# Patient Record
Sex: FEMALE | Race: BLACK OR AFRICAN AMERICAN | NOT HISPANIC OR LATINO | Employment: FULL TIME | URBAN - METROPOLITAN AREA
[De-identification: names, ages, dates, MRNs, and addresses within clinical notes are randomized per-mention and may not be internally consistent; named-entity substitution may affect disease eponyms.]

---

## 2017-02-04 ENCOUNTER — APPOINTMENT (OUTPATIENT)
Dept: GENERAL RADIOLOGY | Facility: CLINIC | Age: 27
End: 2017-02-04
Attending: EMERGENCY MEDICINE

## 2017-02-04 ENCOUNTER — HOSPITAL ENCOUNTER (EMERGENCY)
Facility: CLINIC | Age: 27
Discharge: HOME | End: 2017-02-04
Attending: EMERGENCY MEDICINE | Admitting: EMERGENCY MEDICINE

## 2017-02-04 VITALS
TEMPERATURE: 97.7 F | RESPIRATION RATE: 18 BRPM | BODY MASS INDEX: 29.77 KG/M2 | OXYGEN SATURATION: 100 % | DIASTOLIC BLOOD PRESSURE: 81 MMHG | WEIGHT: 168 LBS | SYSTOLIC BLOOD PRESSURE: 116 MMHG

## 2017-02-04 DIAGNOSIS — T59.91XA TOXIC EFFECT OF GAS EXPOSURE, ACCIDENTAL OR UNINTENTIONAL, INITIAL ENCOUNTER: ICD-10-CM

## 2017-02-04 DIAGNOSIS — R11.2 NON-INTRACTABLE VOMITING WITH NAUSEA, UNSPECIFIED VOMITING TYPE: ICD-10-CM

## 2017-02-04 LAB
ALBUMIN SERPL-MCNC: 3.6 G/DL (ref 3.4–5)
ALP SERPL-CCNC: 44 U/L (ref 40–150)
ALT SERPL W P-5'-P-CCNC: 23 U/L (ref 0–50)
ANION GAP SERPL CALCULATED.3IONS-SCNC: 9 MMOL/L (ref 3–14)
AST SERPL W P-5'-P-CCNC: 24 U/L (ref 0–45)
BASOPHILS # BLD AUTO: 0 10E9/L (ref 0–0.2)
BASOPHILS NFR BLD AUTO: 0.2 %
BILIRUB SERPL-MCNC: 0.5 MG/DL (ref 0.2–1.3)
BUN SERPL-MCNC: 11 MG/DL (ref 7–30)
CALCIUM SERPL-MCNC: 8.1 MG/DL (ref 8.5–10.1)
CHLORIDE SERPL-SCNC: 106 MMOL/L (ref 94–109)
CO2 SERPL-SCNC: 26 MMOL/L (ref 20–32)
COHGB MFR BLD: 3.7 % (ref 0–2)
CREAT SERPL-MCNC: 0.84 MG/DL (ref 0.52–1.04)
DIFFERENTIAL METHOD BLD: ABNORMAL
EOSINOPHIL # BLD AUTO: 0.2 10E9/L (ref 0–0.7)
EOSINOPHIL NFR BLD AUTO: 1.6 %
ERYTHROCYTE [DISTWIDTH] IN BLOOD BY AUTOMATED COUNT: 13.2 % (ref 10–15)
GFR SERPL CREATININE-BSD FRML MDRD: 82 ML/MIN/1.7M2
GLUCOSE SERPL-MCNC: 91 MG/DL (ref 70–99)
HCT VFR BLD AUTO: 41.8 % (ref 35–47)
HGB BLD-MCNC: 13.3 G/DL (ref 11.7–15.7)
IMM GRANULOCYTES # BLD: 0.1 10E9/L (ref 0–0.4)
IMM GRANULOCYTES NFR BLD: 0.4 %
LYMPHOCYTES # BLD AUTO: 3.1 10E9/L (ref 0.8–5.3)
LYMPHOCYTES NFR BLD AUTO: 23 %
MCH RBC QN AUTO: 30.5 PG (ref 26.5–33)
MCHC RBC AUTO-ENTMCNC: 31.8 G/DL (ref 31.5–36.5)
MCV RBC AUTO: 96 FL (ref 78–100)
MONOCYTES # BLD AUTO: 0.9 10E9/L (ref 0–1.3)
MONOCYTES NFR BLD AUTO: 6.6 %
NEUTROPHILS # BLD AUTO: 9.3 10E9/L (ref 1.6–8.3)
NEUTROPHILS NFR BLD AUTO: 68.2 %
NRBC # BLD AUTO: 0 10*3/UL
NRBC BLD AUTO-RTO: 0 /100
PLATELET # BLD AUTO: 210 10E9/L (ref 150–450)
POTASSIUM SERPL-SCNC: 4.2 MMOL/L (ref 3.4–5.3)
PROT SERPL-MCNC: 7.3 G/DL (ref 6.8–8.8)
RBC # BLD AUTO: 4.36 10E12/L (ref 3.8–5.2)
SODIUM SERPL-SCNC: 141 MMOL/L (ref 133–144)
WBC # BLD AUTO: 13.7 10E9/L (ref 4–11)

## 2017-02-04 PROCEDURE — 96361 HYDRATE IV INFUSION ADD-ON: CPT | Performed by: EMERGENCY MEDICINE

## 2017-02-04 PROCEDURE — 99284 EMERGENCY DEPT VISIT MOD MDM: CPT | Mod: Z6 | Performed by: EMERGENCY MEDICINE

## 2017-02-04 PROCEDURE — 99284 EMERGENCY DEPT VISIT MOD MDM: CPT | Mod: 25 | Performed by: EMERGENCY MEDICINE

## 2017-02-04 PROCEDURE — 85025 COMPLETE CBC W/AUTO DIFF WBC: CPT | Performed by: EMERGENCY MEDICINE

## 2017-02-04 PROCEDURE — 96374 THER/PROPH/DIAG INJ IV PUSH: CPT | Performed by: EMERGENCY MEDICINE

## 2017-02-04 PROCEDURE — 80053 COMPREHEN METABOLIC PANEL: CPT | Performed by: EMERGENCY MEDICINE

## 2017-02-04 PROCEDURE — 82375 ASSAY CARBOXYHB QUANT: CPT | Performed by: EMERGENCY MEDICINE

## 2017-02-04 PROCEDURE — 71020 XR CHEST 2 VW: CPT

## 2017-02-04 PROCEDURE — 25000128 H RX IP 250 OP 636: Performed by: EMERGENCY MEDICINE

## 2017-02-04 RX ORDER — LIDOCAINE 40 MG/G
CREAM TOPICAL
Status: DISCONTINUED | OUTPATIENT
Start: 2017-02-04 | End: 2017-02-04 | Stop reason: HOSPADM

## 2017-02-04 RX ORDER — SODIUM CHLORIDE 9 MG/ML
1000 INJECTION, SOLUTION INTRAVENOUS CONTINUOUS
Status: DISCONTINUED | OUTPATIENT
Start: 2017-02-04 | End: 2017-02-04 | Stop reason: HOSPADM

## 2017-02-04 RX ORDER — ONDANSETRON 2 MG/ML
4 INJECTION INTRAMUSCULAR; INTRAVENOUS EVERY 30 MIN PRN
Status: DISCONTINUED | OUTPATIENT
Start: 2017-02-04 | End: 2017-02-04 | Stop reason: HOSPADM

## 2017-02-04 RX ADMIN — SODIUM CHLORIDE 1000 ML: 9 INJECTION, SOLUTION INTRAVENOUS at 01:13

## 2017-02-04 RX ADMIN — ONDANSETRON 4 MG: 2 INJECTION INTRAMUSCULAR; INTRAVENOUS at 01:15

## 2017-02-04 ASSESSMENT — ENCOUNTER SYMPTOMS
ABDOMINAL PAIN: 0
DIZZINESS: 1
LIGHT-HEADEDNESS: 1
SHORTNESS OF BREATH: 1
VOMITING: 1
DIARRHEA: 0
FATIGUE: 1
NAUSEA: 1
FEVER: 0
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
## 2017-02-04 NOTE — ED AVS SNAPSHOT
[facility], [place], Emergency Department    [address]    [facility] [address]    Phone:  [phone]    Fax:  [phone]                                       Chhaya Mock   MRN: [id number]    Department:  [facility], Emergency Department   Date of Visit:  2/4/2017           After Visit Summary Signature Page     I have received my discharge instructions, and my questions have been answered. I have discussed any challenges I see with this plan with the nurse or doctor.    ..........................................................................................................................................  Patient/Patient Representative Signature      ..........................................................................................................................................  Patient Representative Print Name and Relationship to Patient    ..................................................               ................................................  Date                                            Time    ..........................................................................................................................................  Reviewed by Signature/Title    ...................................................              ..............................................  Date                                                            Time
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
## 2017-02-04 NOTE — DISCHARGE INSTRUCTIONS
"   * VOMITING [6yr-Adult]  Vomiting is a common symptom that may be due to different causes. These include gastroenteritis (\"stomach-flu\"), food poisoning and gastritis. There are other more serious causes of vomiting which may be hard to diagnose early in the illness. Therefore, it is important to watch for the warning signs listed below.  The main danger from repeated vomiting is \"dehydration\". This is due to excess loss of water and minerals from the body. When this occurs, body fluids must be replaced.`  HOME CARE:    If symptoms are severe, rest at home for the next 24 hours.    You may use acetaminophen (Tylenol) 650-1000 mg every 6 hours to control fever, unless another medicine was prescribed. [ NOTE : If you have chronic liver disease, talk with your doctor before using acetaminophen.] (Aspirin should never be used in anyone under 18 years of age who is ill with a fever. It may cause severe liver damage.)    Avoid tobacco and alcohol use, which may worsen your symptoms.    If medicines for vomiting were prescribed, take as directed.  DURING THE FIRST 12-24 HOURS follow the diet below. Try to take frequent small sips even if you vomit occasionally:    FRUIT JUICES: Apple, grape juice, clear fruit drinks, electrolyte replacement and sports drinks.    BEVERAGES: Sport drinks such as Gatorade, soft drinks without caffeine; mineral water (plain or flavored), decaffeinated tea and coffee.    SOUPS: Clear broth, consommé and bouillon    DESSERTS: Plain gelatin (Jell-O), popsicles and fruit juice bars.  DURING THE NEXT 24 HOURS you may add the following to the above:    Hot cereal, plain toast, bread, rolls, crackers    Plain noodles, rice, mashed potatoes, chicken noodle or rice soup    Unsweetened canned fruit (avoid pineapple), bananas    Avoid dairy products    Limit caffeine and chocolate. No spices or seasonings except salt.  DURING THE NEXT 24 HOURS  Slowly go back to a normal diet, as you feel better and " your symptoms lessen.  FOLLOW UP with your doctor as advised if you are not improving over the next 2-3 days.  GET PROMPT MEDICAL ATTENTION if any of the following occur:    Constant abdominal pain that stays in the same spot or gets worse    Continued vomiting (unable to keep liquids down) for 24 hours    Frequent diarrhea (more than 5 times a day); blood (red or black color) in diarrhea    No urine output for 12 hours or extreme thirst    Weakness, dizziness or fainting    Unusually drowsy or confused    Fever over 101.0  F (38.3  C) for more than 3 days    Yellow color of the eyes or skin    0979-4163 [facility], [address] [place] [address]. All rights reserved. This information is not intended as a substitute for professional medical care. Always follow your healthcare professional's instructions.

## 2017-02-24 ENCOUNTER — HOSPITAL ENCOUNTER (EMERGENCY)
Facility: CLINIC | Age: 27
Discharge: HOME | End: 2017-02-25
Attending: EMERGENCY MEDICINE | Admitting: EMERGENCY MEDICINE

## 2017-02-24 DIAGNOSIS — L02.412 ABSCESS OF AXILLA, LEFT: ICD-10-CM

## 2017-02-24 PROCEDURE — 25000132 ZZH RX MED GY IP 250 OP 250 PS 637: Performed by: EMERGENCY MEDICINE

## 2017-02-24 PROCEDURE — 99283 EMERGENCY DEPT VISIT LOW MDM: CPT | Mod: 25 | Performed by: EMERGENCY MEDICINE

## 2017-02-24 PROCEDURE — 10061 I&D ABSCESS COMP/MULTIPLE: CPT | Performed by: EMERGENCY MEDICINE

## 2017-02-24 PROCEDURE — 99283 EMERGENCY DEPT VISIT LOW MDM: CPT | Mod: Z6 | Performed by: EMERGENCY MEDICINE

## 2017-02-24 PROCEDURE — 10061 I&D ABSCESS COMP/MULTIPLE: CPT | Mod: Z6 | Performed by: EMERGENCY MEDICINE

## 2017-02-24 RX ORDER — OXYCODONE AND ACETAMINOPHEN 5; 325 MG/1; MG/1
2 TABLET ORAL ONCE
Status: COMPLETED | OUTPATIENT
Start: 2017-02-24 | End: 2017-02-24

## 2017-02-24 RX ADMIN — OXYCODONE HYDROCHLORIDE AND ACETAMINOPHEN 2 TABLET: 5; 325 TABLET ORAL at 23:33

## 2017-02-24 NOTE — ED AVS SNAPSHOT
[facility], Emergency Department    [address]    [place] [address]    Phone:  [phone]    Fax:  [phone]                                       Chhaya Mock   MRN: [id number]    Department:  [facility], Emergency Department   Date of Visit:  2/24/2017           Patient Information     Date Of Birth          1990        Your diagnoses for this visit were:     Abscess of axilla, left        You were seen by Esperanza Luque MD.        Discharge Instructions       Take percocet for pain if needed.  Do not take with tylenol.  You can take it with ibuprofen.      Take clindamycin for 10 days to help treat the infection.     Wound check in 2 days.          Abscess (Incision & Drainage)  An abscess (sometimes called a  boil ) occurs when bacteria get trapped under the skin and begin to grow. Pus forms inside the abscess as the body responds to the bacteria. An abscess can occur with an insect bite, ingrown hair, blocked oil gland, pimple, cyst, or puncture wound.  Treatment of your abscess has required an incision to drain the pus. If the abscess pocket was large, a gauze packing may have been inserted. This will need to be removed and possibly replaced on your next visit. Antibiotics are not required in the treatment of a simple abscess, unless the infection is spreading into the skin around the wound (known as  cellulitis ).  Healing of the wound will take about one to two weeks depending on the size of the abscess. Healthy tissue will grow from the bottom and sides of the opening until it seals over.  Home care  The following home care guidelines can help your wound heal:    The wound may drain for the first two days. Cover the wound with a clean dry dressing. If the dressing becomes soaked with blood or pus, change it.    If a gauze packing was placed inside the abscess cavity, you may be advised to remove it yourself. You may do this in the shower. Once the packing is removed,  you should wash the area in the shower or bath 3 to 4 times a day, until the skin opening has closed. Make sure you wash your hands after changing the packing or cleaning the wound.    If you were prescribed antibiotics, take them as directed until they are all gone.    You may use acetaminophen (Tylenol) or ibuprofen (Motrin, Advil) to control pain, unless another pain medicine was prescribed. If you have liver disease or ever had a stomach ulcer, talk with your doctor before using these medicines.  Follow-up care  Follow up with your doctor as advised by our staff. If a gauze packing was inserted in your wound, it should be removed in 1 to 2 days. Check your wound every day for the signs of worsening infection listed below.  When to seek medical care  Get prompt medical attention if any of the following occur:    Increasing redness or swelling    Red streaks in the skin leading away from the wound    Increasing local pain or swelling    Continued pus draining from the wound two days after treatment    Fever of 100.4 F (38 C) or higher, or as directed by your health care provider    Boil returns when you are at home.    4423-9046 [employer]. [address] [place] [address]. All rights reserved. This information is not intended as a substitute for professional medical care. Always follow your healthcare professional's instructions.          24 Hour Appointment Hotline       To make an appointment at any [facility], call [phone] ([phone]). If you don't have a family doctor or clinic, we will help you find one. [place] clinics are conveniently located to serve the needs of you and your family.             Review of your medicines      START taking        Dose / Directions Last dose taken    clindamycin 300 MG capsule   Commonly known as:  CLEOCIN   Dose:  300 mg   Quantity:  40 capsule        Take 1 capsule (300 mg) by mouth 4 times daily for 10 days   Refills:  0         "oxyCODONE-acetaminophen 5-325 MG per tablet   Commonly known as:  PERCOCET   Dose:  1-2 tablet   Quantity:  20 tablet        Take 1-2 tablets by mouth every 4 hours as needed for moderate to severe pain or pain   Refills:  0          Our records show that you are taking the medicines listed below. If these are incorrect, please call your family doctor or clinic.        Dose / Directions Last dose taken    TYLENOL PO   Dose:  200 mg        Take 200 mg by mouth   Refills:  0                Prescriptions were sent or printed at these locations (2 Prescriptions)                   Other Prescriptions                Printed at Department/Unit printer (2 of 2)         clindamycin (CLEOCIN) 300 MG capsule               oxyCODONE-acetaminophen (PERCOCET) 5-325 MG per tablet                Procedures and tests performed during your visit     Abscess Culture Aerobic Bacterial    Incision and drainage      Orders Needing Specimen Collection     None      Pending Results     Date and Time Order Name Status Description    2/25/2017 0059 Abscess Culture Aerobic Bacterial In process             Pending Culture Results     Date and Time Order Name Status Description    2/25/2017 0059 Abscess Culture Aerobic Bacterial In process             Thank you for choosing [facility]       Thank you for choosing [facility] for your care. Our goal is always to provide you with excellent care. Hearing back from our patients is one way we can continue to improve our services. Please take a few minutes to complete the written survey that you may receive in the mail after you visit with us. Thank you!        [employer] Information     [employer] lets you send messages to your doctor, view your test results, renew your prescriptions, schedule appointments and more. To sign up, go to [email or web address] . Click on \"Log in\" on the left side of the screen, which will take you to the Welcome page. Then click on \"Sign up Now\" on the right side of the page. "     You will be asked to enter the access code listed below, as well as some personal information. Please follow the directions to create your username and password.     Your access code is: [email or web address]  Expires: 2017  2:29 AM     Your access code will  in 90 days. If you need help or a new code, please call your [facility] clinic or [phone].        Care EveryWhere ID     This is your Care EveryWhere ID. This could be used by other organizations to access your [facility] medical records  [id number]        After Visit Summary       This is your record. Keep this with you and show to your community pharmacist(s) and doctor(s) at your next visit.

## 2017-02-24 NOTE — ED AVS SNAPSHOT
[facility], [place], Emergency Department    [address]    [facility] [address]    Phone:  [phone]    Fax:  [phone]                                       Chhaya Mock   MRN: [id number]    Department:  [facility], Emergency Department   Date of Visit:  2/24/2017           After Visit Summary Signature Page     I have received my discharge instructions, and my questions have been answered. I have discussed any challenges I see with this plan with the nurse or doctor.    ..........................................................................................................................................  Patient/Patient Representative Signature      ..........................................................................................................................................  Patient Representative Print Name and Relationship to Patient    ..................................................               ................................................  Date                                            Time    ..........................................................................................................................................  Reviewed by Signature/Title    ...................................................              ..............................................  Date                                                            Time

## 2017-02-25 VITALS
RESPIRATION RATE: 18 BRPM | SYSTOLIC BLOOD PRESSURE: 115 MMHG | DIASTOLIC BLOOD PRESSURE: 86 MMHG | HEART RATE: 83 BPM | TEMPERATURE: 96.2 F | OXYGEN SATURATION: 99 %

## 2017-02-25 PROCEDURE — 87186 SC STD MICRODIL/AGAR DIL: CPT | Performed by: EMERGENCY MEDICINE

## 2017-02-25 PROCEDURE — 87076 CULTURE ANAEROBE IDENT EACH: CPT | Performed by: EMERGENCY MEDICINE

## 2017-02-25 PROCEDURE — 87070 CULTURE OTHR SPECIMN AEROBIC: CPT | Performed by: EMERGENCY MEDICINE

## 2017-02-25 PROCEDURE — 25000132 ZZH RX MED GY IP 250 OP 250 PS 637: Performed by: EMERGENCY MEDICINE

## 2017-02-25 PROCEDURE — 87077 CULTURE AEROBIC IDENTIFY: CPT | Performed by: EMERGENCY MEDICINE

## 2017-02-25 RX ORDER — CLINDAMYCIN HCL 300 MG
300 CAPSULE ORAL 4 TIMES DAILY
Qty: 40 CAPSULE | Refills: 0 | Status: SHIPPED | OUTPATIENT
Start: 2017-02-25 | End: 2017-02-26

## 2017-02-25 RX ORDER — OXYCODONE AND ACETAMINOPHEN 5; 325 MG/1; MG/1
1-2 TABLET ORAL EVERY 4 HOURS PRN
Qty: 20 TABLET | Refills: 0 | Status: SHIPPED | OUTPATIENT
Start: 2017-02-25 | End: 2017-02-27

## 2017-02-25 RX ORDER — IBUPROFEN 600 MG/1
600 TABLET, FILM COATED ORAL ONCE
Status: COMPLETED | OUTPATIENT
Start: 2017-02-25 | End: 2017-02-25

## 2017-02-25 RX ORDER — BUPIVACAINE HYDROCHLORIDE AND EPINEPHRINE 2.5; 5 MG/ML; UG/ML
10 INJECTION, SOLUTION EPIDURAL; INFILTRATION; INTRACAUDAL; PERINEURAL ONCE
Status: DISCONTINUED | OUTPATIENT
Start: 2017-02-25 | End: 2017-02-25 | Stop reason: HOSPADM

## 2017-02-25 RX ADMIN — IBUPROFEN 600 MG: 600 TABLET ORAL at 01:02

## 2017-02-25 ASSESSMENT — ENCOUNTER SYMPTOMS
CHILLS: 0
FEVER: 0
WOUND: 1

## 2017-02-25 NOTE — DISCHARGE INSTRUCTIONS
Take percocet for pain if needed.  Do not take with tylenol.  You can take it with ibuprofen.      Take clindamycin for 10 days to help treat the infection.     Wound check in 2 days.          Abscess (Incision & Drainage)  An abscess (sometimes called a  boil ) occurs when bacteria get trapped under the skin and begin to grow. Pus forms inside the abscess as the body responds to the bacteria. An abscess can occur with an insect bite, ingrown hair, blocked oil gland, pimple, cyst, or puncture wound.  Treatment of your abscess has required an incision to drain the pus. If the abscess pocket was large, a gauze packing may have been inserted. This will need to be removed and possibly replaced on your next visit. Antibiotics are not required in the treatment of a simple abscess, unless the infection is spreading into the skin around the wound (known as  cellulitis ).  Healing of the wound will take about one to two weeks depending on the size of the abscess. Healthy tissue will grow from the bottom and sides of the opening until it seals over.  Home care  The following home care guidelines can help your wound heal:    The wound may drain for the first two days. Cover the wound with a clean dry dressing. If the dressing becomes soaked with blood or pus, change it.    If a gauze packing was placed inside the abscess cavity, you may be advised to remove it yourself. You may do this in the shower. Once the packing is removed, you should wash the area in the shower or bath 3 to 4 times a day, until the skin opening has closed. Make sure you wash your hands after changing the packing or cleaning the wound.    If you were prescribed antibiotics, take them as directed until they are all gone.    You may use acetaminophen (Tylenol) or ibuprofen (Motrin, Advil) to control pain, unless another pain medicine was prescribed. If you have liver disease or ever had a stomach ulcer, talk with your doctor before using these  medicines.  Follow-up care  Follow up with your doctor as advised by our staff. If a gauze packing was inserted in your wound, it should be removed in 1 to 2 days. Check your wound every day for the signs of worsening infection listed below.  When to seek medical care  Get prompt medical attention if any of the following occur:    Increasing redness or swelling    Red streaks in the skin leading away from the wound    Increasing local pain or swelling    Continued pus draining from the wound two days after treatment    Fever of 100.4 F (38 C) or higher, or as directed by your health care provider    Boil returns when you are at home.    9149-5517 [employer]. [address] [place] [address], [place] [address]. All rights reserved. This information is not intended as a substitute for professional medical care. Always follow your healthcare professional's instructions.

## 2017-02-25 NOTE — ED PROVIDER NOTES
History     Chief Complaint   Patient presents with     Abscess     Boil under L armpit Pt noticed 2 days ago.      HPI  Chhaya Mock is a 26 year old female who developed an abscess of her left armpit 2 days ago.  She has had them since the age of 12 so knew what it was.  No hx of diabetes.  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  No f/c.      I have reviewed the Medications, Allergies, Past Medical and Surgical History, and Social History in the Epic system.    Review of Systems   Constitutional: Negative for chills and fever.   Skin: Positive for wound.   All other systems reviewed and are negative.      Physical Exam   BP: 115/86  Pulse: 83  Temp: 96.2  F (35.7  C)  Resp: 18  SpO2: 99 %  Physical Exam   Constitutional: She is oriented to person, place, and time. She appears well-developed and well-nourished. No distress.   HENT:   Head: Normocephalic and atraumatic.   Eyes: EOM are normal.   Neck: Normal range of motion.   Cardiovascular: Normal rate, regular rhythm and normal heart sounds.    Pulmonary/Chest: Effort normal and breath sounds normal.   Musculoskeletal:   Swelling of left axilla.  She has old scars making evaluation more difficult.  Ultrasound probe confirms abscess.    Neurological: She is alert and oriented to person, place, and time.   Skin: Skin is warm and dry. She is not diaphoretic.   Psychiatric: She has a normal mood and affect.   Nursing note and vitals reviewed.      ED Course     ED Course     Incision + drainage  Date/Time: 2/25/2017 1:04 AM  Performed by: TYLOR HOGAN  Authorized by: TYLOR HOGAN   Consent: Verbal consent obtained.  Risks and benefits: risks, benefits and alternatives were discussed  Consent given by: patient  Patient understanding: patient states understanding of the procedure being performed  Patient consent: the patient's understanding of the procedure matches consent given  Patient identity confirmed: verbally with patient  Time  "out: Immediately prior to procedure a \"time out\" was called to verify the correct patient, procedure, equipment, support staff and site/side marked as required.  Type: abscess  Location: left axilla.  Anesthesia: local infiltration    Anesthesia:  Anesthesia: local infiltration  Local Anesthetic: bupivacaine 0.5% with epinephrine   Anesthetic total: 4 mL  Sedation:  Patient sedated: no    Scalpel size: 11  Needle gauge: 18  Incision type: single straight  Incision depth: subcutaneous  Complexity: simple  Drainage: purulent  Drainage amount: moderate  Packing material: wick placed  Patient tolerance: Patient tolerated the procedure well with no immediate complications                 Labs Ordered and Resulted from Time of ED Arrival Up to the Time of Departure from the ED - No data to display    Assessments & Plan (with Medical Decision Making)   Patient has hx of recurrent abscesses and had another left axillary region starting 2 days ago.  She was given percocet for pain. Wound was opened and wick placed.  Used ultrasound to verify location given old scars making it more difficult.  Used needle to guide correct placement.  Tolerated well. No sob after.     I have reviewed the nursing notes.    I have reviewed the findings, diagnosis, plan and need for follow up with the patient.    New Prescriptions    CLINDAMYCIN (CLEOCIN) 300 MG CAPSULE    Take 1 capsule (300 mg) by mouth 4 times daily for 10 days    OXYCODONE-ACETAMINOPHEN (PERCOCET) 5-325 MG PER TABLET    Take 1-2 tablets by mouth every 4 hours as needed for moderate to severe pain or pain       Final diagnoses:   Abscess of axilla, left       2/24/2017   [facility], [place], EMERGENCY DEPARTMENT     Esperanza Luque MD  02/25/17 0107    "

## 2017-02-26 RX ORDER — SULFAMETHOXAZOLE/TRIMETHOPRIM 800-160 MG
1 TABLET ORAL 2 TIMES DAILY
Qty: 20 TABLET | Refills: 0
Start: 2017-02-26 | End: 2017-04-08

## 2017-02-26 NOTE — PROGRESS NOTES
Pharmacy called and stated the clindamycin originally prescribed was not covered by insurance.  Discussed with pharmacist.  Patient not pregnant.  Prescription changed to Septra DS by mouth twice a day for 10 days.    Lance Bennett MD

## 2017-03-04 ENCOUNTER — TELEPHONE (OUTPATIENT)
Dept: EMERGENCY MEDICINE | Facility: CLINIC | Age: 27
End: 2017-03-04

## 2017-03-04 LAB
BACTERIA SPEC CULT: ABNORMAL
Lab: ABNORMAL
MICRO REPORT STATUS: ABNORMAL
MICROORGANISM SPEC CULT: ABNORMAL
SPECIMEN SOURCE: ABNORMAL

## 2017-03-04 NOTE — TELEPHONE ENCOUNTER
[facility] Emergency Department Lab result notification:    [facility] ED lab result protocol used  General culture protocol -  Abscess    Reason for call  Notify of lab results, assess symptoms,  review ED providers recommendations/discharge instructions (if necessary) and advise per ED lab result f/u protocol    Lab Result  Final Abscess culture report on 03/04/2017  [facility] ED discharge antibiotic: Clindamycin (Cleocin) 300 mg PO capsule, Take 1 capsule (300 mg) by mouth 4 times daily for 10 days Rx changed to Septra DS by mouth twice a day for 10 days.  #1. Bacteria, Moderate growth Staphylococcus lugdunensis, which is SUSCEPTIBLE to ED discharge antibiotic.  Incision and Drainage performed in the [facility] ED: Yes  Patient to be notified of result, symptoms's assessed and advised per [facility] ED lab result protocol.  Information table from ED Provider visit on 02/24/2017  Symptoms reported at ED visit (Chief complaint, HPI) a 26 year old female who developed an abscess of her left armpit 2 days ago. She has had them since the age of 12 so knew what it was. No hx of diabetes. 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 No f/c.    ED providers Impression and Plan (applicable information) Patient has hx of recurrent abscesses and had another left axillary region starting 2 days ago. She was given percocet for pain. Wound was opened and wick placed. Used ultrasound to verify location given old scars making it more difficult. Used needle to guide correct placement. Tolerated well. No sob after.    Miscellaneous information Take percocet for pain if needed. Do not take with tylenol. You can take it with ibuprofen.   Take clindamycin for 10 days to help treat the infection.   Wound check in 2 days.      RN Assessment (Patient s current Symptoms), include time called.  [Insert Left message here if message left]  At 1545 pt hung up on RN   At 1546 Left voicemail message requesting a call back  to [phone] between 10 a.m. and 6:30 p.m. for patient's ED/UC lab results.      Tabitha Valdivia, RN  [facility] [employer] RN  Lung Nodule and ED Lab Result F/u RN  Epic pool (ED late result f/u RN): [id number]  FV INCIDENTAL RADIOLOGY F/U NURSES: P [id number]  Ph# [phone]      Copy of Lab result   Exam Information   Exam Date Exam Time Accession # Results    2/25/17  1:03 AM [id number]    Component Results   Component Collected Lab   Specimen Description 02/25/2017  1:03 AM 75   Abscess Left Axilla   Special Requests 02/25/2017  1:03 AM 75   Specimen collected in eSwab transport (blue cap)   Culture Micro (Abnormal) 02/25/2017  1:03    Moderate growth Coagulase negative Staphylococcus Susceptibility testing not    routinely done   Moderate growth Staphylococcus lugdunensis   Moderate growth Gram positive bacilli resembling diphtheroids No further    identification Susceptibility testing not routinely done   On day 3, isolated in broth only: Anaerobic gram negative rods Unable to    completely identify Susceptibility testing not routinely done      Micro Report Status 02/25/2017  1:03    FINAL 03/04/2017   Organism: 02/25/2017  1:03    Moderate growth Staphylococcus lugdunensis   Culture & Susceptibility   MODERATE GROWTH STAPHYLOCOCCUS LUGDUNENSIS (IKE)   Antibiotic Sensitivity Unit Status   CIPROFLOXACIN <=0.5 Susceptible ug/mL Final   CLINDAMYCIN <=0.25 Susceptible ug/mL Final   ERYTHROMYCIN <=0.25 Susceptible ug/mL Final   GENTAMICIN <=0.5 Susceptible ug/mL Final   LEVOFLOXACIN <=0.12 Susceptible ug/mL Final   OXACILLIN 2 Susceptible ug/mL Final   PENICILLIN >1.0 Resistant ug/mL Final   TETRACYCLINE <=1 Susceptible ug/mL Final   Trimethoprim/Sulfa <=.5/9.5 Susceptible ug/mL Final   VANCOMYCIN <=0.5 Susceptible ug/mL Final

## 2017-04-08 ENCOUNTER — HOSPITAL ENCOUNTER (EMERGENCY)
Facility: CLINIC | Age: 27
Discharge: HOME | End: 2017-04-08
Attending: INTERNAL MEDICINE | Admitting: INTERNAL MEDICINE

## 2017-04-08 VITALS
DIASTOLIC BLOOD PRESSURE: 89 MMHG | HEART RATE: 65 BPM | SYSTOLIC BLOOD PRESSURE: 118 MMHG | HEIGHT: 63 IN | TEMPERATURE: 98.1 F | RESPIRATION RATE: 18 BRPM | OXYGEN SATURATION: 100 % | WEIGHT: 161.13 LBS | BODY MASS INDEX: 28.55 KG/M2

## 2017-04-08 DIAGNOSIS — L02.411 ABSCESS OF RIGHT AXILLA: ICD-10-CM

## 2017-04-08 DIAGNOSIS — L02.219 CELLULITIS AND ABSCESS OF TRUNK: ICD-10-CM

## 2017-04-08 DIAGNOSIS — L03.113 CELLULITIS OF RIGHT HAND EXCLUDING FINGERS AND THUMB: ICD-10-CM

## 2017-04-08 DIAGNOSIS — L03.319 CELLULITIS AND ABSCESS OF TRUNK: ICD-10-CM

## 2017-04-08 PROCEDURE — 10060 I&D ABSCESS SIMPLE/SINGLE: CPT | Mod: Z6 | Performed by: INTERNAL MEDICINE

## 2017-04-08 PROCEDURE — 25000132 ZZH RX MED GY IP 250 OP 250 PS 637: Performed by: INTERNAL MEDICINE

## 2017-04-08 PROCEDURE — 99284 EMERGENCY DEPT VISIT MOD MDM: CPT | Mod: Z6 | Performed by: INTERNAL MEDICINE

## 2017-04-08 PROCEDURE — 10060 I&D ABSCESS SIMPLE/SINGLE: CPT | Performed by: INTERNAL MEDICINE

## 2017-04-08 PROCEDURE — 76882 US LMTD JT/FCL EVL NVASC XTR: CPT | Performed by: INTERNAL MEDICINE

## 2017-04-08 PROCEDURE — 25000125 ZZHC RX 250: Performed by: INTERNAL MEDICINE

## 2017-04-08 PROCEDURE — 99284 EMERGENCY DEPT VISIT MOD MDM: CPT | Mod: 25 | Performed by: INTERNAL MEDICINE

## 2017-04-08 RX ORDER — OXYCODONE AND ACETAMINOPHEN 5; 325 MG/1; MG/1
1 TABLET ORAL EVERY 6 HOURS PRN
Qty: 10 TABLET | Refills: 0 | Status: SHIPPED | OUTPATIENT
Start: 2017-04-08 | End: 2017-04-28

## 2017-04-08 RX ORDER — OXYCODONE AND ACETAMINOPHEN 5; 325 MG/1; MG/1
1 TABLET ORAL ONCE
Status: COMPLETED | OUTPATIENT
Start: 2017-04-08 | End: 2017-04-08

## 2017-04-08 RX ORDER — CEPHALEXIN 500 MG/1
500 CAPSULE ORAL 4 TIMES DAILY
Qty: 40 CAPSULE | Refills: 0 | Status: SHIPPED | OUTPATIENT
Start: 2017-04-08 | End: 2017-04-18

## 2017-04-08 RX ORDER — LIDOCAINE HYDROCHLORIDE AND EPINEPHRINE 10; 10 MG/ML; UG/ML
30 INJECTION, SOLUTION INFILTRATION; PERINEURAL ONCE
Status: COMPLETED | OUTPATIENT
Start: 2017-04-08 | End: 2017-04-08

## 2017-04-08 RX ADMIN — LIDOCAINE HYDROCHLORIDE AND EPINEPHRINE 30 ML: 10; 10 INJECTION, SOLUTION INFILTRATION; PERINEURAL at 14:22

## 2017-04-08 RX ADMIN — OXYCODONE HYDROCHLORIDE AND ACETAMINOPHEN 1 TABLET: 5; 325 TABLET ORAL at 15:42

## 2017-04-08 RX ADMIN — OXYCODONE HYDROCHLORIDE AND ACETAMINOPHEN 1 TABLET: 5; 325 TABLET ORAL at 14:24

## 2017-04-08 ASSESSMENT — ENCOUNTER SYMPTOMS
FEVER: 0
CHILLS: 0

## 2017-04-08 NOTE — DISCHARGE INSTRUCTIONS
Abscess (Incision & Drainage)  An abscess (sometimes called a  boil ) occurs when bacteria get trapped under the skin and begin to grow. Pus forms inside the abscess as the body responds to the bacteria. An abscess can occur with an insect bite, ingrown hair, blocked oil gland, pimple, cyst, or puncture wound.  Treatment of your abscess has required an incision to drain the pus. If the abscess pocket was large, a gauze packing may have been inserted. This will need to be removed and possibly replaced on your next visit. Antibiotics are not required in the treatment of a simple abscess, unless the infection is spreading into the skin around the wound (known as  cellulitis ).  Healing of the wound will take about one to two weeks depending on the size of the abscess. Healthy tissue will grow from the bottom and sides of the opening until it seals over.  Home care  The following home care guidelines can help your wound heal:    The wound may drain for the first two days. Cover the wound with a clean dry dressing. If the dressing becomes soaked with blood or pus, change it.    If a gauze packing was placed inside the abscess cavity, you may be advised to remove it yourself. You may do this in the shower. Once the packing is removed, you should wash the area in the shower or bath 3 to 4 times a day, until the skin opening has closed. Make sure you wash your hands after changing the packing or cleaning the wound.    If you were prescribed antibiotics, take them as directed until they are all gone.    You may use acetaminophen (Tylenol) or ibuprofen (Motrin, Advil) to control pain, unless another pain medicine was prescribed. If you have liver disease or ever had a stomach ulcer, talk with your doctor before using these medicines.  Follow-up care  Follow up with your doctor as advised by our staff. If a gauze packing was inserted in your wound, it should be removed in 1 to 2 days. Check your wound every day for the signs  of worsening infection listed below.  When to seek medical care  Get prompt medical attention if any of the following occur:    Increasing redness or swelling    Red streaks in the skin leading away from the wound    Increasing local pain or swelling    Continued pus draining from the wound two days after treatment    Fever of 100.4 F (38 C) or higher, or as directed by your health care provider    Boil returns when you are at home.    3747-2861 [employer]. [address] [place] [address], [place] [address]. All rights reserved. This information is not intended as a substitute for professional medical care. Always follow your healthcare professional's instructions.      Please make an appointment to follow up with Your Primary Care Provider in 3-7 days even if entirely better.

## 2017-04-08 NOTE — ED AVS SNAPSHOT
[facility], Emergency Department    [address]    [place] [address]    Phone:  [phone]    Fax:  [phone]                                       Chhaya Mock   MRN: [id number]    Department:  [facility], Emergency Department   Date of Visit:  4/8/2017           Patient Information     Date Of Birth          1990        Your diagnoses for this visit were:     Cellulitis and abscess of trunk right axilla       You were seen by Rayray Flores MD.        Discharge Instructions         Abscess (Incision & Drainage)  An abscess (sometimes called a  boil ) occurs when bacteria get trapped under the skin and begin to grow. Pus forms inside the abscess as the body responds to the bacteria. An abscess can occur with an insect bite, ingrown hair, blocked oil gland, pimple, cyst, or puncture wound.  Treatment of your abscess has required an incision to drain the pus. If the abscess pocket was large, a gauze packing may have been inserted. This will need to be removed and possibly replaced on your next visit. Antibiotics are not required in the treatment of a simple abscess, unless the infection is spreading into the skin around the wound (known as  cellulitis ).  Healing of the wound will take about one to two weeks depending on the size of the abscess. Healthy tissue will grow from the bottom and sides of the opening until it seals over.  Home care  The following home care guidelines can help your wound heal:    The wound may drain for the first two days. Cover the wound with a clean dry dressing. If the dressing becomes soaked with blood or pus, change it.    If a gauze packing was placed inside the abscess cavity, you may be advised to remove it yourself. You may do this in the shower. Once the packing is removed, you should wash the area in the shower or bath 3 to 4 times a day, until the skin opening has closed. Make sure you wash your hands after changing the packing or  cleaning the wound.    If you were prescribed antibiotics, take them as directed until they are all gone.    You may use acetaminophen (Tylenol) or ibuprofen (Motrin, Advil) to control pain, unless another pain medicine was prescribed. If you have liver disease or ever had a stomach ulcer, talk with your doctor before using these medicines.  Follow-up care  Follow up with your doctor as advised by our staff. If a gauze packing was inserted in your wound, it should be removed in 1 to 2 days. Check your wound every day for the signs of worsening infection listed below.  When to seek medical care  Get prompt medical attention if any of the following occur:    Increasing redness or swelling    Red streaks in the skin leading away from the wound    Increasing local pain or swelling    Continued pus draining from the wound two days after treatment    Fever of 100.4 F (38 C) or higher, or as directed by your health care provider    Boil returns when you are at home.    9803-6066 [employer]. [address] [place] [address]. All rights reserved. This information is not intended as a substitute for professional medical care. Always follow your healthcare professional's instructions.      Please make an appointment to follow up with Your Primary Care Provider in 3-7 days even if entirely better.     24 Hour Appointment Hotline       To make an appointment at any [facility], call [phone] ([phone]). If you don't have a family doctor or clinic, we will help you find one. [facility] clinics are conveniently located to serve the needs of you and your family.             Review of your medicines      START taking        Dose / Directions Last dose taken    cephALEXin 500 MG capsule   Commonly known as:  KEFLEX   Dose:  500 mg   Quantity:  40 capsule        Take 1 capsule (500 mg) by mouth 4 times daily for 10 days   Refills:  0        oxyCODONE-acetaminophen 5-325 MG per tablet   Commonly  "known as:  PERCOCET   Dose:  1 tablet   Quantity:  10 tablet        Take 1 tablet by mouth every 6 hours as needed for moderate to severe pain   Refills:  0                Prescriptions were sent or printed at these locations (2 Prescriptions)                   Other Prescriptions                Printed at Department/Unit printer (2 of 2)         cephALEXin (KEFLEX) 500 MG capsule               oxyCODONE-acetaminophen (PERCOCET) 5-325 MG per tablet                Procedures and tests performed during your visit     POC US SOFT TISSUE      Orders Needing Specimen Collection     None      Pending Results     No orders found from 2017 to 2017.            Pending Culture Results     No orders found from 2017 to 2017.            Thank you for choosing [facility]       Thank you for choosing [facility] for your care. Our goal is always to provide you with excellent care. Hearing back from our patients is one way we can continue to improve our services. Please take a few minutes to complete the written survey that you may receive in the mail after you visit with us. Thank you!        [employer] Information     [employer] lets you send messages to your doctor, view your test results, renew your prescriptions, schedule appointments and more. To sign up, go to [email or web address] . Click on \"Log in\" on the left side of the screen, which will take you to the Welcome page. Then click on \"Sign up Now\" on the right side of the page.     You will be asked to enter the access code listed below, as well as some personal information. Please follow the directions to create your username and password.     Your access code is: [email or web address]  Expires: 2017  3:29 AM     Your access code will  in 90 days. If you need help or a new code, please call your [facility] clinic or [phone].        Care EveryWhere ID     This is your Care EveryWhere ID. This could be used by other organizations to access your [facility] medical " records  [id number]        After Visit Summary       This is your record. Keep this with you and show to your community pharmacist(s) and doctor(s) at your next visit.

## 2017-04-08 NOTE — ED AVS SNAPSHOT
[facility], [place], Emergency Department    [address]    [facility] [address]    Phone:  [phone]    Fax:  [phone]                                       Chhaya Mock   MRN: [id number]    Department:  [facility], Emergency Department   Date of Visit:  4/8/2017           After Visit Summary Signature Page     I have received my discharge instructions, and my questions have been answered. I have discussed any challenges I see with this plan with the nurse or doctor.    ..........................................................................................................................................  Patient/Patient Representative Signature      ..........................................................................................................................................  Patient Representative Print Name and Relationship to Patient    ..................................................               ................................................  Date                                            Time    ..........................................................................................................................................  Reviewed by Signature/Title    ...................................................              ..............................................  Date                                                            Time

## 2017-04-08 NOTE — ED PROVIDER NOTES
History     Chief Complaint   Patient presents with     Wound Infection     Boil under right armpit.     MARLON Mock is an otherwise healthy 26 year old female who presents to the Emergency Department for evaluation of an abscess at right axilla. Patient states that she developed abscesses under bilateral axilla about three days ago. She was able to drain the abscess at left axilla but was unable to do so at right axilla. She states the abscess at her right axilla is very painful, making it difficult to lift her right arm. She denies fever, chills, nausea, or vomiting.     PAST MEDICAL HISTORY  Past Medical History:   Diagnosis Date     Carbuncle and furuncle      Tobacco use disorder     Smokes 1/2 ppd. Started smoking at 14 years     PAST SURGICAL HISTORY  Past Surgical History:   Procedure Laterality Date     C ORAL SURGERY PROCEDURE  10/23/2008    [place] Teeth Extraction     FAMILY HISTORY  Family History   Problem Relation Age of Onset     Hypertension Maternal Grandmother      DIABETES Maternal Uncle      Hypertension Maternal Uncle      Alcohol/Drug Maternal Aunt      Alcohol/Drug Maternal Uncle      Anesthesia Reaction Other      SOCIAL HISTORY  Social History   Substance Use Topics     Smoking status: Current Every Day Smoker     Packs/day: 0.50     Years: 2.00     Types: Cigarettes     Smokeless tobacco: Never Used     Alcohol use Yes      Comment: occassionally     MEDICATIONS  No current facility-administered medications for this encounter.      Current Outpatient Prescriptions   Medication     cephALEXin (KEFLEX) 500 MG capsule     oxyCODONE-acetaminophen (PERCOCET) 5-325 MG per tablet     ALLERGIES  No Known Allergies      I have reviewed the Medications, Allergies, Past Medical and Surgical History, and Social History in the Epic system.    Review of Systems   Constitutional: Negative for chills and fever.   Musculoskeletal:        Positive for pain at Right axilla   All other  "systems reviewed and are negative.      Physical Exam   BP: 105/69  Pulse: 91  Temp: 97.9  F (36.6  C)  Resp: 16  Height: 160 cm (5' 3\")  Weight: 73.1 kg (161 lb 2 oz)  SpO2: 100 %  Physical Exam   Constitutional: No distress.   HENT:   Head: Atraumatic.   Mouth/Throat: Oropharynx is clear and moist. No oropharyngeal exudate.   Eyes: Pupils are equal, round, and reactive to light. No scleral icterus.   Cardiovascular: Normal heart sounds and intact distal pulses.    Pulmonary/Chest: Breath sounds normal. No respiratory distress.   Abdominal: Soft. Bowel sounds are normal. There is no tenderness.   Musculoskeletal: She exhibits no edema or tenderness.   Skin: Skin is warm. Lesion noted. No rash noted. She is not diaphoretic.            ED Course     ED Course     1:58 PM  The patient was seen and examined by Dr. Shields in Room 6.     Incision + drainage  Date/Time: 4/8/2017 4:29 PM  Performed by: CHRIS SHIELDS  Authorized by: CHRIS SHIELDS   Consent: Verbal consent obtained. Written consent not obtained.  Risks and benefits: risks, benefits and alternatives were discussed  Consent given by: patient  Patient understanding: patient states understanding of the procedure being performed  Patient identity confirmed: verbally with patient  Time out: Immediately prior to procedure a \"time out\" was called to verify the correct patient, procedure, equipment, support staff and site/side marked as required.  Type: abscess  Body area: upper extremity  Location details: right arm  Anesthesia: local infiltration    Anesthesia:  Anesthesia: local infiltration  Local Anesthetic: lidocaine 1% with epinephrine   Anesthetic total: 5 mL  Sedation:  Patient sedated: no    Scalpel size: 11  Incision type: single straight  Incision depth: dermal  Complexity: simple  Drainage: purulent  Drainage amount: copious  Wound treatment: wound left open  Packing material: none  Patient tolerance: Patient tolerated the procedure well with no " immediate complications        Results for orders placed during the hospital encounter of 04/08/17   POC US SOFT TISSUE    Impression Limited Soft Tissue Ultrasound, performed and interpreted by me.    Indication:  Skin redness warmth pain. Evaluate for cellulitis vs abscess.     Body location: right upper extremity    Findings:  There is no cobblestoning suggestive of cellulitis in the evaluated area. There is a fluid collection measuring to suggest abscess. No foreign body identified    IMPRESSION: Abscess            Results for orders placed or performed during the hospital encounter of 04/08/17 (from the past 24 hour(s))   POC US SOFT TISSUE     Status: None    Collection Time: 04/08/17  2:02 PM    Impression    Limited Soft Tissue Ultrasound, performed and interpreted by me.    Indication:  Skin redness warmth pain. Evaluate for cellulitis vs abscess.     Body location: right upper extremity    Findings:  There is no cobblestoning suggestive of cellulitis in the evaluated area. There is a fluid collection measuring to suggest abscess. No foreign body identified    IMPRESSION: Abscess               Labs Ordered and Resulted from Time of ED Arrival Up to the Time of Departure from the ED - No data to display    Assessments & Plan (with Medical Decision Making)  Left axillary abscess s/p I and D with relief, large pus out, will DC with keflex and percocet #10, follow up with her PMD for wound check.       I have reviewed the nursing notes.    I have reviewed the findings, diagnosis, plan and need for follow up with the patient.    Discharge Medication List as of 4/8/2017  3:54 PM      START taking these medications    Details   cephALEXin (KEFLEX) 500 MG capsule Take 1 capsule (500 mg) by mouth 4 times daily for 10 days, Disp-40 capsule, R-0, Local Print      oxyCODONE-acetaminophen (PERCOCET) 5-325 MG per tablet Take 1 tablet by mouth every 6 hours as needed for moderate to severe pain, Disp-10 tablet, R-0, Local  Print             Final diagnoses:   Cellulitis and abscess of trunk - right axilla     Namrata PALACIOS, am serving as a trained medical scribe to document services personally performed by Rayray Flores MD, based on the provider's statements to me.   Rayray PALACIOS MD, was physically present and have reviewed and verified the accuracy of this note documented by Namrata Shannon.      4/8/2017   [facility], [place], EMERGENCY DEPARTMENT     Rayray Flores MD  04/08/17 0545

## 2017-04-08 NOTE — LETTER
[facility], [place], EMERGENCY DEPARTMENT  [address]  [facility] [address]  [phone]    2017    Chhaya Mock  [address]  [place] [address]  [phone] (home)     : 1990      To Whom it may concern:    Chhaya Mock was seen in our Emergency Department today, 2017.  I expect her condition to improve over the next 2 days.  She may return to work/school when improved.    Sincerely,        Rayray Flores MD

## 2017-04-28 ENCOUNTER — HOSPITAL ENCOUNTER (EMERGENCY)
Facility: CLINIC | Age: 27
Discharge: HOME | End: 2017-04-28
Attending: EMERGENCY MEDICINE | Admitting: EMERGENCY MEDICINE

## 2017-04-28 VITALS
OXYGEN SATURATION: 99 % | RESPIRATION RATE: 20 BRPM | BODY MASS INDEX: 27.99 KG/M2 | DIASTOLIC BLOOD PRESSURE: 77 MMHG | WEIGHT: 158 LBS | TEMPERATURE: 98.3 F | SYSTOLIC BLOOD PRESSURE: 107 MMHG

## 2017-04-28 DIAGNOSIS — B37.31 YEAST VAGINITIS: ICD-10-CM

## 2017-04-28 DIAGNOSIS — N76.0 ACUTE VAGINITIS: ICD-10-CM

## 2017-04-28 DIAGNOSIS — N89.8 VAGINAL DISCHARGE: ICD-10-CM

## 2017-04-28 LAB
ALBUMIN UR-MCNC: NEGATIVE MG/DL
APPEARANCE UR: ABNORMAL
BILIRUB UR QL STRIP: NEGATIVE
COLOR UR AUTO: YELLOW
GLUCOSE UR STRIP-MCNC: NEGATIVE MG/DL
HCG UR QL: NEGATIVE
HGB UR QL STRIP: ABNORMAL
INTERNAL QC OK POCT: YES
KETONES UR STRIP-MCNC: NEGATIVE MG/DL
LEUKOCYTE ESTERASE UR QL STRIP: NEGATIVE
MICRO REPORT STATUS: NORMAL
MUCOUS THREADS #/AREA URNS LPF: PRESENT /LPF
NITRATE UR QL: NEGATIVE
PH UR STRIP: 7 PH (ref 5–7)
RBC #/AREA URNS AUTO: 2 /HPF (ref 0–2)
SP GR UR STRIP: 1.01 (ref 1–1.03)
SPECIMEN SOURCE: NORMAL
SQUAMOUS #/AREA URNS AUTO: 10 /HPF (ref 0–1)
URN SPEC COLLECT METH UR: ABNORMAL
UROBILINOGEN UR STRIP-MCNC: NORMAL MG/DL (ref 0–2)
WBC #/AREA URNS AUTO: 1 /HPF (ref 0–2)
WET PREP SPEC: NORMAL

## 2017-04-28 PROCEDURE — 87491 CHLMYD TRACH DNA AMP PROBE: CPT | Performed by: EMERGENCY MEDICINE

## 2017-04-28 PROCEDURE — 87591 N.GONORRHOEAE DNA AMP PROB: CPT | Performed by: EMERGENCY MEDICINE

## 2017-04-28 PROCEDURE — 99284 EMERGENCY DEPT VISIT MOD MDM: CPT | Mod: Z6 | Performed by: EMERGENCY MEDICINE

## 2017-04-28 PROCEDURE — 81025 URINE PREGNANCY TEST: CPT | Performed by: EMERGENCY MEDICINE

## 2017-04-28 PROCEDURE — 81001 URINALYSIS AUTO W/SCOPE: CPT | Performed by: EMERGENCY MEDICINE

## 2017-04-28 PROCEDURE — 87210 SMEAR WET MOUNT SALINE/INK: CPT | Performed by: EMERGENCY MEDICINE

## 2017-04-28 PROCEDURE — 99284 EMERGENCY DEPT VISIT MOD MDM: CPT | Performed by: EMERGENCY MEDICINE

## 2017-04-28 RX ORDER — FLUCONAZOLE 150 MG/1
TABLET ORAL
Qty: 2 TABLET | Refills: 0 | Status: SHIPPED | OUTPATIENT
Start: 2017-04-28 | End: 2017-05-01

## 2017-04-28 ASSESSMENT — ENCOUNTER SYMPTOMS
SHORTNESS OF BREATH: 0
FEVER: 0
DIFFICULTY URINATING: 0
HEADACHES: 0
CONFUSION: 0
EYE REDNESS: 0
ARTHRALGIAS: 0
ABDOMINAL PAIN: 0
COLOR CHANGE: 0
NECK STIFFNESS: 0

## 2017-04-28 NOTE — ED AVS SNAPSHOT
[facility], [place], Emergency Department    [address]    [facility] [address]    Phone:  [phone]    Fax:  [phone]                                       Chhaya Mock   MRN: [id number]    Department:  [facility], Emergency Department   Date of Visit:  4/28/2017           After Visit Summary Signature Page     I have received my discharge instructions, and my questions have been answered. I have discussed any challenges I see with this plan with the nurse or doctor.    ..........................................................................................................................................  Patient/Patient Representative Signature      ..........................................................................................................................................  Patient Representative Print Name and Relationship to Patient    ..................................................               ................................................  Date                                            Time    ..........................................................................................................................................  Reviewed by Signature/Title    ...................................................              ..............................................  Date                                                            Time

## 2017-04-28 NOTE — ED PROVIDER NOTES
History     Chief Complaint   Patient presents with     Abdominal Pain     Pain across lower abdomen for 2 days. Having yellow vaginal discharge. LMP 4/12.     [facility]  Chhaya Mock is a 26 year old female who presents to emergency department with a 2 day history of suprapubic abdominal pain and vaginal discharge.  Patient reports she's been having yellow vaginal discharge.  She denies any nausea or vomiting.  She denies any dysuria, urgency, or frequency.  She denies any constipation or diarrhea.  Patient's last menstrual period was approximately 2 weeks ago.  She denies fever.  No previous abdominal surgeries.  The patient is concerned that the vaginal discharge is consistent with previous episodes of bacterial vaginosis or yeast vaginitis.    I have reviewed the Medications, Allergies, Past Medical and Surgical History, and Social History in the Epic system.    Review of Systems   Constitutional: Negative for fever.   HENT: Negative for congestion.    Eyes: Negative for redness.   Respiratory: Negative for shortness of breath.    Cardiovascular: Negative for chest pain.   Gastrointestinal: Negative for abdominal pain.   Genitourinary: Positive for pelvic pain and vaginal discharge. Negative for difficulty urinating.   Musculoskeletal: Negative for arthralgias and neck stiffness.   Skin: Negative for color change.   Neurological: Negative for headaches.   Psychiatric/Behavioral: Negative for confusion.   All other systems reviewed and are negative.      Physical Exam   BP: 105/72  Heart Rate: 87  Temp: 98.9  F (37.2  C)  Resp: 16  Weight: 71.7 kg (158 lb)  SpO2: 98 %  Physical Exam   Constitutional: She appears well-developed and well-nourished. No distress.   HENT:   Head: Normocephalic and atraumatic.   Eyes: Pupils are equal, round, and reactive to light. No scleral icterus.   Neck: Normal range of motion.   Cardiovascular: Normal rate, regular rhythm, normal heart sounds and intact distal pulses.     Pulmonary/Chest: Effort normal and breath sounds normal. No respiratory distress.   Abdominal: Soft. Bowel sounds are normal. There is no tenderness.   Genitourinary: Uterus normal. Cervix exhibits no motion tenderness. Right adnexum displays no mass and no tenderness. Left adnexum displays no mass and no tenderness. Vaginal discharge (white) found.   Musculoskeletal: Normal range of motion. She exhibits no edema or tenderness.   Neurological: She is alert. She has normal strength. Coordination and gait normal.   Skin: Skin is warm. No rash noted. She is not diaphoretic.   Nursing note and vitals reviewed.      ED Course     ED Course     Procedures            Critical Care time:    Results for orders placed or performed during the hospital encounter of 04/28/17   UA with Microscopic reflex to Culture   Result Value Ref Range    Color Urine Yellow     Appearance Urine Slightly Cloudy     Glucose Urine Negative NEG mg/dL    Bilirubin Urine Negative NEG    Ketones Urine Negative NEG mg/dL    Specific Gravity Urine 1.015 1.003 - 1.035    Blood Urine Small (A) NEG    pH Urine 7.0 5.0 - 7.0 pH    Protein Albumin Urine Negative NEG mg/dL    Urobilinogen mg/dL Normal 0.0 - 2.0 mg/dL    Nitrite Urine Negative NEG    Leukocyte Esterase Urine Negative NEG    Source Midstream Urine     WBC Urine 1 0 - 2 /HPF    RBC Urine 2 0 - 2 /HPF    Squamous Epithelial /HPF Urine 10 (H) 0 - 1 /HPF    Mucous Urine Present (A) NEG /LPF   hCG qual urine POCT   Result Value Ref Range    HCG Qual Urine Negative neg    Internal QC OK Yes    Wet prep   Result Value Ref Range    Specimen Description Cervix     Wet Prep       No clue cells seen  No yeast seen  No Trichomonas seen  No PMNs seen      Micro Report Status FINAL 04/28/2017            Assessments & Plan (with Medical Decision Making)   26 year old female to the emergency department with one-day history of vaginal discharge.  The patient does not have abdominal pain nor tenderness.  She  does not have cervical motion tenderness or signs or symptoms concerning for PID on her physical exam.  She does have white vaginal discharge seems most consistent with these vaginitis.  Her wet prep is negative.  GC and chlamydia are currently pending.  The patient will be treated clinically for yeast vaginitis with Diflucan.  Primary care follow-up as needed.  Yeast vaginitis discharge instructions given.    I have reviewed the nursing notes.    I have reviewed the findings, diagnosis, plan and need for follow up with the patient.    New Prescriptions    FLUCONAZOLE (DIFLUCAN) 150 MG TABLET    Take one tablet now, and one tablet in three days       Final diagnoses:   Vaginal discharge   Yeast vaginitis       4/28/2017   [facility], [place], EMERGENCY DEPARTMENT     Prince Dorado MD  04/28/17 3389

## 2017-04-28 NOTE — DISCHARGE INSTRUCTIONS
Vaginal Infection: Yeast (Candidiasis)  Yeast infection occurs when yeast in the vagina increase and start attacking the vaginal tissues. Yeast is a type of fungus. These infections are often caused by a type of yeast called Candida albicans. Other species of yeast can also cause infections. Factors that may make infection more likely include recent antibiotic use, douching, or increased sex. Yeast infections are more common in women who have diabetes, or are obese or pregnant, or have a weak immune system.  Symptoms of yeast infection    Clumpy or thin, white discharge, which may look like cottage cheese    No odor or minimal odor    Severe vaginal itching or burning    Burning with urination    Swelling, redness of vulva    Pain during sex  Treating yeast infection  Yeast infection is treated with a vaginal antifungal cream. In some cases, antifungal pills are prescribed instead. During treatment:    Finish all of your medicine, even if your symptoms go away.    Apply the cream before going to bed. Lie flat after applying so that it doesn't drip out.    Do not douche or use tampons.    Don't rely on a diaphragm or condoms, since the cream may weaken them.    Avoid intercourse if advised by your healthcare provider.     Should I treat a yeast infection myself?  Discuss with your healthcare provider whether you should use over-the-counter medicines to treat a yeast infection. Self-treatment may depend on whether:    You've had a yeast infection in the past.    You're at risk for STDs.  Call your healthcare provider if symptoms do not go away or come back after treatment.     9654-8903 [employer]. [address] [place] [address], [place] [address]. All rights reserved. This information is not intended as a substitute for professional medical care. Always follow your healthcare professional's instructions.      Take diflucan as directed.

## 2017-04-28 NOTE — ED AVS SNAPSHOT
[facility], Emergency Department    [address]    [place] [address]    Phone:  [phone]    Fax:  [phone]                                       Chhaya Mock   MRN: [id number]    Department:  [facility], Emergency Department   Date of Visit:  4/28/2017           Patient Information     Date Of Birth          1990        Your diagnoses for this visit were:     Vaginal discharge     Yeast vaginitis        You were seen by Prince Dorado MD.        Discharge Instructions         Vaginal Infection: Yeast (Candidiasis)  Yeast infection occurs when yeast in the vagina increase and start attacking the vaginal tissues. Yeast is a type of fungus. These infections are often caused by a type of yeast called Candida albicans. Other species of yeast can also cause infections. Factors that may make infection more likely include recent antibiotic use, douching, or increased sex. Yeast infections are more common in women who have diabetes, or are obese or pregnant, or have a weak immune system.  Symptoms of yeast infection    Clumpy or thin, white discharge, which may look like cottage cheese    No odor or minimal odor    Severe vaginal itching or burning    Burning with urination    Swelling, redness of vulva    Pain during sex  Treating yeast infection  Yeast infection is treated with a vaginal antifungal cream. In some cases, antifungal pills are prescribed instead. During treatment:    Finish all of your medicine, even if your symptoms go away.    Apply the cream before going to bed. Lie flat after applying so that it doesn't drip out.    Do not douche or use tampons.    Don't rely on a diaphragm or condoms, since the cream may weaken them.    Avoid intercourse if advised by your healthcare provider.     Should I treat a yeast infection myself?  Discuss with your healthcare provider whether you should use over-the-counter medicines to treat a yeast infection. Self-treatment may depend  on whether:    You've had a yeast infection in the past.    You're at risk for STDs.  Call your healthcare provider if symptoms do not go away or come back after treatment.     0610-0144 [employer]. [address] [place] [address], [place] [address]. All rights reserved. This information is not intended as a substitute for professional medical care. Always follow your healthcare professional's instructions.      Take diflucan as directed.    24 Hour Appointment Hotline       To make an appointment at any [facility] clinic, call [phone] ([phone]). If you don't have a family doctor or clinic, we will help you find one. [facility] clinics are conveniently located to serve the needs of you and your family.             Review of your medicines      START taking        Dose / Directions Last dose taken    fluconazole 150 MG tablet   Commonly known as:  DIFLUCAN   Quantity:  2 tablet        Take one tablet now, and one tablet in three days   Refills:  0                Prescriptions were sent or printed at these locations (1 Prescription)                   Other Prescriptions                Printed at Department/Unit printer (1 of 1)         fluconazole (DIFLUCAN) 150 MG tablet                Procedures and tests performed during your visit     Chlamydia trachomatis PCR    Neisseria gonorrhoea PCR    Prep for procedure - pelvic exam    UA with Microscopic reflex to Culture    Wet prep    hCG qual urine POCT      Orders Needing Specimen Collection     None      Pending Results     Date and Time Order Name Status Description    4/28/2017 1350 Neisseria gonorrhoea PCR In process     4/28/2017 1350 Chlamydia trachomatis PCR In process             Pending Culture Results     Date and Time Order Name Status Description    4/28/2017 1350 Neisseria gonorrhoea PCR In process     4/28/2017 1350 Chlamydia trachomatis PCR In process             Thank you for choosing [facility]       Thank you for choosing [facility] for  "your care. Our goal is always to provide you with excellent care. Hearing back from our patients is one way we can continue to improve our services. Please take a few minutes to complete the written survey that you may receive in the mail after you visit with us. Thank you!        [employer] Information     [employer] lets you send messages to your doctor, view your test results, renew your prescriptions, schedule appointments and more. To sign up, go to [email or web address] . Click on \"Log in\" on the left side of the screen, which will take you to the Welcome page. Then click on \"Sign up Now\" on the right side of the page.     You will be asked to enter the access code listed below, as well as some personal information. Please follow the directions to create your username and password.     Your access code is: [email or web address]  Expires: 2017  3:29 AM     Your access code will  in 90 days. If you need help or a new code, please call your [facility] clinic or [phone].        Care EveryWhere ID     This is your Care EveryWhere ID. This could be used by other organizations to access your [facility] medical records  [id number]        After Visit Summary       This is your record. Keep this with you and show to your community pharmacist(s) and doctor(s) at your next visit.                  "

## 2017-04-30 LAB
C TRACH DNA SPEC QL NAA+PROBE: NORMAL
N GONORRHOEA DNA SPEC QL NAA+PROBE: NORMAL
SPECIMEN SOURCE: NORMAL
SPECIMEN SOURCE: NORMAL

## 2017-05-01 ENCOUNTER — HOSPITAL ENCOUNTER (EMERGENCY)
Facility: CLINIC | Age: 27
Discharge: HOME | End: 2017-05-01
Attending: EMERGENCY MEDICINE | Admitting: EMERGENCY MEDICINE

## 2017-05-01 VITALS
RESPIRATION RATE: 12 BRPM | HEART RATE: 58 BPM | WEIGHT: 163 LBS | OXYGEN SATURATION: 100 % | TEMPERATURE: 98.3 F | SYSTOLIC BLOOD PRESSURE: 111 MMHG | BODY MASS INDEX: 28.87 KG/M2 | DIASTOLIC BLOOD PRESSURE: 79 MMHG

## 2017-05-01 DIAGNOSIS — L02.411 ABSCESS OF AXILLA, RIGHT: ICD-10-CM

## 2017-05-01 DIAGNOSIS — L73.2 HIDRADENITIS SUPPURATIVA: ICD-10-CM

## 2017-05-01 LAB
GRAM STN SPEC: ABNORMAL
MICRO REPORT STATUS: ABNORMAL
SPECIMEN SOURCE: ABNORMAL

## 2017-05-01 PROCEDURE — 76882 US LMTD JT/FCL EVL NVASC XTR: CPT | Performed by: EMERGENCY MEDICINE

## 2017-05-01 PROCEDURE — 87077 CULTURE AEROBIC IDENTIFY: CPT | Performed by: EMERGENCY MEDICINE

## 2017-05-01 PROCEDURE — 99285 EMERGENCY DEPT VISIT HI MDM: CPT | Mod: 25 | Performed by: EMERGENCY MEDICINE

## 2017-05-01 PROCEDURE — 10060 I&D ABSCESS SIMPLE/SINGLE: CPT | Mod: Z6 | Performed by: EMERGENCY MEDICINE

## 2017-05-01 PROCEDURE — 87186 SC STD MICRODIL/AGAR DIL: CPT | Performed by: EMERGENCY MEDICINE

## 2017-05-01 PROCEDURE — 87205 SMEAR GRAM STAIN: CPT | Performed by: EMERGENCY MEDICINE

## 2017-05-01 PROCEDURE — 99156 MOD SED OTH PHYS/QHP 5/>YRS: CPT | Mod: 59 | Performed by: EMERGENCY MEDICINE

## 2017-05-01 PROCEDURE — 96361 HYDRATE IV INFUSION ADD-ON: CPT | Performed by: EMERGENCY MEDICINE

## 2017-05-01 PROCEDURE — 25000128 H RX IP 250 OP 636: Performed by: EMERGENCY MEDICINE

## 2017-05-01 PROCEDURE — 25000125 ZZHC RX 250

## 2017-05-01 PROCEDURE — 87070 CULTURE OTHR SPECIMN AEROBIC: CPT | Performed by: EMERGENCY MEDICINE

## 2017-05-01 PROCEDURE — 76882 US LMTD JT/FCL EVL NVASC XTR: CPT | Mod: 26 | Performed by: EMERGENCY MEDICINE

## 2017-05-01 PROCEDURE — 96375 TX/PRO/DX INJ NEW DRUG ADDON: CPT | Performed by: EMERGENCY MEDICINE

## 2017-05-01 PROCEDURE — 96374 THER/PROPH/DIAG INJ IV PUSH: CPT | Performed by: EMERGENCY MEDICINE

## 2017-05-01 PROCEDURE — 25000128 H RX IP 250 OP 636

## 2017-05-01 PROCEDURE — 10060 I&D ABSCESS SIMPLE/SINGLE: CPT | Performed by: EMERGENCY MEDICINE

## 2017-05-01 RX ORDER — DEXAMETHASONE SODIUM PHOSPHATE 10 MG/ML
INJECTION, SOLUTION INTRAMUSCULAR; INTRAVENOUS
Status: DISPENSED
Start: 2017-05-01 | End: 2017-05-02

## 2017-05-01 RX ORDER — IPRATROPIUM BROMIDE AND ALBUTEROL SULFATE 2.5; .5 MG/3ML; MG/3ML
SOLUTION RESPIRATORY (INHALATION)
Status: DISPENSED
Start: 2017-05-01 | End: 2017-05-02

## 2017-05-01 RX ORDER — OXYCODONE AND ACETAMINOPHEN 5; 325 MG/1; MG/1
1-2 TABLET ORAL EVERY 4 HOURS PRN
Qty: 12 TABLET | Refills: 0 | Status: SHIPPED | OUTPATIENT
Start: 2017-05-01 | End: 2017-09-11

## 2017-05-01 RX ORDER — ONDANSETRON 2 MG/ML
4 INJECTION INTRAMUSCULAR; INTRAVENOUS ONCE
Status: COMPLETED | OUTPATIENT
Start: 2017-05-01 | End: 2017-05-01

## 2017-05-01 RX ORDER — SODIUM CHLORIDE 9 MG/ML
INJECTION, SOLUTION INTRAVENOUS
Status: COMPLETED
Start: 2017-05-01 | End: 2017-05-01

## 2017-05-01 RX ORDER — BUPIVACAINE HYDROCHLORIDE AND EPINEPHRINE 2.5; 5 MG/ML; UG/ML
INJECTION, SOLUTION EPIDURAL; INFILTRATION; INTRACAUDAL; PERINEURAL
Status: COMPLETED
Start: 2017-05-01 | End: 2017-05-01

## 2017-05-01 RX ORDER — KETAMINE HYDROCHLORIDE 10 MG/ML
INJECTION, SOLUTION INTRAMUSCULAR; INTRAVENOUS
Status: COMPLETED
Start: 2017-05-01 | End: 2017-05-01

## 2017-05-01 RX ORDER — FENTANYL CITRATE 50 UG/ML
INJECTION, SOLUTION INTRAMUSCULAR; INTRAVENOUS
Status: COMPLETED
Start: 2017-05-01 | End: 2017-05-01

## 2017-05-01 RX ORDER — SULFAMETHOXAZOLE/TRIMETHOPRIM 800-160 MG
1 TABLET ORAL 2 TIMES DAILY
Qty: 10 TABLET | Refills: 0 | Status: SHIPPED | OUTPATIENT
Start: 2017-05-01 | End: 2017-05-06

## 2017-05-01 RX ORDER — PROPOFOL 10 MG/ML
INJECTION, EMULSION INTRAVENOUS
Status: COMPLETED
Start: 2017-05-01 | End: 2017-05-01

## 2017-05-01 RX ORDER — HYDROMORPHONE HYDROCHLORIDE 1 MG/ML
0.5 INJECTION, SOLUTION INTRAMUSCULAR; INTRAVENOUS; SUBCUTANEOUS ONCE
Status: COMPLETED | OUTPATIENT
Start: 2017-05-01 | End: 2017-05-01

## 2017-05-01 RX ADMIN — KETAMINE HYDROCHLORIDE 80 MG: 10 INJECTION, SOLUTION INTRAMUSCULAR; INTRAVENOUS at 12:12

## 2017-05-01 RX ADMIN — FENTANYL CITRATE 25 MCG: 50 INJECTION INTRAMUSCULAR; INTRAVENOUS at 12:02

## 2017-05-01 RX ADMIN — ONDANSETRON 4 MG: 2 INJECTION INTRAMUSCULAR; INTRAVENOUS at 12:59

## 2017-05-01 RX ADMIN — HYDROMORPHONE HYDROCHLORIDE 0.5 MG: 1 INJECTION, SOLUTION INTRAMUSCULAR; INTRAVENOUS; SUBCUTANEOUS at 13:28

## 2017-05-01 RX ADMIN — PROPOFOL 100 MG/HR: 10 INJECTION, EMULSION INTRAVENOUS at 12:03

## 2017-05-01 RX ADMIN — BUPIVACAINE HYDROCHLORIDE AND EPINEPHRINE BITARTRATE 5 ML: 2.5; .005 INJECTION, SOLUTION EPIDURAL; INFILTRATION; INTRACAUDAL; PERINEURAL at 12:02

## 2017-05-01 RX ADMIN — SODIUM CHLORIDE 1000 ML: 9 INJECTION, SOLUTION INTRAVENOUS at 12:30

## 2017-05-01 ASSESSMENT — ENCOUNTER SYMPTOMS
NAUSEA: 0
FEVER: 0
SHORTNESS OF BREATH: 0
VOMITING: 0

## 2017-05-01 NOTE — ED AVS SNAPSHOT
[facility], [place], Emergency Department    [address]    [facility] [address]    Phone:  [phone]    Fax:  [phone]                                       Chhaya Mock   MRN: [id number]    Department:  [facility], Emergency Department   Date of Visit:  5/1/2017           After Visit Summary Signature Page     I have received my discharge instructions, and my questions have been answered. I have discussed any challenges I see with this plan with the nurse or doctor.    ..........................................................................................................................................  Patient/Patient Representative Signature      ..........................................................................................................................................  Patient Representative Print Name and Relationship to Patient    ..................................................               ................................................  Date                                            Time    ..........................................................................................................................................  Reviewed by Signature/Title    ...................................................              ..............................................  Date                                                            Time

## 2017-05-01 NOTE — ED PROVIDER NOTES
"  History     Chief Complaint   Patient presents with     Wound Infection     R axillary abscess     HPI  Chhaya Mock is a 26 year old  female who presents for evaluation of a right axillary cyst. Patient complains of a cyst in her right axilla that began forming yesterday with associated severe pain when attempting to move her right arm. She states she frequently gets boils and has gotten them on numerous areas of her body in the past including her chest, between her legs, and on her backside. She denies fever. She states she was seen and evaluated here in the emergency department a couple of weeks ago (per chart review on 04/08/17) for the same issue at which time the cyst was drained however she feels \"it wasn't done right and just came back\". She describes severe pain in her armpit and states it hurts to even lift up her arm.     Per chart review, when patient was seen and evaluated on 04/08/17 the abscess was I&D'd with a large output of pus. She was discharged on a course of Keflex as well as given ten tablets of Percocet for her pain. She was instructed to follow up with her primary care provider for wound check.   Social: smokes 1/2 ppd, denies illicit drug use    I have reviewed the Medications, Allergies, Past Medical and Surgical History, and Social History in the [facility] system.  Past Medical History:   Diagnosis Date     Carbuncle and furuncle      Tobacco use disorder     Smokes 1/2 ppd. Started smoking at 14 years       Past Surgical History:   Procedure Laterality Date     C ORAL SURGERY PROCEDURE  10/23/2008    [place] Teeth Extraction       Family History   Problem Relation Age of Onset     Hypertension Maternal Grandmother      DIABETES Maternal Uncle      Hypertension Maternal Uncle      Alcohol/Drug Maternal Aunt      Alcohol/Drug Maternal Uncle      Anesthesia Reaction Other        Social History   Substance Use Topics     Smoking status: Current Every Day Smoker     " Packs/day: 0.50     Years: 2.00     Types: Cigarettes     Smokeless tobacco: Never Used     Alcohol use Yes      Comment: occassionally     Current Facility-Administered Medications   Medication     NaCl 0.9 % infusion     Current Outpatient Prescriptions   Medication     sulfamethoxazole-trimethoprim (BACTRIM DS) 800-160 MG per tablet     oxyCODONE-acetaminophen (PERCOCET) 5-325 MG per tablet     fluconazole (DIFLUCAN) 150 MG tablet        Allergies   Allergen Reactions     No Known Allergies        Review of Systems   Constitutional: Negative for fever.   Respiratory: Negative for shortness of breath.    Cardiovascular: Negative for chest pain.   Gastrointestinal: Negative for nausea and vomiting.   Skin:        Positive for right axillary abscess   All other systems reviewed and are negative.      Physical Exam   BP: 110/71  Heart Rate: 85  Temp: 98.4  F (36.9  C)  Resp: 18  Weight: 73.9 kg (163 lb)  SpO2: 99 %  Physical Exam  Physical Exam   Constitutional:   well nourished, well developed, appears uncomfortable  HENT:   Head: Normocephalic and atraumatic.   Neck:   no adenopathy, no bony tenderness  Axillary: large and fluctuant abscess with loculations beneath the right axilla, no erythema  Cardiovascular: regular rate and rhythm without murmurs or gallops  Pulmonary/Chest: Clear to auscultation bilaterally, with no wheezes or retractions. No respiratory distress.  GI: Soft with good bowel sounds.  Non-tender, non-distended, with no guarding, no rebound, no peritoneal signs.   Musculoskeletal:  no edema or clubbing   Skin: Skin is warm and dry. No rash noted.   Neurological: alert and oriented to person, place, and time. Nonfocal exam  Psychiatric:  normal mood and affect.   ED Course     ED Course     Procedures  Results for orders placed during the hospital encounter of 05/01/17   POC US SOFT TISSUE    Impression Limited Soft Tissue Ultrasound, performed and interpreted by me.    Indication:  Pain and  swelling. Evaluate for cellulitis vs abscess.     Body location: right axilla    Findings: There is a fluid collection measuring 7 cm by 9.5cm with loculations to suggest abscess.      IMPRESSION: Abscess             10:58 AM  The patient was seen and examined by Dr. Blackwlel in Room 7.     Procedure: Incision and Drainage   LOCATIONS:  Right axilla     ANESTHESIA:  Local field block using Marcaine 0.25% with epinephrine, total of 5 mLs     PREPARATION:  Cleansed with Shur Clens and alcohol wipes     PROCEDURE:  Area was incised with # 11 Blade ([place]) with a Single Straight incision.  Wound treatment included Purulent Drainage and Expression of Material and well as using a Jackie clamp to remove loculations.  Packing consisted of Plain Gauze.  Appropriate dressing was applied to cover the area.    Patient Status:        Patient tolerated the procedure moderately well. There were no complications.  Incision and drainage was done under procedural sedation using propofol, fentanyl, and ketamine, please see Dr. Fink's documentation on this procedure.               Critical Care time:  none               Labs Ordered and Resulted from Time of ED Arrival Up to the Time of Departure from the ED   WOUND CULTURE AEROBIC BACTERIAL   GRAM STAIN            Assessments & Plan (with Medical Decision Making)         I have reviewed the nursing notes.  Emergency Department course:  The patient was seen and examined at 1058 am.  The patient has a large, fluctuant, loculated abscess beneath her right axilla.  She was unable to tolerate even the pressure of the ultrasound.  Risks and benefits of doing an incision and drainage under procedural sedation were discussed with the patient and she would prefer this.    Procedure note: Incision and drainage done under procedural sedation.  Procedural sedation included propofol, fentanyl, and ketamine. Dr. Fink performed the sedation.  Please see his note regarding this.  Incision and  draining is as noted above with large amounts of purulent drainage and pus.  The wound was then packed.  Wound culture was obtained.  I suspect the patient has hidradenitis supperativa, and this wound abscess may recur..   The patient was discharged on on Bactrim double strength.  I also prescribed 10 Percocet for pain.  She should follow-up with surgery clinic and call tomorrow for an appointment.  She should not drive for the remainder of today.  She will be under the effects of sedation for several hours.  She should not drive while taking Percocet.  She can try ibuprofen, which is much more effective for pain.    The patient was observed in the ED for several hours after the procedure.  She received Dilaudid IV for pain.  I believe her abscesses are not simple abscesses but rather hidradenitis supperativa abscesses.  She should follow-up with both surgery and dermatology.  She should not drive for today.  I counseled the patient in my usual and standard fashion for abscess and reasons to return to the Emergency Department.        I have reviewed the findings, diagnosis, plan and need for follow up with the patient.    New Prescriptions    OXYCODONE-ACETAMINOPHEN (PERCOCET) 5-325 MG PER TABLET    Take 1-2 tablets by mouth every 4 hours as needed for pain    SULFAMETHOXAZOLE-TRIMETHOPRIM (BACTRIM DS) 800-160 MG PER TABLET    Take 1 tablet by mouth 2 times daily for 5 days       Final diagnoses:   Abscess of axilla, right   Hidradenitis suppurativa   IMl, am serving as a trained medical scribe to document services personally performed by Deyanira Blackwell MD, based on the provider's statements to me.   IDeyanira MD, was physically present and have reviewed and verified the accuracy of this note documented by Ml Chang.  This note was created in part by the use of Dragon voice recognition dictation system. Inadvertent grammatical errors and typographical errors may still exist.  Deyanira  MD Rosalva      5/1/2017   [facility], [place], EMERGENCY DEPARTMENT     Deyanira Blackwell MD  05/01/17 9721

## 2017-05-01 NOTE — DISCHARGE INSTRUCTIONS
Please make an appointment to follow up with Surgery (General) (phone: [phone]) in 7 days.  You most likely have hidradenitis supperativa in these wounds and abscesses will recur.  This was a very large abscess and you will probably need definitive treatment by surgery to take care of it.  You have a packing in place.  This should be removed in one to 2 days.  Return for signs of infection.   Do not drive when taking Percocet.  Do not drive for the remainder of today and tonight.  Ibuprofen 600 mg every 6 hours as needed is far more effective pain relief than Percocet.  Please make an appointment to follow up with Dermatology Clinic (phone: [phone]) in 7-14 days.

## 2017-05-01 NOTE — ED NOTES
12:23 PM  Sedation:   Performed by: Chely Fink  Authorized by: Chely Fink    Pre-Procedure Assessment done at 1130.    Expected Level:  Moderate Sedation    Indication:  Sedation is required to allow for Incision and drainage of abcess    Consent obtained from patient after discussing the risks, benefits and alternatives.    PO Intake:  Appropriately NPO for procedure    ASA Class:  Class 1 - HEALTHY PATIENT    Mallampati:  Grade 1:  Soft palate, uvula, tonsillar pillars, and posterior pharyngeal wall visible    Lungs: Lungs Clear with good breath sounds bilaterally.     Heart: Normal heart sounds and rate    History and physical reviewed and no updates needed. I have reviewed the lab findings, diagnostic data, medications, and the plan for sedation. I have determined this patient to be an appropriate candidate for the planned sedation and procedure and have reassessed the patient IMMEDIATELY PRIOR to sedation and procedure.      Sedation Post Procedure Summary:    Prior to the start of the procedure and with procedural staff participation, I verbally confirmed the patient s identity using two indicators, relevant allergies, that the procedure was appropriate and matched the consent or emergent situation, and that the correct equipment/implants were available. Immediately prior to starting the procedure I conducted the Time Out with the procedural staff and re-confirmed the patient s name, procedure, and site/side. (The Joint Commission universal protocol was followed.)  Yes      Sedatives: Fentanyl, Propofol and Ketamine    Vital signs, airway, End Tidal CO2 and pulse oximetry were monitored and remained stable throughout the procedure and sedation was maintained until the procedure was complete.  The patient was monitored by staff until sedation discharge criteria were met.    Patient tolerance: Patient tolerated the procedure well with no immediate complications.    Time of sedation in minutes:  20 minutes  from beginning to end of physician one to one monitoring.           Chely Fink MD  05/01/17 4922

## 2017-05-01 NOTE — ED AVS SNAPSHOT
[facility], Emergency Department    [address]    [place] [address]    Phone:  [phone]    Fax:  [phone]                                       Chhaya Mock   MRN: [id number]    Department:  [facility], Emergency Department   Date of Visit:  5/1/2017           Patient Information     Date Of Birth          1990        Your diagnoses for this visit were:     Abscess of axilla, right     Hidradenitis suppurativa        You were seen by Deyanira Blackwell MD.        Discharge Instructions       Please make an appointment to follow up with Surgery (General) (phone: [phone]) in 7 days.  You most likely have hidradenitis supperativa in these wounds and abscesses will recur.  This was a very large abscess and you will probably need definitive treatment by surgery to take care of it.  You have a packing in place.  This should be removed in one to 2 days.  Return for signs of infection.   Do not drive when taking Percocet.  Do not drive for the remainder of today and tonight.  Ibuprofen 600 mg every 6 hours as needed is far more effective pain relief than Percocet.  Please make an appointment to follow up with Dermatology Clinic (phone: [phone]) in 7-14 days.     Discharge References/Attachments     ABSCESS, INCISION AND DRAINAGE (ENGLISH)    HIDRADENITIS SUPPURATIVA, INCISION AND DRAINAGE (ENGLISH)      24 Hour Appointment Hotline       To make an appointment at any [facility], call [phone] ([phone]). If you don't have a family doctor or clinic, we will help you find one. [facility] clinics are conveniently located to serve the needs of you and your family.             Review of your medicines      START taking        Dose / Directions Last dose taken    oxyCODONE-acetaminophen 5-325 MG per tablet   Commonly known as:  PERCOCET   Dose:  1-2 tablet   Quantity:  12 tablet        Take 1-2 tablets by mouth every 4 hours as needed for pain   Refills:  0         sulfamethoxazole-trimethoprim 800-160 MG per tablet   Commonly known as:  BACTRIM DS   Dose:  1 tablet   Quantity:  10 tablet        Take 1 tablet by mouth 2 times daily for 5 days   Refills:  0          Our records show that you are taking the medicines listed below. If these are incorrect, please call your family doctor or clinic.        Dose / Directions Last dose taken    fluconazole 150 MG tablet   Commonly known as:  DIFLUCAN   Quantity:  2 tablet        Take one tablet now, and one tablet in three days   Refills:  0                Prescriptions were sent or printed at these locations (2 Prescriptions)                   Other Prescriptions                Printed at Department/Unit printer (2 of 2)         sulfamethoxazole-trimethoprim (BACTRIM DS) 800-160 MG per tablet               oxyCODONE-acetaminophen (PERCOCET) 5-325 MG per tablet                Procedures and tests performed during your visit     Gram stain    POC US SOFT TISSUE    Wound Culture Aerobic Bacterial      Orders Needing Specimen Collection     None      Pending Results     Date and Time Order Name Status Description    5/1/2017 1123 Gram stain In process     5/1/2017 1123 Wound Culture Aerobic Bacterial In process             Pending Culture Results     Date and Time Order Name Status Description    5/1/2017 1123 Gram stain In process     5/1/2017 1123 Wound Culture Aerobic Bacterial In process             Thank you for choosing [facility]       Thank you for choosing [facility] for your care. Our goal is always to provide you with excellent care. Hearing back from our patients is one way we can continue to improve our services. Please take a few minutes to complete the written survey that you may receive in the mail after you visit with us. Thank you!        [employer] Information     [employer] lets you send messages to your doctor, view your test results, renew your prescriptions, schedule appointments and more. To sign up, go to  [email or web address] . Click on \"Log in\" on the left side of the screen, which will take you to the Welcome page. Then click on \"Sign up Now\" on the right side of the page.     You will be asked to enter the access code listed below, as well as some personal information. Please follow the directions to create your username and password.     Your access code is: [email or web address]  Expires: 2017  3:29 AM     Your access code will  in 90 days. If you need help or a new code, please call your [facility] clinic or [phone].        Care EveryWhere ID     This is your Care EveryWhere ID. This could be used by other organizations to access your [facility] medical records  [id number]        After Visit Summary       This is your record. Keep this with you and show to your community pharmacist(s) and doctor(s) at your next visit.                  "

## 2017-05-01 NOTE — LETTER
[facility], [place], EMERGENCY DEPARTMENT  [address]  [facility] [address]  [phone]    May 1, 2017    Chhaya Mock  [address]  [place] [address]  [phone] (home)     : 1990      To Whom it may concern:    Chhaya Mock was seen in our Emergency Department today, May 1, 2017.  Please excuse her from work until May 3.    Sincerely,        Deyanira Blackwell MD

## 2017-05-04 ENCOUNTER — TELEPHONE (OUTPATIENT)
Dept: EMERGENCY MEDICINE | Facility: CLINIC | Age: 27
End: 2017-05-04

## 2017-05-04 LAB
BACTERIA SPEC CULT: ABNORMAL
MICRO REPORT STATUS: ABNORMAL
MICROORGANISM SPEC CULT: ABNORMAL
SPECIMEN SOURCE: ABNORMAL

## 2017-05-04 NOTE — TELEPHONE ENCOUNTER
"[facility] Emergency Department Lab result notification:    [facility] ED lab result protocol used  Wound culture    Reason for call  Notify of lab results, assess symptoms,  review ED providers recommendations/discharge instructions (if necessary) and advise per ED lab result f/u protocol    Lab Result  Final Wound culture report on 5/4/17  [facility] ED discharge antibiotic: Sulfamethoxazole-Trimethoprim (Bactrim DS, Septra DS) 800-160 mg PO tablet, 1 tablet by mouth 2 times daily for 5 days  #1. Bacteria, Light growth Staphylococcus lugdunensis, which is [SUSCEPTIBLE] to ED discharge antibiotic - Bactrim  Incision and Drainage performed in [facility] ED [Yes / No]: Yes  Patient to be notified of result, symptoms's assessed and advised per [facility] ED lab result protocol.  Information table from ED Provider visit on 5/2/17  Symptoms reported at ED visit (Chief complaint, HPI)   Wound Infection       R axillary abscess      HPI  Marielosle Katelin Mock is a 26 year old  female who presents for evaluation of a right axillary cyst. Patient complains of a cyst in her right axilla that began forming yesterday with associated severe pain when attempting to move her right arm. She states she frequently gets boils and has gotten them on numerous areas of her body in the past including her chest, between her legs, and on her backside. She denies fever. She states she was seen and evaluated here in the emergency department a couple of weeks ago (per chart review on 04/08/17) for the same issue at which time the cyst was drained however she feels \"it wasn't done right and just came back\". She describes severe pain in her armpit and states it hurts to even lift up her arm.      Per chart review, when patient was seen and evaluated on 04/08/17 the abscess was I&D'd with a large output of pus. She was discharged on a course of Keflex as well as given ten tablets of Percocet for her pain. She was instructed to " "follow up with her primary care provider for wound check.   Social: smokes 1/2 ppd, denies illicit drug use     ED providers Impression and Plan (applicable information) Incision and draining is as noted above with large amounts of purulent drainage and pus. The wound was then packed.  Wound culture was obtained.  I suspect the patient has hidradenitis supperativa, and this wound abscess may recur..   The patient was discharged on on Bactrim double strength. I also prescribed 10 Percocet for pain. She should follow-up with surgery clinic and call tomorrow for an appointment.  She should not drive for the remainder of today. She will be under the effects of sedation for several hours. She should not drive while taking Percocet. She can try ibuprofen, which is much more effective for pain.     The patient was observed in the ED for several hours after the procedure. She received Dilaudid IV for pain. I believe her abscesses are not simple abscesses but rather hidradenitis supperativa abscesses. She should follow-up with both surgery and dermatology. She should not drive for today.  I counseled the patient in my usual and standard fashion for abscess and reasons to return to the Emergency Department.        Miscellaneous information      RN Assessment (Patient s current Symptoms), include time called.  [Insert Left message here if message left]  4:10pm Patient said, \"My packing came out last night in the shower and that area is much better.\"    RN Recommendations/Instructions per [facility] ED lab result protocol  Advised to finish full course of antibiotics as prescribed and drink plenty of fluids.  And follow up with your PCP as the ED provider recommended. Patient voices understanding and is in agreement with this plan.     Please Contact your PCP clinic or return to the Emergency department if your:    Symptoms return.    Symptoms do not improve after 3 days on antibiotic.    Symptoms do not resolve after completing " antibiotic.    Symptoms worsen or other concerning symptom's.    PCP follow-up Questions asked: YES       [RN Name]  Brittney Mcdonnell RN  [employer] RN  Lung Nodule and ED Lab Result F/u RN  Epic pool (ED late result f/u RN): P [id number]  # [phone]

## 2017-05-31 NOTE — PROGRESS NOTES
SUBJECTIVE:     CC: Chhaya Mock is an 26 year old woman who presents for preventive health visit.     Healthy Habits:    Do you get at least three servings of calcium containing foods daily (dairy, green leafy vegetables, etc.)? yes    Amount of exercise or daily activities, outside of work: 3 day(s) over the last 2 weeks     Problems taking medications regularly not applicable    Medication side effects: No    Have you had an eye exam in the past two years? no    Do you see a dentist twice per year? no    Do you have sleep apnea, excessive snoring or daytime drowsiness?no    Other questions or concerns:  Lesions on armpit, groin and chest ongoing many years  Fertility issues- trying for a year to get pregnant    Has previously been diagnosed with hidradenitis suppurativa. Has had lesions lanced many times in the ER, and has a lot of scar tissues in axilla and groin area. Has been on oral antibiotics many times, and has tried a longer course of bactrim to see if they could be prevented. Feels very embarrassed and scared about having these lesions. Hard to be intimate with partner, wear revealing clothes. Ongoing since age 12.     Has a boyfriend of 1.5 years, and they are very eager to have kids. He has had fertility testing/sperm count performed. Her periods are regular, every 28 days, no excessive cramping. Has been recently tested for STIs, all negative. Has never been pregnant. Eats a healthy diet, no excessive sugars, and exercises regularly. They have sex generally at least 3x per week, more if she knows she is ovulating. No FH of fertility issues.  -------------------------------------    Today's PHQ-2 Score:   PHQ-2 ( 1999 Pfizer) 6/2/2017   Q1: Little interest or pleasure in doing things 0   Q2: Feeling down, depressed or hopeless 0   PHQ-2 Score 0       Abuse: Current or Past(Physical, Sexual or Emotional)- No  Do you feel safe in your environment - Yes    Social History   Substance Use  Topics     Smoking status: Current Every Day Smoker     Packs/day: 0.50     Years: 2.00     Types: Cigarettes     Smokeless tobacco: Never Used     Alcohol use Yes      Comment: occassionally     The patient does not drink >3 drinks per day nor >7 drinks per week.    No results for input(s): CHOL, HDL, LDL, TRIG, CHOLHDLRATIO, NHDL in the last 42008 hours.    Reviewed orders with patient.  Reviewed health maintenance and updated orders accordingly - Yes    Mammo Decision Support:  Mammogram not appropriate for this patient based on age.    Pertinent mammograms are reviewed under the imaging tab.  History of abnormal Pap smear: NO - age 21-29 PAP every 3 years recommended    Reviewed and updated as needed this visit by clinical staff  Allergies  Meds         Reviewed and updated as needed this visit by Provider            ROS:  C: NEGATIVE for fever, chills, change in weight  E: NEGATIVE for vision changes or irritation  ENT: NEGATIVE for ear, mouth and throat problems  R: NEGATIVE for significant cough or SOB  B: NEGATIVE for masses, tenderness or discharge  CV: NEGATIVE for chest pain, palpitations or peripheral edema  GI: NEGATIVE for nausea, abdominal pain, heartburn, or change in bowel habits  : NEGATIVE for unusual urinary or vaginal symptoms. Periods are regular.  M: NEGATIVE for significant arthralgias or myalgia  N: NEGATIVE for weakness, dizziness or paresthesias  P: NEGATIVE for changes in mood or affect    Problem list, Medication list, Allergies, and Medical/Social/Surgical histories reviewed in [facility] and updated as appropriate.  Labs reviewed in EPIC  OBJECTIVE:     /74 (BP Location: Right arm, Patient Position: Chair, Cuff Size: Adult Regular)  Pulse 100  Temp 97.7  F (36.5  C) (Oral)  Wt 158 lb 8 oz (71.9 kg)  SpO2 97%  BMI 28.08 kg/m2  EXAM:  GENERAL: healthy, alert and no distress  NECK: no adenopathy, no asymmetry, masses, or scars and thyroid normal to palpation  RESP: lungs clear to  "auscultation - no rales, rhonchi or wheezes  BREAST: normal without masses, tenderness or nipple discharge and no palpable axillary masses or adenopathy  CV: regular rate and rhythm, normal S1 S2, no S3 or S4, no murmur, click or rub, no peripheral edema and peripheral pulses strong  ABDOMEN: soft, nontender, no hepatosplenomegaly, no masses and bowel sounds normal   (female): normal female external genitalia, normal urethral meatus, vaginal mucosa, normal cervix/adnexa/uterus without masses or discharge  MS: no gross musculoskeletal defects noted, no edema  SKIN: multiple pustules and scarring present in both axilla, and both inner thighs, and between breasts on chest. No signs of acute infection.  NEURO: Normal strength and tone, mentation intact and speech normal  PSYCH: mentation appears normal, affect normal/bright    ASSESSMENT/PLAN:         ICD-10-CM    1. Female fertility problem N97.9 TSH with free T4 reflex     Hemoglobin A1c     Comprehensive metabolic panel   2. Routine general medical examination at a health care facility Z00.00 CARE COORDINATION REFERRAL   3. Screening for cervical cancer Z12.4 PAP imaged thin layer screen reflex to HPV if ASCUS - recommended age 25 - 29 years   4. Vaginal discharge N89.8 Wet prep   5. Hidradenitis suppurativa L73.2 clindamycin (CLEOCIN) 300 MG capsule     DERMATOLOGY REFERRAL       COUNSELING:   Reviewed preventive health counseling, as reflected in patient instructions       Regular exercise       Healthy diet/nutrition       Family planning         reports that she has been smoking Cigarettes.  She has a 1.00 pack-year smoking history. She has never used smokeless tobacco.    Estimated body mass index is 28.08 kg/(m^2) as calculated from the following:    Height as of 4/8/17: 5' 3\" (1.6 m).    Weight as of this encounter: 158 lb 8 oz (71.9 kg).       Counseling Resources:  ATP IV Guidelines  Pooled Cohorts Equation Calculator  Breast Cancer Risk Calculator  FRAX " Risk Assessment  ICSI Preventive Guidelines  Dietary Guidelines for Americans, 2010  USDA's MyPlate  ASA Prophylaxis  Lung CA Screening    MARIOLA Lowe CNP  [facility] [place]

## 2017-06-02 ENCOUNTER — CARE COORDINATION (OUTPATIENT)
Dept: CARE COORDINATION | Facility: CLINIC | Age: 27
End: 2017-06-02

## 2017-06-02 ENCOUNTER — OFFICE VISIT (OUTPATIENT)
Dept: FAMILY MEDICINE | Facility: CLINIC | Age: 27
End: 2017-06-02

## 2017-06-02 VITALS
TEMPERATURE: 97.7 F | OXYGEN SATURATION: 97 % | SYSTOLIC BLOOD PRESSURE: 109 MMHG | BODY MASS INDEX: 28.08 KG/M2 | WEIGHT: 158.5 LBS | HEART RATE: 100 BPM | DIASTOLIC BLOOD PRESSURE: 74 MMHG

## 2017-06-02 DIAGNOSIS — N97.9 FEMALE FERTILITY PROBLEM: ICD-10-CM

## 2017-06-02 DIAGNOSIS — B96.89 BACTERIAL VAGINOSIS: ICD-10-CM

## 2017-06-02 DIAGNOSIS — N76.0 BACTERIAL VAGINOSIS: ICD-10-CM

## 2017-06-02 DIAGNOSIS — L73.2 HIDRADENITIS SUPPURATIVA: ICD-10-CM

## 2017-06-02 DIAGNOSIS — N89.8 VAGINAL DISCHARGE: ICD-10-CM

## 2017-06-02 DIAGNOSIS — Z00.00 ROUTINE GENERAL MEDICAL EXAMINATION AT A HEALTH CARE FACILITY: Primary | ICD-10-CM

## 2017-06-02 DIAGNOSIS — Z12.4 SCREENING FOR CERVICAL CANCER: ICD-10-CM

## 2017-06-02 LAB
ALBUMIN SERPL-MCNC: 4.1 G/DL (ref 3.4–5)
ALP SERPL-CCNC: 53 U/L (ref 40–150)
ALT SERPL W P-5'-P-CCNC: 14 U/L (ref 0–50)
ANION GAP SERPL CALCULATED.3IONS-SCNC: 10 MMOL/L (ref 3–14)
AST SERPL W P-5'-P-CCNC: 9 U/L (ref 0–45)
BILIRUB SERPL-MCNC: 0.4 MG/DL (ref 0.2–1.3)
BUN SERPL-MCNC: 10 MG/DL (ref 7–30)
CALCIUM SERPL-MCNC: 8.6 MG/DL (ref 8.5–10.1)
CHLORIDE SERPL-SCNC: 108 MMOL/L (ref 94–109)
CO2 SERPL-SCNC: 22 MMOL/L (ref 20–32)
CREAT SERPL-MCNC: 0.73 MG/DL (ref 0.52–1.04)
GFR SERPL CREATININE-BSD FRML MDRD: NORMAL ML/MIN/1.7M2
GLUCOSE SERPL-MCNC: 88 MG/DL (ref 70–99)
HBA1C MFR BLD: 5.1 % (ref 4.3–6)
MICRO REPORT STATUS: ABNORMAL
POTASSIUM SERPL-SCNC: 3.6 MMOL/L (ref 3.4–5.3)
PROT SERPL-MCNC: 8.2 G/DL (ref 6.8–8.8)
SODIUM SERPL-SCNC: 140 MMOL/L (ref 133–144)
SPECIMEN SOURCE: ABNORMAL
TSH SERPL DL<=0.005 MIU/L-ACNC: 0.54 MU/L (ref 0.4–4)
WET PREP SPEC: ABNORMAL

## 2017-06-02 PROCEDURE — 99395 PREV VISIT EST AGE 18-39: CPT | Performed by: NURSE PRACTITIONER

## 2017-06-02 PROCEDURE — 83036 HEMOGLOBIN GLYCOSYLATED A1C: CPT | Performed by: NURSE PRACTITIONER

## 2017-06-02 PROCEDURE — 84443 ASSAY THYROID STIM HORMONE: CPT | Performed by: NURSE PRACTITIONER

## 2017-06-02 PROCEDURE — 99213 OFFICE O/P EST LOW 20 MIN: CPT | Mod: 25 | Performed by: NURSE PRACTITIONER

## 2017-06-02 PROCEDURE — G0145 SCR C/V CYTO,THINLAYER,RESCR: HCPCS | Performed by: NURSE PRACTITIONER

## 2017-06-02 PROCEDURE — 36415 COLL VENOUS BLD VENIPUNCTURE: CPT | Performed by: NURSE PRACTITIONER

## 2017-06-02 PROCEDURE — 80053 COMPREHEN METABOLIC PANEL: CPT | Performed by: NURSE PRACTITIONER

## 2017-06-02 PROCEDURE — 87210 SMEAR WET MOUNT SALINE/INK: CPT | Performed by: NURSE PRACTITIONER

## 2017-06-02 RX ORDER — METRONIDAZOLE 7.5 MG/G
1 GEL VAGINAL AT BEDTIME
Qty: 70 G | Refills: 0 | Status: SHIPPED | OUTPATIENT
Start: 2017-06-02 | End: 2017-06-07

## 2017-06-02 RX ORDER — METRONIDAZOLE 500 MG/1
500 TABLET ORAL 2 TIMES DAILY
Qty: 14 TABLET | Refills: 0 | Status: SHIPPED | OUTPATIENT
Start: 2017-06-02 | End: 2017-09-11

## 2017-06-02 RX ORDER — CLINDAMYCIN HCL 300 MG
300 CAPSULE ORAL 2 TIMES DAILY
Qty: 60 CAPSULE | Refills: 3 | Status: SHIPPED | OUTPATIENT
Start: 2017-06-02 | End: 2017-09-11

## 2017-06-02 NOTE — NURSING NOTE
"Chief Complaint   Patient presents with     Physical       Initial /74 (BP Location: Right arm, Patient Position: Chair, Cuff Size: Adult Regular)  Pulse 100  Temp 97.7  F (36.5  C) (Oral)  Wt 158 lb 8 oz (71.9 kg)  SpO2 97%  BMI 28.08 kg/m2 Estimated body mass index is 28.08 kg/(m^2) as calculated from the following:    Height as of 4/8/17: 5' 3\" (1.6 m).    Weight as of this encounter: 158 lb 8 oz (71.9 kg).  Medication Reconciliation: complete     Manuela Corea CMA    "

## 2017-06-02 NOTE — LETTER
[address] [id number] [phone]      June 6, 2017      Chhaya Mock  [address]  [place] [address]        Dear Ms. Katelin Mock,    Despite the bacterial vaginosis, which the nurse discussed with you, your thyroid, sugar, kidney and liver function were all normal. These labs are what we commonly start with when we are starting to look into an issue with fertility, so it's good news that they are all normal, especially since your periods are also normal. If you and your partner don't get pregnant within the next several months, it would be a good idea to make an appointment with the OBGYN at the clinic. However, it can be normal for people to have to try for a year or two to get pregnant, even when there is nothing wrong.  Once your pap smear results are ready, the OBGYN nurse will be in touch with you. Take care, and I hope you have a great week.  Enclosed is a copy of these results.  If you have any further questions or problems, please contact our office.    Sincerely,        Suzan Auguste CNP        Results for orders placed or performed in visit on 06/02/17   TSH with free T4 reflex   Result Value Ref Range    TSH 0.54 0.40 - 4.00 mU/L   Hemoglobin A1c   Result Value Ref Range    Hemoglobin A1C 5.1 4.3 - 6.0 %   Comprehensive metabolic panel   Result Value Ref Range    Sodium 140 133 - 144 mmol/L    Potassium 3.6 3.4 - 5.3 mmol/L    Chloride 108 94 - 109 mmol/L    Carbon Dioxide 22 20 - 32 mmol/L    Anion Gap 10 3 - 14 mmol/L    Glucose 88 70 - 99 mg/dL    Urea Nitrogen 10 7 - 30 mg/dL    Creatinine 0.73 0.52 - 1.04 mg/dL    GFR Estimate >90  Non  GFR Calc   >60 mL/min/1.7m2    GFR Estimate If Black >90   GFR Calc   >60 mL/min/1.7m2    Calcium 8.6 8.5 - 10.1 mg/dL    Bilirubin Total 0.4 0.2 - 1.3 mg/dL    Albumin 4.1 3.4 - 5.0 g/dL    Protein Total 8.2 6.8 - 8.8 g/dL    Alkaline  Phosphatase 53 40 - 150 U/L    ALT 14 0 - 50 U/L    AST 9 0 - 45 U/L   Wet prep   Result Value Ref Range    Specimen Description Vagina     Wet Prep (A)      No Trichomonas seen  Clue cells seen  No yeast seen      Micro Report Status FINAL 06/02/2017

## 2017-06-02 NOTE — LETTER
June 8, 2017      Chhaya Mock  [address]  [place] [address]    Dear MsLaurenKatelin Mock,      I am happy to inform you that your recent cervical cancer screening test (PAP smear) was normal.      Preventative screenings such as this help to ensure your health for years to come. You should repeat a pap smear in 3 years, unless otherwise directed.      You will still need to return to the clinic every year for your annual exam and other preventive tests.     Please contact the clinic at [phone] if you have further questions.       Sincerely,      MARIOLA Lowe CNP/esh

## 2017-06-02 NOTE — PROGRESS NOTES
Clinic Care Coordination Contact  [facility]/Voicemail    Referral Source: PCP  Clinical Data: Care Coordinator Outreach  Outreach attempted x 1.  Left message on voicemail with call back information and requested return call.  Plan:. Care Coordinator will try to reach patient again in 3-5 business days.  EVETTE Berry    Care Coordinator  [facility] and Women's   [phone]

## 2017-06-02 NOTE — MR AVS SNAPSHOT
After Visit Summary   6/2/2017    Chhaya Mock    MRN: [id number]           Patient Information     Date Of Birth          1990        Visit Information        Provider Department      6/2/2017 9:00 AM Suzan Auguste APRN [facility]        Today's Diagnoses     Female fertility problem    -  1    Routine general medical examination at a health care facility        Screening for cervical cancer        Vaginal discharge        Hidradenitis suppurativa          Care Instructions      Preventive Health Recommendations  Female Ages 26 - 39  Yearly exam:   See your health care provider every year in order to    Review health changes.     Discuss preventive care.      Review your medicines if you your doctor has prescribed any.    Until age 30: Get a Pap test every three years (more often if you have had an abnormal result).    After age 30: Talk to your doctor about whether you should have a Pap test every 3 years or have a Pap test with HPV screening every 5 years.   You do not need a Pap test if your uterus was removed (hysterectomy) and you have not had cancer.  You should be tested each year for STDs (sexually transmitted diseases), if you're at risk.   Talk to your provider about how often to have your cholesterol checked.  If you are at risk for diabetes, you should have a diabetes test (fasting glucose).  Shots: Get a flu shot each year. Get a tetanus shot every 10 years.   Nutrition:     Eat at least 5 servings of fruits and vegetables each day.    Eat whole-grain bread, whole-wheat pasta and brown rice instead of white grains and rice.    Talk to your provider about Calcium and Vitamin D.     Lifestyle    Exercise at least 150 minutes a week (30 minutes a day, 5 days of the week). This will help you control your weight and prevent disease.    Limit alcohol to one drink per day.    No smoking.     Wear sunscreen to prevent skin cancer.    See your dentist  every six months for an exam and cleaning.            Follow-ups after your visit        Additional Services     CARE COORDINATION REFERRAL       Services are provided by a Care Coordinator for people with complex needs such as: medical, social, or financial troubles.  The Care Coordinator works with the patient and their Primary Care Provider to determine health goals, obtain resources, achieve outcomes, and develop care plans that help coordinate the patient's care.     Reason for Referral: No Medical Insurance    Provide additional details for Care Coordination to best meet the patient's current needs: need for insurance    Clinical Staff have discussed the Care Coordination Referral with the patient and/or caregiver: yes            DERMATOLOGY REFERRAL       Your provider has referred you to: [facility]: [facility] [phone]  [email or web address]  [facility]: [facility] [phone]   [email or web address]  [place]: [facility] [phone]   [email or web address]  [facility] [place] [phone]   [email or web address]    Please be aware that coverage of these services is subject to the terms and limitations of your health insurance plan.  Call member services at your health plan with any benefit or coverage questions.      Please bring the following with you to your appointment:    (1) Any X-Rays, CTs or MRIs which have been performed.  Contact the facility where they were done to arrange for  prior to your scheduled appointment.    (2) List of current medications  (3) This referral request   (4) Any documents/labs given to you for this referral                  Who to contact     If you have questions or need follow up information about today's clinic visit or your schedule please contact [facility] directly at [phone].  Normal or non-critical  "lab and imaging results will be communicated to you by MyChart, letter or phone within 4 business days after the clinic has received the results. If you do not hear from us within 7 days, please contact the clinic through [employer] or phone. If you have a critical or abnormal lab result, we will notify you by phone as soon as possible.  Submit refill requests through [employer] or call your pharmacy and they will forward the refill request to us. Please allow 3 business days for your refill to be completed.          Additional Information About Your Visit        [employer] Information     [employer] lets you send messages to your doctor, view your test results, renew your prescriptions, schedule appointments and more. To sign up, go to [email or web address] . Click on \"Log in\" on the left side of the screen, which will take you to the Welcome page. Then click on \"Sign up Now\" on the right side of the page.     You will be asked to enter the access code listed below, as well as some personal information. Please follow the directions to create your username and password.     Your access code is: [email or web address]  Expires: 2017 10:05 AM     Your access code will  in 90 days. If you need help or a new code, please call your [facility] clinic or [phone].        Care EveryWhere ID     This is your Care EveryWhere ID. This could be used by other organizations to access your [facility] medical records  [id number]        Your Vitals Were     Pulse Temperature Pulse Oximetry BMI (Body Mass Index)          100 97.7  F (36.5  C) (Oral) 97% 28.08 kg/m2         Blood Pressure from Last 3 Encounters:   17 109/74   17 111/79   17 107/77    Weight from Last 3 Encounters:   17 158 lb 8 oz (71.9 kg)   17 163 lb (73.9 kg)   17 158 lb (71.7 kg)              We Performed the Following     CARE COORDINATION REFERRAL     Comprehensive metabolic panel     DERMATOLOGY REFERRAL     Hemoglobin A1c     PAP " imaged thin layer screen reflex to HPV if ASCUS - recommended age 25 - 29 years     TSH with free T4 reflex     Wet prep          Today's Medication Changes          These changes are accurate as of: 6/2/17 10:05 AM.  If you have any questions, ask your nurse or doctor.               Start taking these medicines.        Dose/Directions    clindamycin 300 MG capsule   Commonly known as:  CLEOCIN   Used for:  Hidradenitis suppurativa   Started by:  Suzan Auguste APRN CNP        Dose:  300 mg   Take 1 capsule (300 mg) by mouth 2 times daily   Quantity:  60 capsule   Refills:  3            Where to get your medicines      These medications were sent to [facility] [address] [place] [address], [address]) [place] [address]     Phone:  [phone]     clindamycin 300 MG capsule                Primary Care Provider Office Phone #    [facility] [phone]       No address on file        Thank you!     Thank you for choosing [facility]  for your care. Our goal is always to provide you with excellent care. Hearing back from our patients is one way we can continue to improve our services. Please take a few minutes to complete the written survey that you may receive in the mail after your visit with us. Thank you!             Your Updated Medication List - Protect others around you: Learn how to safely use, store and throw away your medicines at www.disposemymeds.org.          This list is accurate as of: 6/2/17 10:05 AM.  Always use your most recent med list.                   Brand Name Dispense Instructions for use    clindamycin 300 MG capsule    CLEOCIN    60 capsule    Take 1 capsule (300 mg) by mouth 2 times daily       oxyCODONE-acetaminophen 5-325 MG per tablet    PERCOCET    12 tablet    Take 1-2 tablets by mouth every 4 hours as needed for pain

## 2017-06-06 LAB
COPATH REPORT: NORMAL
PAP: NORMAL

## 2017-09-11 ENCOUNTER — HOSPITAL ENCOUNTER (EMERGENCY)
Facility: CLINIC | Age: 27
Discharge: HOME | End: 2017-09-11
Attending: EMERGENCY MEDICINE | Admitting: EMERGENCY MEDICINE

## 2017-09-11 VITALS
TEMPERATURE: 98.8 F | RESPIRATION RATE: 16 BRPM | SYSTOLIC BLOOD PRESSURE: 96 MMHG | WEIGHT: 153.13 LBS | BODY MASS INDEX: 27.12 KG/M2 | HEART RATE: 72 BPM | DIASTOLIC BLOOD PRESSURE: 61 MMHG | OXYGEN SATURATION: 98 %

## 2017-09-11 DIAGNOSIS — B37.31 YEAST VAGINITIS: ICD-10-CM

## 2017-09-11 LAB
ALBUMIN UR-MCNC: NEGATIVE MG/DL
APPEARANCE UR: CLEAR
BILIRUB UR QL STRIP: NEGATIVE
C TRACH DNA SPEC QL NAA+PROBE: NEGATIVE
COLOR UR AUTO: ABNORMAL
GLUCOSE UR STRIP-MCNC: NEGATIVE MG/DL
HCG UR QL: NEGATIVE
HGB UR QL STRIP: ABNORMAL
INTERNAL QC OK POCT: YES
KETONES UR STRIP-MCNC: NEGATIVE MG/DL
LEUKOCYTE ESTERASE UR QL STRIP: ABNORMAL
MUCOUS THREADS #/AREA URNS LPF: PRESENT /LPF
N GONORRHOEA DNA SPEC QL NAA+PROBE: NEGATIVE
NITRATE UR QL: NEGATIVE
PH UR STRIP: 5.5 PH (ref 5–7)
RBC #/AREA URNS AUTO: 4 /HPF (ref 0–2)
SOURCE: ABNORMAL
SP GR UR STRIP: 1.01 (ref 1–1.03)
SPECIMEN SOURCE: NORMAL
SQUAMOUS #/AREA URNS AUTO: 1 /HPF (ref 0–1)
UROBILINOGEN UR STRIP-MCNC: NORMAL MG/DL (ref 0–2)
WBC #/AREA URNS AUTO: <1 /HPF (ref 0–2)
WET PREP SPEC: NORMAL

## 2017-09-11 PROCEDURE — 81025 URINE PREGNANCY TEST: CPT | Performed by: EMERGENCY MEDICINE

## 2017-09-11 PROCEDURE — 87591 N.GONORRHOEAE DNA AMP PROB: CPT | Performed by: EMERGENCY MEDICINE

## 2017-09-11 PROCEDURE — 87491 CHLMYD TRACH DNA AMP PROBE: CPT | Performed by: EMERGENCY MEDICINE

## 2017-09-11 PROCEDURE — 81001 URINALYSIS AUTO W/SCOPE: CPT | Performed by: EMERGENCY MEDICINE

## 2017-09-11 PROCEDURE — 99284 EMERGENCY DEPT VISIT MOD MDM: CPT | Performed by: EMERGENCY MEDICINE

## 2017-09-11 PROCEDURE — 87210 SMEAR WET MOUNT SALINE/INK: CPT | Performed by: EMERGENCY MEDICINE

## 2017-09-11 PROCEDURE — 99284 EMERGENCY DEPT VISIT MOD MDM: CPT | Mod: Z6 | Performed by: EMERGENCY MEDICINE

## 2017-09-11 PROCEDURE — 25000132 ZZH RX MED GY IP 250 OP 250 PS 637: Performed by: EMERGENCY MEDICINE

## 2017-09-11 RX ORDER — FLUCONAZOLE 150 MG/1
150 TABLET ORAL ONCE
Status: COMPLETED | OUTPATIENT
Start: 2017-09-11 | End: 2017-09-11

## 2017-09-11 RX ADMIN — FLUCONAZOLE 150 MG: 150 TABLET ORAL at 09:16

## 2017-09-11 ASSESSMENT — ENCOUNTER SYMPTOMS
DYSURIA: 0
FEVER: 0
ABDOMINAL PAIN: 1
HEMATURIA: 0
FREQUENCY: 0

## 2017-09-11 NOTE — DISCHARGE INSTRUCTIONS
Please make an appointment to follow up with Your Primary Care Provider or our OB/Gyn--[facility] (phone: [phone]) in one week unless symptoms completely resolve.

## 2017-09-11 NOTE — ED AVS SNAPSHOT
[facility], [place], Emergency Department    [address]    [facility] [address]    Phone:  [phone]    Fax:  [phone]                                       Chhaya Mock   MRN: [id number]    Department:  [facility], Emergency Department   Date of Visit:  9/11/2017           After Visit Summary Signature Page     I have received my discharge instructions, and my questions have been answered. I have discussed any challenges I see with this plan with the nurse or doctor.    ..........................................................................................................................................  Patient/Patient Representative Signature      ..........................................................................................................................................  Patient Representative Print Name and Relationship to Patient    ..................................................               ................................................  Date                                            Time    ..........................................................................................................................................  Reviewed by Signature/Title    ...................................................              ..............................................  Date                                                            Time

## 2017-09-11 NOTE — ED PROVIDER NOTES
History     Chief Complaint   Patient presents with     Vaginal Discharge     Discharge for last 2 days and now abd is starting to hurt.     MARLON Mock is a 26 year old female who presents to the ER with complaints of a white, milky discharge she has had for the last few days. Patient complains of some vaginal irritation, and now some lower abdominal cramping. Patient denies any UTI symptoms. Patient denies any fevers. Epic records reveal previous visits for bacterial vaginosis and a yeast vaginitis in the past.     This part of the medical record was transcribed by Gary Espinosa Medical Scribe, from a dictation done by Lance Bennett MD.      Past Medical History:   Diagnosis Date     Carbuncle and furuncle      Tobacco use disorder     Smokes 1/2 ppd. Started smoking at 14 years     Vaginal yeast infection 06/02/2017       Past Surgical History:   Procedure Laterality Date     C ORAL SURGERY PROCEDURE  10/23/2008    [place] Teeth Extraction       Family History   Problem Relation Age of Onset     Hypertension Maternal Grandmother      DIABETES Maternal Uncle      Hypertension Maternal Uncle      Alcohol/Drug Maternal Aunt      Alcohol/Drug Maternal Uncle      Anesthesia Reaction Other        Social History   Substance Use Topics     Smoking status: Current Every Day Smoker     Packs/day: 0.50     Years: 2.00     Types: Cigarettes     Smokeless tobacco: Never Used     Alcohol use Yes      Comment: occassionally     No current facility-administered medications for this encounter.      No current outpatient prescriptions on file.        Allergies   Allergen Reactions     No Known Allergies         I have reviewed the Medications, Allergies, Past Medical and Surgical History, and Social History in the Epic system.    Review of Systems   Constitutional: Negative for fever.   Gastrointestinal: Positive for abdominal pain (lower, cramping).   Genitourinary: Positive for vaginal discharge.  Negative for dysuria, frequency, hematuria and urgency.   All other systems reviewed and are negative.      Physical Exam   BP: 120/85  Pulse: 92  Temp: 98.6  F (37  C)  Resp: 16  Weight: 69.5 kg (153 lb 2 oz)  SpO2: 99 %  Physical Exam   Constitutional: She is oriented to person, place, and time. She appears well-nourished. No distress.   HENT:   Head: Atraumatic.   Eyes: EOM are normal. Pupils are equal, round, and reactive to light.   Abdominal: Soft. There is no tenderness. There is no rebound and no guarding.   Genitourinary: Vaginal discharge (thick white) found.   Genitourinary Comments: Pelvic exam revealed no cervical motion tenderness.  There was no fundal or adnexal tenderness or masses palpated.  Routine specimens sent to lab.   Neurological: She is alert and oriented to person, place, and time.   Grossly intact and symmetric   Skin: No rash noted.   Psychiatric: She has a normal mood and affect.       ED Course     ED Course     Procedures        Results for orders placed or performed during the hospital encounter of 09/11/17   UA with Microscopic reflex to Culture   Result Value Ref Range    Color Urine Light Yellow     Appearance Urine Clear     Glucose Urine Negative NEG^Negative mg/dL    Bilirubin Urine Negative NEG^Negative    Ketones Urine Negative NEG^Negative mg/dL    Specific Gravity Urine 1.008 1.003 - 1.035    Blood Urine Small (A) NEG^Negative    pH Urine 5.5 5.0 - 7.0 pH    Protein Albumin Urine Negative NEG^Negative mg/dL    Urobilinogen mg/dL Normal 0.0 - 2.0 mg/dL    Nitrite Urine Negative NEG^Negative    Leukocyte Esterase Urine Small (A) NEG^Negative    Source Midstream Urine     WBC Urine <1 0 - 2 /HPF    RBC Urine 4 (H) 0 - 2 /HPF    Squamous Epithelial /HPF Urine 1 0 - 1 /HPF    Mucous Urine Present (A) NEG^Negative /LPF   hCG qual urine POCT   Result Value Ref Range    HCG Qual Urine Negative neg    Internal QC OK Yes    Wet prep   Result Value Ref Range    Specimen Description  Vagina     Wet Prep No Trichomonas seen     Wet Prep No yeast seen     Wet Prep Many  PMNs seen       Wet Prep No clue cells seen        Labs Ordered and Resulted from Time of ED Arrival Up to the Time of Departure from the ED   ROUTINE UA WITH MICROSCOPIC REFLEX TO CULTURE - Abnormal; Notable for the following:        Result Value    Blood Urine Small (*)     Leukocyte Esterase Urine Small (*)     RBC Urine 4 (*)     Mucous Urine Present (*)     All other components within normal limits   HCG QUAL URINE POCT - Normal   PREP FOR PROCEDURE   WET PREP   CHLAMYDIA TRACHOMATIS PCR   NEISSERIA GONORRHOEAE PCR            Assessments & Plan (with Medical Decision Making)     I have reviewed the nursing notes.    Medications   fluconazole (DIFLUCAN) tablet 150 mg (150 mg Oral Given 9/11/17 0916)       I have reviewed the findings, diagnosis, plan and need for follow up with the patient.    New Prescriptions    No medications on file       Final diagnoses:   Yeast vaginitis     Please make an appointment to follow up with Your Primary Care Provider or our OB/Gyn--[facility] (phone: [phone]) in one week unless symptoms completely resolve.    Routine discharge instructions are given for this diagnosis.    Lance Bennett MD      9/11/2017   [facility] EMERGENCY DEPARTMENT     Lance Bennett MD  09/11/17 0965

## 2017-09-11 NOTE — ED AVS SNAPSHOT
[facility], Emergency Department    [address]    [place] [address]    Phone:  [phone]    Fax:  [phone]                                       Chhaya Mock   MRN: [id number]    Department:  [facility], Emergency Department   Date of Visit:  9/11/2017           Patient Information     Date Of Birth          1990        Your diagnoses for this visit were:     Yeast vaginitis        You were seen by Lance Bennett MD.        Discharge Instructions       Please make an appointment to follow up with Your Primary Care Provider or our OB/Gyn--[facility] (phone: [phone]) in one week unless symptoms completely resolve.    Discharge References/Attachments     VAGINAL INFECTION: YEAST (CANDIDIASIS) (ENGLISH)    VAGINITIS, PREVENTING (ENGLISH)      24 Hour Appointment Hotline       To make an appointment at any [facility], call [phone] ([phone]). If you don't have a family doctor or clinic, we will help you find one. [facility] are conveniently located to serve the needs of you and your family.             Review of your medicines      Notice     You have not been prescribed any medications.            Procedures and tests performed during your visit     Chlamydia trachomatis PCR    Neisseria gonorrhoea PCR    Prep for procedure - pelvic exam    UA with Microscopic reflex to Culture    Wet prep    hCG qual urine POCT      Orders Needing Specimen Collection     None      Pending Results     Date and Time Order Name Status Description    9/11/2017 0818 Neisseria gonorrhoea PCR In process     9/11/2017 0818 Chlamydia trachomatis PCR In process             Pending Culture Results     Date and Time Order Name Status Description    9/11/2017 0818 Neisseria gonorrhoea PCR In process     9/11/2017 0818 Chlamydia trachomatis PCR In process             Pending Results Instructions     If you had any lab results that were not finalized  "at the time of your Discharge, you can call the ED Lab Result RN at [phone]. You will be contacted by this team for any positive Lab results or changes in treatment. The nurses are available 7 days a week from 10A to 6:30P.  You can leave a message 24 hours per day and they will return your call.        Thank you for choosing [facility]       Thank you for choosing [facility] for your care. Our goal is always to provide you with excellent care. Hearing back from our patients is one way we can continue to improve our services. Please take a few minutes to complete the written survey that you may receive in the mail after you visit with us. Thank you!        [employer] Information     [employer] lets you send messages to your doctor, view your test results, renew your prescriptions, schedule appointments and more. To sign up, go to [email or web address] . Click on \"Log in\" on the left side of the screen, which will take you to the Welcome page. Then click on \"Sign up Now\" on the right side of the page.     You will be asked to enter the access code listed below, as well as some personal information. Please follow the directions to create your username and password.     Your access code is: [email or web address]  Expires: 12/10/2017  9:43 AM     Your access code will  in 90 days. If you need help or a new code, please call your [facility] clinic or [phone].        Care EveryWhere ID     This is your Care EveryWhere ID. This could be used by other organizations to access your [facility] medical records  [id number]        Equal Access to Services     BRITTANY TOVAR : Haddiandra boyd Socelso, waaxda luqadaha, qaybta kaalmada luis, yong raya . So [facility] [phone].    ATENCIÓN: Si habla español, tiene a sams disposición servicios gratuitos de asistencia lingüística. Llame al [phone].    We comply with applicable federal civil rights laws and [place] laws. We do not discriminate on " the basis of race, color, national origin, age, disability sex, sexual orientation or gender identity.            After Visit Summary       This is your record. Keep this with you and show to your community pharmacist(s) and doctor(s) at your next visit.

## 2017-09-12 ENCOUNTER — CARE COORDINATION (OUTPATIENT)
Dept: CARE COORDINATION | Facility: CLINIC | Age: 27
End: 2017-09-12

## 2017-09-12 NOTE — PROGRESS NOTES
Clinic Care Coordination Contact  [facility] [[email or web address]]/[email or web address]    Referral Source: PCP  Clinical Data: Care Coordinator Outreach  Outreach attempted x 1.  Left message on voicemail with call back information and requested return call.  Plan: Care Coordinator will mail out care coordination introduction letter with care coordinator contact information and explanation of care coordination services. Care Coordinator will try to reach patient again in 1-2 business days.    Carola Bennett R.N.  Clinic Care Coordinator  [facility] Primary [facility]  [phone]

## 2017-09-12 NOTE — PROGRESS NOTES
Clinic Care Coordination Contact  OUTREACH    Referral Information:  Referral Source: ED follow up  Reason for Contact: ED follow up  Care Conference: No     Universal Utilization:   ED Visits in last year: 6  Hospital visits in last year: 0  Last PCP appointment: 06/02/17  Missed Appointments: 0  Concerns: 0  Multiple Providers or Specialists: 0    Clinical Concerns:  Current Medical Concerns: vaginal discharge and irritation    Current Behavioral Concerns: none    Education Provided to patient: options for symptoms other than ED   Clinical Pathway Name: None  Clinical Pathway: None    Medication Management:  Given one dose oral diflucan in ED     Functional Status:  Mobility Status: Independent     Transportation: self           Psychosocial:  Current living arrangement:: I live in a private home  Financial/Insurance: not discussed       Resources and Interventions:  Current Resources:  (none);  (none)  PAS Number:  (na)  Senior Linkage Line Referral Placed:  (na)  Advanced Care Plans/Directives on file:: No  Referrals Placed:  (na)     Patient/Caregiver understanding: good  Frequency of Care Coordination: monthly  Upcoming appointment:  (none)     Plan: chart reviewed. Testing for UA, wet prep, GC, chlamydia all negative. Patient does report complete resolution of vaginal irritation and discharge today. Test results mailed to home per patients request. No concerns at this time.     Carola Bennett R.N.  Clinic Care Coordinator  [facility] Primary Care [facility]  [phone]

## 2017-09-12 NOTE — LETTER
[facility] CARE COORDINATION  [address] [address]  Phone: [phone]      September 12, 2017      Chhaya Mock  [address]  [place] [address]    Dear Chhaya,  I am the Clinic Care Coordinator that works with your primary care provider's clinic. I wanted to introduce myself and provide you with my contact information for you to be able to call me with any questions or concerns. Below is a description of what Clinic Care Coordination is and how I can further assist you.     The Clinic Care Coordinator role is a Registered Nurse and/or  who understands the health care system. The goal of Clinic Care Coordination is to help you manage your health and improve access to the [facility] system in the most efficient manner.  The Registered Nurse can assist you in meeting your health care goals by providing education, coordinating services, and strengthening the communication among your providers. The  can assist you with financial, behavioral, psychosocial, and chemical dependency and counseling/psychiatric resources.    Please feel free to keep this letter and contact information to contact me at [phone] with any further questions or concerns that may arise. We at [facility] are focused on providing you with the highest-quality healthcare experience possible and that all starts with you.       Sincerely,     Madhavi Bennett    Enclosed: I have enclosed a copy of a 24 Hour Access Plan. This has helpful phone numbers for you to call when needed. Please keep this in an easy to access place to use as needed.

## 2017-09-12 NOTE — LETTER
Health Care Home - Access Care Plan    About Me  Patient Name:  Chhaya Mock    YOB: 1990  Age:                            26 year old   [place] MRN:         [id number] Telephone Information:     Home Phone [phone]   Mobile [phone]       Address:    [address]  [place] [address] Email address:  No e-mail address on record      Emergency Contact(s)  Name Relationship Lgl Grd Work Phone Home Phone Mobile Phone   1. VIRGILIO ALFARO* Mother No  [phone]   2. NO SECONDARY P*    NONE              Health Maintenance:      My Access Plan  Medical Emergency 911   Questions or concerns during clinic hours Primary Clinic Line, I will call the clinic directly: Primary Clinic: [facility]- [phone]   24 Hour Appointment Line [phone] or  [phone])  (toll free)   24 Hour Nurse Line [phone] (toll free)   Questions or concerns outside clinic hours 24 Hour Appointment Line, I will call the after-hours on-call line:   [place] [phone] or [phone] (toll-free)   Preferred Urgent Care     Preferred Hospital Preferred Hospital: [facility]  [phone]   Preferred Pharmacy [facility] [address] AT [address] OF [facility]     Behavioral Health Crisis Line The National Suicide Prevention Lifeline at 1-322.825.2343 or 911     My Care Team Members  Patient Care Team       Relationship Specialty Notifications Start End    Clinic, [facility] Women[facility] PCP - General   7/29/13     Comment:  no dr assigned yet, will be going to this clinic 05/01/2017    Phone: [phone]         Marii Navarro   Admissions 6/2/17     Phone: [phone]             My Medical and Care Information  Problem List   Patient Active Problem List   Diagnosis     Contraceptive management     Hidradenitis  suppurativa      Current Medications and Allergies:  See printed Medication Report

## 2018-01-16 ENCOUNTER — HOSPITAL ENCOUNTER (EMERGENCY)
Facility: CLINIC | Age: 28
Discharge: HOME | End: 2018-01-17
Attending: EMERGENCY MEDICINE | Admitting: EMERGENCY MEDICINE

## 2018-01-16 DIAGNOSIS — N89.8 VAGINAL DISCHARGE: ICD-10-CM

## 2018-01-16 LAB
ALBUMIN UR-MCNC: 10 MG/DL
APPEARANCE UR: ABNORMAL
BACTERIA #/AREA URNS HPF: ABNORMAL /HPF
BILIRUB UR QL STRIP: NEGATIVE
COLOR UR AUTO: YELLOW
GLUCOSE UR STRIP-MCNC: NEGATIVE MG/DL
HCG UR QL: NEGATIVE
HGB UR QL STRIP: ABNORMAL
INTERNAL QC OK POCT: YES
KETONES UR STRIP-MCNC: NEGATIVE MG/DL
LEUKOCYTE ESTERASE UR QL STRIP: NEGATIVE
MUCOUS THREADS #/AREA URNS LPF: PRESENT /LPF
NITRATE UR QL: NEGATIVE
PH UR STRIP: 5.5 PH (ref 5–7)
RBC #/AREA URNS AUTO: 3 /HPF (ref 0–2)
SOURCE: ABNORMAL
SP GR UR STRIP: 1.02 (ref 1–1.03)
SPECIMEN SOURCE: NORMAL
SQUAMOUS #/AREA URNS AUTO: 7 /HPF (ref 0–1)
UROBILINOGEN UR STRIP-MCNC: NORMAL MG/DL (ref 0–2)
WBC #/AREA URNS AUTO: 1 /HPF (ref 0–2)
WET PREP SPEC: NORMAL

## 2018-01-16 PROCEDURE — 99284 EMERGENCY DEPT VISIT MOD MDM: CPT | Performed by: EMERGENCY MEDICINE

## 2018-01-16 PROCEDURE — 81025 URINE PREGNANCY TEST: CPT | Performed by: EMERGENCY MEDICINE

## 2018-01-16 PROCEDURE — 81001 URINALYSIS AUTO W/SCOPE: CPT | Performed by: EMERGENCY MEDICINE

## 2018-01-16 PROCEDURE — 99284 EMERGENCY DEPT VISIT MOD MDM: CPT | Mod: Z6 | Performed by: EMERGENCY MEDICINE

## 2018-01-16 PROCEDURE — 87210 SMEAR WET MOUNT SALINE/INK: CPT | Performed by: EMERGENCY MEDICINE

## 2018-01-16 PROCEDURE — 87591 N.GONORRHOEAE DNA AMP PROB: CPT | Performed by: EMERGENCY MEDICINE

## 2018-01-16 PROCEDURE — 87491 CHLMYD TRACH DNA AMP PROBE: CPT | Performed by: EMERGENCY MEDICINE

## 2018-01-16 ASSESSMENT — ENCOUNTER SYMPTOMS
NAUSEA: 0
DIARRHEA: 0
FEVER: 0
CHILLS: 0
VOMITING: 0
ABDOMINAL PAIN: 1
FREQUENCY: 0

## 2018-01-16 NOTE — ED AVS SNAPSHOT
[facility], Emergency Department    [address]    [place] [address]    Phone:  [phone]    Fax:  [phone]                                       Chhaya Mock   MRN: [id number]    Department:  [facility], Emergency Department   Date of Visit:  1/16/2018           Patient Information     Date Of Birth          1990        Your diagnoses for this visit were:     Vaginal discharge        You were seen by Otilia Bejarano MD.        Discharge Instructions       You have been seen in the emergency department for increased vaginal discharge.  Your wet prep is negative, this means there is no sign of yeast, trichomonas, or bacterial vaginosis.  Your tests for chlamydia and gonorrhea are currently in process, they generally take 2 days to come back.  You will be called if those tests are positive.  Your urine test did not show any sign of infection.  We recommend establishing primary care with a clinic so that you can follow-up with a clinic for routine complaints.    Please make an appointment to follow up with [facility] (phone: [phone]) to establish primary care with a clinic.      Discharge References/Attachments     VAGINAL INFECTION, PREVENTING  (ENGLISH)      24 Hour Appointment Hotline       To make an appointment at any [facility], call [phone]). If you don't have a family doctor or clinic, we will help you find one. [facility] clinics are conveniently located to serve the needs of you and your family.             Review of your medicines      Notice     You have not been prescribed any medications.            Procedures and tests performed during your visit     Chlamydia trachomatis PCR    Neisseria gonorrhoeae PCR    UA with Microscopic reflex to Culture    Wet prep    hCG qual urine POCT      Orders Needing Specimen Collection     None      Pending Results     Date and Time Order Name Status Description     "2018 2246 Chlamydia trachomatis PCR In process     2018 2246 Neisseria gonorrhoeae PCR In process             Pending Culture Results     Date and Time Order Name Status Description    2018 2246 Chlamydia trachomatis PCR In process     2018 2246 Neisseria gonorrhoeae PCR In process             Pending Results Instructions     If you had any lab results that were not finalized at the time of your Discharge, you can call the ED Lab Result RN at [phone]. You will be contacted by this team for any positive Lab results or changes in treatment. The nurses are available 7 days a week from 10A to 6:30P.  You can leave a message 24 hours per day and they will return your call.        Thank you for choosing [facility]       Thank you for choosing [facility] for your care. Our goal is always to provide you with excellent care. Hearing back from our patients is one way we can continue to improve our services. Please take a few minutes to complete the written survey that you may receive in the mail after you visit with us. Thank you!        [employer] Information     [employer] lets you send messages to your doctor, view your test results, renew your prescriptions, schedule appointments and more. To sign up, go to [email or web address] . Click on \"Log in\" on the left side of the screen, which will take you to the Welcome page. Then click on \"Sign up Now\" on the right side of the page.     You will be asked to enter the access code listed below, as well as some personal information. Please follow the directions to create your username and password.     Your access code is: [email or web address]  Expires: 2018 12:18 AM     Your access code will  in 90 days. If you need help or a new code, please call your [facility] clinic or [phone].        Care EveryWhere ID     This is your Care EveryWhere ID. This could be used by other organizations to access your [facility] medical records  [id number]        Equal " Access to Services     BRITTANY [facility] : Kendell Grant, [place], qayong fung. So [facility] [phone].    ATENCIÓN: Si habla español, tiene a sams disposición servicios gratuitos de asistencia lingüística. Llame al [phone].    We comply with applicable federal civil rights laws and [place] laws. We do not discriminate on the basis of race, color, national origin, age, disability, sex, sexual orientation, or gender identity.            After Visit Summary       This is your record. Keep this with you and show to your community pharmacist(s) and doctor(s) at your next visit.

## 2018-01-17 VITALS
RESPIRATION RATE: 16 BRPM | OXYGEN SATURATION: 100 % | HEIGHT: 63 IN | SYSTOLIC BLOOD PRESSURE: 113 MMHG | DIASTOLIC BLOOD PRESSURE: 69 MMHG | BODY MASS INDEX: 29.39 KG/M2 | HEART RATE: 81 BPM | WEIGHT: 165.9 LBS | TEMPERATURE: 97.9 F

## 2018-01-17 LAB
C TRACH DNA SPEC QL NAA+PROBE: NEGATIVE
N GONORRHOEA DNA SPEC QL NAA+PROBE: NEGATIVE
SPECIMEN SOURCE: NORMAL
SPECIMEN SOURCE: NORMAL

## 2018-01-17 ASSESSMENT — ENCOUNTER SYMPTOMS
COUGH: 0
SORE THROAT: 0
SHORTNESS OF BREATH: 0

## 2018-01-17 NOTE — ED PROVIDER NOTES
History     Chief Complaint   Patient presents with     Abdominal Pain     onset late last night; pain in supra-pubic, lower abdomen; has white, thick vaginal discharge-denies vaginal bleeding; denies pain with voiding or bowel movements; denies fever or chills; denies nausea or vomiting; no diarrhea     The history is provided by the patient.     Chhaya Mock is a 27 year old female who presents to the ED with suprapubic pain and vaginal discharge. Patient states last night she started to have suprapubic pain last night which eventually resolved after passing gas, but came back as mild pain through the night. Her pain didn't improve with passing normal bowel movements and felt worse after eating sloppy joes today. However her pain is currently gone.  She has also noted a significant amount of white colored vaginal discharge which is new. She states this is similar to when she has had trichomonas in the past. She was treated for this last time. She states her LMP was at the end of last month. She is sexually active and doesn't use contraceptives including birth control or condoms. She denies any known STDs with her current partner. She also states she doesn't have a PCP.  She otherwise currently denies any vaginal bleeding,frequency, nausea, vomiting, diarrhea, previous abdominal surgeries, fevers, or chills.     PAST MEDICAL HISTORY  Past Medical History:   Diagnosis Date     Carbuncle and furuncle      Tobacco use disorder     Smokes 1/2 ppd. Started smoking at 14 years     Vaginal yeast infection 06/02/2017     PAST SURGICAL HISTORY  Past Surgical History:   Procedure Laterality Date     C ORAL SURGERY PROCEDURE  10/23/2008    [place] Teeth Extraction     FAMILY HISTORY  Family History   Problem Relation Age of Onset     Hypertension Maternal Grandmother      DIABETES Maternal Uncle      Hypertension Maternal Uncle      Alcohol/Drug Maternal Aunt      Alcohol/Drug Maternal Uncle      Anesthesia  "Reaction Other      SOCIAL HISTORY  Social History   Substance Use Topics     Smoking status: Current Every Day Smoker     Packs/day: 0.50     Years: 2.00     Types: Cigarettes     Smokeless tobacco: Never Used     Alcohol use Yes      Comment: occassionally     MEDICATIONS  No current facility-administered medications for this encounter.      No current outpatient prescriptions on file.     ALLERGIES  Allergies   Allergen Reactions     No Known Allergies          I have reviewed the Medications, Allergies, Past Medical and Surgical History, and Social History in the Epic system.    Review of Systems   Constitutional: Negative for chills and fever.   HENT: Negative for sore throat.    Respiratory: Negative for cough and shortness of breath.    Cardiovascular: Negative for chest pain.   Gastrointestinal: Positive for abdominal pain (suprapubic). Negative for diarrhea, nausea and vomiting.   Genitourinary: Positive for vaginal discharge. Negative for frequency, vaginal bleeding and vaginal pain.   All other systems reviewed and are negative.      Physical Exam   BP: 106/68  Pulse: 81  Temp: 97  F (36.1  C)  Resp: 16  Height: 160 cm (5' 3\")  Weight: 75.3 kg (165 lb 14.4 oz)  SpO2: 98 %      Physical Exam   Constitutional: No distress.   AA female, alert, cooperative, NAD   HENT:   Head: Atraumatic.   Mouth/Throat: Oropharynx is clear and moist. No oropharyngeal exudate.   Eyes: Pupils are equal, round, and reactive to light. No scleral icterus.   Cardiovascular: Normal rate, regular rhythm, normal heart sounds and intact distal pulses.    No murmur heard.  Pulmonary/Chest: Effort normal and breath sounds normal. No respiratory distress. She has no wheezes. She has no rales.   Abdominal: Soft. Bowel sounds are normal. There is no tenderness.   Soft, flat, nontender to palpation, normal bowel sounds.   Genitourinary:   Genitourinary Comments: Normal external genitalia. Spec exam shows no blood in vault. Small amount of " white discharge. Some degree of friability of the vaginal/cervical tissue with bleeding resulting from light touch of cotton swab. No sign of cervical erythema. No TTP upon bimanual exam.   Musculoskeletal: She exhibits no edema or tenderness.   Skin: Skin is warm. No rash noted. She is not diaphoretic.   Nursing note and vitals reviewed.      ED Course     ED Course     Procedures   10:39 PM  The patient was seen and examined by Dr. Bejarano in Room 6.                Critical Care time:  none             Results for orders placed or performed during the hospital encounter of 01/16/18 (from the past 24 hour(s))   UA with Microscopic reflex to Culture   Result Value Ref Range    Color Urine Yellow     Appearance Urine Slightly Cloudy     Glucose Urine Negative NEG^Negative mg/dL    Bilirubin Urine Negative NEG^Negative    Ketones Urine Negative NEG^Negative mg/dL    Specific Gravity Urine 1.023 1.003 - 1.035    Blood Urine Moderate (A) NEG^Negative    pH Urine 5.5 5.0 - 7.0 pH    Protein Albumin Urine 10 (A) NEG^Negative mg/dL    Urobilinogen mg/dL Normal 0.0 - 2.0 mg/dL    Nitrite Urine Negative NEG^Negative    Leukocyte Esterase Urine Negative NEG^Negative    Source Unspecified Urine     WBC Urine 1 0 - 2 /HPF    RBC Urine 3 (H) 0 - 2 /HPF    Bacteria Urine Few (A) NEG^Negative /HPF    Squamous Epithelial /HPF Urine 7 (H) 0 - 1 /HPF    Mucous Urine Present (A) NEG^Negative /LPF   hCG qual urine POCT   Result Value Ref Range    HCG Qual Urine Negative neg    Internal QC OK Yes    Wet prep   Result Value Ref Range    Specimen Description Cervix     Wet Prep No motile Trichomonas seen     Wet Prep No yeast seen     Wet Prep PMNs seen  Few       Wet Prep No clue cells seen               Assessments & Plan (with Medical Decision Making)   Patient presents to the emergency department today with suprapubic pain, vaginal discharge.  Differential diagnosis could include UTI, cervicitis, ectopic pregnancy, amongst many  others.  She currently has no pain so I think it would be very unlikely that she would have an acute intra-abdominal process.  Her UPT is negative so I do not think ectopic pregnancy is very unlikely.  UA shows no sign of infection.  Pelvic exam demonstrates small to moderate amount of white discharge, I did notice that there was a fair amount of friability to the vaginal tissue and there is some bleeding with light touch of the cotton swab.  GC and Chlamydia are pending.  Wet prep negative for trich, BV or yeast.     It may turn out that she may indeed have chlamydia or gonorrhea, however I would prefer to wait for confirmatory testing.  I do not suspect PID based on exam.  Her exam is really quite benign.  She will be contacted if her chlamydia or gonorrhea tests are positive.  I have given her a primary clinic so that she can establish primary care.  She was advised to use barrier methods to help prevent infection.    I have reviewed the nursing notes.    I have reviewed the findings, diagnosis, plan and need for follow up with the patient.    New Prescriptions    No medications on file       Final diagnoses:   Vaginal discharge     IMicky, am serving as a trained medical scribe to document services personally performed by Otilia Bejarano MD, based on the provider's statements to me.      IOtilia MD, was physically present and have reviewed and verified the accuracy of this note documented by Micky Vuong.     1/16/2018   [facility], [place], EMERGENCY DEPARTMENT     Otilia Bejarano MD  01/17/18 0019

## 2018-01-17 NOTE — DISCHARGE INSTRUCTIONS
You have been seen in the emergency department for increased vaginal discharge.  Your wet prep is negative, this means there is no sign of yeast, trichomonas, or bacterial vaginosis.  Your tests for chlamydia and gonorrhea are currently in process, they generally take 2 days to come back.  You will be called if those tests are positive.  Your urine test did not show any sign of infection.  We recommend establishing primary care with a clinic so that you can follow-up with a clinic for routine complaints.    Please make an appointment to follow up with [facility] (phone: [phone]) to establish primary care with a clinic.

## 2018-01-19 ENCOUNTER — CARE COORDINATION (OUTPATIENT)
Dept: CARE COORDINATION | Facility: CLINIC | Age: 28
End: 2018-01-19

## 2018-01-19 NOTE — PROGRESS NOTES
Clinic Care Coordination Contact  [facility]/Voicemail    Referral Source: PCP  Clinical Data: Care Coordinator Outreach  Outreach attempted x 1.  Left message on voicemail with call back information and requested return call.  Plan: Care Coordinator mailed out care coordination introduction letter on 9-12-17. Care Coordinator will try to reach patient again in 1-2 business days.    Carola Bennett R.N.  Clinic Care Coordinator  [facility]  [phone]

## 2018-01-24 NOTE — PROGRESS NOTES
Clinic Care Coordination Contact  [facility]/Voicemail    Referral Source: PCP  Clinical Data: Care Coordinator Outreach  Outreach attempted x 2.  Left message on voicemail with call back information and requested return call.  Plan: Care Coordinator will do no further outreaches at this time.    Carola Bennett R.N.  Clinic Care Coordinator  [facility]  [phone]

## 2018-01-30 ENCOUNTER — HOSPITAL ENCOUNTER (EMERGENCY)
Facility: CLINIC | Age: 28
Discharge: HOME | End: 2018-01-31
Attending: EMERGENCY MEDICINE | Admitting: EMERGENCY MEDICINE

## 2018-01-30 DIAGNOSIS — L73.2 HIDRADENITIS SUPPURATIVA OF LEFT AXILLA: ICD-10-CM

## 2018-01-30 PROCEDURE — 99152 MOD SED SAME PHYS/QHP 5/>YRS: CPT | Mod: 59 | Performed by: EMERGENCY MEDICINE

## 2018-01-30 PROCEDURE — 10060 I&D ABSCESS SIMPLE/SINGLE: CPT | Performed by: EMERGENCY MEDICINE

## 2018-01-30 PROCEDURE — 10060 I&D ABSCESS SIMPLE/SINGLE: CPT | Mod: Z6 | Performed by: EMERGENCY MEDICINE

## 2018-01-30 PROCEDURE — 99285 EMERGENCY DEPT VISIT HI MDM: CPT | Mod: 25 | Performed by: EMERGENCY MEDICINE

## 2018-01-30 PROCEDURE — 96374 THER/PROPH/DIAG INJ IV PUSH: CPT | Performed by: EMERGENCY MEDICINE

## 2018-01-30 PROCEDURE — 99152 MOD SED SAME PHYS/QHP 5/>YRS: CPT | Performed by: EMERGENCY MEDICINE

## 2018-01-30 RX ORDER — LIDOCAINE 40 MG/G
CREAM TOPICAL
Status: DISCONTINUED
Start: 2018-01-30 | End: 2018-01-30 | Stop reason: HOSPADM

## 2018-01-30 RX ORDER — LIDOCAINE 40 MG/G
CREAM TOPICAL
Status: COMPLETED | OUTPATIENT
Start: 2018-01-30 | End: 2018-01-31

## 2018-01-30 RX ORDER — KETAMINE HYDROCHLORIDE 10 MG/ML
2 INJECTION, SOLUTION INTRAMUSCULAR; INTRAVENOUS ONCE
Status: COMPLETED | OUTPATIENT
Start: 2018-01-30 | End: 2018-01-31

## 2018-01-30 ASSESSMENT — ENCOUNTER SYMPTOMS: FEVER: 0

## 2018-01-30 NOTE — ED AVS SNAPSHOT
[facility], [place], Emergency Department    [address]    [facility] [address]    Phone:  [phone]    Fax:  [phone]                                       Chhaya Mock   MRN: [id number]    Department:  [facility], Emergency Department   Date of Visit:  1/30/2018           After Visit Summary Signature Page     I have received my discharge instructions, and my questions have been answered. I have discussed any challenges I see with this plan with the nurse or doctor.    ..........................................................................................................................................  Patient/Patient Representative Signature      ..........................................................................................................................................  Patient Representative Print Name and Relationship to Patient    ..................................................               ................................................  Date                                            Time    ..........................................................................................................................................  Reviewed by Signature/Title    ...................................................              ..............................................  Date                                                            Time

## 2018-01-30 NOTE — LETTER
January 31, 2018      To Whom It May Concern:      Chhaya Mock was seen in our Emergency Department today, 01/31/18.  I expect her condition to improve over the next 3-4 days.  She may return to work/school when improved. Will need to keep a wound covered with gauze and will have limited use of the left arm while healing.     Sincerely,        Dorinda Martinez MD FACEP

## 2018-01-30 NOTE — ED AVS SNAPSHOT
[facility], Emergency Department    [address]    [place] [address]    Phone:  [phone]    Fax:  [phone]                                       Chhaya Mock   MRN: [id number]    Department:  [facility], Emergency Department   Date of Visit:  1/30/2018           Patient Information     Date Of Birth          1990        Your diagnoses for this visit were:     Hidradenitis suppurativa of left axilla        You were seen by Dorinda Martinez MD and Amira Barfield MD.        Discharge Instructions       Thank you for coming to the [facility] Emergency Department.     There are two packing strips in your left arm pit. Leave these in for 24 hours, then remove them after soaking the gauze wet in the shower.     Take the antibiotic, Keflex.    Hydrocodone-acetaminophen for pain not improving with tylenol or ibuprofen. Please note that hydrocodone-acetaminophen contains acetaminophen (Tylenol).  Do not take more than 4000mg of acetaminophen from all sources in a 24 hour period.    Return if abscess recurs.       24 Hour Appointment Hotline       To make an appointment at any [facility] clinic, call [phone] ([phone]). If you don't have a family doctor or clinic, we will help you find one. [facility] clinics are conveniently located to serve the needs of you and your family.             Review of your medicines      START taking        Dose / Directions Last dose taken    cephALEXin 500 MG capsule   Commonly known as:  KEFLEX   Dose:  500 mg   Quantity:  15 capsule        Take 1 capsule (500 mg) by mouth 3 times daily for 5 days   Refills:  0        HYDROcodone-acetaminophen 5-325 MG per tablet   Commonly known as:  NORCO   Dose:  1-2 tablet   Quantity:  15 tablet        Take 1-2 tablets by mouth every 4 hours as needed for moderate to severe pain   Refills:  0                Prescriptions were sent or printed at these locations (2  "Prescriptions)                   Other Prescriptions                Printed at Department/Unit printer (2 of 2)         cephALEXin (KEFLEX) 500 MG capsule               HYDROcodone-acetaminophen (NORCO) 5-325 MG per tablet                Procedures and tests performed during your visit     Cardiac Continuous Monitoring    Peripheral IV catheter      Orders Needing Specimen Collection     None      Pending Results     No orders found for last 3 day(s).            Pending Culture Results     No orders found for last 3 day(s).            Pending Results Instructions     If you had any lab results that were not finalized at the time of your Discharge, you can call the ED Lab Result RN at [phone]. You will be contacted by this team for any positive Lab results or changes in treatment. The nurses are available 7 days a week from 10A to 6:30P.  You can leave a message 24 hours per day and they will return your call.        Thank you for choosing [facility]       Thank you for choosing [facility] for your care. Our goal is always to provide you with excellent care. Hearing back from our patients is one way we can continue to improve our services. Please take a few minutes to complete the written survey that you may receive in the mail after you visit with us. Thank you!        [employer] Information     [employer] lets you send messages to your doctor, view your test results, renew your prescriptions, schedule appointments and more. To sign up, go to [email or web address] . Click on \"Log in\" on the left side of the screen, which will take you to the Welcome page. Then click on \"Sign up Now\" on the right side of the page.     You will be asked to enter the access code listed below, as well as some personal information. Please follow the directions to create your username and password.     Your access code is: [email or web address]  Expires: 2018 12:18 AM     Your access code will  in 90 days. If you need help or a new code, " please call your [facility] clinic or [phone].        Care EveryWhere ID     This is your Care EveryWhere ID. This could be used by other organizations to access your [facility] medical records  [id number]        Equal Access to Services     SAMANTHA TOVAR : Kendell Grant, walonnieda luqadaha, qaybta kaalmada luis, yong jeffries. So [facility] [phone].    ATENCIÓN: Si habla español, tiene a sams disposición servicios gratuitos de asistencia lingüística. Llame al [phone].    We comply with applicable federal civil rights laws and [place] laws. We do not discriminate on the basis of race, color, national origin, age, disability, sex, sexual orientation, or gender identity.            After Visit Summary       This is your record. Keep this with you and show to your community pharmacist(s) and doctor(s) at your next visit.

## 2018-01-31 VITALS
DIASTOLIC BLOOD PRESSURE: 81 MMHG | HEART RATE: 105 BPM | RESPIRATION RATE: 14 BRPM | TEMPERATURE: 97.7 F | SYSTOLIC BLOOD PRESSURE: 117 MMHG | OXYGEN SATURATION: 99 %

## 2018-01-31 PROCEDURE — 25000125 ZZHC RX 250: Performed by: EMERGENCY MEDICINE

## 2018-01-31 PROCEDURE — 25000128 H RX IP 250 OP 636: Performed by: EMERGENCY MEDICINE

## 2018-01-31 RX ORDER — CEPHALEXIN 500 MG/1
500 CAPSULE ORAL 3 TIMES DAILY
Qty: 15 CAPSULE | Refills: 0 | Status: SHIPPED | OUTPATIENT
Start: 2018-01-31 | End: 2018-02-05

## 2018-01-31 RX ORDER — HYDROCODONE BITARTRATE AND ACETAMINOPHEN 5; 325 MG/1; MG/1
1-2 TABLET ORAL EVERY 4 HOURS PRN
Qty: 15 TABLET | Refills: 0 | Status: SHIPPED | OUTPATIENT
Start: 2018-01-31 | End: 2018-05-16

## 2018-01-31 RX ORDER — ONDANSETRON 2 MG/ML
4 INJECTION INTRAMUSCULAR; INTRAVENOUS ONCE
Status: COMPLETED | OUTPATIENT
Start: 2018-01-31 | End: 2018-01-31

## 2018-01-31 RX ADMIN — LIDOCAINE: 40 CREAM TOPICAL at 00:00

## 2018-01-31 RX ADMIN — ONDANSETRON 4 MG: 2 INJECTION INTRAMUSCULAR; INTRAVENOUS at 00:36

## 2018-01-31 RX ADMIN — KETAMINE HYDROCHLORIDE 150 MG: 10 INJECTION, SOLUTION INTRAMUSCULAR; INTRAVENOUS at 00:15

## 2018-01-31 NOTE — DISCHARGE INSTRUCTIONS
Thank you for coming to the [facility] Emergency Department.     There are two packing strips in your left arm pit. Leave these in for 24 hours, then remove them after soaking the gauze wet in the shower.     Take the antibiotic, Keflex.    Hydrocodone-acetaminophen for pain not improving with tylenol or ibuprofen. Please note that hydrocodone-acetaminophen contains acetaminophen (Tylenol).  Do not take more than 4000mg of acetaminophen from all sources in a 24 hour period.    Return if abscess recurs.

## 2018-01-31 NOTE — ED PROVIDER NOTES
History     Chief Complaint   Patient presents with     Wound Infection     under her left arm-painful     HPI  Chhaya Mock is a 27 year old  female who presents for evaluation of a left axillary cyst. The patient reports she has felt a cyst in her left axilla forming over the past day. She states she has tried applying hot packs and popping the cyst herself, but it has grown in size with associated worsening pain. She states she has a history of axillary cysts bilaterally and per chart review, she was last seen in this ED approximately one year ago with a left axillary cyst that required I&D. She denies any fever.    Past Medical History:   Diagnosis Date     Carbuncle and furuncle      Tobacco use disorder     Smokes 1/2 ppd. Started smoking at 14 years     Vaginal yeast infection 06/02/2017       Past Surgical History:   Procedure Laterality Date     C ORAL SURGERY PROCEDURE  10/23/2008    [place] Teeth Extraction       Family History   Problem Relation Age of Onset     Hypertension Maternal Grandmother      DIABETES Maternal Uncle      Hypertension Maternal Uncle      Alcohol/Drug Maternal Aunt      Alcohol/Drug Maternal Uncle      Anesthesia Reaction Other        Social History   Substance Use Topics     Smoking status: Current Every Day Smoker     Packs/day: 0.50     Years: 2.00     Types: Cigarettes     Smokeless tobacco: Never Used     Alcohol use Yes      Comment: occassionally       No current facility-administered medications for this encounter.      Current Outpatient Prescriptions   Medication     cephALEXin (KEFLEX) 500 MG capsule     HYDROcodone-acetaminophen (NORCO) 5-325 MG per tablet        Allergies   Allergen Reactions     No Known Allergies        I have reviewed the Medications, Allergies, Past Medical and Surgical History, and Social History in the Epic system.    Review of Systems   Constitutional: Negative for fever.   Skin:        Positive for left axillary  cyst   All other systems reviewed and are negative.      Physical Exam   BP: 118/79  Pulse: 105  Heart Rate: 84  Temp: 97.7  F (36.5  C)  Resp: 16  SpO2: 98 %      Physical Exam   Gen:A&Ox3, no acute distress  HEENT:PERRL, no facial tenderness or wounds, head atraumatic, oropharynx clear, mucous membranes moist, TMs clear bilaterally  Neck:no bony tenderness or step offs, no JVD, trachea midline  Back: no CVA tenderness, no midline bony tenderness  CV:RRR without murmurs  PULM:Clear to auscultation bilaterally  Abd:soft, nontender, nondistended. Bowel sounds present and normal  UE: 4 cm fluctuant area of the left axilla with small area of skin erythema. Radial pulses normal and normal arm perfusion.   LE:no traumatic injuries, skin normal  Neuro:CN II-XII intact, strength 5/5 throughout  Skin: no rashes or ecchymoses    ED Course     ED Course     Procedures             Critical Care time:  [facility] Procedure Note        Sedation:      Performed by: Dorinda Martinez  Authorized by: Dorinda Martinez    Pre-Procedure Assessment done at 0000.    Expected Level:  Moderate Sedation    Indication:  Sedation is required to allow for Incision and drainage of abcess    Consent obtained from patient after discussing the risks, benefits and alternatives.    PO Intake:  Appropriately NPO for procedure    ASA Class:  Class 1 - HEALTHY PATIENT    Mallampati:  Grade 1:  Soft palate, uvula, tonsillar pillars, and posterior pharyngeal wall visible    Lungs: Lungs Clear with good breath sounds bilaterally.     Heart: Normal heart sounds and rate    History and physical reviewed and no updates needed. I have reviewed the lab findings, diagnostic data, medications, and the plan for sedation. I have determined this patient to be an appropriate candidate for the planned sedation and procedure and have reassessed the patient IMMEDIATELY PRIOR to sedation and procedure.      Sedation Post Procedure Summary:    Prior to  the start of the procedure and with procedural staff participation, I verbally confirmed the patient s identity using two indicators, relevant allergies, that the procedure was appropriate and matched the consent or emergent situation, and that the correct equipment/implants were available. Immediately prior to starting the procedure I conducted the Time Out with the procedural staff and re-confirmed the patient s name, procedure, and site/side. (The Joint Commission universal protocol was followed.)  Yes      Sedatives: Ketamine    Vital signs, airway, End Tidal CO2 and pulse oximetry were monitored and remained stable throughout the procedure and sedation was maintained until the procedure was complete.  The patient was monitored by staff until sedation discharge criteria were met.    Patient tolerance: Retching/Vomiting, managed with:   IV zofran. No vomiting, just nausea    Time of sedation in minutes:  15 minutes from beginning to end of physician one to one monitoring.      Procedure: Incision and Drainage   LOCATIONS:  Left axilla     ANESTHESIA:  Local field block using topical LET and procedural sedation     PREPARATION:  Cleansed with chloraprep     PROCEDURE:  Area was incised with # 11 Blade ([place]) with 2 incisions.  Wound treatment included Deloculation, Purulent Drainage and Expression of Material.  Packing consisted of Iodoform Gauze x2.  Appropriate dressing was applied to cover the area.    Patient Status:        Patient tolerated the procedure well. There were no complications.          Assessments & Plan (with Medical Decision Making)   27-year-old female with history of hidradenitis suppurativa, presenting with recurrent left axillary abscess.  There is notable for mild tachycardia with heart rate of 105.  Very minimal area of erythema around the abscess.  Patient's pain was significant, requiring sedation with ketamine in order to tolerate incision and drainage.  Bedside ultrasound during  the I&D demonstrated loculated abscess, which was fully drained and packed with 2 gauze bala into 2 separate pockets with a firm scar septum between.   Keflex antibiotics course started.  Norco for pain.  Follow-up with primary care.     I have reviewed the nursing notes.    I have reviewed the findings, diagnosis, plan and need for follow up with the patient.    New Prescriptions    CEPHALEXIN (KEFLEX) 500 MG CAPSULE    Take 1 capsule (500 mg) by mouth 3 times daily for 5 days    HYDROCODONE-ACETAMINOPHEN (NORCO) 5-325 MG PER TABLET    Take 1-2 tablets by mouth every 4 hours as needed for moderate to severe pain       Final diagnoses:   Hidradenitis suppurativa of left axilla   I, Santos Abbott, am serving as a trained medical scribe to document services personally performed by Dorinda Martinez MD, based on the provider's statements to me.      IDorinda MD, was physically present and have reviewed and verified the accuracy of this note documented by Santos Abbott.       1/30/2018   [facility], [place], EMERGENCY DEPARTMENT    MD ILANA Devries Katrina Anne, MD  01/31/18 0318

## 2018-01-31 NOTE — ED NOTES
Patient up to bathroom with assistance. Patient ambulating with steady gait. Patient tolerating PO fluids and food with no c/o nausea/vomitting.

## 2018-05-08 ENCOUNTER — HOSPITAL ENCOUNTER (EMERGENCY)
Facility: CLINIC | Age: 28
Discharge: LEFT BEFORE DISCHARGE | End: 2018-05-08

## 2018-05-09 NOTE — ED TRIAGE NOTES
Pt informed registration she was leaving. Did not sign declination form. Did not speak with nurse.

## 2018-05-16 PROCEDURE — 99284 EMERGENCY DEPT VISIT MOD MDM: CPT | Performed by: EMERGENCY MEDICINE

## 2018-05-16 PROCEDURE — 99284 EMERGENCY DEPT VISIT MOD MDM: CPT | Mod: Z6 | Performed by: EMERGENCY MEDICINE

## 2018-05-17 ENCOUNTER — HOSPITAL ENCOUNTER (EMERGENCY)
Facility: CLINIC | Age: 28
Discharge: HOME | End: 2018-05-17
Attending: EMERGENCY MEDICINE | Admitting: EMERGENCY MEDICINE

## 2018-05-17 VITALS
RESPIRATION RATE: 18 BRPM | BODY MASS INDEX: 28.34 KG/M2 | HEART RATE: 97 BPM | OXYGEN SATURATION: 100 % | DIASTOLIC BLOOD PRESSURE: 71 MMHG | TEMPERATURE: 98.3 F | WEIGHT: 160 LBS | SYSTOLIC BLOOD PRESSURE: 112 MMHG

## 2018-05-17 DIAGNOSIS — N89.8 VAGINAL DISCHARGE: ICD-10-CM

## 2018-05-17 DIAGNOSIS — R14.3 EXCESSIVE FLATUS: ICD-10-CM

## 2018-05-17 DIAGNOSIS — R10.84 ABDOMINAL PAIN, GENERALIZED: ICD-10-CM

## 2018-05-17 DIAGNOSIS — N76.0 BACTERIAL VAGINOSIS: ICD-10-CM

## 2018-05-17 DIAGNOSIS — B96.89 BACTERIAL VAGINOSIS: ICD-10-CM

## 2018-05-17 LAB
ALBUMIN UR-MCNC: NEGATIVE MG/DL
APPEARANCE UR: CLEAR
BILIRUB UR QL STRIP: NEGATIVE
C TRACH DNA SPEC QL NAA+PROBE: NEGATIVE
COLOR UR AUTO: YELLOW
GLUCOSE UR STRIP-MCNC: NEGATIVE MG/DL
HCG UR QL: NEGATIVE
HGB UR QL STRIP: ABNORMAL
INTERNAL QC OK POCT: YES
KETONES UR STRIP-MCNC: NEGATIVE MG/DL
LEUKOCYTE ESTERASE UR QL STRIP: NEGATIVE
MUCOUS THREADS #/AREA URNS LPF: PRESENT /LPF
N GONORRHOEA DNA SPEC QL NAA+PROBE: NEGATIVE
NITRATE UR QL: NEGATIVE
PH UR STRIP: 6.5 PH (ref 5–7)
RBC #/AREA URNS AUTO: 18 /HPF (ref 0–2)
SOURCE: ABNORMAL
SP GR UR STRIP: 1.02 (ref 1–1.03)
SPECIMEN SOURCE: ABNORMAL
SPECIMEN SOURCE: NORMAL
SPECIMEN SOURCE: NORMAL
SQUAMOUS #/AREA URNS AUTO: 3 /HPF (ref 0–1)
UROBILINOGEN UR STRIP-MCNC: 2 MG/DL (ref 0–2)
WBC #/AREA URNS AUTO: 1 /HPF (ref 0–5)
WET PREP SPEC: ABNORMAL

## 2018-05-17 PROCEDURE — 81025 URINE PREGNANCY TEST: CPT | Performed by: EMERGENCY MEDICINE

## 2018-05-17 PROCEDURE — 87210 SMEAR WET MOUNT SALINE/INK: CPT | Performed by: EMERGENCY MEDICINE

## 2018-05-17 PROCEDURE — 81001 URINALYSIS AUTO W/SCOPE: CPT | Performed by: EMERGENCY MEDICINE

## 2018-05-17 PROCEDURE — 87591 N.GONORRHOEAE DNA AMP PROB: CPT | Performed by: EMERGENCY MEDICINE

## 2018-05-17 PROCEDURE — 87491 CHLMYD TRACH DNA AMP PROBE: CPT | Performed by: EMERGENCY MEDICINE

## 2018-05-17 RX ORDER — METRONIDAZOLE 500 MG/1
500 TABLET ORAL 2 TIMES DAILY
Qty: 14 TABLET | Refills: 0 | Status: SHIPPED | OUTPATIENT
Start: 2018-05-17 | End: 2018-05-24

## 2018-05-17 ASSESSMENT — ENCOUNTER SYMPTOMS
FEVER: 0
ABDOMINAL PAIN: 1
DIFFICULTY URINATING: 0
SHORTNESS OF BREATH: 0
CONSTIPATION: 0
DYSURIA: 0
DIARRHEA: 0
NAUSEA: 1
HEMATURIA: 0
VOMITING: 0

## 2018-05-17 NOTE — ED AVS SNAPSHOT
[facility], [place], Emergency Department    [address]    [facility] [address]    Phone:  [phone]    Fax:  [phone]                                       Chhaya Mock   MRN: [id number]    Department:  [facility], Emergency Department   Date of Visit:  5/16/2018           After Visit Summary Signature Page     I have received my discharge instructions, and my questions have been answered. I have discussed any challenges I see with this plan with the nurse or doctor.    ..........................................................................................................................................  Patient/Patient Representative Signature      ..........................................................................................................................................  Patient Representative Print Name and Relationship to Patient    ..................................................               ................................................  Date                                            Time    ..........................................................................................................................................  Reviewed by Signature/Title    ...................................................              ..............................................  Date                                                            Time

## 2018-05-17 NOTE — ED PROVIDER NOTES
"  History     Chief Complaint   Patient presents with     Abdominal Pain     Been having pain for days throughout abdomen. \"Gassy,  nauseous and bowels are fine.\"     HPI  Chhaya Mock is an otherwise healthy 27 year old female who presents to the Emergency Department for evaluation of abdominal pain. The patient reports she has had episodic abdominal pain with associated nausea and passing lots of gas since Monday (3 days ago). She says the abdominal pain worsened today with increased gas after eating lunch so she decided to come to the ED. Her last BM was yesterday and she denies any bowel symptoms. She says denies any urinary symptoms. She is currently sexually active with one partner and notes she has had some thick, white, odorous vaginal discharge over the past few days. She denies any vaginal bleeding. She smokes 1/2 pack of cigarettes daily and occasional marijuana.    Past Medical History:   Diagnosis Date     Carbuncle and furuncle      Tobacco use disorder     Smokes 1/2 ppd. Started smoking at 14 years     Vaginal yeast infection 06/02/2017       Past Surgical History:   Procedure Laterality Date     C ORAL SURGERY PROCEDURE  10/23/2008    [place] Teeth Extraction       Family History   Problem Relation Age of Onset     Hypertension Maternal Grandmother      DIABETES Maternal Uncle      Hypertension Maternal Uncle      Alcohol/Drug Maternal Aunt      Alcohol/Drug Maternal Uncle      Anesthesia Reaction Other        Social History   Substance Use Topics     Smoking status: Current Every Day Smoker     Packs/day: 0.50     Years: 2.00     Types: Cigarettes     Smokeless tobacco: Never Used     Alcohol use Yes      Comment: occassionally       No current facility-administered medications for this encounter.      Current Outpatient Prescriptions   Medication     metroNIDAZOLE (FLAGYL) 500 MG tablet        Allergies   Allergen Reactions     No Known Allergies          I have reviewed the " Medications, Allergies, Past Medical and Surgical History, and Social History in the Epic system.    Review of Systems   Constitutional: Negative for fever.   Respiratory: Negative for shortness of breath.    Cardiovascular: Negative for chest pain.   Gastrointestinal: Positive for abdominal pain and nausea. Negative for constipation, diarrhea and vomiting.   Genitourinary: Positive for vaginal discharge. Negative for difficulty urinating, dysuria, hematuria and vaginal bleeding.   All other systems reviewed and are negative.      Physical Exam   BP: 104/69  Pulse: 91  Temp: 98.2  F (36.8  C)  Resp: 18  Weight: 72.6 kg (160 lb)  SpO2: 98 %      Physical Exam   Constitutional: She is oriented to person, place, and time. She appears well-developed. No distress.   HENT:   Head: Normocephalic and atraumatic.   Right Ear: External ear normal.   Left Ear: External ear normal.   Mouth/Throat: Oropharynx is clear and moist.   Eyes: Conjunctivae and EOM are normal. Pupils are equal, round, and reactive to light.   Neck: Normal range of motion. Neck supple.   Cardiovascular: Normal rate, regular rhythm and normal heart sounds.    Pulmonary/Chest: Effort normal and breath sounds normal. No respiratory distress. She has no wheezes. She has no rales.   Abdominal: Soft. She exhibits no distension. There is no tenderness. There is no rebound and no guarding.   Genitourinary: Vaginal discharge found.   Genitourinary Comments: Normal external vaginal, internal exam unremarkable except for a small amount of white cloudy discharge, no erythema, no CMT, no adnexal tenderness   Musculoskeletal: Normal range of motion. She exhibits no tenderness or deformity.   Neurological: She is alert and oriented to person, place, and time. No cranial nerve deficit. Coordination normal.   Skin: Skin is warm and dry. No rash noted.   Psychiatric: She has a normal mood and affect. Her behavior is normal.       ED Course     ED Course     Procedures              Critical Care time:  none             Labs Ordered and Resulted from Time of ED Arrival Up to the Time of Departure from the ED   ROUTINE UA WITH MICROSCOPIC REFLEX TO CULTURE - Abnormal; Notable for the following:        Result Value    Blood Urine Small (*)     RBC Urine 18 (*)     Squamous Epithelial /HPF Urine 3 (*)     Mucous Urine Present (*)     All other components within normal limits   WET PREP - Abnormal; Notable for the following:     Wet Prep Clue cells seen (*)     All other components within normal limits   HCG QUAL URINE POCT - Normal   PREP FOR PROCEDURE   CHLAMYDIA TRACHOMATIS PCR   NEISSERIA GONORRHOEAE PCR        .  Results for orders placed or performed during the hospital encounter of 05/17/18   UA with Microscopic reflex to Culture   Result Value Ref Range    Color Urine Yellow     Appearance Urine Clear     Glucose Urine Negative NEG^Negative mg/dL    Bilirubin Urine Negative NEG^Negative    Ketones Urine Negative NEG^Negative mg/dL    Specific Gravity Urine 1.019 1.003 - 1.035    Blood Urine Small (A) NEG^Negative    pH Urine 6.5 5.0 - 7.0 pH    Protein Albumin Urine Negative NEG^Negative mg/dL    Urobilinogen mg/dL 2.0 0.0 - 2.0 mg/dL    Nitrite Urine Negative NEG^Negative    Leukocyte Esterase Urine Negative NEG^Negative    Source Midstream Urine     WBC Urine 1 0 - 5 /HPF    RBC Urine 18 (H) 0 - 2 /HPF    Squamous Epithelial /HPF Urine 3 (H) 0 - 1 /HPF    Mucous Urine Present (A) NEG^Negative /LPF   hCG qual urine POCT   Result Value Ref Range    HCG Qual Urine Negative neg    Internal QC OK Yes    Wet prep   Result Value Ref Range    Specimen Description Vagina     Wet Prep No motile Trichomonas seen     Wet Prep No yeast seen     Wet Prep Few  PMNs seen       Wet Prep Clue cells seen (A)          Assessments & Plan (with Medical Decision Making)   Chhaya Mock is an otherwise healthy 27 year old female who presents to the Emergency Department for evaluation of  abdominal pain.  Upon arrival patient is well-appearing, afebrile, no distress.  Patient reports increased fluctuance along with vaginal discharge, concern for possible pregnancy.  At this time plan for urinalysis, vaginal exam, and reevaluate.  Admit is soft, nontender, nondistended, no peritoneal signs, vaginal exam with normal external genitalia, internal vaginal exam normal except for small amount of white cloudy discharge, no cervical motion tenderness, no cervical erythema, no adnexal tenderness.  I reviewed urinalysis which is negative for pregnancy, no signs of acute cystitis, wet prep positive for clue cells suggestive of bacterial vaginosis.  I discussed this with patient we will send her home on a course of metronidazole.  Patient understands and agrees with the plan.  Return precautions discussed if high fever, severe pain, persistent vomiting, nursing symptoms.  Patient will follow up with her primary care physician in the next 3-5 days.  Return precautions discussed. .The patient is discharged home with instructions to return if their symptoms persist or worsen.  Plan for close follow-up with their primary physician.  I discussed workup, results, treatment, and plan with the patient.  Patient understands and agrees with the plan.      I have reviewed the nursing notes.    I have reviewed the findings, diagnosis, plan and need for follow up with the patient.    Discharge Medication List as of 5/17/2018  2:43 AM      START taking these medications    Details   metroNIDAZOLE (FLAGYL) 500 MG tablet Take 1 tablet (500 mg) by mouth 2 times daily for 7 days, Disp-14 tablet, R-0, Local Print[employer]at yogurt or cottage cheese daily to prevent diarrhea that can be caused by taking this antibiotic.             Final diagnoses:   Vaginal discharge   Abdominal pain, generalized   Bacterial vaginosis   Excessive flatus   ISantos, am serving as a trained medical scribe to document services personally performed  by Isabelle Bennett MD, based on the provider's statements to me.      I, Isabelle Bennett MD, was physically present and have reviewed and verified the accuracy of this note documented by Santos Abbott.       5/16/2018   [facility], [place], EMERGENCY DEPARTMENT     Isabelle Bennett MD  05/17/18 0712

## 2018-05-17 NOTE — ED AVS SNAPSHOT
[facility], Emergency Department    [address]    [place] [address]    Phone:  [phone]    Fax:  [phone]                                       Chhaya Mock   MRN: [id number]    Department:  [facility], Emergency Department   Date of Visit:  5/16/2018           Patient Information     Date Of Birth          1990        Your diagnoses for this visit were:     Vaginal discharge     Abdominal pain, generalized     Bacterial vaginosis     Excessive flatus        You were seen by Isabelle Bennett MD.        Discharge Instructions       Thank you for your patience today.  Please follow-up with your regular doctor in the next 2-3 days for further evaluation and follow-up care.  Please call to schedule an appointment.  Please continue your own medications.  Please take antibiotics twice daily as directed.  Please do not drink any alcohol while on this medication for the next 1 week.  Please drink plenty of fluids, and follow the brat diet (bananas, rice, applesauce, toast) please return to the ER if you develop high fever, persistent vomiting, severe pain, or any worsening of your current symptoms.  It was a pleasure taking care of you today.  We hope you feel better soon.    Bacterial Vaginosis    You have a vaginal infection called bacterial vaginosis (BV). Both good and bad bacteria are present in a healthy vagina. BV occurs when these bacteria get out of balance. The number of bad bacteria increase. And the number of good bacteria decrease. Although BV is associated with sexual activity, it is not a sexually transmitted disease.  BV may or may not cause symptoms. If symptoms do occur, they can include:    Thin, gray, milky-white, or sometimes green discharge    Unpleasant odor or  fishy  smell    Itching, burning, or pain in or around the vagina  It is not known what causes BV, but certain factors can make the problem more likely. This can  include:    Douching    Having sex with a new partner    Having sex with more than one partner  BV will sometimes go away on its own. But treatment is usually recommended. This is because untreated BV can increase the risk of more serious health problems such as:    Pelvic inflammatory disease (PID)     delivery (giving birth to a baby early if you re pregnant)    HIV and certain other sexually transmitted diseases (STDs)    Infection after surgery on the reproductive organs  Home care  General care    BV is most often treated with medicines called antibiotics. These may be given as pills or as a vaginal cream. If antibiotics are prescribed, be sure to use them exactly as directed. Also, be sure to complete all of the medicine, even if your symptoms go away.    Don't douche or having sex during treatment.    If you have sex with a female partner, ask your healthcare provider if she should also be treated.  Prevention    Don't douche.    Don't have sex. If you do have sex, then take steps to lower your risk:  ? Use condoms when having sex.  ? Limit the number of sexual partners you have.  Follow-up care  Follow up with your healthcare provider, or as advised.  When to seek medical advice  Call your healthcare provider right away if:    You have a fever of 100.4 F (38 C) or higher, or as directed by your provider.    Your symptoms worsen, or they don t go away within a few days of starting treatment.    You have new pain in the lower belly or pelvic region.    You have side effects that bother you or a reaction to the pills or cream you re prescribed.    You or any partners you have sex with have new symptoms, such as a rash, joint pain, or sores.  Date Last Reviewed: 10/1/2017    1518-3013 [employer]. [address] [place] [address]. All rights reserved. This information is not intended as a substitute for professional medical care. Always follow your healthcare professional's  instructions.          24 Hour Appointment Hotline       To make an appointment at any [facility], call [phone] ([phone]). If you don't have a family doctor or clinic, we will help you find one. [facility] clinics are conveniently located to serve the needs of you and your family.             Review of your medicines      START taking        Dose / Directions Last dose taken    metroNIDAZOLE 500 MG tablet   Commonly known as:  FLAGYL   Dose:  500 mg   Quantity:  14 tablet        Take 1 tablet (500 mg) by mouth 2 times daily for 7 days   Refills:  0                Prescriptions were sent or printed at these locations (1 Prescription)                   Other Prescriptions                Printed at Department/Unit printer (1 of 1)         metroNIDAZOLE (FLAGYL) 500 MG tablet                Procedures and tests performed during your visit     Chlamydia trachomatis PCR    Neisseria gonorrhoea PCR    Prep for procedure - pelvic exam    UA with Microscopic reflex to Culture    Wet prep    hCG qual urine POCT      Orders Needing Specimen Collection     None      Pending Results     Date and Time Order Name Status Description    5/17/2018 0125 Neisseria gonorrhoea PCR In process     5/17/2018 0125 Chlamydia trachomatis PCR In process             Pending Culture Results     Date and Time Order Name Status Description    5/17/2018 0125 Neisseria gonorrhoea PCR In process     5/17/2018 0125 Chlamydia trachomatis PCR In process             Pending Results Instructions     If you had any lab results that were not finalized at the time of your Discharge, you can call the ED Lab Result RN at [phone]. You will be contacted by this team for any positive Lab results or changes in treatment. The nurses are available 7 days a week from 10A to 6:30P.  You can leave a message 24 hours per day and they will return your call.        Thank you for choosing [facility]       Thank you for choosing [facility] for your care.  "Our goal is always to provide you with excellent care. Hearing back from our patients is one way we can continue to improve our services. Please take a few minutes to complete the written survey that you may receive in the mail after you visit with us. Thank you!        [employer] Information     [employer] lets you send messages to your doctor, view your test results, renew your prescriptions, schedule appointments and more. To sign up, go to [email or web address] . Click on \"Log in\" on the left side of the screen, which will take you to the Welcome page. Then click on \"Sign up Now\" on the right side of the page.     You will be asked to enter the access code listed below, as well as some personal information. Please follow the directions to create your username and password.     Your access code is: [email or web address]  Expires: 8/15/2018  2:03 AM     Your access code will  in 90 days. If you need help or a new code, please call your [facility] clinic or [phone].        Care EveryWhere ID     This is your Care EveryWhere ID. This could be used by other organizations to access your [facility] medical records  [id number]        Equal Access to Services     SAMANTHA TOVAR : Kendell Grant, mando park, katty smith, yong jeffries. So [facility] [phone].    ATENCIÓN: Si habla español, tiene a sams disposición servicios gratuitos de asistencia lingüística. Eileen al [phone].    We comply with applicable federal civil rights laws and [place] laws. We do not discriminate on the basis of race, color, national origin, age, disability, sex, sexual orientation, or gender identity.            After Visit Summary       This is your record. Keep this with you and show to your community pharmacist(s) and doctor(s) at your next visit.                  "

## 2018-05-17 NOTE — LETTER
May 17, 2018      To Whom It May Concern:      Chhaya Mock was seen in our Emergency Department today, 05/17/18.   She may return to work/school when improved.    Sincerely,        Isabelle Bennett MD

## 2018-05-17 NOTE — DISCHARGE INSTRUCTIONS
Thank you for your patience today.  Please follow-up with your regular doctor in the next 2-3 days for further evaluation and follow-up care.  Please call to schedule an appointment.  Please continue your own medications.  Please take antibiotics twice daily as directed.  Please do not drink any alcohol while on this medication for the next 1 week.  Please drink plenty of fluids, and follow the brat diet (bananas, rice, applesauce, toast) please return to the ER if you develop high fever, persistent vomiting, severe pain, or any worsening of your current symptoms.  It was a pleasure taking care of you today.  We hope you feel better soon.    Bacterial Vaginosis    You have a vaginal infection called bacterial vaginosis (BV). Both good and bad bacteria are present in a healthy vagina. BV occurs when these bacteria get out of balance. The number of bad bacteria increase. And the number of good bacteria decrease. Although BV is associated with sexual activity, it is not a sexually transmitted disease.  BV may or may not cause symptoms. If symptoms do occur, they can include:    Thin, gray, milky-white, or sometimes green discharge    Unpleasant odor or  fishy  smell    Itching, burning, or pain in or around the vagina  It is not known what causes BV, but certain factors can make the problem more likely. This can include:    Douching    Having sex with a new partner    Having sex with more than one partner  BV will sometimes go away on its own. But treatment is usually recommended. This is because untreated BV can increase the risk of more serious health problems such as:    Pelvic inflammatory disease (PID)     delivery (giving birth to a baby early if you re pregnant)    HIV and certain other sexually transmitted diseases (STDs)    Infection after surgery on the reproductive organs  Home care  General care    BV is most often treated with medicines called antibiotics. These may be given as pills or as a vaginal  cream. If antibiotics are prescribed, be sure to use them exactly as directed. Also, be sure to complete all of the medicine, even if your symptoms go away.    Don't douche or having sex during treatment.    If you have sex with a female partner, ask your healthcare provider if she should also be treated.  Prevention    Don't douche.    Don't have sex. If you do have sex, then take steps to lower your risk:  ? Use condoms when having sex.  ? Limit the number of sexual partners you have.  Follow-up care  Follow up with your healthcare provider, or as advised.  When to seek medical advice  Call your healthcare provider right away if:    You have a fever of 100.4 F (38 C) or higher, or as directed by your provider.    Your symptoms worsen, or they don t go away within a few days of starting treatment.    You have new pain in the lower belly or pelvic region.    You have side effects that bother you or a reaction to the pills or cream you re prescribed.    You or any partners you have sex with have new symptoms, such as a rash, joint pain, or sores.  Date Last Reviewed: 10/1/2017    6492-8380 [employer]. [address] [place] [address], [place] [address]. All rights reserved. This information is not intended as a substitute for professional medical care. Always follow your healthcare professional's instructions.

## 2018-06-11 ENCOUNTER — HOSPITAL ENCOUNTER (EMERGENCY)
Facility: CLINIC | Age: 28
Discharge: HOME | End: 2018-06-12
Attending: EMERGENCY MEDICINE | Admitting: EMERGENCY MEDICINE

## 2018-06-11 DIAGNOSIS — J18.9 COMMUNITY ACQUIRED PNEUMONIA OF LEFT LOWER LOBE OF LUNG: ICD-10-CM

## 2018-06-11 PROCEDURE — 99284 EMERGENCY DEPT VISIT MOD MDM: CPT | Mod: Z6 | Performed by: EMERGENCY MEDICINE

## 2018-06-11 PROCEDURE — 85025 COMPLETE CBC W/AUTO DIFF WBC: CPT | Performed by: EMERGENCY MEDICINE

## 2018-06-11 PROCEDURE — 83690 ASSAY OF LIPASE: CPT | Performed by: EMERGENCY MEDICINE

## 2018-06-11 PROCEDURE — 99284 EMERGENCY DEPT VISIT MOD MDM: CPT | Mod: 25 | Performed by: EMERGENCY MEDICINE

## 2018-06-11 PROCEDURE — 80053 COMPREHEN METABOLIC PANEL: CPT | Performed by: EMERGENCY MEDICINE

## 2018-06-11 RX ORDER — ALBUTEROL SULFATE 0.83 MG/ML
2.5 SOLUTION RESPIRATORY (INHALATION) ONCE
Status: COMPLETED | OUTPATIENT
Start: 2018-06-11 | End: 2018-06-12

## 2018-06-11 RX ORDER — MORPHINE SULFATE 2 MG/ML
2 INJECTION, SOLUTION INTRAMUSCULAR; INTRAVENOUS
Status: DISCONTINUED | OUTPATIENT
Start: 2018-06-11 | End: 2018-06-12 | Stop reason: HOSPADM

## 2018-06-11 RX ORDER — SODIUM CHLORIDE 9 MG/ML
1000 INJECTION, SOLUTION INTRAVENOUS CONTINUOUS
Status: DISCONTINUED | OUTPATIENT
Start: 2018-06-11 | End: 2018-06-12 | Stop reason: HOSPADM

## 2018-06-11 RX ORDER — ONDANSETRON 2 MG/ML
4 INJECTION INTRAMUSCULAR; INTRAVENOUS EVERY 30 MIN PRN
Status: DISCONTINUED | OUTPATIENT
Start: 2018-06-11 | End: 2018-06-12 | Stop reason: HOSPADM

## 2018-06-11 ASSESSMENT — ENCOUNTER SYMPTOMS
CONSTIPATION: 1
VOMITING: 1
SORE THROAT: 1
DIARRHEA: 1
CHILLS: 1
COUGH: 1
ABDOMINAL PAIN: 1
HEADACHES: 1
SHORTNESS OF BREATH: 1
BACK PAIN: 1
NAUSEA: 1
FEVER: 1

## 2018-06-11 NOTE — ED AVS SNAPSHOT
[facility], Emergency Department    [address]    [facility] [place] [facility] [place] [place] [address]    Phone:  [phone]    Fax:  [phone]                                       Chhaya Mock   MRN: [id number]    Department:  [facility], Emergency Department   Date of Visit:  6/11/2018           Patient Information     Date Of Birth          1990        Your diagnoses for this visit were:     Community acquired pneumonia of left lower lobe of lung (H)        You were seen by Ruben Fernandez DO.        Discharge Instructions         Pneumonia (Adult)  Pneumonia is an infection deep within the lungs. It is in the small air sacs (alveoli). Pneumonia may be caused by a virus or bacteria. Pneumonia caused by bacteria is usually treated with an antibiotic. Severe cases may need to be treated in the hospital. Milder cases can be treated at home. Symptoms usually start to get better during the first 2 days of treatment.    Home care  Follow these guidelines when caring for yourself at home:    Rest at home for the first 2 to 3 days, or until you feel stronger. Don t let yourself get overly tired when you go back to your activities.    Stay away from cigarette smoke - yours or other people s.    You may use acetaminophen or ibuprofen to control fever or pain, unless another medicine was prescribed. If you have chronic liver or kidney disease, talk with your healthcare provider before using these medicines. Also talk with your provider if you ve had a stomach ulcer or gastrointestinal bleeding. Don t give aspirin to anyone younger than 18 years of age who is ill with a fever. It may cause severe liver damage.    Your appetite may be poor, so a light diet is fine.    Drink 6 to 8 glasses of fluids every day to make sure you are getting enough fluids. Beverages can include water, sport drinks, sodas without caffeine, juices, tea, or soup. Fluids will help loosen secretions in the lung. This will make it  easier for you to cough up the phlegm (sputum). If you also have heart or kidney disease, check with your healthcare provider before you drink extra fluids.    Take antibiotic medicine prescribed until it is all gone, even if you are feeling better after a few days.  Follow-up care  Follow up with your healthcare provider in the next 2 to 3 days, or as advised. This is to be sure the medicine is helping you get better.  If you are 65 or older, you should get a pneumococcal vaccine and a yearly flu (influenza) shot. You should also get these vaccines if you have chronic lung disease like asthma, emphysema, or COPD. Recently, a second type of pneumonia vaccine has become available for everyone over 65 years old. This is in addition to the previous vaccine. Ask your provider about this.  When to seek medical advice  Call your healthcare provider right away if any of these occur:    You don t get better within the first 48 hours of treatment    Shortness of breath gets worse    Rapid breathing (more than 25 breaths per minute)    Coughing up blood    Chest pain gets worse with breathing    Fever of 100.4 F (38 C) or higher that doesn t get better with fever medicine    Weakness, dizziness, or fainting that gets worse    Thirst or dry mouth that gets worse    Sinus pain, headache, or a stiff neck    Chest pain not caused by coughing  Date Last Reviewed: 1/1/2017 2000-2017 [employer]. [address] [place] [address]. All rights reserved. This information is not intended as a substitute for professional medical care. Always follow your healthcare professional's instructions.      Take complete course of levofloxacin.    Please make an appointment to follow up with Your Primary Care Provider in 4-5 days.     24 Hour Appointment Hotline       To make an appointment at any [facility], call [phone] ([phone]). If you don't have a family doctor or clinic, we will help you find one.  [facility] are conveniently located to serve the needs of you and your family.             Review of your medicines      START taking        Dose / Directions Last dose taken    levofloxacin 750 MG tablet   Commonly known as:  LEVAQUIN   Dose:  750 mg   Quantity:  5 tablet        Take 1 tablet (750 mg) by mouth daily for 5 days   Refills:  0                Prescriptions were sent or printed at these locations (1 Prescription)                   Other Prescriptions                Printed at Department/Unit printer (1 of 1)         levofloxacin (LEVAQUIN) 750 MG tablet                Procedures and tests performed during your visit     CBC with platelets differential    Comprehensive metabolic panel    HCG qualitative urine    Lipase    Peripheral IV catheter    UA reflex to Microscopic and Culture    XR Chest 2 Views      Orders Needing Specimen Collection     None      Pending Results     Date and Time Order Name Status Description    6/11/2018 2321 XR Chest 2 Views Preliminary             Pending Culture Results     No orders found for last 3 day(s).            Pending Results Instructions     If you had any lab results that were not finalized at the time of your Discharge, you can call the ED Lab Result RN at [phone]. You will be contacted by this team for any positive Lab results or changes in treatment. The nurses are available 7 days a week from 10A to 6:30P.  You can leave a message 24 hours per day and they will return your call.        Thank you for choosing [facility]       Thank you for choosing [facility] for your care. Our goal is always to provide you with excellent care. Hearing back from our patients is one way we can continue to improve our services. Please take a few minutes to complete the written survey that you may receive in the mail after you visit with us. Thank you!        [facility] Information     [employer] lets you send messages to your doctor, view your test results, renew your  "prescriptions, schedule appointments and more. To sign up, go to [email or web address] . Click on \"Log in\" on the left side of the screen, which will take you to the Welcome page. Then click on \"Sign up Now\" on the right side of the page.     You will be asked to enter the access code listed below, as well as some personal information. Please follow the directions to create your username and password.     Your access code is: [email or web address]  Expires: 8/15/2018  2:03 AM     Your access code will  in 90 days. If you need help or a new code, please call your [facility] clinic or [phone].        Care EveryWhere ID     This is your Care EveryWhere ID. This could be used by other organizations to access your [facility] medical records  [id number]        Equal Access to Services     SAMANTHA TOVAR : Kendell Grant, [place], katty smith, ynog raya . So [facility] [place] [phone].    ATENCIÓN: Si habla español, tiene a sams disposición servicios gratuitos de asistencia lingüística. Llame al [phone].    We comply with applicable federal civil rights laws and [place] laws. We do not discriminate on the basis of race, color, national origin, age, disability, sex, sexual orientation, or gender identity.            After Visit Summary       This is your record. Keep this with you and show to your community pharmacist(s) and doctor(s) at your next visit.                  "

## 2018-06-11 NOTE — ED AVS SNAPSHOT
[facility], [place], Emergency Department    [address]    [facility] [address]    Phone:  [phone]    Fax:  [phone]                                       Chhaya Mock   MRN: [id number]    Department:  [facility], Emergency Department   Date of Visit:  6/11/2018           After Visit Summary Signature Page     I have received my discharge instructions, and my questions have been answered. I have discussed any challenges I see with this plan with the nurse or doctor.    ..........................................................................................................................................  Patient/Patient Representative Signature      ..........................................................................................................................................  Patient Representative Print Name and Relationship to Patient    ..................................................               ................................................  Date                                            Time    ..........................................................................................................................................  Reviewed by Signature/Title    ...................................................              ..............................................  Date                                                            Time

## 2018-06-12 ENCOUNTER — APPOINTMENT (OUTPATIENT)
Dept: GENERAL RADIOLOGY | Facility: CLINIC | Age: 28
End: 2018-06-12
Attending: EMERGENCY MEDICINE

## 2018-06-12 VITALS
DIASTOLIC BLOOD PRESSURE: 83 MMHG | SYSTOLIC BLOOD PRESSURE: 102 MMHG | TEMPERATURE: 98.2 F | OXYGEN SATURATION: 99 % | HEART RATE: 80 BPM | RESPIRATION RATE: 18 BRPM

## 2018-06-12 LAB
ALBUMIN SERPL-MCNC: 3.5 G/DL (ref 3.4–5)
ALBUMIN UR-MCNC: 30 MG/DL
ALP SERPL-CCNC: 47 U/L (ref 40–150)
ALT SERPL W P-5'-P-CCNC: 10 U/L (ref 0–50)
ANION GAP SERPL CALCULATED.3IONS-SCNC: 9 MMOL/L (ref 3–14)
APPEARANCE UR: CLEAR
AST SERPL W P-5'-P-CCNC: 18 U/L (ref 0–45)
BACTERIA #/AREA URNS HPF: ABNORMAL /HPF
BASOPHILS # BLD AUTO: 0 10E9/L (ref 0–0.2)
BASOPHILS NFR BLD AUTO: 0.2 %
BILIRUB SERPL-MCNC: 0.6 MG/DL (ref 0.2–1.3)
BILIRUB UR QL STRIP: NEGATIVE
BUN SERPL-MCNC: 11 MG/DL (ref 7–30)
CALCIUM SERPL-MCNC: 7.9 MG/DL (ref 8.5–10.1)
CHLORIDE SERPL-SCNC: 106 MMOL/L (ref 94–109)
CO2 SERPL-SCNC: 26 MMOL/L (ref 20–32)
COLOR UR AUTO: YELLOW
CREAT SERPL-MCNC: 0.78 MG/DL (ref 0.52–1.04)
DIFFERENTIAL METHOD BLD: NORMAL
EOSINOPHIL # BLD AUTO: 0 10E9/L (ref 0–0.7)
EOSINOPHIL NFR BLD AUTO: 0.2 %
ERYTHROCYTE [DISTWIDTH] IN BLOOD BY AUTOMATED COUNT: 12.7 % (ref 10–15)
GFR SERPL CREATININE-BSD FRML MDRD: 88 ML/MIN/1.7M2
GLUCOSE SERPL-MCNC: 88 MG/DL (ref 70–99)
GLUCOSE UR STRIP-MCNC: NEGATIVE MG/DL
HCG UR QL: NEGATIVE
HCT VFR BLD AUTO: 39.3 % (ref 35–47)
HGB BLD-MCNC: 13 G/DL (ref 11.7–15.7)
HGB UR QL STRIP: ABNORMAL
IMM GRANULOCYTES # BLD: 0 10E9/L (ref 0–0.4)
IMM GRANULOCYTES NFR BLD: 0.2 %
KETONES UR STRIP-MCNC: NEGATIVE MG/DL
LEUKOCYTE ESTERASE UR QL STRIP: NEGATIVE
LIPASE SERPL-CCNC: 102 U/L (ref 73–393)
LYMPHOCYTES # BLD AUTO: 2.8 10E9/L (ref 0.8–5.3)
LYMPHOCYTES NFR BLD AUTO: 42.6 %
MCH RBC QN AUTO: 29.8 PG (ref 26.5–33)
MCHC RBC AUTO-ENTMCNC: 33.1 G/DL (ref 31.5–36.5)
MCV RBC AUTO: 90 FL (ref 78–100)
MONOCYTES # BLD AUTO: 0.7 10E9/L (ref 0–1.3)
MONOCYTES NFR BLD AUTO: 10.1 %
MUCOUS THREADS #/AREA URNS LPF: PRESENT /LPF
NEUTROPHILS # BLD AUTO: 3 10E9/L (ref 1.6–8.3)
NEUTROPHILS NFR BLD AUTO: 46.7 %
NITRATE UR QL: NEGATIVE
NRBC # BLD AUTO: 0 10*3/UL
NRBC BLD AUTO-RTO: 0 /100
PH UR STRIP: 6 PH (ref 5–7)
PLATELET # BLD AUTO: 163 10E9/L (ref 150–450)
POTASSIUM SERPL-SCNC: 3.3 MMOL/L (ref 3.4–5.3)
PROT SERPL-MCNC: 7.9 G/DL (ref 6.8–8.8)
RBC # BLD AUTO: 4.36 10E12/L (ref 3.8–5.2)
RBC #/AREA URNS AUTO: 14 /HPF (ref 0–2)
SODIUM SERPL-SCNC: 141 MMOL/L (ref 133–144)
SOURCE: ABNORMAL
SP GR UR STRIP: 1.02 (ref 1–1.03)
SQUAMOUS #/AREA URNS AUTO: 9 /HPF (ref 0–1)
UROBILINOGEN UR STRIP-MCNC: 2 MG/DL (ref 0–2)
WBC # BLD AUTO: 6.5 10E9/L (ref 4–11)
WBC #/AREA URNS AUTO: 2 /HPF (ref 0–5)
YEAST #/AREA URNS HPF: ABNORMAL /HPF

## 2018-06-12 PROCEDURE — 96360 HYDRATION IV INFUSION INIT: CPT | Performed by: EMERGENCY MEDICINE

## 2018-06-12 PROCEDURE — 81001 URINALYSIS AUTO W/SCOPE: CPT | Performed by: EMERGENCY MEDICINE

## 2018-06-12 PROCEDURE — 25000128 H RX IP 250 OP 636: Performed by: EMERGENCY MEDICINE

## 2018-06-12 PROCEDURE — 25000125 ZZHC RX 250: Performed by: EMERGENCY MEDICINE

## 2018-06-12 PROCEDURE — 96361 HYDRATE IV INFUSION ADD-ON: CPT | Performed by: EMERGENCY MEDICINE

## 2018-06-12 PROCEDURE — 94640 AIRWAY INHALATION TREATMENT: CPT | Performed by: EMERGENCY MEDICINE

## 2018-06-12 PROCEDURE — 81025 URINE PREGNANCY TEST: CPT | Performed by: EMERGENCY MEDICINE

## 2018-06-12 PROCEDURE — 71046 X-RAY EXAM CHEST 2 VIEWS: CPT

## 2018-06-12 RX ORDER — LEVOFLOXACIN 750 MG/1
750 TABLET, FILM COATED ORAL DAILY
Qty: 5 TABLET | Refills: 0 | Status: SHIPPED | OUTPATIENT
Start: 2018-06-12 | End: 2018-06-17

## 2018-06-12 RX ADMIN — SODIUM CHLORIDE 1000 ML: 9 INJECTION, SOLUTION INTRAVENOUS at 00:04

## 2018-06-12 RX ADMIN — ALBUTEROL SULFATE 2.5 MG: 2.5 SOLUTION RESPIRATORY (INHALATION) at 00:05

## 2018-06-12 NOTE — ED NOTES
Patient received as sign out at change of shift pending laboratory and radiographic results.  The patient's primary concern is cough.  She does have a left lower lobe infiltrate on her chest radiograph.  Her urinalysis is a contaminated specimen that also has some microscopic hematuria.  The patient does not provide a history of flank or abdominal pain that is concerning for ureterolithiasis.  The patient appears clinically well and stable here in the emergency department.  She will be discharged home on a 5 day course of levofloxacin.  Primary care follow-up recommended in 4-5 days.  Results for orders placed or performed during the hospital encounter of 06/11/18   XR Chest 2 Views    Narrative    CHEST 2 VIEWS  6/12/2018 12:58 AM     HISTORY: Cough and shortness of breath.    COMPARISON: 2/4/2017.    FINDINGS: A 3 cm patchy opacity is present in the posterior medial  aspect of the left lower lobe, new since 2/4/2017. The lungs are  otherwise clear. Normal-sized cardiac silhouette.      Impression    IMPRESSION:  1. A 3 cm patchy opacity is present in the posterior medial aspect of  the left lower lobe, new since 2/4/2017. This is nonspecific, but  suspicious for pneumonia. A follow-up chest radiograph could be  considered in one month to confirm resolution.  2. No other findings suspicious for active cardiopulmonary disease.   UA reflex to Microscopic and Culture   Result Value Ref Range    Color Urine Yellow     Appearance Urine Clear     Glucose Urine Negative NEG^Negative mg/dL    Bilirubin Urine Negative NEG^Negative    Ketones Urine Negative NEG^Negative mg/dL    Specific Gravity Urine 1.025 1.003 - 1.035    Blood Urine Moderate (A) NEG^Negative    pH Urine 6.0 5.0 - 7.0 pH    Protein Albumin Urine 30 (A) NEG^Negative mg/dL    Urobilinogen mg/dL 2.0 0.0 - 2.0 mg/dL    Nitrite Urine Negative NEG^Negative    Leukocyte Esterase Urine Negative NEG^Negative    Source Midstream Urine     RBC Urine 14 (H) 0 - 2 /HPF     WBC Urine 2 0 - 5 /HPF    Bacteria Urine Many (A) NEG^Negative /HPF    Yeast Urine Few (A) NEG^Negative /HPF    Squamous Epithelial /HPF Urine 9 (H) 0 - 1 /HPF    Mucous Urine Present (A) NEG^Negative /LPF   HCG qualitative urine   Result Value Ref Range    HCG Qual Urine Negative NEG^Negative   CBC with platelets differential   Result Value Ref Range    WBC 6.5 4.0 - 11.0 10e9/L    RBC Count 4.36 3.8 - 5.2 10e12/L    Hemoglobin 13.0 11.7 - 15.7 g/dL    Hematocrit 39.3 35.0 - 47.0 %    MCV 90 78 - 100 fl    MCH 29.8 26.5 - 33.0 pg    MCHC 33.1 31.5 - 36.5 g/dL    RDW 12.7 10.0 - 15.0 %    Platelet Count 163 150 - 450 10e9/L    Diff Method Automated Method     % Neutrophils 46.7 %    % Lymphocytes 42.6 %    % Monocytes 10.1 %    % Eosinophils 0.2 %    % Basophils 0.2 %    % Immature Granulocytes 0.2 %    Nucleated RBCs 0 0 /100    Absolute Neutrophil 3.0 1.6 - 8.3 10e9/L    Absolute Lymphocytes 2.8 0.8 - 5.3 10e9/L    Absolute Monocytes 0.7 0.0 - 1.3 10e9/L    Absolute Eosinophils 0.0 0.0 - 0.7 10e9/L    Absolute Basophils 0.0 0.0 - 0.2 10e9/L    Abs Immature Granulocytes 0.0 0 - 0.4 10e9/L    Absolute Nucleated RBC 0.0    Comprehensive metabolic panel   Result Value Ref Range    Sodium 141 133 - 144 mmol/L    Potassium 3.3 (L) 3.4 - 5.3 mmol/L    Chloride 106 94 - 109 mmol/L    Carbon Dioxide 26 20 - 32 mmol/L    Anion Gap 9 3 - 14 mmol/L    Glucose 88 70 - 99 mg/dL    Urea Nitrogen 11 7 - 30 mg/dL    Creatinine 0.78 0.52 - 1.04 mg/dL    GFR Estimate 88 >60 mL/min/1.7m2    GFR Estimate If Black >90 >60 mL/min/1.7m2    Calcium 7.9 (L) 8.5 - 10.1 mg/dL    Bilirubin Total 0.6 0.2 - 1.3 mg/dL    Albumin 3.5 3.4 - 5.0 g/dL    Protein Total 7.9 6.8 - 8.8 g/dL    Alkaline Phosphatase 47 40 - 150 U/L    ALT 10 0 - 50 U/L    AST 18 0 - 45 U/L   Lipase   Result Value Ref Range    Lipase 102 73 - 393 U/L         Prince Dorado MD  06/12/18 3034

## 2018-06-12 NOTE — ED NOTES
Pt approached the registration person and stated that she could not breathe. VS checked. Pt has good sats and resp. She is able to talk and resp are easy and regular. She also states that she has been vomiting since Thursday.

## 2018-06-12 NOTE — DISCHARGE INSTRUCTIONS

## 2018-06-12 NOTE — ED PROVIDER NOTES
History     Chief Complaint   Patient presents with     Nausea & Vomiting     N/V since last Thursday. Unable to keep anything down. Dry heaving during triage     Abdominal Pain     Diffuse ABD pain since last Thursday.     MARLON Mock is a 27 year old female smoker (~1/2 ppd) who presents for evaluation of a variety of complaints. Patient states she first noticed a cough on 6/7/18, 4 days ago, that eventually developed into SOB, a sore throat, hot and cold flashes, headaches, vomiting and nausea, constipation that eventually developed into diarrhea, and abdominal/back pain. Patient states that due to her nausea and vomiting, she has been unable to keep food or drink down since Thursday. She also reports that she has been unable to smoke due to her symptoms; her last cigarette was on 6/6/18. Patient states there is a chance of pregnancy. Her last period was from 5/22-5/24, over the course of 3 days. She states this was unusual as her periods usually last 6 days.    Past Medical History:   Diagnosis Date     Carbuncle and furuncle      Tobacco use disorder     Smokes 1/2 ppd. Started smoking at 14 years     Vaginal yeast infection 06/02/2017       Past Surgical History:   Procedure Laterality Date     C ORAL SURGERY PROCEDURE  10/23/2008    [place] Teeth Extraction       Family History   Problem Relation Age of Onset     Hypertension Maternal Grandmother      DIABETES Maternal Uncle      Hypertension Maternal Uncle      Alcohol/Drug Maternal Aunt      Alcohol/Drug Maternal Uncle      Anesthesia Reaction Other        Social History   Substance Use Topics     Smoking status: Current Every Day Smoker     Packs/day: 0.50     Years: 2.00     Types: Cigarettes     Smokeless tobacco: Never Used     Alcohol use No      Comment: occassionally       No current facility-administered medications for this encounter.      No current outpatient prescriptions on file.        Allergies   Allergen Reactions     No  Known Allergies          I have reviewed the Medications, Allergies, Past Medical and Surgical History, and Social History in the Epic system.    Review of Systems   Constitutional: Positive for chills and fever.   HENT: Positive for sore throat.    Respiratory: Positive for cough and shortness of breath.    Gastrointestinal: Positive for abdominal pain, constipation, diarrhea, nausea and vomiting.   Musculoskeletal: Positive for back pain.   Neurological: Positive for headaches.   All other systems reviewed and are negative.      Physical Exam   BP: 104/56  Pulse: 111  Temp: 98.8  F (37.1  C)  Resp: 20  SpO2: 99 %      Physical Exam   Constitutional: She appears distressed.   HENT:   Head: Atraumatic.   Mouth/Throat: Oropharynx is clear and moist. No oropharyngeal exudate.   Eyes: EOM are normal. No scleral icterus.   Neck: Neck supple.   Cardiovascular: Normal rate, regular rhythm and normal heart sounds.    Pulmonary/Chest: Breath sounds normal. No respiratory distress.   Scattered mild wheezes, coarse breath sounds   Abdominal: Soft. She exhibits no distension.   Mild epigastric and upper abdominal tenderness   Musculoskeletal: She exhibits no edema or deformity.   Neurological: She is alert.   Skin: Skin is warm and dry. She is not diaphoretic.   Psychiatric: She has a normal mood and affect. Her behavior is normal.       ED Course     ED Course     Procedures       11:15 PM  The patient was seen and examined by Dr. Fernandez in Room 5.        Labs Ordered and Resulted from Time of ED Arrival Up to the Time of Departure from the ED - No data to display         Assessments & Plan (with Medical Decision Making)   Signed out to Dr. Dorado pending labs and imaging and disposition    I have reviewed the nursing notes.    I have reviewed the findings, diagnosis, plan and need for follow up with the patient.    New Prescriptions    No medications on file       Final diagnoses:   None   I, Bogdan Tucker, am serving as a  trained medical scribe to document services personally performed by Ruben Fernandez MD, based on the provider's statements to me.   I, Ruben Fernandez MD, was physically present and have reviewed and verified the accuracy of this note documented by Bogdan Tucker.      6/11/2018   [facility], [place], EMERGENCY DEPARTMENT     Ruben Fernandez,   06/12/18 0015

## 2018-06-22 ENCOUNTER — HOSPITAL ENCOUNTER (EMERGENCY)
Facility: CLINIC | Age: 28
Discharge: HOME | End: 2018-06-23
Attending: EMERGENCY MEDICINE | Admitting: EMERGENCY MEDICINE

## 2018-06-22 DIAGNOSIS — L02.91 ABSCESS: ICD-10-CM

## 2018-06-22 PROCEDURE — 10061 I&D ABSCESS COMP/MULTIPLE: CPT | Performed by: EMERGENCY MEDICINE

## 2018-06-22 PROCEDURE — 10061 I&D ABSCESS COMP/MULTIPLE: CPT | Mod: Z6 | Performed by: EMERGENCY MEDICINE

## 2018-06-22 PROCEDURE — 99283 EMERGENCY DEPT VISIT LOW MDM: CPT | Mod: 25 | Performed by: EMERGENCY MEDICINE

## 2018-06-22 PROCEDURE — 99285 EMERGENCY DEPT VISIT HI MDM: CPT | Mod: 25 | Performed by: EMERGENCY MEDICINE

## 2018-06-22 NOTE — ED AVS SNAPSHOT
[facility], Emergency Department    [address]    [place] [address]    Phone:  [phone]    Fax:  [phone]                                       Chhaya Mock   MRN: [id number]    Department:  [facility], Emergency Department   Date of Visit:  6/22/2018           Patient Information     Date Of Birth          1990        Your diagnoses for this visit were:     Abscess        You were seen by Dorinda Martinez MD.        Discharge Instructions       Thank you for coming to the [facility] Emergency Department.     Pull the packing out in 24 hours. Keep the wound covered with gauze if there is any drainage or bleeding from the site.     Take the antibiotics until gone.     Wash the area with soap and water daily after the packing is removed. While showering, squeeze the area to express any additional puss from the wound after the packing is removed.     24 Hour Appointment Hotline       To make an appointment at any [facility] clinic, call [phone] ([phone]). If you don't have a family doctor or clinic, we will help you find one. [place] clinics are conveniently located to serve the needs of you and your family.             Review of your medicines      START taking        Dose / Directions Last dose taken    sulfamethoxazole-trimethoprim 800-160 MG per tablet   Commonly known as:  BACTRIM DS   Dose:  1 tablet   Quantity:  14 tablet        Take 1 tablet by mouth 2 times daily for 7 days   Refills:  0                Prescriptions were sent or printed at these locations (1 Prescription)                   Other Prescriptions                Printed at Department/Unit printer (1 of 1)         sulfamethoxazole-trimethoprim (BACTRIM DS) 800-160 MG per tablet                Procedures and tests performed during your visit     Cardiac Continuous Monitoring    End Tidal CO2    POC US SOFT TISSUE    Peripheral IV catheter    Pulse oximetry     "  Orders Needing Specimen Collection     None      Pending Results     Date and Time Order Name Status Description    2018 0030 POC US SOFT TISSUE In process             Pending Culture Results     No orders found for last 3 day(s).            Pending Results Instructions     If you had any lab results that were not finalized at the time of your Discharge, you can call the ED Lab Result RN at [phone]. You will be contacted by this team for any positive Lab results or changes in treatment. The nurses are available 7 days a week from 10A to 6:30P.  You can leave a message 24 hours per day and they will return your call.        Thank you for choosing [facility]       Thank you for choosing [facility] for your care. Our goal is always to provide you with excellent care. Hearing back from our patients is one way we can continue to improve our services. Please take a few minutes to complete the written survey that you may receive in the mail after you visit with us. Thank you!        [employer] Information     [employer] lets you send messages to your doctor, view your test results, renew your prescriptions, schedule appointments and more. To sign up, go to [email or web address] . Click on \"Log in\" on the left side of the screen, which will take you to the Welcome page. Then click on \"Sign up Now\" on the right side of the page.     You will be asked to enter the access code listed below, as well as some personal information. Please follow the directions to create your username and password.     Your access code is: [email or web address]  Expires: 8/15/2018  2:03 AM     Your access code will  in 90 days. If you need help or a new code, please call your [facility] clinic or [phone].        Care EveryWhere ID     This is your Care EveryWhere ID. This could be used by other organizations to access your [facility] medical records  [id number]        Equal Access to Services     SAMANTHA TOVAR AH: Kendell Grant, " [place], yong jon. So [facility] [phone].    ATENCIÓN: Si habla español, tiene a sams disposición servicios gratuitos de asistencia lingüística. Llame al [phone].    We comply with applicable federal civil rights laws and [place] laws. We do not discriminate on the basis of race, color, national origin, age, disability, sex, sexual orientation, or gender identity.            After Visit Summary       This is your record. Keep this with you and show to your community pharmacist(s) and doctor(s) at your next visit.

## 2018-06-22 NOTE — LETTER
June 23, 2018      To Whom It May Concern:      Chhaya Mock was seen in our Emergency Department today, 06/23/18.  I expect her condition to improve over the next 2 days.  She may return to work/school when improved.    Sincerely,        Dorinda Martinez MD FACEP

## 2018-06-22 NOTE — ED AVS SNAPSHOT
[facility], [place], Emergency Department    [address]    [facility] [address]    Phone:  [phone]    Fax:  [phone]                                       Chhaya Mock   MRN: [id number]    Department:  [facility], Emergency Department   Date of Visit:  6/22/2018           After Visit Summary Signature Page     I have received my discharge instructions, and my questions have been answered. I have discussed any challenges I see with this plan with the nurse or doctor.    ..........................................................................................................................................  Patient/Patient Representative Signature      ..........................................................................................................................................  Patient Representative Print Name and Relationship to Patient    ..................................................               ................................................  Date                                            Time    ..........................................................................................................................................  Reviewed by Signature/Title    ...................................................              ..............................................  Date                                                            Time

## 2018-06-23 VITALS
BODY MASS INDEX: 26.39 KG/M2 | SYSTOLIC BLOOD PRESSURE: 118 MMHG | RESPIRATION RATE: 12 BRPM | TEMPERATURE: 98.3 F | DIASTOLIC BLOOD PRESSURE: 75 MMHG | OXYGEN SATURATION: 100 % | WEIGHT: 149 LBS | HEART RATE: 73 BPM

## 2018-06-23 PROCEDURE — 25000125 ZZHC RX 250: Performed by: EMERGENCY MEDICINE

## 2018-06-23 RX ORDER — KETAMINE HYDROCHLORIDE 10 MG/ML
35 INJECTION, SOLUTION INTRAMUSCULAR; INTRAVENOUS EVERY 5 MIN PRN
Status: DISCONTINUED | OUTPATIENT
Start: 2018-06-23 | End: 2018-06-23 | Stop reason: HOSPADM

## 2018-06-23 RX ORDER — LIDOCAINE 40 MG/G
CREAM TOPICAL
Status: DISCONTINUED
Start: 2018-06-23 | End: 2018-06-23 | Stop reason: HOSPADM

## 2018-06-23 RX ORDER — SULFAMETHOXAZOLE/TRIMETHOPRIM 800-160 MG
1 TABLET ORAL 2 TIMES DAILY
Qty: 14 TABLET | Refills: 0 | Status: SHIPPED | OUTPATIENT
Start: 2018-06-23 | End: 2018-06-30

## 2018-06-23 RX ORDER — LIDOCAINE 40 MG/G
CREAM TOPICAL ONCE
Status: DISCONTINUED | OUTPATIENT
Start: 2018-06-23 | End: 2018-06-23 | Stop reason: HOSPADM

## 2018-06-23 RX ADMIN — KETAMINE HYDROCHLORIDE 35 MG: 10 INJECTION INTRAMUSCULAR; INTRAVENOUS at 01:45

## 2018-06-23 ASSESSMENT — ENCOUNTER SYMPTOMS: FEVER: 0

## 2018-06-23 NOTE — DISCHARGE INSTRUCTIONS
Thank you for coming to the [facility] Emergency Department.     Pull the packing out in 24 hours. Keep the wound covered with gauze if there is any drainage or bleeding from the site.     Take the antibiotics until gone.     Wash the area with soap and water daily after the packing is removed. While showering, squeeze the area to express any additional puss from the wound after the packing is removed.

## 2018-06-23 NOTE — ED PROVIDER NOTES
"  History     Chief Complaint   Patient presents with     Wound Infection     Onset today days ago with \"a huge boil on my butt,\" unable to sit, increasing pain.     HPI  Chhaya Mock is a 27 year old female with history of hidradenitis suppurativa who presents for evaluation of a cyst on her right buttock associated with worsening pain over the last day. She states her pain prevents her from moving or sitting, further stating that she has not had anything to eat or drink today because of it. Patient reports she has been hot-packing the area but that it has not been effective. She endorses a history of cysts in this area before, however stating that they usually drain on their own. Patient denies an associated fever. She states she is not currently on any medications but does note she recently finished a course of Levofloxacin last Thursday, 8 days ago, for a bout of pneumonia. Per her report, the Levofloxacin was effective in treating her symptoms.    Past Medical History:   Diagnosis Date     Carbuncle and furuncle      Tobacco use disorder     Smokes 1/2 ppd. Started smoking at 14 years     Vaginal yeast infection 06/02/2017       Past Surgical History:   Procedure Laterality Date     C ORAL SURGERY PROCEDURE  10/23/2008    [place] Teeth Extraction       Family History   Problem Relation Age of Onset     Hypertension Maternal Grandmother      Diabetes Maternal Uncle      Hypertension Maternal Uncle      Alcohol/Drug Maternal Aunt      Alcohol/Drug Maternal Uncle      Anesthesia Reaction Other        Social History   Substance Use Topics     Smoking status: Current Every Day Smoker     Packs/day: 0.50     Years: 2.00     Types: Cigarettes     Smokeless tobacco: Never Used     Alcohol use No      Comment: occassionally       No current facility-administered medications for this encounter.      Current Outpatient Prescriptions   Medication     sulfamethoxazole-trimethoprim (BACTRIM DS) 800-160 MG per " tablet        Allergies   Allergen Reactions     No Known Allergies        I have reviewed the Medications, Allergies, Past Medical and Surgical History, and Social History in the Epic system.    Review of Systems   Constitutional: Negative for fever.   Skin:        Positive for cyst on right buttock   All other systems reviewed and are negative.      Physical Exam   BP: 116/83  Pulse: 104  Heart Rate: 104  Temp: 98.1  F (36.7  C)  Resp: 16  Weight: 67.6 kg (149 lb)  SpO2: 93 %      Physical Exam   Gen:A&Ox3, only comfortable position is laying on her stomach  HEENT:PERRL, no facial tenderness or wounds, head atraumatic  CV:RRR without murmurs  PULM:Clear to auscultation bilaterally  Abd:soft, nontender, nondistended. Bowel sounds present and normal  Rectal: large abscess to right buttock just above the gluteal cleft.   UE:No traumatic injuries, scarred axillae bilaterally.   LE:no traumatic injuries, skin normal  Neuro:CN II-XII intact, strength 5/5 throughout, gait stable.   Skin: no rashes or ecchymoses      ED Course     ED Course     Procedures  Results for orders placed during the hospital encounter of 06/22/18   POC US SOFT TISSUE    Impression Limited Soft Tissue Ultrasound, performed and interpreted by me.    Indication:  Skin redness warmth pain. Evaluate for cellulitis vs abscess.     Body location: buttock    Findings:  There is cobblestoning suggestive of cellulitis in the evaluated area. There is a fluid collection measuring 2.3cm in max diameter to suggest abscess. No foreign body identified    IMPRESSION: Abscess                 12:24 AM  The patient was seen and examined by Dr. Martinez in Room 1.     Critical Care time:  [facility] Procedure Note        Sedation:      Performed by: Dorinda Martinez  Authorized by: Dorinda Martinez    Pre-Procedure Assessment done at 0030.    Expected Level:  Moderate Sedation    Indication:  Sedation is required to allow for Incision and drainage of  abcess    Consent obtained from patient after discussing the risks, benefits and alternatives.    PO Intake:  Appropriately NPO for procedure    ASA Class:  Class 1 - HEALTHY PATIENT    Mallampati:  Grade 1:  Soft palate, uvula, tonsillar pillars, and posterior pharyngeal wall visible    Lungs: Lungs Clear with good breath sounds bilaterally.     Heart: Normal heart sounds and rate    History and physical reviewed and no updates needed. I have reviewed the lab findings, diagnostic data, medications, and the plan for sedation. I have determined this patient to be an appropriate candidate for the planned sedation and procedure and have reassessed the patient IMMEDIATELY PRIOR to sedation and procedure.      Sedation Post Procedure Summary:    Prior to the start of the procedure and with procedural staff participation, I verbally confirmed the patient s identity using two indicators, relevant allergies, that the procedure was appropriate and matched the consent or emergent situation, and that the correct equipment/implants were available. Immediately prior to starting the procedure I conducted the Time Out with the procedural staff and re-confirmed the patient s name, procedure, and site/side. (The Joint Commission universal protocol was followed.)  Yes      Sedatives: Ketamine    Vital signs, airway, End Tidal CO2 and pulse oximetry were monitored and remained stable throughout the procedure and sedation was maintained until the procedure was complete.  The patient was monitored by staff until sedation discharge criteria were met.    Patient tolerance: Patient tolerated the procedure well with no immediate complications.    Time of sedation in minutes:  10 minutes from beginning to end of physician one to one monitoring.          Procedure: Incision and Drainage   LOCATIONS:  Right buttock     ANESTHESIA:  Topical LMX 4%     PREPARATION:  Cleansed with Betadine     PROCEDURE:  Area was incised with # 11 Blade (Sharp  Point) with a Single Straight incision.  Wound treatment included Deloculation, Purulent Drainage and Expression of Material.  Packing consisted of Iodoform Gauze.  Appropriate dressing was applied to cover the area.    Patient Status:        Patient tolerated the procedure well. There were no complications.           Assessments & Plan (with Medical Decision Making)   26 yo F with a hx of hidradenitis suppurativa presenting with abscess to the right buttock. Not involving the perianal or perirectal areas.   Vitals notable for mild tachycardia with .   IV access obtained.   Bedside US confirmed a large subcutaneous abscess.   Pt was sedated successfully with ketamine and abscess I&D's. See sedation and procedure notes above.   Packing removal in 24 hours.   Complete course of bactrim.   Return in the event of recurrent abscess or systemic infectious symptoms.        I have reviewed the nursing notes.    I have reviewed the findings, diagnosis, plan and need for follow up with the patient.    Discharge Medication List as of 6/23/2018  3:14 AM      START taking these medications    Details   sulfamethoxazole-trimethoprim (BACTRIM DS) 800-160 MG per tablet Take 1 tablet by mouth 2 times daily for 7 days, Disp-14 tablet, R-0, Local Print             Final diagnoses:   Abscess   I, Bogdan Tucker, am serving as a trained medical scribe to document services personally performed by Dorinda Martinez MD, based on the provider's statements to me.   IDorinda MD, was physically present and have reviewed and verified the accuracy of this note documented by Bogdan Tucker.      6/22/2018   [facility], EMERGENCY DEPARTMENT    MD ILANA Devries Katrina Anne, MD  06/25/18 [id number]

## 2018-10-09 ENCOUNTER — HOSPITAL ENCOUNTER (EMERGENCY)
Facility: CLINIC | Age: 28
Discharge: HOME | End: 2018-10-09
Attending: EMERGENCY MEDICINE | Admitting: EMERGENCY MEDICINE

## 2018-10-09 VITALS
TEMPERATURE: 98.1 F | OXYGEN SATURATION: 98 % | RESPIRATION RATE: 16 BRPM | HEIGHT: 66 IN | DIASTOLIC BLOOD PRESSURE: 84 MMHG | WEIGHT: 158 LBS | SYSTOLIC BLOOD PRESSURE: 125 MMHG | BODY MASS INDEX: 25.39 KG/M2

## 2018-10-09 DIAGNOSIS — N89.8 VAGINAL DISCHARGE: ICD-10-CM

## 2018-10-09 LAB
ALBUMIN SERPL-MCNC: 3.5 G/DL (ref 3.4–5)
ALBUMIN UR-MCNC: 10 MG/DL
ALP SERPL-CCNC: 41 U/L (ref 40–150)
ALT SERPL W P-5'-P-CCNC: 16 U/L (ref 0–50)
ANION GAP SERPL CALCULATED.3IONS-SCNC: 6 MMOL/L (ref 3–14)
APPEARANCE UR: ABNORMAL
AST SERPL W P-5'-P-CCNC: 19 U/L (ref 0–45)
BACTERIA #/AREA URNS HPF: ABNORMAL /HPF
BASOPHILS # BLD AUTO: 0 10E9/L (ref 0–0.2)
BASOPHILS NFR BLD AUTO: 0.1 %
BILIRUB SERPL-MCNC: 0.5 MG/DL (ref 0.2–1.3)
BILIRUB UR QL STRIP: NEGATIVE
BUN SERPL-MCNC: 8 MG/DL (ref 7–30)
CALCIUM SERPL-MCNC: 7.9 MG/DL (ref 8.5–10.1)
CHLORIDE SERPL-SCNC: 110 MMOL/L (ref 94–109)
CO2 SERPL-SCNC: 25 MMOL/L (ref 20–32)
COLOR UR AUTO: YELLOW
CREAT SERPL-MCNC: 0.74 MG/DL (ref 0.52–1.04)
DIFFERENTIAL METHOD BLD: NORMAL
EOSINOPHIL # BLD AUTO: 0.1 10E9/L (ref 0–0.7)
EOSINOPHIL NFR BLD AUTO: 1.4 %
ERYTHROCYTE [DISTWIDTH] IN BLOOD BY AUTOMATED COUNT: 13.3 % (ref 10–15)
GFR SERPL CREATININE-BSD FRML MDRD: >90 ML/MIN/1.7M2
GLUCOSE SERPL-MCNC: 95 MG/DL (ref 70–99)
GLUCOSE UR STRIP-MCNC: NEGATIVE MG/DL
HCG SERPL QL: NEGATIVE
HCG UR QL: NEGATIVE
HCT VFR BLD AUTO: 38.3 % (ref 35–47)
HGB BLD-MCNC: 12.4 G/DL (ref 11.7–15.7)
HGB UR QL STRIP: ABNORMAL
IMM GRANULOCYTES # BLD: 0 10E9/L (ref 0–0.4)
IMM GRANULOCYTES NFR BLD: 0.1 %
INTERNAL QC OK POCT: YES
KETONES UR STRIP-MCNC: NEGATIVE MG/DL
LEUKOCYTE ESTERASE UR QL STRIP: NEGATIVE
LIPASE SERPL-CCNC: 115 U/L (ref 73–393)
LYMPHOCYTES # BLD AUTO: 2.2 10E9/L (ref 0.8–5.3)
LYMPHOCYTES NFR BLD AUTO: 31.3 %
MCH RBC QN AUTO: 29.9 PG (ref 26.5–33)
MCHC RBC AUTO-ENTMCNC: 32.4 G/DL (ref 31.5–36.5)
MCV RBC AUTO: 92 FL (ref 78–100)
MONOCYTES # BLD AUTO: 0.4 10E9/L (ref 0–1.3)
MONOCYTES NFR BLD AUTO: 6 %
MUCOUS THREADS #/AREA URNS LPF: PRESENT /LPF
NEUTROPHILS # BLD AUTO: 4.4 10E9/L (ref 1.6–8.3)
NEUTROPHILS NFR BLD AUTO: 61.1 %
NITRATE UR QL: NEGATIVE
NRBC # BLD AUTO: 0 10*3/UL
NRBC BLD AUTO-RTO: 0 /100
PH UR STRIP: 5.5 PH (ref 5–7)
PLATELET # BLD AUTO: 216 10E9/L (ref 150–450)
POTASSIUM SERPL-SCNC: 4.5 MMOL/L (ref 3.4–5.3)
PROT SERPL-MCNC: 7.6 G/DL (ref 6.8–8.8)
RBC # BLD AUTO: 4.15 10E12/L (ref 3.8–5.2)
RBC #/AREA URNS AUTO: 7 /HPF (ref 0–2)
SODIUM SERPL-SCNC: 141 MMOL/L (ref 133–144)
SOURCE: ABNORMAL
SP GR UR STRIP: 1.02 (ref 1–1.03)
SPECIMEN SOURCE: NORMAL
SQUAMOUS #/AREA URNS AUTO: 27 /HPF (ref 0–1)
UROBILINOGEN UR STRIP-MCNC: NORMAL MG/DL (ref 0–2)
WBC # BLD AUTO: 7.1 10E9/L (ref 4–11)
WBC #/AREA URNS AUTO: 2 /HPF (ref 0–5)
WET PREP SPEC: NORMAL

## 2018-10-09 PROCEDURE — 99283 EMERGENCY DEPT VISIT LOW MDM: CPT | Mod: Z6 | Performed by: EMERGENCY MEDICINE

## 2018-10-09 PROCEDURE — 99283 EMERGENCY DEPT VISIT LOW MDM: CPT | Performed by: EMERGENCY MEDICINE

## 2018-10-09 PROCEDURE — 85025 COMPLETE CBC W/AUTO DIFF WBC: CPT | Performed by: EMERGENCY MEDICINE

## 2018-10-09 PROCEDURE — 84703 CHORIONIC GONADOTROPIN ASSAY: CPT | Performed by: EMERGENCY MEDICINE

## 2018-10-09 PROCEDURE — 87491 CHLMYD TRACH DNA AMP PROBE: CPT | Performed by: EMERGENCY MEDICINE

## 2018-10-09 PROCEDURE — 81001 URINALYSIS AUTO W/SCOPE: CPT | Performed by: EMERGENCY MEDICINE

## 2018-10-09 PROCEDURE — 80053 COMPREHEN METABOLIC PANEL: CPT | Performed by: EMERGENCY MEDICINE

## 2018-10-09 PROCEDURE — 87210 SMEAR WET MOUNT SALINE/INK: CPT | Performed by: EMERGENCY MEDICINE

## 2018-10-09 PROCEDURE — 81025 URINE PREGNANCY TEST: CPT | Performed by: EMERGENCY MEDICINE

## 2018-10-09 PROCEDURE — 87591 N.GONORRHOEAE DNA AMP PROB: CPT | Performed by: EMERGENCY MEDICINE

## 2018-10-09 PROCEDURE — 83690 ASSAY OF LIPASE: CPT | Performed by: EMERGENCY MEDICINE

## 2018-10-09 RX ORDER — METRONIDAZOLE 500 MG/1
500 TABLET ORAL 2 TIMES DAILY
Qty: 14 TABLET | Refills: 0 | Status: SHIPPED | OUTPATIENT
Start: 2018-10-09 | End: 2018-10-16

## 2018-10-09 ASSESSMENT — ENCOUNTER SYMPTOMS
FEVER: 0
DIFFICULTY URINATING: 0
SHORTNESS OF BREATH: 0
NECK STIFFNESS: 0
EYE REDNESS: 0
ARTHRALGIAS: 0
ABDOMINAL PAIN: 1
CONFUSION: 0
COLOR CHANGE: 0
HEADACHES: 0

## 2018-10-09 NOTE — LETTER
October 9, 2018      To Whom It May Concern:      Chhaya Mock was seen in our Emergency Department today, 10/09/18.   She may return to work tomorrow.    Sincerely,        AMOS GIBSON MD, MD

## 2018-10-09 NOTE — ED AVS SNAPSHOT
[facility], Emergency Department    [address]    [facility] [address]    Phone:  [phone]    Fax:  [phone]                                       Chhaya Mock   MRN: [id number]    Department:  [facility], Emergency Department   Date of Visit:  10/9/2018           Patient Information     Date Of Birth          1990        Your diagnoses for this visit were:     Vaginal discharge        You were seen by Prince Dorado MD.        Discharge Instructions         Bacterial Vaginosis    You have a vaginal infection called bacterial vaginosis (BV). Both good and bad bacteria are present in a healthy vagina. BV occurs when these bacteria get out of balance. The number of bad bacteria increase. And the number of good bacteria decrease. Although BV is associated with sexual activity, it is not a sexually transmitted disease.  BV may or may not cause symptoms. If symptoms do occur, they can include:    Thin, gray, milky-white, or sometimes green discharge    Unpleasant odor or  fishy  smell    Itching, burning, or pain in or around the vagina  It is not known what causes BV, but certain factors can make the problem more likely. This can include:    Douching    Having sex with a new partner    Having sex with more than one partner  BV will sometimes go away on its own. But treatment is usually recommended. This is because untreated BV can increase the risk of more serious health problems such as:    Pelvic inflammatory disease (PID)     delivery (giving birth to a baby early if you re pregnant)    HIV and certain other sexually transmitted diseases (STDs)    Infection after surgery on the reproductive organs  Home care  General care    BV is most often treated with medicines called antibiotics. These may be given as pills or as a vaginal cream. If antibiotics are prescribed, be sure to use them exactly as directed. Also, be sure to complete all of the medicine, even if your  symptoms go away.    Don't douche or having sex during treatment.    If you have sex with a female partner, ask your healthcare provider if she should also be treated.  Prevention    Don't douche.    Don't have sex. If you do have sex, then take steps to lower your risk:  ? Use condoms when having sex.  ? Limit the number of sexual partners you have.  Follow-up care  Follow up with your healthcare provider, or as advised.  When to seek medical advice  Call your healthcare provider right away if:    You have a fever of 100.4 F (38 C) or higher, or as directed by your provider.    Your symptoms worsen, or they don t go away within a few days of starting treatment.    You have new pain in the lower belly or pelvic region.    You have side effects that bother you or a reaction to the pills or cream you re prescribed.    You or any partners you have sex with have new symptoms, such as a rash, joint pain, or sores.  Date Last Reviewed: 10/1/2017    5866-8548 The [employer]. [address] [place] [address]. All rights reserved. This information is not intended as a substitute for professional medical care. Always follow your healthcare professional's instructions.      Take complete course of metronidazole.    Return to the emergency department if fever, constant abdominal pain, vomiting, worse, or other concerns.    24 Hour Appointment Hotline       To make an appointment at any [facility] clinic, call [phone] ([phone]). If you don't have a family doctor or clinic, we will help you find one. [facility] clinics are conveniently located to serve the needs of you and your family.             Review of your medicines      START taking        Dose / Directions Last dose taken    metroNIDAZOLE 500 MG tablet   Commonly known as:  FLAGYL   Dose:  500 mg   Quantity:  14 tablet        Take 1 tablet (500 mg) by mouth 2 times daily for 7 days   Refills:  0                Prescriptions were sent or  printed at these locations (1 Prescription)                   Other Prescriptions                Printed at Department/Unit printer (1 of 1)         metroNIDAZOLE (FLAGYL) 500 MG tablet                Procedures and tests performed during your visit     CBC with platelets differential    Chlamydia trachomatis PCR    Comprehensive metabolic panel    HCG qualitative pregnancy (blood)    Lipase    Neisseria gonorrhoea PCR    Peripheral IV catheter    Prep for procedure - pelvic exam    UA with Microscopic reflex to Culture    Wet prep    hCG qual urine POCT      Orders Needing Specimen Collection     None      Pending Results     Date and Time Order Name Status Description    10/9/2018 0845 Neisseria gonorrhoea PCR In process     10/9/2018 0845 Chlamydia trachomatis PCR In process             Pending Culture Results     Date and Time Order Name Status Description    10/9/2018 0845 Neisseria gonorrhoea PCR In process     10/9/2018 0845 Chlamydia trachomatis PCR In process             Pending Results Instructions     If you had any lab results that were not finalized at the time of your Discharge, you can call the ED Lab Result RN at [phone]. You will be contacted by this team for any positive Lab results or changes in treatment. The nurses are available 7 days a week from 10A to 6:30P.  You can leave a message 24 hours per day and they will return your call.        Thank you for choosing [facility]       Thank you for choosing [facility] for your care. Our goal is always to provide you with excellent care. Hearing back from our patients is one way we can continue to improve our services. Please take a few minutes to complete the written survey that you may receive in the mail after you visit with us. Thank you!        [employer] Information     [employer] lets you send messages to your doctor, view your test results, renew your prescriptions, schedule appointments and more. To sign up, go to [email or web address] . Click  "on \"Log in\" on the left side of the screen, which will take you to the Welcome page. Then click on \"Sign up Now\" on the right side of the page.     You will be asked to enter the access code listed below, as well as some personal information. Please follow the directions to create your username and password.     Your access code is: [email or web address]  Expires: 2019 11:10 AM     Your access code will  in 90 days. If you need help or a new code, please call your [facility] clinic or [phone].        Care EveryWhere ID     This is your Care EveryWhere ID. This could be used by other organizations to access your [facility] medical records  [id number]        Equal Access to Services     SAMANTHA TOVAR : Kendell Grant, wayenny park, katty smith, yong jeffries. So [facility] [phone].    ATENCIÓN: Si habla español, tiene a sasm disposición servicios gratuitos de asistencia lingüística. Llame al [phone].    We comply with applicable federal civil rights laws and [place] laws. We do not discriminate on the basis of race, color, national origin, age, disability, sex, sexual orientation, or gender identity.            After Visit Summary       This is your record. Keep this with you and show to your community pharmacist(s) and doctor(s) at your next visit.                  "

## 2018-10-09 NOTE — ED AVS SNAPSHOT
[facility], [place], Emergency Department    [address]    [facility] [address]    Phone:  [phone]    Fax:  [phone]                                       Chhaya Mock   MRN: [id number]    Department:  [facility], Emergency Department   Date of Visit:  10/9/2018           After Visit Summary Signature Page     I have received my discharge instructions, and my questions have been answered. I have discussed any challenges I see with this plan with the nurse or doctor.    ..........................................................................................................................................  Patient/Patient Representative Signature      ..........................................................................................................................................  Patient Representative Print Name and Relationship to Patient    ..................................................               ................................................  Date                                   Time    ..........................................................................................................................................  Reviewed by Signature/Title    ...................................................              ..............................................  Date                                               Time          22EPIC Rev 08/18

## 2018-10-09 NOTE — DISCHARGE INSTRUCTIONS
Bacterial Vaginosis    You have a vaginal infection called bacterial vaginosis (BV). Both good and bad bacteria are present in a healthy vagina. BV occurs when these bacteria get out of balance. The number of bad bacteria increase. And the number of good bacteria decrease. Although BV is associated with sexual activity, it is not a sexually transmitted disease.  BV may or may not cause symptoms. If symptoms do occur, they can include:    Thin, gray, milky-white, or sometimes green discharge    Unpleasant odor or  fishy  smell    Itching, burning, or pain in or around the vagina  It is not known what causes BV, but certain factors can make the problem more likely. This can include:    Douching    Having sex with a new partner    Having sex with more than one partner  BV will sometimes go away on its own. But treatment is usually recommended. This is because untreated BV can increase the risk of more serious health problems such as:    Pelvic inflammatory disease (PID)     delivery (giving birth to a baby early if you re pregnant)    HIV and certain other sexually transmitted diseases (STDs)    Infection after surgery on the reproductive organs  Home care  General care    BV is most often treated with medicines called antibiotics. These may be given as pills or as a vaginal cream. If antibiotics are prescribed, be sure to use them exactly as directed. Also, be sure to complete all of the medicine, even if your symptoms go away.    Don't douche or having sex during treatment.    If you have sex with a female partner, ask your healthcare provider if she should also be treated.  Prevention    Don't douche.    Don't have sex. If you do have sex, then take steps to lower your risk:  ? Use condoms when having sex.  ? Limit the number of sexual partners you have.  Follow-up care  Follow up with your healthcare provider, or as advised.  When to seek medical advice  Call your healthcare provider right away if:    You  have a fever of 100.4 F (38 C) or higher, or as directed by your provider.    Your symptoms worsen, or they don t go away within a few days of starting treatment.    You have new pain in the lower belly or pelvic region.    You have side effects that bother you or a reaction to the pills or cream you re prescribed.    You or any partners you have sex with have new symptoms, such as a rash, joint pain, or sores.  Date Last Reviewed: 10/1/2017    4259-0595 [employer] [address] [place] [address]. All rights reserved. This information is not intended as a substitute for professional medical care. Always follow your healthcare professional's instructions.      Take complete course of metronidazole.    Return to the emergency department if fever, constant abdominal pain, vomiting, worse, or other concerns.

## 2018-10-09 NOTE — ED PROVIDER NOTES
"  History     Chief Complaint   Patient presents with     Abdominal Pain     2-3 days of abdominal pain. First upper abdomen. Now lateral and bilateral lower quadrants. One week of vaginal discharge. Was white, now off white. LMP 9/19.     MARLON Mock is a 27 year old female who resents to the emergency department with vaginal discharge.  Patient states that she has been having some white and yellow vaginal discharge for the past week.  For the past 2-3 days, she is been having some intermittent abdominal cramping.  She states that last night, she had some pain in her mid abdomen.  Patient states it also made her back itch.  She denies any associated nausea or vomiting.  She states that today, she had some cramping in her lower abdomen.  She denies any dysuria or urgency but does complain of urinary frequency.  Patient reports intermittent constipation.  She denies any fever.  Last menstrual period was 9/19.  She denies a history of abdominal surgery.    I have reviewed the Medications, Allergies, Past Medical and Surgical History, and Social History in the Epic system.    Review of Systems   Constitutional: Negative for fever.   HENT: Negative for congestion.    Eyes: Negative for redness.   Respiratory: Negative for shortness of breath.    Cardiovascular: Negative for chest pain.   Gastrointestinal: Positive for abdominal pain.   Genitourinary: Positive for vaginal discharge. Negative for difficulty urinating.   Musculoskeletal: Negative for arthralgias and neck stiffness.   Skin: Negative for color change.   Neurological: Negative for headaches.   Psychiatric/Behavioral: Negative for confusion.   All other systems reviewed and are negative.      Physical Exam   BP: 123/84  Heart Rate: 80  Temp: 98.1  F (36.7  C)  Resp: 16  Height: 167.6 cm (5' 6\")  Weight: 71.7 kg (158 lb)  SpO2: 99 %      Physical Exam   Constitutional: She appears well-developed and well-nourished. No distress.   HENT: "   Head: Normocephalic and atraumatic.   Eyes: Pupils are equal, round, and reactive to light. No scleral icterus.   Cardiovascular: Normal rate, regular rhythm, normal heart sounds and intact distal pulses.    Pulmonary/Chest: Effort normal and breath sounds normal. No respiratory distress.   Abdominal: Soft. Bowel sounds are normal. There is no tenderness. There is no rebound and no guarding.   Genitourinary: Cervix exhibits no motion tenderness. Right adnexum displays no mass and no tenderness. Left adnexum displays no mass and no tenderness. Vaginal discharge (white) found.   Musculoskeletal: Normal range of motion. She exhibits no edema or tenderness.   Neurological: She is alert. She has normal strength. Coordination normal.   Skin: Skin is warm and dry. No rash noted. She is not diaphoretic.   Psychiatric: She has a normal mood and affect. Her behavior is normal.   Nursing note and vitals reviewed.      ED Course     ED Course     Procedures            Critical Care time:    Results for orders placed or performed during the hospital encounter of 10/09/18 (from the past 24 hour(s))   UA with Microscopic reflex to Culture   Result Value Ref Range    Color Urine Yellow     Appearance Urine Slightly Cloudy     Glucose Urine Negative NEG^Negative mg/dL    Bilirubin Urine Negative NEG^Negative    Ketones Urine Negative NEG^Negative mg/dL    Specific Gravity Urine 1.023 1.003 - 1.035    Blood Urine Moderate (A) NEG^Negative    pH Urine 5.5 5.0 - 7.0 pH    Protein Albumin Urine 10 (A) NEG^Negative mg/dL    Urobilinogen mg/dL Normal 0.0 - 2.0 mg/dL    Nitrite Urine Negative NEG^Negative    Leukocyte Esterase Urine Negative NEG^Negative    Source Midstream Urine     WBC Urine 2 0 - 5 /HPF    RBC Urine 7 (H) 0 - 2 /HPF    Bacteria Urine Few (A) NEG^Negative /HPF    Squamous Epithelial /HPF Urine 27 (H) 0 - 1 /HPF    Mucous Urine Present (A) NEG^Negative /LPF   hCG qual urine POCT   Result Value Ref Range    HCG Qual  Urine Negative neg    Internal QC OK Yes    CBC with platelets differential   Result Value Ref Range    WBC 7.1 4.0 - 11.0 10e9/L    RBC Count 4.15 3.8 - 5.2 10e12/L    Hemoglobin 12.4 11.7 - 15.7 g/dL    Hematocrit 38.3 35.0 - 47.0 %    MCV 92 78 - 100 fl    MCH 29.9 26.5 - 33.0 pg    MCHC 32.4 31.5 - 36.5 g/dL    RDW 13.3 10.0 - 15.0 %    Platelet Count 216 150 - 450 10e9/L    Diff Method Automated Method     % Neutrophils 61.1 %    % Lymphocytes 31.3 %    % Monocytes 6.0 %    % Eosinophils 1.4 %    % Basophils 0.1 %    % Immature Granulocytes 0.1 %    Nucleated RBCs 0 0 /100    Absolute Neutrophil 4.4 1.6 - 8.3 10e9/L    Absolute Lymphocytes 2.2 0.8 - 5.3 10e9/L    Absolute Monocytes 0.4 0.0 - 1.3 10e9/L    Absolute Eosinophils 0.1 0.0 - 0.7 10e9/L    Absolute Basophils 0.0 0.0 - 0.2 10e9/L    Abs Immature Granulocytes 0.0 0 - 0.4 10e9/L    Absolute Nucleated RBC 0.0    Comprehensive metabolic panel   Result Value Ref Range    Sodium 141 133 - 144 mmol/L    Potassium 4.5 3.4 - 5.3 mmol/L    Chloride 110 (H) 94 - 109 mmol/L    Carbon Dioxide 25 20 - 32 mmol/L    Anion Gap 6 3 - 14 mmol/L    Glucose 95 70 - 99 mg/dL    Urea Nitrogen 8 7 - 30 mg/dL    Creatinine 0.74 0.52 - 1.04 mg/dL    GFR Estimate >90 >60 mL/min/1.7m2    GFR Estimate If Black >90 >60 mL/min/1.7m2    Calcium 7.9 (L) 8.5 - 10.1 mg/dL    Bilirubin Total 0.5 0.2 - 1.3 mg/dL    Albumin 3.5 3.4 - 5.0 g/dL    Protein Total 7.6 6.8 - 8.8 g/dL    Alkaline Phosphatase 41 40 - 150 U/L    ALT 16 0 - 50 U/L    AST 19 0 - 45 U/L   Lipase   Result Value Ref Range    Lipase 115 73 - 393 U/L   HCG qualitative pregnancy (blood)   Result Value Ref Range    HCG Qualitative Serum Negative NEG^Negative   Wet prep   Result Value Ref Range    Specimen Description Vagina     Wet Prep No motile Trichomonas seen     Wet Prep No yeast seen     Wet Prep Rare  PMNs seen       Wet Prep No clue cells seen       Medications - No data to display             Assessments & Plan  (with Medical Decision Making)   27 year old female to the emergency department with concern for vaginal discharge.  Clinically, the patient's vaginal discharge is concerning for bacterial vaginosis.  The patient shares this concern.  Her wet prep is negative and GC and Chlamydia are currently pending.  The patient's labs are normal.  She does have a few red blood cells and many squamous cells on her urinalysis.  She does not have symptoms consistent with ureterolithiasis.  This was discussed in detail and patient does not desire any kind of further evaluation for her hematuria including abdomen/pelvis CT.  She does report that she has had cystoscopy in the past for hematuria.  The patient's labs are otherwise normal.  She will be discharged home with a 7-day course of Flagyl.  Primary care follow-up in 1 week as needed.    I have reviewed the nursing notes.    I have reviewed the findings, diagnosis, plan and need for follow up with the patient.    Discharge Medication List as of 10/9/2018 11:11 AM      START taking these medications    Details   metroNIDAZOLE (FLAGYL) 500 MG tablet Take 1 tablet (500 mg) by mouth 2 times daily for 7 days, Disp-14 tablet, R-0, Local PrintEat yogurt or cottage cheese daily to prevent diarrhea that can be caused by taking this medication.             Final diagnoses:   Vaginal discharge       10/9/2018   [facility], [place], EMERGENCY DEPARTMENT     Prince Dorado MD  10/09/18 1286

## 2018-11-20 ENCOUNTER — ANESTHESIA EVENT (OUTPATIENT)
Dept: EMERGENCY MEDICINE | Facility: CLINIC | Age: 28
End: 2018-11-20

## 2018-11-20 ENCOUNTER — HOSPITAL ENCOUNTER (EMERGENCY)
Facility: CLINIC | Age: 28
Discharge: HOME | End: 2018-11-20
Attending: EMERGENCY MEDICINE | Admitting: EMERGENCY MEDICINE

## 2018-11-20 ENCOUNTER — ANESTHESIA (OUTPATIENT)
Dept: EMERGENCY MEDICINE | Facility: CLINIC | Age: 28
End: 2018-11-20

## 2018-11-20 VITALS
HEART RATE: 88 BPM | RESPIRATION RATE: 15 BRPM | BODY MASS INDEX: 25.57 KG/M2 | SYSTOLIC BLOOD PRESSURE: 105 MMHG | TEMPERATURE: 98.5 F | DIASTOLIC BLOOD PRESSURE: 72 MMHG | WEIGHT: 158.4 LBS | OXYGEN SATURATION: 100 %

## 2018-11-20 DIAGNOSIS — L02.31 LEFT BUTTOCK ABSCESS: ICD-10-CM

## 2018-11-20 PROCEDURE — 25000125 ZZHC RX 250: Performed by: EMERGENCY MEDICINE

## 2018-11-20 PROCEDURE — 99285 EMERGENCY DEPT VISIT HI MDM: CPT | Mod: Z6 | Performed by: EMERGENCY MEDICINE

## 2018-11-20 PROCEDURE — 40000671 ZZH STATISTIC ANESTHESIA CASE

## 2018-11-20 PROCEDURE — 10060 I&D ABSCESS SIMPLE/SINGLE: CPT | Mod: Z6 | Performed by: EMERGENCY MEDICINE

## 2018-11-20 PROCEDURE — 25000128 H RX IP 250 OP 636: Performed by: EMERGENCY MEDICINE

## 2018-11-20 PROCEDURE — 99285 EMERGENCY DEPT VISIT HI MDM: CPT | Mod: 25 | Performed by: EMERGENCY MEDICINE

## 2018-11-20 PROCEDURE — 96360 HYDRATION IV INFUSION INIT: CPT | Performed by: EMERGENCY MEDICINE

## 2018-11-20 PROCEDURE — 10060 I&D ABSCESS SIMPLE/SINGLE: CPT | Performed by: EMERGENCY MEDICINE

## 2018-11-20 PROCEDURE — 25000132 ZZH RX MED GY IP 250 OP 250 PS 637: Performed by: EMERGENCY MEDICINE

## 2018-11-20 PROCEDURE — 96374 THER/PROPH/DIAG INJ IV PUSH: CPT | Performed by: EMERGENCY MEDICINE

## 2018-11-20 RX ORDER — SULFAMETHOXAZOLE/TRIMETHOPRIM 800-160 MG
1 TABLET ORAL 2 TIMES DAILY
Qty: 14 TABLET | Refills: 0 | Status: SHIPPED | OUTPATIENT
Start: 2018-11-20 | End: 2018-11-27

## 2018-11-20 RX ORDER — KETAMINE HCL IN 0.9 % NACL 50 MG/5 ML
20 SYRINGE (ML) INTRAVENOUS ONCE
Status: COMPLETED | OUTPATIENT
Start: 2018-11-20 | End: 2018-11-20

## 2018-11-20 RX ORDER — IBUPROFEN 600 MG/1
600 TABLET, FILM COATED ORAL EVERY 8 HOURS PRN
Qty: 20 TABLET | Refills: 0 | Status: SHIPPED | OUTPATIENT
Start: 2018-11-20 | End: 2019-06-21

## 2018-11-20 RX ORDER — SODIUM CHLORIDE 9 MG/ML
1000 INJECTION, SOLUTION INTRAVENOUS CONTINUOUS
Status: DISCONTINUED | OUTPATIENT
Start: 2018-11-20 | End: 2018-11-21 | Stop reason: HOSPADM

## 2018-11-20 RX ORDER — SULFAMETHOXAZOLE/TRIMETHOPRIM 800-160 MG
2 TABLET ORAL ONCE
Status: COMPLETED | OUTPATIENT
Start: 2018-11-20 | End: 2018-11-20

## 2018-11-20 RX ORDER — SULFAMETHOXAZOLE/TRIMETHOPRIM 800-160 MG
1 TABLET ORAL 2 TIMES DAILY
Qty: 6 TABLET | Refills: 0 | Status: SHIPPED | OUTPATIENT
Start: 2018-11-20 | End: 2018-11-20

## 2018-11-20 RX ORDER — LIDOCAINE HYDROCHLORIDE AND EPINEPHRINE 10; 10 MG/ML; UG/ML
INJECTION, SOLUTION INFILTRATION; PERINEURAL
Status: DISCONTINUED
Start: 2018-11-20 | End: 2018-11-21 | Stop reason: HOSPADM

## 2018-11-20 RX ORDER — HYDROCODONE BITARTRATE AND ACETAMINOPHEN 5; 325 MG/1; MG/1
1 TABLET ORAL EVERY 6 HOURS PRN
Qty: 10 TABLET | Refills: 0 | Status: SHIPPED | OUTPATIENT
Start: 2018-11-20 | End: 2019-06-21

## 2018-11-20 RX ADMIN — SODIUM CHLORIDE 1000 ML: 9 INJECTION, SOLUTION INTRAVENOUS at 21:54

## 2018-11-20 RX ADMIN — Medication 20 MG: at 21:54

## 2018-11-20 RX ADMIN — SULFAMETHOXAZOLE AND TRIMETHOPRIM 2 TABLET: 800; 160 TABLET ORAL at 22:18

## 2018-11-20 ASSESSMENT — ENCOUNTER SYMPTOMS
ROS SKIN COMMENTS: LEFT BUTTOCK ABSCESS
FEVER: 0
ABDOMINAL PAIN: 0
NAUSEA: 0
DIARRHEA: 0
DIAPHORESIS: 0
WOUND: 1
VOMITING: 0

## 2018-11-20 NOTE — ED AVS SNAPSHOT
[facility], [place], Emergency Department    [address]    [facility] [address]    Phone:  [phone]    Fax:  [phone]                                       Chhaya Mock   MRN: [id number]    Department:  [facility], Emergency Department   Date of Visit:  11/20/2018           After Visit Summary Signature Page     I have received my discharge instructions, and my questions have been answered. I have discussed any challenges I see with this plan with the nurse or doctor.    ..........................................................................................................................................  Patient/Patient Representative Signature      ..........................................................................................................................................  Patient Representative Print Name and Relationship to Patient    ..................................................               ................................................  Date                                   Time    ..........................................................................................................................................  Reviewed by Signature/Title    ...................................................              ..............................................  Date                                               Time          22EPIC Rev 08/18

## 2018-11-20 NOTE — ED AVS SNAPSHOT
[facility], Emergency Department    [address]    [place] [address]    Phone:  [phone]    Fax:  [phone]                                       Chhaya Mock   MRN: [id number]    Department:  [facility], Emergency Department   Date of Visit:  11/20/2018           Patient Information     Date Of Birth          1990        Your diagnoses for this visit were:     Left buttock abscess        You were seen by Maciel Sun MD.        Discharge Instructions       Start antibiotics as directed  You may pull the packing out in 3 days  I recommend you follow-up with a surgeon for consideration of deeper exploration of the area with a goal of preventing recurrence of abscess  Please make an appointment to follow up with [facility] (phone: [phone])    24 Hour Appointment Hotline       To make an appointment at any [facility], call [phone] ([phone]). If you don't have a family doctor or clinic, we will help you find one. [facility] clinics are conveniently located to serve the needs of you and your family.             Review of your medicines      START taking        Dose / Directions Last dose taken    HYDROcodone-acetaminophen 5-325 MG per tablet   Commonly known as:  NORCO   Dose:  1 tablet   Quantity:  10 tablet        Take 1 tablet by mouth every 6 hours as needed for severe pain   Refills:  0        ibuprofen 600 MG tablet   Commonly known as:  ADVIL/MOTRIN   Dose:  600 mg   Quantity:  20 tablet        Take 1 tablet (600 mg) by mouth every 8 hours as needed for moderate pain   Refills:  0        sulfamethoxazole-trimethoprim 800-160 MG per tablet   Commonly known as:  BACTRIM DS   Dose:  1 tablet   Quantity:  14 tablet        Take 1 tablet by mouth 2 times daily for 7 days   Refills:  0                Information about OPIOIDS     PRESCRIPTION OPIOIDS: WHAT YOU NEED TO KNOW   We gave you an opioid (narcotic) pain medicine. It  is important to manage your pain, but opioids are not always the best choice. You should first try all the other options your care team gave you. Take this medicine for as short a time (and as few doses) as possible.    Some activities can increase your pain, such as bandage changes or therapy sessions. It may help to take your pain medicine 30 to 60 minutes before these activities. Reduce your stress by getting enough sleep, working on hobbies you enjoy and practicing relaxation or meditation. Talk to your care team about ways to manage your pain beyond prescription opioids.    These medicines have risks:    DO NOT drive when on new or higher doses of pain medicine. These medicines can affect your alertness and reaction times, and you could be arrested for driving under the influence (DUI). If you need to use opioids long-term, talk to your care team about driving.    DO NOT operate heavy machinery    DO NOT do any other dangerous activities while taking these medicines.    DO NOT drink any alcohol while taking these medicines.     If the opioid prescribed includes acetaminophen, DO NOT take with any other medicines that contain acetaminophen. Read all labels carefully. Look for the word  acetaminophen  or  Tylenol.  Ask your pharmacist if you have questions or are unsure.    You can get addicted to pain medicines, especially if you have a history of addiction (chemical, alcohol or substance dependence). Talk to your care team about ways to reduce this risk.    All opioids tend to cause constipation. Drink plenty of water and eat foods that have a lot of fiber, such as fruits, vegetables, prune juice, apple juice and high-fiber cereal. Take a laxative (Miralax, milk of magnesia, Colace, Senna) if you don t move your bowels at least every other day. Other side effects include upset stomach, sleepiness, dizziness, throwing up, tolerance (needing more of the medicine to have the same effect), physical dependence and  slowed breathing.    Store your pills in a secure place, locked if possible. We will not replace any lost or stolen medicine. If you don t finish your medicine, please throw away (dispose) as directed by your pharmacist. The [place] [employer]ution [employer] has more information about safe disposal: https://www.pca.state.[place]/living-green/managing-unwanted-medications        Prescriptions were sent or printed at these locations (3 Prescriptions)                   Other Prescriptions                Printed at Department/Unit printer (3 of 3)         HYDROcodone-acetaminophen (NORCO) 5-325 MG per tablet               ibuprofen (ADVIL/MOTRIN) 600 MG tablet               sulfamethoxazole-trimethoprim (BACTRIM DS) 800-160 MG per tablet                Procedures and tests performed during your visit     Incision and drainage      Orders Needing Specimen Collection     None      Pending Results     No orders found from 11/18/2018 to 11/21/2018.            Pending Culture Results     No orders found from 11/18/2018 to 11/21/2018.            Pending Results Instructions     If you had any lab results that were not finalized at the time of your Discharge, you can call the ED Lab Result RN at [phone]. You will be contacted by this team for any positive Lab results or changes in treatment. The nurses are available 7 days a week from 10A to 6:30P.  You can leave a message 24 hours per day and they will return your call.        Thank you for choosing [facility]       Thank you for choosing [facility] for your care. Our goal is always to provide you with excellent care. Hearing back from our patients is one way we can continue to improve our services. Please take a few minutes to complete the written survey that you may receive in the mail after you visit with us. Thank you!        [employer] Information     [employer] lets you send messages to your doctor, view your test results, renew your prescriptions, schedule appointments and  "more. To sign up, go to [email or web address] . Click on \"Log in\" on the left side of the screen, which will take you to the Welcome page. Then click on \"Sign up Now\" on the right side of the page.     You will be asked to enter the access code listed below, as well as some personal information. Please follow the directions to create your username and password.     Your access code is: [email or web address]  Expires: 2019 10:10 AM     Your access code will  in 90 days. If you need help or a new code, please call your [facility] clinic or [phone].        Care EveryWhere ID     This is your Care EveryWhere ID. This could be used by other organizations to access your [facility] medical records  [id number]        Equal Access to Services     SAMANTHA TOVAR : Kendell Grant, mando park, katty smith, yong jeffries. So [facility] [phone].    ATENCIÓN: Si habla español, tiene a sams disposición servicios gratuitos de asistencia lingüística. Llame al [phone].    We comply with applicable federal civil rights laws and [place] laws. We do not discriminate on the basis of race, color, national origin, age, disability, sex, sexual orientation, or gender identity.            After Visit Summary       This is your record. Keep this with you and show to your community pharmacist(s) and doctor(s) at your next visit.                  "

## 2018-11-21 NOTE — DISCHARGE INSTRUCTIONS
Start antibiotics as directed  You may pull the packing out in 3 days  I recommend you follow-up with a surgeon for consideration of deeper exploration of the area with a goal of preventing recurrence of abscess  Please make an appointment to follow up with [facility] (phone: [phone])

## 2018-11-21 NOTE — ED NOTES
Patient procedure finished at 2159. Patient is now alert and vital signs stable. Patient states no symptoms. Patient is alert and oriented times 4.

## 2018-11-21 NOTE — ED NOTES
Patient states that after shaving, for the past three days she has had a boil on her buttocks close to the inner region. Patient states no discharge from area. Patient states area has grown and become more painful.

## 2018-11-21 NOTE — ED PROVIDER NOTES
[facility] [place] EMERGENCY DEPARTMENT ([facility])    11/20/18     ED 8    History     Chief Complaint   Patient presents with     Boil     noted 3 days ago a boil on L inner buttock, denies any drainage     The history is provided by the patient and medical records.     Chhaya Mock is a 27 year old female who presents with painful tender area noted on the left buttock at the same location of previous I&D. Symptoms have been going on for about 3 days.  She has no systemic symptoms of illness, there is no spontaneous drainage.  She has had multiple episodes of soft tissue infections in the past.  No documented MRSA but likely.  Previous procedure was done with ketamine sedation which she would like to have again today.    This part of the document was transcribed by Kenia Vuong, Medical Scribe.      I have reviewed the Medications, Allergies, Past Medical and Surgical History, and Social History in the [employer] system.  Past Medical History:   Diagnosis Date     Carbuncle and furuncle      Tobacco use disorder     Smokes 1/2 ppd. Started smoking at 14 years     Vaginal yeast infection 06/02/2017     Social History     Social History     Marital status: Single     Spouse name: N/A     Number of children: N/A     Years of education: N/A     Occupational History      [employer]     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   Gastrointestinal: Negative for abdominal pain, diarrhea, nausea and vomiting.   Genitourinary:  Negative.    Skin: Positive for wound.        Left buttock abscess   All other systems reviewed and are negative.      Physical Exam   BP: 113/85  Pulse: 87  Temp: 98.5  F (36.9  C)  Resp: 16  Weight: 72.4 kg (159 lb 11.2 oz)  SpO2: 96 %      Physical Exam   Constitutional: She is oriented to person, place, and time. She appears well-developed and well-nourished. No distress.   Pulmonary/Chest: No respiratory distress.   Genitourinary:         Genitourinary Comments: Tender area as noted with fluctuance and induration no spontaneous drainage.  It appears to be the same location that was drained previously.  No cellulitis.   Neurological: She is alert and oriented to person, place, and time.   Skin: Skin is warm. No erythema.   Psychiatric: She has a normal mood and affect. Her behavior is normal.   Nursing note and vitals reviewed.      ED Course     ED Course     Incision + drainage  Date/Time: 11/20/2018 10:07 PM  Performed by: JOVAN CAMERON  Authorized by: JOVAN CAMERON     Consent:     Consent obtained:  Written and verbal    Consent given by:  Patient    Risks discussed:  Bleeding, incomplete drainage, pain and infection  Location:     Type:  Abscess    Location:  Anogenital  Pre-procedure details:     Skin preparation:  Betadine  Sedation:     Sedation type:  Anxiolysis  Anesthesia (see MAR for exact dosages):     Anesthesia method:  Local infiltration    Local anesthetic:  Lidocaine 1% WITH epi  Procedure type:     Complexity:  Simple  Procedure details:     Needle aspiration: no      Incision types:  Single straight    Incision depth:  Subcutaneous    Scalpel blade:  11    Wound management:  Probed and deloculated and irrigated with saline    Drainage:  Purulent    Drainage amount:  Moderate    Wound treatment:  Wound left open    Packing materials:  1/4 in iodoform gauze  Post-procedure details:     Patient tolerance of procedure:  Tolerated well, no immediate complications               Medications   0.9% sodium chloride BOLUS (0 mLs Intravenous Stopped 11/20/18 2240)     Followed by   sodium chloride 0.9% infusion (not administered)   lidocaine 1% with EPINEPHrine 1:100,000 1 %-1:206947 injection (not administered)   ketamine (KETALAR) injection 20 mg (20 mg Intravenous Given 11/20/18 2154)   sulfamethoxazole-trimethoprim (BACTRIM DS/SEPTRA DS) 800-160 MG per tablet 2 tablet (2 tablets Oral Given 11/20/18 2218)     [facility] Procedure Note        Sedation:      Performed by: Maciel Sun  Authorized by: Maciel Sun    Pre-Procedure Assessment done at 2130.    Expected Level:  Minimal Sedation    Indication:  Sedation is required to allow for Incision and drainage of abcess    Consent obtained from patient after discussing the risks, benefits and alternatives.    PO Intake:  Appropriately NPO for procedure    ASA Class:  Class 1 - HEALTHY PATIENT    Mallampati:  Grade 1:  Soft palate, uvula, tonsillar pillars, and posterior pharyngeal wall visible    Lungs: Lungs Clear with good breath sounds bilaterally.     Heart: Normal heart sounds and rate    History and physical reviewed and no updates needed. I have reviewed the lab findings, diagnostic data, medications, and the plan for sedation. I have determined this patient to be an appropriate candidate for the planned sedation and procedure and have reassessed the patient IMMEDIATELY PRIOR to sedation and procedure.      Sedation Post Procedure Summary:    Prior to the start of the procedure and with procedural staff participation, I verbally confirmed the patient s identity using two indicators, relevant allergies, that the procedure was appropriate and matched the consent or emergent situation, and that the correct equipment/implants were available. Immediately prior to starting the procedure I conducted the Time Out with the procedural staff and re-confirmed the patient s name, procedure, and site/side. (The Joint  Commission universal protocol was followed.)  Yes      Sedatives: Ketamine    Vital signs, airway, End Tidal CO2 and pulse oximetry were monitored and remained stable throughout the procedure and sedation was maintained until the procedure was complete.  The patient was monitored by staff until sedation discharge criteria were met.    Patient tolerance: Patient tolerated the procedure well with no immediate complications.    Time of sedation in minutes:  5 minutes from beginning to end of physician one to one monitoring.         Labs Ordered and Resulted from Time of ED Arrival Up to the Time of Departure from the ED - No data to display         Assessments & Plan (with Medical Decision Making)   Incision and drainage of recurrent left buttock abscess.  Moderate purulence returned.  Packing was left in place.  Will place on Bactrim because of residual induration.  I recommend she follow-up with colorectal surgery for consideration of definitive management since these seem to be recurrent.  Will treat for possible MRSA.  She is discharged home with pain medication and Bactrim.  She also has the phone number for the colorectal clinic.  She can pull out the packing in 3 days, return if any problems    I have reviewed the nursing notes.    I have reviewed the findings, diagnosis, plan and need for follow up with the patient.    New Prescriptions    HYDROCODONE-ACETAMINOPHEN (NORCO) 5-325 MG PER TABLET    Take 1 tablet by mouth every 6 hours as needed for severe pain    IBUPROFEN (ADVIL/MOTRIN) 600 MG TABLET    Take 1 tablet (600 mg) by mouth every 8 hours as needed for moderate pain    SULFAMETHOXAZOLE-TRIMETHOPRIM (BACTRIM DS) 800-160 MG PER TABLET    Take 1 tablet by mouth 2 times daily for 7 days       Final diagnoses:   Left buttock abscess       11/20/2018   [facility], [place], EMERGENCY DEPARTMENT     Maciel Sun MD  11/20/18 9700

## 2019-06-21 ENCOUNTER — HOSPITAL ENCOUNTER (EMERGENCY)
Facility: CLINIC | Age: 29
Discharge: HOME | End: 2019-06-21
Attending: EMERGENCY MEDICINE | Admitting: EMERGENCY MEDICINE

## 2019-06-21 VITALS
SYSTOLIC BLOOD PRESSURE: 118 MMHG | BODY MASS INDEX: 28.06 KG/M2 | DIASTOLIC BLOOD PRESSURE: 81 MMHG | OXYGEN SATURATION: 95 % | HEIGHT: 63 IN | TEMPERATURE: 98.5 F | RESPIRATION RATE: 16 BRPM

## 2019-06-21 DIAGNOSIS — L73.2 HIDRADENITIS: ICD-10-CM

## 2019-06-21 DIAGNOSIS — N89.8 VAGINAL DISCHARGE: ICD-10-CM

## 2019-06-21 LAB
ALBUMIN UR-MCNC: 10 MG/DL
APPEARANCE UR: ABNORMAL
BILIRUB UR QL STRIP: NEGATIVE
COLOR UR AUTO: YELLOW
GLUCOSE UR STRIP-MCNC: NEGATIVE MG/DL
HCG UR QL: NEGATIVE
HGB UR QL STRIP: ABNORMAL
KETONES UR STRIP-MCNC: NEGATIVE MG/DL
LEUKOCYTE ESTERASE UR QL STRIP: NEGATIVE
MUCOUS THREADS #/AREA URNS LPF: PRESENT /LPF
NITRATE UR QL: NEGATIVE
PH UR STRIP: 5.5 PH (ref 5–7)
RBC #/AREA URNS AUTO: 26 /HPF (ref 0–2)
SOURCE: ABNORMAL
SP GR UR STRIP: 1.02 (ref 1–1.03)
SPECIMEN SOURCE: NORMAL
SQUAMOUS #/AREA URNS AUTO: 8 /HPF (ref 0–1)
UROBILINOGEN UR STRIP-MCNC: NORMAL MG/DL (ref 0–2)
WBC #/AREA URNS AUTO: 2 /HPF (ref 0–5)
WET PREP SPEC: NORMAL

## 2019-06-21 PROCEDURE — 99284 EMERGENCY DEPT VISIT MOD MDM: CPT | Mod: Z6 | Performed by: EMERGENCY MEDICINE

## 2019-06-21 PROCEDURE — 87491 CHLMYD TRACH DNA AMP PROBE: CPT | Performed by: EMERGENCY MEDICINE

## 2019-06-21 PROCEDURE — 25000132 ZZH RX MED GY IP 250 OP 250 PS 637: Performed by: EMERGENCY MEDICINE

## 2019-06-21 PROCEDURE — 81025 URINE PREGNANCY TEST: CPT | Performed by: EMERGENCY MEDICINE

## 2019-06-21 PROCEDURE — 87591 N.GONORRHOEAE DNA AMP PROB: CPT | Performed by: EMERGENCY MEDICINE

## 2019-06-21 PROCEDURE — 81001 URINALYSIS AUTO W/SCOPE: CPT | Performed by: EMERGENCY MEDICINE

## 2019-06-21 PROCEDURE — 99284 EMERGENCY DEPT VISIT MOD MDM: CPT | Mod: 25

## 2019-06-21 PROCEDURE — 87210 SMEAR WET MOUNT SALINE/INK: CPT | Performed by: EMERGENCY MEDICINE

## 2019-06-21 RX ORDER — SULFAMETHOXAZOLE/TRIMETHOPRIM 800-160 MG
1 TABLET ORAL ONCE
Status: COMPLETED | OUTPATIENT
Start: 2019-06-21 | End: 2019-06-21

## 2019-06-21 RX ORDER — SULFAMETHOXAZOLE/TRIMETHOPRIM 800-160 MG
1 TABLET ORAL 2 TIMES DAILY
Qty: 20 TABLET | Refills: 0 | Status: SHIPPED | OUTPATIENT
Start: 2019-06-21 | End: 2019-11-25

## 2019-06-21 RX ADMIN — SULFAMETHOXAZOLE AND TRIMETHOPRIM 1 TABLET: 800; 160 TABLET ORAL at 09:23

## 2019-06-21 ASSESSMENT — ENCOUNTER SYMPTOMS
CHILLS: 0
WOUND: 1
FEVER: 0
ABDOMINAL PAIN: 1
VOMITING: 0

## 2019-06-21 NOTE — ED AVS SNAPSHOT
[facility], [place], Emergency Department  [address]  [facility] [address]  Phone:  [phone]  Fax:  [phone]                                    Chhaya Mock   MRN: [id number]    Department:  [facility], Emergency Department   Date of Visit:  6/21/2019           After Visit Summary Signature Page    I have received my discharge instructions, and my questions have been answered. I have discussed any challenges I see with this plan with the nurse or doctor.    ..........................................................................................................................................  Patient/Patient Representative Signature      ..........................................................................................................................................  Patient Representative Print Name and Relationship to Patient    ..................................................               ................................................  Date                                   Time    ..........................................................................................................................................  Reviewed by Signature/Title    ...................................................              ..............................................  Date                                               Time          22EPIC Rev 08/18

## 2019-06-21 NOTE — DISCHARGE INSTRUCTIONS
If your culture results are positive from the vaginal swab you will be contacted to arrange for antibiotic treatments.  Otherwise please follow-up with gynecology for further assessment of your discharge.    Please make an appointment to follow up with OB/Gyn--[facility] (phone: [phone]) as soon as possible as needed.

## 2019-06-21 NOTE — ED PROVIDER NOTES
[facility] EMERGENCY DEPARTMENT ([facility])    6/21/19       History     Chief Complaint   Patient presents with     Abdominal Pain     transverse low abd pain started about 3 days ago. Feels constipated. Vag discharge     The history is provided by the patient and medical records.     Chhaya Mock is a 28 year old female who has a PMHx of hidradenitis suppurativa, who presents to the Emergency Department for evaluation of abdominal pain.  Presents to the ED for intermittent bilateral lower abdominal discomfort/pressure with some vaginal discharge that started 3 days ago.  Patient reports that she has been sexually active and does not use contraceptives.  She is seeking evaluation for possible pregnancy or STD.  Patient reports that her LMP was 5/29- 6/2/19.  She reports some occasional nausea triggered by certain smells such as  soap but has not vomited.  Patient denies fevers or chills.  Patient denies a history of ovarian cysts.  Additionally, the patient reports that she had a recent eruption of a boil under her right axilla that has been draining for multiple days.  She reports that this is been causing her discomfort and that she has a history of severe recurrent abscesses recently in the buttock region. She is not taking medications for this or any other medical condition currently.    I have reviewed the Medications, Allergies, Past Medical and Surgical History, and Social History in the [employer] system.    Past Medical History:   Diagnosis Date     Carbuncle and furuncle      Tobacco use disorder     Smokes 1/2 ppd. Started smoking at 14 years     Vaginal yeast infection 06/02/2017       Past Surgical History:   Procedure Laterality Date     C ORAL SURGERY PROCEDURE  10/23/2008    [place] Teeth Extraction       Family History   Problem Relation Age of Onset     Hypertension Maternal Grandmother      Diabetes Maternal Uncle      Hypertension Maternal Uncle      Alcohol/Drug  "Maternal Aunt      Alcohol/Drug Maternal Uncle      Anesthesia Reaction Other        Social History     Tobacco Use     Smoking status: Current Every Day Smoker     Packs/day: 0.50     Years: 2.00     Pack years: 1.00     Types: Cigarettes     Smokeless tobacco: Never Used   Substance Use Topics     Alcohol use: No     Comment: occassionally       No current facility-administered medications for this encounter.      Current Outpatient Medications   Medication     sulfamethoxazole-trimethoprim (BACTRIM DS) 800-160 MG tablet        Allergies   Allergen Reactions     No Known Allergies         Review of Systems   Constitutional: Negative for chills and fever.   Gastrointestinal: Positive for abdominal pain (Bilateral lower). Negative for vomiting. Nausea: infrequent.   Genitourinary: Positive for vaginal discharge.   Skin: Positive for wound (draining R axilla boil).   All other systems reviewed and are negative.      Physical Exam   BP: 118/81  Heart Rate: 98  Temp: 98.5  F (36.9  C)  Resp: 16  Height: 160 cm (5' 3\")  SpO2: 95 %      Physical Exam   Constitutional: No distress.   HENT:   Head: Atraumatic.   Mouth/Throat: Oropharynx is clear and moist. No oropharyngeal exudate.   Eyes: EOM are normal. No scleral icterus.   Neck: Neck supple.   Cardiovascular: Normal rate, regular rhythm and normal heart sounds.   Pulmonary/Chest: Breath sounds normal. No respiratory distress.   Abdominal: Soft. She exhibits no distension. There is no tenderness.   No abdominal tenderness, patient states suprapubic palpation makes her feel like she wants to pee   Genitourinary:   Genitourinary Comments: Speculum exam: Copious white clumpy discharge from cervix, no tenderness   Musculoskeletal: She exhibits no edema or deformity.   Neurological: She is alert.   Skin: Skin is warm and dry. She is not diaphoretic.   Right axilla indurated tender area consistent with a possible abscess.   Psychiatric: She has a normal mood and affect. Her " behavior is normal.       ED Course   9:07 AM  The patient was seen and examined by Ruben Fernandez DO in Room ED06.       Procedures           Results for orders placed or performed during the hospital encounter of 06/21/19 (from the past 24 hour(s))   HCG qualitative urine (UPT)   Result Value Ref Range    HCG Qual Urine Negative NEG^Negative   UA reflex to Microscopic and Culture   Result Value Ref Range    Color Urine Yellow     Appearance Urine Slightly Cloudy     Glucose Urine Negative NEG^Negative mg/dL    Bilirubin Urine Negative NEG^Negative    Ketones Urine Negative NEG^Negative mg/dL    Specific Gravity Urine 1.024 1.003 - 1.035    Blood Urine Moderate (A) NEG^Negative    pH Urine 5.5 5.0 - 7.0 pH    Protein Albumin Urine 10 (A) NEG^Negative mg/dL    Urobilinogen mg/dL Normal 0.0 - 2.0 mg/dL    Nitrite Urine Negative NEG^Negative    Leukocyte Esterase Urine Negative NEG^Negative    Source Midstream Urine     RBC Urine 26 (H) 0 - 2 /HPF    WBC Urine 2 0 - 5 /HPF    Squamous Epithelial /HPF Urine 8 (H) 0 - 1 /HPF    Mucous Urine Present (A) NEG^Negative /LPF   Wet prep   Result Value Ref Range    Specimen Description Cervix     Wet Prep No yeast seen     Wet Prep No clue cells seen     Wet Prep No motile Trichomonas seen     Wet Prep Rare  PMNs seen            Labs Ordered and Resulted from Time of ED Arrival Up to the Time of Departure from the ED   UA MACROSCOPIC WITH REFLEX TO MICRO AND CULTURE - Abnormal; Notable for the following components:       Result Value    Blood Urine Moderate (*)     Protein Albumin Urine 10 (*)     RBC Urine 26 (*)     Squamous Epithelial /HPF Urine 8 (*)     Mucous Urine Present (*)     All other components within normal limits   HCG QUALITATIVE URINE   CHLAMYDIA TRACHOMATIS PCR   WET PREP   NEISSERIA GONORRHOEAE PCR     Results for orders placed or performed during the hospital encounter of 06/21/19   HCG qualitative urine (UPT)   Result Value Ref Range    HCG Qual Urine  Negative NEG^Negative   UA reflex to Microscopic and Culture   Result Value Ref Range    Color Urine Yellow     Appearance Urine Slightly Cloudy     Glucose Urine Negative NEG^Negative mg/dL    Bilirubin Urine Negative NEG^Negative    Ketones Urine Negative NEG^Negative mg/dL    Specific Gravity Urine 1.024 1.003 - 1.035    Blood Urine Moderate (A) NEG^Negative    pH Urine 5.5 5.0 - 7.0 pH    Protein Albumin Urine 10 (A) NEG^Negative mg/dL    Urobilinogen mg/dL Normal 0.0 - 2.0 mg/dL    Nitrite Urine Negative NEG^Negative    Leukocyte Esterase Urine Negative NEG^Negative    Source Midstream Urine     RBC Urine 26 (H) 0 - 2 /HPF    WBC Urine 2 0 - 5 /HPF    Squamous Epithelial /HPF Urine 8 (H) 0 - 1 /HPF    Mucous Urine Present (A) NEG^Negative /LPF   Wet prep   Result Value Ref Range    Specimen Description Cervix     Wet Prep No yeast seen     Wet Prep No clue cells seen     Wet Prep No motile Trichomonas seen     Wet Prep Rare  PMNs seen               Assessments & Plan (with Medical Decision Making)   20-year-old female presents to us with a chief complaint of pelvic pain, discharge and concern for STD and pregnancy.  She has no history of ovarian cysts or other ovarian issues.  She has copious white discharge on exam.  Wet prep showed no obvious clue cells, yeast, trichomoniasis.  Discussed empiric treatment for possible STDs.  Patient prefers to wait for GC chlamydia results.  Patient had no pain on exam of her lower abdomen or speculum.  I have a low suspicion for ovarian torsion, PID, abscess based on this.  We will give the patient the number for gynecology for the purposes of follow-up.  She is comfortable with this plan.    In addition she has a history of hidradenitis and a possible right axillary boil.  Patient was offered aspiration and possible I&D.  She states it is been intermittently draining and she would prefer just antibiotics at this time.  She states that if he gets worse she will come in for  I&D.    I have reviewed the nursing notes.    I have reviewed the findings, diagnosis, plan and need for follow up with the patient.       Medication List      Started    sulfamethoxazole-trimethoprim 800-160 MG tablet  Commonly known as:  BACTRIM DS  1 tablet, Oral, 2 TIMES DAILY            Final diagnoses:   Vaginal discharge   Hidradenitis     Fortino PALACIOS, am serving as a trained medical scribe to document services personally performed by Ruben Fernandez DO, based on the provider's statements to me.   Ruben PALACIOS DO, was physically present and have reviewed and verified the accuracy of this note documented by Fortino Nicholas.     6/21/2019   [facility], [place], EMERGENCY DEPARTMENT     Ruben Fernandez DO  06/21/19 1116

## 2019-10-04 ENCOUNTER — HEALTH MAINTENANCE LETTER (OUTPATIENT)
Age: 29
End: 2019-10-04

## 2019-11-09 ENCOUNTER — HOSPITAL ENCOUNTER (EMERGENCY)
Facility: CLINIC | Age: 29
Discharge: HOME | End: 2019-11-09
Attending: EMERGENCY MEDICINE | Admitting: EMERGENCY MEDICINE

## 2019-11-09 VITALS
HEART RATE: 73 BPM | TEMPERATURE: 97.1 F | DIASTOLIC BLOOD PRESSURE: 76 MMHG | OXYGEN SATURATION: 100 % | SYSTOLIC BLOOD PRESSURE: 109 MMHG | BODY MASS INDEX: 28.41 KG/M2 | WEIGHT: 160.4 LBS | RESPIRATION RATE: 16 BRPM

## 2019-11-09 DIAGNOSIS — L02.31 ABSCESS OF RIGHT BUTTOCK: ICD-10-CM

## 2019-11-09 PROCEDURE — 99285 EMERGENCY DEPT VISIT HI MDM: CPT | Mod: 25 | Performed by: EMERGENCY MEDICINE

## 2019-11-09 PROCEDURE — 99152 MOD SED SAME PHYS/QHP 5/>YRS: CPT | Performed by: EMERGENCY MEDICINE

## 2019-11-09 PROCEDURE — 76857 US EXAM PELVIC LIMITED: CPT | Mod: 26 | Performed by: EMERGENCY MEDICINE

## 2019-11-09 PROCEDURE — 96361 HYDRATE IV INFUSION ADD-ON: CPT | Performed by: EMERGENCY MEDICINE

## 2019-11-09 PROCEDURE — 10060 I&D ABSCESS SIMPLE/SINGLE: CPT | Performed by: EMERGENCY MEDICINE

## 2019-11-09 PROCEDURE — 25800030 ZZH RX IP 258 OP 636

## 2019-11-09 PROCEDURE — 25000125 ZZHC RX 250

## 2019-11-09 PROCEDURE — 25000128 H RX IP 250 OP 636: Performed by: EMERGENCY MEDICINE

## 2019-11-09 PROCEDURE — 10060 I&D ABSCESS SIMPLE/SINGLE: CPT | Mod: Z6 | Performed by: EMERGENCY MEDICINE

## 2019-11-09 PROCEDURE — 76857 US EXAM PELVIC LIMITED: CPT | Performed by: EMERGENCY MEDICINE

## 2019-11-09 PROCEDURE — 99152 MOD SED SAME PHYS/QHP 5/>YRS: CPT | Mod: 59 | Performed by: EMERGENCY MEDICINE

## 2019-11-09 PROCEDURE — 25000128 H RX IP 250 OP 636

## 2019-11-09 PROCEDURE — 96374 THER/PROPH/DIAG INJ IV PUSH: CPT | Performed by: EMERGENCY MEDICINE

## 2019-11-09 PROCEDURE — 96376 TX/PRO/DX INJ SAME DRUG ADON: CPT | Performed by: EMERGENCY MEDICINE

## 2019-11-09 RX ORDER — MORPHINE SULFATE 4 MG/ML
4 INJECTION, SOLUTION INTRAMUSCULAR; INTRAVENOUS ONCE
Status: COMPLETED | OUTPATIENT
Start: 2019-11-09 | End: 2019-11-09

## 2019-11-09 RX ORDER — LIDOCAINE HYDROCHLORIDE AND EPINEPHRINE 10; 10 MG/ML; UG/ML
INJECTION, SOLUTION INFILTRATION; PERINEURAL
Status: DISCONTINUED
Start: 2019-11-09 | End: 2019-11-09 | Stop reason: WASHOUT

## 2019-11-09 RX ORDER — PROPOFOL 10 MG/ML
0.5 INJECTION, EMULSION INTRAVENOUS ONCE
Status: COMPLETED | OUTPATIENT
Start: 2019-11-09 | End: 2019-11-09

## 2019-11-09 RX ORDER — SODIUM CHLORIDE 9 MG/ML
INJECTION, SOLUTION INTRAVENOUS
Status: COMPLETED
Start: 2019-11-09 | End: 2019-11-09

## 2019-11-09 RX ORDER — LIDOCAINE HYDROCHLORIDE 10 MG/ML
INJECTION, SOLUTION EPIDURAL; INFILTRATION; INTRACAUDAL; PERINEURAL
Status: COMPLETED
Start: 2019-11-09 | End: 2019-11-09

## 2019-11-09 RX ORDER — OXYCODONE HYDROCHLORIDE 5 MG/1
5 TABLET ORAL EVERY 6 HOURS PRN
Qty: 10 TABLET | Refills: 0 | Status: SHIPPED | OUTPATIENT
Start: 2019-11-09 | End: 2019-11-25

## 2019-11-09 RX ORDER — PROPOFOL 10 MG/ML
INJECTION, EMULSION INTRAVENOUS
Status: COMPLETED
Start: 2019-11-09 | End: 2019-11-09

## 2019-11-09 RX ORDER — DOXYCYCLINE 100 MG/1
100 CAPSULE ORAL 2 TIMES DAILY
Qty: 20 CAPSULE | Refills: 0 | Status: SHIPPED | OUTPATIENT
Start: 2019-11-09 | End: 2019-11-25

## 2019-11-09 RX ADMIN — MORPHINE SULFATE 4 MG: 4 INJECTION INTRAVENOUS at 19:02

## 2019-11-09 RX ADMIN — PROPOFOL 20 MG: 10 INJECTION, EMULSION INTRAVENOUS at 20:17

## 2019-11-09 RX ADMIN — MORPHINE SULFATE 4 MG: 4 INJECTION INTRAVENOUS at 20:06

## 2019-11-09 RX ADMIN — SODIUM CHLORIDE 1000 ML: 9 INJECTION, SOLUTION INTRAVENOUS at 20:06

## 2019-11-09 RX ADMIN — PROPOFOL 36 MG: 10 INJECTION, EMULSION INTRAVENOUS at 20:26

## 2019-11-09 RX ADMIN — LIDOCAINE HYDROCHLORIDE 300 MG: 10 INJECTION, SOLUTION EPIDURAL; INFILTRATION; INTRACAUDAL; PERINEURAL at 20:26

## 2019-11-09 RX ADMIN — PROPOFOL 20 MG: 10 INJECTION, EMULSION INTRAVENOUS at 20:19

## 2019-11-09 RX ADMIN — PROPOFOL 20 MG: 10 INJECTION, EMULSION INTRAVENOUS at 20:22

## 2019-11-09 RX ADMIN — PROPOFOL 40 MG: 10 INJECTION, EMULSION INTRAVENOUS at 20:27

## 2019-11-09 RX ADMIN — PROPOFOL 36 MG: 10 INJECTION, EMULSION INTRAVENOUS at 20:14

## 2019-11-09 ASSESSMENT — ENCOUNTER SYMPTOMS
WOUND: 1
SHORTNESS OF BREATH: 0
NAUSEA: 0
FEVER: 0
VOMITING: 0
ABDOMINAL PAIN: 0

## 2019-11-09 NOTE — ED PROVIDER NOTES
"  History     Chief Complaint   Patient presents with     Abscess     per pt she has a boil in her rectal area x 3 days \" it's getting bigger\"     HPI  Chhaya Mock is a 28 year old female with a history of hidradenitis suppurativa, who presents to the Emergency Department for evaluation of a painful right buttock abscess. Patient reports that she first noticed this pain and sweling two or three days ago. She has attempted several home remedies including heat packs and pressure packs, all without any improvement. She states that the swelling actually seems to have worsened and increased in size. She has not noticed any drainage. Patient reports that this is consistent with her recurrent abscesses secondary to her known hidradenitis suppurativa for which she has required many I&Ds, sometimes with sedation in the past. She has had both axillary and chest abscesses as well. She notes that the last two episodes of this were in a similar location and resulted in I & D with sedation. She has never required surgical intervention in the Operating Room. She reports that her last oral intake was approximately six hours prior to arrival. She denies any fevers, abdominal pain, nausea, vomiting, chest pain or shortness of breath. She denies any other medical issues.     I have reviewed the Medications, Allergies, Past Medical and Surgical History, and Social History in the [employer] system.    Past Medical History:   Diagnosis Date     Carbuncle and furuncle      Tobacco use disorder     Smokes 1/2 ppd. Started smoking at 14 years     Vaginal yeast infection 06/02/2017       Past Surgical History:   Procedure Laterality Date     C ORAL SURGERY PROCEDURE  10/23/2008    [place] Teeth Extraction       Family History   Problem Relation Age of Onset     Hypertension Maternal Grandmother      Diabetes Maternal Uncle      Hypertension Maternal Uncle      Alcohol/Drug Maternal Aunt      Alcohol/Drug Maternal Uncle      " Anesthesia Reaction Other        Social History     Tobacco Use     Smoking status: Current Every Day Smoker     Packs/day: 0.50     Years: 2.00     Pack years: 1.00     Types: Cigarettes     Smokeless tobacco: Never Used   Substance Use Topics     Alcohol use: No     Comment: occassionally     No current facility-administered medications for this encounter.      Current Outpatient Medications   Medication     doxycycline hyclate (VIBRAMYCIN) 100 MG capsule     oxyCODONE (ROXICODONE) 5 MG tablet        Allergies   Allergen Reactions     No Known Allergies        Review of Systems   Constitutional: Negative for fever.   Respiratory: Negative for shortness of breath.    Cardiovascular: Negative for chest pain.   Gastrointestinal: Negative for abdominal pain, nausea and vomiting.   Skin: Positive for wound (perianal abscess).   All other systems reviewed and are negative.      Physical Exam   BP: 121/89  Pulse: 102  Heart Rate: 72  Temp: 97.1  F (36.2  C)  Resp: 16  Weight: 72.8 kg (160 lb 6.4 oz)  SpO2: 94 %      Physical Exam    GEN:  Alert, well developed, no acute distress  HEENT:  PERRL, EOMI, Mucous membranes are moist.   Cardio:  RRR, no murmur, radial pulses equal bilaterally  PULM:  Lungs clear, good air movement, no wheezes, rales   Abd:  Soft, normal bowel sounds, no focal tenderness  Back exam:  No CVA tenderness  Buttock exam: There is tenderness with induration along the right buttock along the inside of the gluteal cleft.  No swelling of the anus or hemorrhoids.   Musculoskeletal:  normal range of motion, no lower extremity swelling or calf tenderness  Neuro:  Alert and oriented X3, Follows commands, moving all extremities spontaneously   Skin:  Warm, dry   ED Course   The patient is familiar with the incision and drainage procedure and has had several in the past including with sedation.  We discussed the options and risks and benefits, she wished to proceed and gave verbal consent.     [facility]  "[facility], [place]    PROCEDURE: -Incision/Drainage  Date/Time: 11/9/2019 8:43 PM  Performed by: Dina Kumar MD  Authorized by: Dina Kumar MD     ED EVALUATION:      Assessment Time: 11/9/2019 8:00 PM      I have performed an Emergency Department Evaluation including taking a history and physical examination, this evaluation will be documented in the electronic medical record for this ED encounter.      Provisional Diagnosis: RIght buttock abscess    ASA Class: Class 1- healthy patient  UNIVERSAL PROTOCOL   Site Marked: Yes  Prior Images Obtained and Reviewed:  Yes  Required items: Required blood products, implants, devices and special equipment available    Patient identity confirmed:  Verbally with patient and arm band  Patient was reevaluated immediately before administering moderate or deep sedation or anesthesia  Confirmation Checklist:  Patient's identity using two indicators  Time out: Immediately prior to the procedure a time out was called    Preparation: Patient was prepped and draped in usual sterile fashion    ESBL (mL):  10    LOCATION:      Type:  Abscess    Size:  3 cm    Location: right buttock.    PRE-PROCEDURE DETAILS:     Skin preparation:  Betadine    ANESTHESIA (see MAR for exact dosages):     Anesthesia method:  Local infiltration    Local anesthetic:  Lidocaine 1% WITH epi and procaine 1% w/o epi    PROCEDURE TYPE:     Complexity:  Simple    SEDATION    Patient Sedated: Yes    Sedation:  Propofol and morphine  Vital signs: Vital signs monitored during sedation      PROCEDURE DETAILS:     Needle aspiration: no      Incision types:  Single straight    Incision depth:  Dermal    Scalpel blade:  10    Wound management:  Probed and deloculated and irrigated with saline    Drainage:  Purulent and bloody    Drainage amount:  Copious    Wound treatment:  Wound left open and drain placed    Packing materials:  1/2 in iodoform gauze    Amount 1/2\" iodoform:  " 5 cm strip  Post-procedure details:     PROCEDURE   Patient Tolerance:  Patient tolerated the procedure well with no immediate complications    Time of Sedation in Minutes by Physician:  20    Results for orders placed during the hospital encounter of 11/09/19   POC US SOFT TISSUE    Impression Limited Soft Tissue Ultrasound, performed and interpreted by me.    Indication:  Skin redness warmth pain. Evaluate for cellulitis vs abscess.     Body location: buttock    Findings:  There is no cobblestoning suggestive of cellulitis in the evaluated area. There is a fluid collection measuring 2 X 3 cm to suggest abscess. No foreign body identified    IMPRESSION: Abscess                   Critical Care time:  none  Patient was given IV morphine for pain and bedside ultrasound was used to evaluate this area further.  [facility] Procedure Note        Sedation:      Performed by: Dina Kumar MD  Authorized by: Dina Kumar MD    Pre-Procedure Assessment done at 2000.    Sedation Level:  Deep Sedation    Indication:  Sedation is required to allow for Incision and drainage of abcess    Consent obtained from parent(s) after discussing the risks, benefits and alternatives. (Verbal consent)    PO Intake:  Appropriately NPO for procedure    ASA Class:  Class 1 - HEALTHY PATIENT    Mallampati:  Grade 2:  Soft palate, base of uvula, tonsillar pillars, and portion of posterior pharyngeal wall visible    Lungs: Lungs Clear with good breath sounds bilaterally.     Heart: Normal heart sounds and rate    History and physical reviewed and no updates needed. I have reviewed the lab findings, diagnostic data, medications, and the plan for sedation. I have determined this patient to be an appropriate candidate for the planned sedation and procedure and have reassessed the patient IMMEDIATELY PRIOR to sedation and procedure.      Sedation Post Procedure Summary:    Prior to the start of the procedure and with  procedural staff participation, I verbally confirmed the patient s identity using two indicators, relevant allergies, that the procedure was appropriate and matched the consent or emergent situation, and that the correct equipment/implants were available. Immediately prior to starting the procedure I conducted the Time Out with the procedural staff and re-confirmed the patient s name, procedure, and site/side. (The Joint Commission universal protocol was followed.)  Yes      Sedatives: Propofol and Morphine    Vital signs, airway, End Tidal CO2 and pulse oximetry were monitored and remained stable throughout the procedure and sedation was maintained until the procedure was complete.  The patient was monitored by staff until sedation discharge criteria were met.    Patient tolerance: Patient tolerated the procedure well with no immediate complications.    Time of sedation in minutes:  20 minutes from beginning to end of physician one to one monitoring.    Patient was monitored for over an hour after the procedure was done.  After she was awake and alert, she reported significant pain relief after the incision and drainage.  Patient is familiar with the process of leaving a wick in the wound to encourage continued drainage.  She will remove the wick herself in 2 to 3 days.      Labs Ordered and Resulted from Time of ED Arrival Up to the Time of Departure from the ED - No data to display         Assessments & Plan (with Medical Decision Making)   Patient presents with pain along the right buttock close to the gluteal cleft.  The exam and ultrasound findings are consistent with abscess.  Although I do not see cobblestoning on the ultrasound, the physical exam is suggestive of surrounding cellulitis with significant tenderness surrounding the abscess and induration of the skin.  She was given a prescription for doxycycline for the cellulitis as well as prescription for oxycodone for the pain with 10 tablets no refills.   Patient was advised to follow with her primary care provider for wound check and for continued care.    I have reviewed the nursing notes.    I have reviewed the findings, diagnosis, plan and need for follow up with the patient.    Discharge Medication List as of 11/9/2019 10:14 PM      START taking these medications    Details   doxycycline hyclate (VIBRAMYCIN) 100 MG capsule Take 1 capsule (100 mg) by mouth 2 times daily for 10 days, Disp-20 capsule, R-0, Local Print      oxyCODONE (ROXICODONE) 5 MG tablet Take 1 tablet (5 mg) by mouth every 6 hours as needed for pain, Disp-10 tablet, R-0, Local Print             Final diagnoses:   Abscess of right buttock     I, Roberta Camacho, am serving as a trained medical scribe to document services personally performed by Dina Kumar MD, based on the provider's statements to me.   IDina MD, was physically present and have reviewed and verified the accuracy of this note documented by Roberta Camacho.    11/9/2019   [facility], [place], EMERGENCY DEPARTMENT     Dina Kumar MD  11/09/19 [id number]

## 2019-11-09 NOTE — ED AVS SNAPSHOT
[facility], [place], Emergency Department  [address]  [facility] [address]  Phone:  [phone]  Fax:  [phone]                                    Chhaya Mock   MRN: [id number]    Department:  [facility], Emergency Department   Date of Visit:  11/9/2019           After Visit Summary Signature Page    I have received my discharge instructions, and my questions have been answered. I have discussed any challenges I see with this plan with the nurse or doctor.    ..........................................................................................................................................  Patient/Patient Representative Signature      ..........................................................................................................................................  Patient Representative Print Name and Relationship to Patient    ..................................................               ................................................  Date                                   Time    ..........................................................................................................................................  Reviewed by Signature/Title    ...................................................              ..............................................  Date                                               Time          22EPIC Rev 08/18

## 2019-11-10 NOTE — ED NOTES
Time-out completed by MD and staff RN's (Micki STEWART and Vera VILLARREAL). MD stated verbal consent was given for sedation and procedure.

## 2019-11-25 ENCOUNTER — HOSPITAL ENCOUNTER (INPATIENT)
Facility: CLINIC | Age: 29
LOS: 2 days | Discharge: HOME | DRG: 607 | End: 2019-11-27
Attending: EMERGENCY MEDICINE | Admitting: SURGERY

## 2019-11-25 ENCOUNTER — APPOINTMENT (OUTPATIENT)
Dept: CT IMAGING | Facility: CLINIC | Age: 29
DRG: 607 | End: 2019-11-25

## 2019-11-25 DIAGNOSIS — L02.31 ABSCESS OF BUTTOCK, RIGHT: ICD-10-CM

## 2019-11-25 DIAGNOSIS — L73.2 HIDRADENITIS SUPPURATIVA: Primary | ICD-10-CM

## 2019-11-25 PROBLEM — L02.91 ABSCESS: Status: ACTIVE | Noted: 2019-11-25

## 2019-11-25 LAB
ALBUMIN SERPL-MCNC: 3.5 G/DL (ref 3.4–5)
ALP SERPL-CCNC: 52 U/L (ref 40–150)
ALT SERPL W P-5'-P-CCNC: 17 U/L (ref 0–50)
ANION GAP SERPL CALCULATED.3IONS-SCNC: 2 MMOL/L (ref 3–14)
AST SERPL W P-5'-P-CCNC: 11 U/L (ref 0–45)
BASOPHILS # BLD AUTO: 0 10E9/L (ref 0–0.2)
BASOPHILS NFR BLD AUTO: 0.2 %
BILIRUB SERPL-MCNC: 0.8 MG/DL (ref 0.2–1.3)
BUN SERPL-MCNC: 9 MG/DL (ref 7–30)
CALCIUM SERPL-MCNC: 8.3 MG/DL (ref 8.5–10.1)
CHLORIDE SERPL-SCNC: 108 MMOL/L (ref 94–109)
CO2 SERPL-SCNC: 27 MMOL/L (ref 20–32)
CREAT SERPL-MCNC: 0.77 MG/DL (ref 0.52–1.04)
DIFFERENTIAL METHOD BLD: ABNORMAL
EOSINOPHIL # BLD AUTO: 0.1 10E9/L (ref 0–0.7)
EOSINOPHIL NFR BLD AUTO: 0.6 %
ERYTHROCYTE [DISTWIDTH] IN BLOOD BY AUTOMATED COUNT: 13.2 % (ref 10–15)
GFR SERPL CREATININE-BSD FRML MDRD: >90 ML/MIN/{1.73_M2}
GLUCOSE SERPL-MCNC: 92 MG/DL (ref 70–99)
HCG UR QL: NEGATIVE
HCT VFR BLD AUTO: 38.9 % (ref 35–47)
HGB BLD-MCNC: 12.2 G/DL (ref 11.7–15.7)
IMM GRANULOCYTES # BLD: 0 10E9/L (ref 0–0.4)
IMM GRANULOCYTES NFR BLD: 0.2 %
INTERNAL QC OK POCT: YES
LYMPHOCYTES # BLD AUTO: 1.5 10E9/L (ref 0.8–5.3)
LYMPHOCYTES NFR BLD AUTO: 11.6 %
MCH RBC QN AUTO: 29.5 PG (ref 26.5–33)
MCHC RBC AUTO-ENTMCNC: 31.4 G/DL (ref 31.5–36.5)
MCV RBC AUTO: 94 FL (ref 78–100)
MONOCYTES # BLD AUTO: 1 10E9/L (ref 0–1.3)
MONOCYTES NFR BLD AUTO: 7.6 %
NEUTROPHILS # BLD AUTO: 10.1 10E9/L (ref 1.6–8.3)
NEUTROPHILS NFR BLD AUTO: 79.8 %
NRBC # BLD AUTO: 0 10*3/UL
NRBC BLD AUTO-RTO: 0 /100
PLATELET # BLD AUTO: 222 10E9/L (ref 150–450)
POTASSIUM SERPL-SCNC: 4.3 MMOL/L (ref 3.4–5.3)
PROT SERPL-MCNC: 7.4 G/DL (ref 6.8–8.8)
RBC # BLD AUTO: 4.14 10E12/L (ref 3.8–5.2)
SODIUM SERPL-SCNC: 137 MMOL/L (ref 133–144)
WBC # BLD AUTO: 12.7 10E9/L (ref 4–11)

## 2019-11-25 PROCEDURE — 25000128 H RX IP 250 OP 636: Performed by: STUDENT IN AN ORGANIZED HEALTH CARE EDUCATION/TRAINING PROGRAM

## 2019-11-25 PROCEDURE — 96366 THER/PROPH/DIAG IV INF ADDON: CPT | Performed by: EMERGENCY MEDICINE

## 2019-11-25 PROCEDURE — 99285 EMERGENCY DEPT VISIT HI MDM: CPT | Mod: 25 | Performed by: EMERGENCY MEDICINE

## 2019-11-25 PROCEDURE — 25000125 ZZHC RX 250: Performed by: EMERGENCY MEDICINE

## 2019-11-25 PROCEDURE — 12000001 ZZH R&B MED SURG/OB UMMC

## 2019-11-25 PROCEDURE — 96361 HYDRATE IV INFUSION ADD-ON: CPT | Performed by: EMERGENCY MEDICINE

## 2019-11-25 PROCEDURE — 25000132 ZZH RX MED GY IP 250 OP 250 PS 637: Performed by: STUDENT IN AN ORGANIZED HEALTH CARE EDUCATION/TRAINING PROGRAM

## 2019-11-25 PROCEDURE — 85025 COMPLETE CBC W/AUTO DIFF WBC: CPT | Performed by: STUDENT IN AN ORGANIZED HEALTH CARE EDUCATION/TRAINING PROGRAM

## 2019-11-25 PROCEDURE — 72193 CT PELVIS W/DYE: CPT

## 2019-11-25 PROCEDURE — 76882 US LMTD JT/FCL EVL NVASC XTR: CPT | Mod: RT | Performed by: EMERGENCY MEDICINE

## 2019-11-25 PROCEDURE — 25000125 ZZHC RX 250: Performed by: STUDENT IN AN ORGANIZED HEALTH CARE EDUCATION/TRAINING PROGRAM

## 2019-11-25 PROCEDURE — 80053 COMPREHEN METABOLIC PANEL: CPT | Performed by: STUDENT IN AN ORGANIZED HEALTH CARE EDUCATION/TRAINING PROGRAM

## 2019-11-25 PROCEDURE — 25800030 ZZH RX IP 258 OP 636: Performed by: STUDENT IN AN ORGANIZED HEALTH CARE EDUCATION/TRAINING PROGRAM

## 2019-11-25 PROCEDURE — 81025 URINE PREGNANCY TEST: CPT | Performed by: EMERGENCY MEDICINE

## 2019-11-25 PROCEDURE — 25800030 ZZH RX IP 258 OP 636: Performed by: EMERGENCY MEDICINE

## 2019-11-25 PROCEDURE — 96367 TX/PROPH/DG ADDL SEQ IV INF: CPT | Performed by: EMERGENCY MEDICINE

## 2019-11-25 PROCEDURE — 96376 TX/PRO/DX INJ SAME DRUG ADON: CPT | Performed by: EMERGENCY MEDICINE

## 2019-11-25 PROCEDURE — 96375 TX/PRO/DX INJ NEW DRUG ADDON: CPT | Performed by: EMERGENCY MEDICINE

## 2019-11-25 PROCEDURE — 96365 THER/PROPH/DIAG IV INF INIT: CPT | Mod: 59 | Performed by: EMERGENCY MEDICINE

## 2019-11-25 PROCEDURE — 25000128 H RX IP 250 OP 636: Performed by: EMERGENCY MEDICINE

## 2019-11-25 RX ORDER — ONDANSETRON 4 MG/1
4 TABLET, ORALLY DISINTEGRATING ORAL EVERY 6 HOURS PRN
Status: DISCONTINUED | OUTPATIENT
Start: 2019-11-25 | End: 2019-11-27 | Stop reason: HOSPADM

## 2019-11-25 RX ORDER — VANCOMYCIN HYDROCHLORIDE 1 G/200ML
1000 INJECTION, SOLUTION INTRAVENOUS EVERY 8 HOURS
Status: DISCONTINUED | OUTPATIENT
Start: 2019-11-25 | End: 2019-11-25

## 2019-11-25 RX ORDER — PIPERACILLIN SODIUM, TAZOBACTAM SODIUM 4; .5 G/20ML; G/20ML
4.5 INJECTION, POWDER, LYOPHILIZED, FOR SOLUTION INTRAVENOUS ONCE
Status: COMPLETED | OUTPATIENT
Start: 2019-11-25 | End: 2019-11-25

## 2019-11-25 RX ORDER — SODIUM CHLORIDE, SODIUM LACTATE, POTASSIUM CHLORIDE, CALCIUM CHLORIDE 600; 310; 30; 20 MG/100ML; MG/100ML; MG/100ML; MG/100ML
1000 INJECTION, SOLUTION INTRAVENOUS CONTINUOUS
Status: DISCONTINUED | OUTPATIENT
Start: 2019-11-25 | End: 2019-11-27 | Stop reason: HOSPADM

## 2019-11-25 RX ORDER — SODIUM CHLORIDE 9 MG/ML
INJECTION, SOLUTION INTRAVENOUS CONTINUOUS
Status: DISCONTINUED | OUTPATIENT
Start: 2019-11-25 | End: 2019-11-26

## 2019-11-25 RX ORDER — MORPHINE SULFATE 4 MG/ML
4 INJECTION, SOLUTION INTRAMUSCULAR; INTRAVENOUS ONCE
Status: COMPLETED | OUTPATIENT
Start: 2019-11-25 | End: 2019-11-25

## 2019-11-25 RX ORDER — IOPAMIDOL 755 MG/ML
99 INJECTION, SOLUTION INTRAVASCULAR ONCE
Status: COMPLETED | OUTPATIENT
Start: 2019-11-25 | End: 2019-11-25

## 2019-11-25 RX ORDER — NALOXONE HYDROCHLORIDE 0.4 MG/ML
.1-.4 INJECTION, SOLUTION INTRAMUSCULAR; INTRAVENOUS; SUBCUTANEOUS
Status: DISCONTINUED | OUTPATIENT
Start: 2019-11-25 | End: 2019-11-27 | Stop reason: HOSPADM

## 2019-11-25 RX ORDER — CLINDAMYCIN PHOSPHATE 900 MG/50ML
900 INJECTION, SOLUTION INTRAVENOUS EVERY 8 HOURS
Status: DISCONTINUED | OUTPATIENT
Start: 2019-11-25 | End: 2019-11-25

## 2019-11-25 RX ORDER — ACETAMINOPHEN 325 MG/1
975 TABLET ORAL 3 TIMES DAILY
Status: DISCONTINUED | OUTPATIENT
Start: 2019-11-25 | End: 2019-11-27 | Stop reason: HOSPADM

## 2019-11-25 RX ORDER — MORPHINE SULFATE 4 MG/ML
4 INJECTION, SOLUTION INTRAMUSCULAR; INTRAVENOUS
Status: COMPLETED | OUTPATIENT
Start: 2019-11-25 | End: 2019-11-25

## 2019-11-25 RX ORDER — HYDROMORPHONE HYDROCHLORIDE 1 MG/ML
0.5 INJECTION, SOLUTION INTRAMUSCULAR; INTRAVENOUS; SUBCUTANEOUS
Status: COMPLETED | OUTPATIENT
Start: 2019-11-25 | End: 2019-11-25

## 2019-11-25 RX ORDER — ONDANSETRON 2 MG/ML
4 INJECTION INTRAMUSCULAR; INTRAVENOUS EVERY 6 HOURS PRN
Status: DISCONTINUED | OUTPATIENT
Start: 2019-11-25 | End: 2019-11-27 | Stop reason: HOSPADM

## 2019-11-25 RX ORDER — OXYCODONE HYDROCHLORIDE 5 MG/1
5-10 TABLET ORAL
Status: DISCONTINUED | OUTPATIENT
Start: 2019-11-25 | End: 2019-11-27 | Stop reason: HOSPADM

## 2019-11-25 RX ORDER — CLINDAMYCIN PHOSPHATE 900 MG/50ML
900 INJECTION, SOLUTION INTRAVENOUS EVERY 8 HOURS
Status: DISCONTINUED | OUTPATIENT
Start: 2019-11-25 | End: 2019-11-27 | Stop reason: HOSPADM

## 2019-11-25 RX ORDER — AMOXICILLIN 250 MG
1-2 CAPSULE ORAL 2 TIMES DAILY
Status: DISCONTINUED | OUTPATIENT
Start: 2019-11-25 | End: 2019-11-27 | Stop reason: HOSPADM

## 2019-11-25 RX ORDER — POLYETHYLENE GLYCOL 3350 17 G/17G
17 POWDER, FOR SOLUTION ORAL DAILY PRN
Status: DISCONTINUED | OUTPATIENT
Start: 2019-11-25 | End: 2019-11-27 | Stop reason: HOSPADM

## 2019-11-25 RX ORDER — VANCOMYCIN HYDROCHLORIDE 1 G/200ML
1000 INJECTION, SOLUTION INTRAVENOUS EVERY 8 HOURS
Status: DISCONTINUED | OUTPATIENT
Start: 2019-11-26 | End: 2019-11-27 | Stop reason: HOSPADM

## 2019-11-25 RX ORDER — PIPERACILLIN SODIUM, TAZOBACTAM SODIUM 3; .375 G/15ML; G/15ML
3.38 INJECTION, POWDER, LYOPHILIZED, FOR SOLUTION INTRAVENOUS EVERY 6 HOURS
Status: DISCONTINUED | OUTPATIENT
Start: 2019-11-25 | End: 2019-11-27 | Stop reason: HOSPADM

## 2019-11-25 RX ADMIN — CLINDAMYCIN PHOSPHATE 900 MG: 900 INJECTION, SOLUTION INTRAVENOUS at 15:09

## 2019-11-25 RX ADMIN — ONDANSETRON 4 MG: 2 INJECTION INTRAMUSCULAR; INTRAVENOUS at 19:17

## 2019-11-25 RX ADMIN — HYDROMORPHONE HYDROCHLORIDE 0.5 MG: 1 INJECTION, SOLUTION INTRAMUSCULAR; INTRAVENOUS; SUBCUTANEOUS at 11:05

## 2019-11-25 RX ADMIN — IOPAMIDOL 99 ML: 755 INJECTION, SOLUTION INTRAVENOUS at 16:39

## 2019-11-25 RX ADMIN — OXYCODONE HYDROCHLORIDE 5 MG: 5 TABLET ORAL at 21:46

## 2019-11-25 RX ADMIN — MORPHINE SULFATE 4 MG: 4 INJECTION INTRAVENOUS at 16:18

## 2019-11-25 RX ADMIN — CLINDAMYCIN PHOSPHATE 900 MG: 900 INJECTION, SOLUTION INTRAVENOUS at 23:06

## 2019-11-25 RX ADMIN — PIPERACILLIN AND TAZOBACTAM 4.5 G: 4; .5 INJECTION, POWDER, LYOPHILIZED, FOR SOLUTION INTRAVENOUS; PARENTERAL at 14:28

## 2019-11-25 RX ADMIN — PIPERACILLIN SODIUM AND TAZOBACTAM SODIUM 3.38 G: 3; .375 INJECTION, POWDER, LYOPHILIZED, FOR SOLUTION INTRAVENOUS at 19:46

## 2019-11-25 RX ADMIN — SODIUM CHLORIDE: 9 INJECTION, SOLUTION INTRAVENOUS at 11:10

## 2019-11-25 RX ADMIN — MORPHINE SULFATE 4 MG: 4 INJECTION INTRAVENOUS at 14:19

## 2019-11-25 RX ADMIN — HYDROMORPHONE HYDROCHLORIDE 0.5 MG: 1 INJECTION, SOLUTION INTRAMUSCULAR; INTRAVENOUS; SUBCUTANEOUS at 19:12

## 2019-11-25 RX ADMIN — HYDROMORPHONE HYDROCHLORIDE 0.5 MG: 1 INJECTION, SOLUTION INTRAMUSCULAR; INTRAVENOUS; SUBCUTANEOUS at 20:06

## 2019-11-25 RX ADMIN — VANCOMYCIN HYDROCHLORIDE 1500 MG: 10 INJECTION, POWDER, LYOPHILIZED, FOR SOLUTION INTRAVENOUS at 16:23

## 2019-11-25 RX ADMIN — SODIUM CHLORIDE, POTASSIUM CHLORIDE, SODIUM LACTATE AND CALCIUM CHLORIDE 1000 ML: 600; 310; 30; 20 INJECTION, SOLUTION INTRAVENOUS at 19:42

## 2019-11-25 RX ADMIN — OXYCODONE HYDROCHLORIDE 5 MG: 5 TABLET ORAL at 22:28

## 2019-11-25 ASSESSMENT — ENCOUNTER SYMPTOMS
FEVER: 0
NUMBNESS: 1
ANAL BLEEDING: 0
COUGH: 0
TROUBLE SWALLOWING: 0
HEMATURIA: 0
SORE THROAT: 0
DIARRHEA: 0
ABDOMINAL PAIN: 0
NAUSEA: 0
WEAKNESS: 0
DIZZINESS: 0
VOMITING: 0
SHORTNESS OF BREATH: 0
BACK PAIN: 1
PALPITATIONS: 0
ADENOPATHY: 0
UNEXPECTED WEIGHT CHANGE: 0
CONSTIPATION: 0
CHILLS: 0
HEADACHES: 0
DIAPHORESIS: 0
RECTAL PAIN: 1
DIFFICULTY URINATING: 0
NECK STIFFNESS: 0
ABDOMINAL DISTENTION: 0
DYSPHORIC MOOD: 1
RHINORRHEA: 0
FATIGUE: 0
NECK PAIN: 0
BLOOD IN STOOL: 0

## 2019-11-25 ASSESSMENT — ACTIVITIES OF DAILY LIVING (ADL)
FALL_HISTORY_WITHIN_LAST_SIX_MONTHS: NO
SWALLOWING: 0-->SWALLOWS FOODS/LIQUIDS WITHOUT DIFFICULTY
RETIRED_COMMUNICATION: 0-->UNDERSTANDS/COMMUNICATES WITHOUT DIFFICULTY
AMBULATION: 0-->INDEPENDENT
DRESS: 0-->INDEPENDENT
BATHING: 0-->INDEPENDENT
TOILETING: 0-->INDEPENDENT
COGNITION: 0 - NO COGNITION ISSUES REPORTED
TRANSFERRING: 0-->INDEPENDENT
RETIRED_EATING: 0-->INDEPENDENT
ADLS_ACUITY_SCORE: 12

## 2019-11-25 NOTE — ED NOTES
Bed: ED32  Expected date:   Expected time:   Means of arrival:   Comments:  [facility] transfer from [place]

## 2019-11-25 NOTE — ED PROVIDER NOTES
History     Chief Complaint   Patient presents with     Wound Infection     Boil, on Gracilas muscle, started 2 weeks ago. pain, discomfort, hard on touch, pt was at the ED two weeks ago, the site was drained, was prescriped abx, but did not get better     The history is provided by the patient and medical records.     Chhaya Mock is a 28 year old female with history of chronic recurrent hidradenitis suppurativa, tobacco use disorder and vaginal candidiasis presents with 1 week history of worsening right perianal pain and tenderness status post incision and drainage of right buttock abscess on 11/9/2019.     Patient reports after drainage of her buttocks abscess in the ED on 11/9/2019 patient was adherent to her antibiotics and remove packing as directed.  Patient notes that her symptoms of pain initially improved after removing the packing then gradually worsened with additional tenderness in the area superior to her prior abscess.  Patient states the pain and tenderness has progressively gotten worse to the point where she can no longer walk or go to work.  Patient reports severe burning pain at rest that is worsened with movement.  This morning patient withheld bowel movement due to pain while bearing down.  Patient reports a history of recurrent abscesses since age 12 and a ongoing chronic right axillary swelling and drainage. Patient states she has been seen by a dermatologist that had diagnosed her with hidradenitis suppurativa but no specific treatment was recommended and she takes no current medications..    I have reviewed the Medications, Allergies, Past Medical and Surgical History, and Social History in the [employer] system.    Past Medical History:   Diagnosis Date     Carbuncle and furuncle      Tobacco use disorder     Smokes 1/2 ppd. Started smoking at 14 years     Vaginal yeast infection 06/02/2017       Past Surgical History:   Procedure Laterality Date     C ORAL SURGERY PROCEDURE   10/23/2008    [place] Teeth Extraction       Family History   Problem Relation Age of Onset     Hypertension Maternal Grandmother      Diabetes Maternal Uncle      Hypertension Maternal Uncle      Alcohol/Drug Maternal Aunt      Alcohol/Drug Maternal Uncle      Anesthesia Reaction Other        Social History     Tobacco Use     Smoking status: Current Every Day Smoker     Packs/day: 0.50     Years: 2.00     Pack years: 1.00     Types: Cigarettes     Smokeless tobacco: Current User   Substance Use Topics     Alcohol use: No     Comment: occassionally         Review of Systems   Constitutional: Negative for chills, diaphoresis, fatigue, fever and unexpected weight change.   HENT: Negative for ear pain, rhinorrhea, sore throat and trouble swallowing.    Eyes: Negative for visual disturbance.   Respiratory: Negative for cough and shortness of breath.    Cardiovascular: Negative for chest pain and palpitations.   Gastrointestinal: Positive for rectal pain. Negative for abdominal distention, abdominal pain, anal bleeding, blood in stool, constipation, diarrhea, nausea and vomiting.   Endocrine: Positive for cold intolerance.   Genitourinary: Positive for vaginal discharge. Negative for difficulty urinating, hematuria and pelvic pain.   Musculoskeletal: Positive for back pain. Negative for neck pain and neck stiffness.   Skin: Negative for rash.   Neurological: Positive for numbness. Negative for dizziness, weakness and headaches.   Hematological: Negative for adenopathy.   Psychiatric/Behavioral: Positive for dysphoric mood.       Physical Exam   BP: 99/61  Pulse: 99  Temp: 98.5  F (36.9  C)  Resp: 16  Weight: 72.6 kg (160 lb)  SpO2: 99 %      Physical Exam  Vitals signs and nursing note reviewed. Exam conducted with a chaperone present.   Constitutional:       General: She is in acute distress.      Appearance: Normal appearance. She is normal weight. She is not diaphoretic.   HENT:      Head: Normocephalic and  atraumatic.      Right Ear: External ear normal.      Left Ear: External ear normal.      Nose: No rhinorrhea.      Mouth/Throat:      Mouth: Mucous membranes are moist.      Pharynx: Oropharynx is clear. No oropharyngeal exudate or posterior oropharyngeal erythema.   Eyes:      Extraocular Movements: Extraocular movements intact.      Conjunctiva/sclera: Conjunctivae normal.      Pupils: Pupils are equal, round, and reactive to light.   Neck:      Musculoskeletal: Normal range of motion and neck supple. No neck rigidity or muscular tenderness.   Cardiovascular:      Rate and Rhythm: Normal rate and regular rhythm.      Pulses: Normal pulses.      Heart sounds: Normal heart sounds. No murmur.   Pulmonary:      Effort: Pulmonary effort is normal. No respiratory distress.      Breath sounds: Normal breath sounds. No wheezing or rales.   Abdominal:      General: Bowel sounds are normal. There is no distension.      Palpations: Abdomen is soft. There is no mass.      Tenderness: There is no abdominal tenderness. There is no guarding or rebound.   Genitourinary:     Exam position: Knee-chest position.      Rectum: Normal.          Comments: Approximately 20 cm linear area of warmth, tenderness and induration extending from the sacrum to the perineum.  No visible erythema or discharge present.  No visible external hemorrhoids, anal fissures or lesions.  Musculoskeletal: Normal range of motion.         General: No swelling or tenderness.      Right lower leg: No edema.      Left lower leg: No edema.   Lymphadenopathy:      Cervical: No cervical adenopathy.   Skin:     General: Skin is warm and dry.   Neurological:      General: No focal deficit present.      Mental Status: She is alert and oriented to person, place, and time.      Cranial Nerves: No cranial nerve deficit.      Motor: No weakness.   Psychiatric:         Behavior: Behavior normal.         ED Course        Procedures    Results for orders placed during the  hospital encounter of 11/25/19   POC US SOFT TISSUE    Impression Limited Soft Tissue Ultrasound, performed and interpreted by me.    Indication:  warmth pain. Evaluate for cellulitis vs abscess.     Body location: buttock    Findings:  There is cobblestoning suggestive of cellulitis in the evaluated area. There are 3 separate fluid collections measuring 1-2 cm to suggest abscess. No foreign body identified    IMPRESSION: Abscess X 3 and Cellulitis                  Critical Care time:  none  Labs Ordered and Resulted from Time of ED Arrival Up to the Time of Departure from the ED   CBC WITH PLATELETS DIFFERENTIAL - Abnormal; Notable for the following components:       Result Value    WBC 12.7 (*)     MCHC 31.4 (*)     Absolute Neutrophil 10.1 (*)     All other components within normal limits   COMPREHENSIVE METABOLIC PANEL            Assessments & Plan (with Medical Decision Making)   Chhaya Mock is a 28 year old female with history of chronic recurrent hidradenitis suppurativa, tobacco use disorder and vaginal candidiasis presents with 1 week history of worsening right perianal pain and tenderness status post incision and drainage of right buttock abscess on 11/9/2019.    Differential diagnoses considered include hidradenitis suppurativa, cellulitis, pilonidal disease, hemorrhoids, and perianal abscess.    Patient's history is notable for hidradenitis suppurativa, recent incision and drainage of a right buttock abscess, and worsening right buttock pain.  Patient denies any history of fever, chills, night sweats, nausea, vomiting, or hematochezia.  Patient's examination is notable for tearful affect, a approximately 20 cm linear area of warmth, tenderness and induration extending from sacrum to perineum.  Point of care ultrasound evaluation was notable for diffuse cobblestoning throughout the right buttocks proximal to the gluteal cleft and 3 separate 1-2 cm pockets of heterogeneous fluid collections.      The patient's presentation is consistent with right buttock abscess and cellulitis given her exam findings of tender interest and induration and ultrasound and exam findings of 3 separate fluid collections and surrounding cobblestoning in the right buttocks proximal to the gluteal cleft.  The patient was administered IV hydrocodone for pain management and a general surgery consult was placed given the complex nature of the abscess and prior complications during prior anesthesia.  Patient was accepted by the [facility] ED for transfer and evaluation by general surgery.    I have reviewed the nursing notes.    I have reviewed the findings, diagnosis, plan and need for follow up with the patient.    New Prescriptions    No medications on file       Final diagnoses:   Abscess of buttock, right   This patient was seen and discussed with my attending physician.  Sathay Emanuel MD  PGY-1 Psychiatry Resident Physician    11/25/2019   [facility], [place], EMERGENCY DEPARTMENT     Sathya Emanuel MD  Resident  11/25/19 1104    Attending addendum:  This data collected with the Resident working in the Emergency Department.  Patient was seen and evaluated by myself and I repeated the history and physical exam with the patient.  The plan of care was discussed with them.  The key portions of the note including the entire assessment and plan reflect my documentation.    Pain is along right medial buttock. She denies fever or other concerns today.  Exam:  Agree with exam above.  She appears tearful and is pain.  No acute respiratory distress.  Exam was done with the resident.  The right buttock has induration and tenderness along the medial aspect of the buttock involving much of the buttock, a stretch of about 20 cm. There are 3 separate fluid collections identified on ultrasound.  The previous incision and drainage site is closed and there is no drainage currently.     Given that she had very recent I & D with  sedation and now has increased area of induration and pain with recurrent abscesses, will send to [facility] ED for surgery consult. During the last I & D, patient was sedated with propofol and I & D was completed successfully, however patient was still having pain and withdrew when I attempted to inject lidocaine and still withdrew again when the incision was made despite good sedation.  May benefit from more extensive I & D in the OR. Will send to [facility] for gen surgery consult.         Dina Kumar MD  11/25/19 [id number]

## 2019-11-25 NOTE — LETTER
[address] [facility]  [address] [address]  [phone]    2019    Re: Chhaya Mock  [address]  [place] [address]  [phone] (home)     : 1990      To Whom It May Concern:      Chhaya Mock was hospitalized from 2019 until 2019.  She may return to work as early as 19 with no restrictions.        Sincerely,        Kathleen Dinh, DO

## 2019-11-25 NOTE — LETTER
[facility], [place], EMERGENCY DEPARTMENT  [address]  [facility] [place] [address]  [phone]      2019    Chhaya Mock  [address]  [place] [address]  [phone] (home)     : 1990    Attention: Marii Hanley    To Whom it may concern:    Chhaya Mock was seen in our Emergency Department today, 2019 for urgent medical treatment.    She will not be able to work until she receives additional evaluation and care. Her work limitation will be updated after she received further evaluation in the hospital     Sincerely,    Sathya Emanuel MD

## 2019-11-25 NOTE — DISCHARGE INSTRUCTIONS
You will receive follow up evaluation after transfer to the [facility] Emergency Department by General Surgery.     Thank you for choosing [facility].     Please closely monitor for further symptoms. Return to the Emergency Department if you develop any new or worsening signs or symptoms.    If you received any opiate pain medications or sedatives during your visit, please do not drive for at least 8 hours.     Labs, cultures or final xray interpretations may still need to be reviewed.  We will call you if your plan of care needs to be changed.    Please follow up with your primary care physician or clinic.

## 2019-11-25 NOTE — PHARMACY-VANCOMYCIN DOSING SERVICE
Pharmacy Vancomycin Initial Note  Date of Service 2019  Patient's  1990  28 year old, female    Indication: Skin and Soft Tissue Infection    Current estimated CrCl = Estimated Creatinine Clearance: 103.9 mL/min (based on SCr of 0.77 mg/dL).    Creatinine for last 3 days  2019: 10:55 AM Creatinine 0.77 mg/dL    Recent Vancomycin Level(s) for last 3 days  No results found for requested labs within last 72 hours.      Vancomycin IV Administrations (past 72 hours)      No vancomycin orders with administrations in past 72 hours.                Nephrotoxins and other renal medications (From now, onward)    Start     Dose/Rate Route Frequency Ordered Stop    19 1500  vancomycin 1500 mg in 0.9% NaCl 250 ml intermittent infusion 1,500 mg      1,500 mg  over 90 Minutes Intravenous ONCE 19 1419      19 1412  piperacillin-tazobactam (ZOSYN) 4.5 g vial to attach to  mL bag      4.5 g  over 30 Minutes Intravenous ONCE 19 1411            Contrast Orders - past 72 hours (72h ago, onward)    None                Plan:  1.  Start vancomycin  37329 mg IV x1 (25 mg/kg), followed by 1000 mg IV q8h (14 mg/kg).   2.  Goal Trough Level: 10-15 mg/L   3.  Pharmacy will check trough levels as appropriate in 1-3 Days.    4. Serum creatinine levels will be ordered daily for the first week of therapy and at least twice weekly for subsequent weeks.    5. [place] method utilized to dose vancomycin therapy: Method 2    Deion Hagan, Pharm.D.

## 2019-11-25 NOTE — H&P
[facility] Surgery H&P    Chhaya Mock MRN# [id number]   Age: 28 year old YOB: 1990     Date of Admission:  11/25/2019    Reason for admission: Right buttock abscess               Assessment and Plan:   Assessment:   Chhaya Mock is a 28 year old female with a history of chronic recurrent hidradenitis suppurativa who presents with 10 days of worsening right buttock pain and tenderness. She is status post incision and drainage of right buttock abscess on 11/9/2019 by [place] ED. Present symptoms and location of pain suggest a recurrence of the right buttock abscess.       Plan:   - Admission to general surgery service   - Broad spectrum IV abx  - CT scan ordered to further evaluate extent of disease  - NPO for now  - May require bedside I&D vs OR vs only Abx, depending on scan    Patient discussed with staff, Dr. Galeas.    Noa Augustin MS3    I saw and evaluated this patient with our medical student, Noa Augustin, on 11/25/19. I agree with the note and above plan.    Mindy Hernandez MD (PGY-2)  General Surgery            Chief Complaint:   Right buttock pain         History of Present Illness:   Chhaya Mock is a 28 year old female with a history of chronic recurrent hidradenitis suppurativa, who presents to the Emergency Department for evaluation of a painful right buttock lesion. The patient presented to the ED on 11/19/19 for a similar concern and underwent a I&D of a right buttock abscess at that time. She was discharged with doxycycline and oxycodone. The patient states that after the procedure the wound began to heal and her pain improved. However, about 10 days ago she started to have some increased discomfort in the area. She felt a firm, painful nodule and thought the abscess maybe returning so she tried various home remedies such as hot packs, washing the painful area with warm water, and witch hazel. However, her symptoms worsened  and she was having significant pain at work this week. Pain is localized to the right buttock over the nodule and sometimes radiates into her low back.  Denies redness or warmth in the area. She has not noticed any drainage. Denies fevers and chills. No concerns with urination, reports daily soft bowel movements. She reports that her last oral intake was approximately 2300.     The patient describes lesions related to her hidradenitis suppurativa in her axilla, on her anterior chest wall, and about her inner thighs. She has never required surgical intervention in an perating room. The patient reports that both her mother and her grandmother struggled with similar symptoms.         Past Medical History:     Past Medical History:   Diagnosis Date     Carbuncle and furuncle      Tobacco use disorder     Smokes 1/2 ppd. Started smoking at 14 years     Vaginal yeast infection 06/02/2017             Past Surgical History:     Past Surgical History:   Procedure Laterality Date     C ORAL SURGERY PROCEDURE  10/23/2008    [place] Teeth Extraction             Social History:     Tobacco: 1/2 ppd cigarettes x 10 years  EtOH: none  Occasional marijuana use  Lives in [place] alone, but has family in the area          Family History:     Family History   Problem Relation Age of Onset     Hypertension Maternal Grandmother      Diabetes Maternal Uncle      Hypertension Maternal Uncle      Alcohol/Drug Maternal Aunt      Alcohol/Drug Maternal Uncle      Anesthesia Reaction Other       Mother and maternal grandmother with hidradenitis          Allergies:     Allergies   Allergen Reactions     No Known Allergies              Medications:     None  Recently finished 10-day course doxycycline given on 11/9/19.          Review of Systems:   Negative except what is stated above in HPI.          Physical Exam:   All vitals have been reviewed.  Temp:  [98.5  F (36.9  C)-98.7  F (37.1  C)] 98.7  F (37.1  C)  Pulse:  [99] 99  Heart  Rate:  [75] 75  Resp:  [16-18] 18  BP: ()/(61-72) 113/72  SpO2:  [99 %] 99 %  No intake or output data in the 24 hours ending 11/25/19 1331  Physical Exam:  General: Patient lying in bed, appears uncomfortable when lying supine. She is in moderate painful distress and intermittently tearful during examination.   HENT: Normocephalic  CV: Normal rate  Pulmonary: No increased worked of breathing  JIMENA: Normal rectal exam. No palpated areas of fluctuance within the anal canal/rectum. Tenderness to palpation of the right buttock with appreciable induration and small area of fluctuance 1-2 cm just superior to prior I&D site. No visible drainage or erythema.         Data:   All laboratory data reviewed    Results:  BMP  Recent Labs   Lab 11/25/19  1055      POTASSIUM 4.3   CHLORIDE 108   CO2 27   BUN 9   CR 0.77   GLC 92     CBC  Recent Labs   Lab 11/25/19  1055   WBC 12.7*   HGB 12.2        LFT  Recent Labs   Lab 11/25/19  1055   AST 11   ALT 17   ALKPHOS 52   BILITOTAL 0.8   ALBUMIN 3.5     Recent Labs   Lab 11/25/19  1055   GLC 92       Imaging:  Limited Soft Tissue Ultrasound, performed and interpreted by me.    Indication:  warmth pain. Evaluate for cellulitis vs abscess.     Body location: buttock    Findings:  There is cobblestoning suggestive of cellulitis in the evaluated area. There are 3 separate fluid collections measuring 1-2 cm to suggest abscess. No foreign body identified    IMPRESSION: Abscess X 3 and Cellulitis    CT pelvis: pending

## 2019-11-25 NOTE — PHARMACY-ADMISSION MEDICATION HISTORY
Admission medication history interview status for the 11/25/2019 admission is complete. See Epic admission navigator for allergy information, pharmacy, prior to admission medications and immunization status.     Medication history interview sources:  Dispense report and patient    Changes made to PTA medication list (reason)  Added: None  Deleted:   -Doxycycline 100mg capsule (Started 11/10/19 for cellulitis and finished 10-day course) -Sulfamethoxazole-trimethoprim 800-160mg tablet (finished 10-day course back in July)  -Oxycodone 5mg tablet (none left)  Changed: None    Additional medication history information:  -Patient is a good historian  -She reports not currently taking any medications at home including over-the-counter or herbal supplement medications      Prior to Admission medications    Not on File         Medication history completed by:   Leeanne Mendoza  PharmD Candidate  November 25, 2019

## 2019-11-26 LAB
ANION GAP SERPL CALCULATED.3IONS-SCNC: 10 MMOL/L (ref 3–14)
BUN SERPL-MCNC: 6 MG/DL (ref 7–30)
CALCIUM SERPL-MCNC: 8.2 MG/DL (ref 8.5–10.1)
CHLORIDE SERPL-SCNC: 106 MMOL/L (ref 94–109)
CO2 SERPL-SCNC: 22 MMOL/L (ref 20–32)
CREAT SERPL-MCNC: 0.84 MG/DL (ref 0.52–1.04)
ERYTHROCYTE [DISTWIDTH] IN BLOOD BY AUTOMATED COUNT: 12.9 % (ref 10–15)
GFR SERPL CREATININE-BSD FRML MDRD: >90 ML/MIN/{1.73_M2}
GLUCOSE SERPL-MCNC: 112 MG/DL (ref 70–99)
HCT VFR BLD AUTO: 35.1 % (ref 35–47)
HGB BLD-MCNC: 11 G/DL (ref 11.7–15.7)
MCH RBC QN AUTO: 29.2 PG (ref 26.5–33)
MCHC RBC AUTO-ENTMCNC: 31.3 G/DL (ref 31.5–36.5)
MCV RBC AUTO: 93 FL (ref 78–100)
PLATELET # BLD AUTO: 206 10E9/L (ref 150–450)
POTASSIUM SERPL-SCNC: 3.5 MMOL/L (ref 3.4–5.3)
RBC # BLD AUTO: 3.77 10E12/L (ref 3.8–5.2)
SODIUM SERPL-SCNC: 137 MMOL/L (ref 133–144)
WBC # BLD AUTO: 11.4 10E9/L (ref 4–11)

## 2019-11-26 PROCEDURE — 36415 COLL VENOUS BLD VENIPUNCTURE: CPT | Performed by: STUDENT IN AN ORGANIZED HEALTH CARE EDUCATION/TRAINING PROGRAM

## 2019-11-26 PROCEDURE — 25000128 H RX IP 250 OP 636: Performed by: EMERGENCY MEDICINE

## 2019-11-26 PROCEDURE — 25000125 ZZHC RX 250: Performed by: STUDENT IN AN ORGANIZED HEALTH CARE EDUCATION/TRAINING PROGRAM

## 2019-11-26 PROCEDURE — 40000141 ZZH STATISTIC PERIPHERAL IV START W/O US GUIDANCE

## 2019-11-26 PROCEDURE — 25000128 H RX IP 250 OP 636: Performed by: STUDENT IN AN ORGANIZED HEALTH CARE EDUCATION/TRAINING PROGRAM

## 2019-11-26 PROCEDURE — 80048 BASIC METABOLIC PNL TOTAL CA: CPT | Performed by: STUDENT IN AN ORGANIZED HEALTH CARE EDUCATION/TRAINING PROGRAM

## 2019-11-26 PROCEDURE — 40000556 ZZH STATISTIC PERIPHERAL IV START W US GUIDANCE

## 2019-11-26 PROCEDURE — 85027 COMPLETE CBC AUTOMATED: CPT | Performed by: STUDENT IN AN ORGANIZED HEALTH CARE EDUCATION/TRAINING PROGRAM

## 2019-11-26 PROCEDURE — 25000132 ZZH RX MED GY IP 250 OP 250 PS 637: Performed by: STUDENT IN AN ORGANIZED HEALTH CARE EDUCATION/TRAINING PROGRAM

## 2019-11-26 PROCEDURE — 12000001 ZZH R&B MED SURG/OB UMMC

## 2019-11-26 RX ORDER — ACETAMINOPHEN 325 MG/1
975 TABLET ORAL 3 TIMES DAILY
Qty: 30 TABLET | Refills: 0 | COMMUNITY
Start: 2019-11-26 | End: 2019-12-10

## 2019-11-26 RX ORDER — SULFAMETHOXAZOLE AND TRIMETHOPRIM 400; 80 MG/1; MG/1
1 TABLET ORAL 2 TIMES DAILY
Qty: 28 TABLET | Refills: 0 | Status: SHIPPED | OUTPATIENT
Start: 2019-11-26 | End: 2019-11-26

## 2019-11-26 RX ORDER — SULFAMETHOXAZOLE/TRIMETHOPRIM 800-160 MG
1 TABLET ORAL 2 TIMES DAILY
Qty: 28 TABLET | Refills: 0 | Status: SHIPPED | OUTPATIENT
Start: 2019-11-26 | End: 2019-12-10

## 2019-11-26 RX ORDER — POLYETHYLENE GLYCOL 3350 17 G/17G
17 POWDER, FOR SOLUTION ORAL DAILY PRN
Qty: 30 PACKET | Refills: 0 | Status: SHIPPED | OUTPATIENT
Start: 2019-11-26 | End: 2019-12-10

## 2019-11-26 RX ORDER — OXYCODONE HYDROCHLORIDE 5 MG/1
5-10 TABLET ORAL
Qty: 5 TABLET | Refills: 0 | Status: SHIPPED | OUTPATIENT
Start: 2019-11-26 | End: 2019-11-29

## 2019-11-26 RX ORDER — HYDROMORPHONE HCL/0.9% NACL/PF 0.2MG/0.2
0.2 SYRINGE (ML) INTRAVENOUS ONCE
Status: COMPLETED | OUTPATIENT
Start: 2019-11-26 | End: 2019-11-26

## 2019-11-26 RX ORDER — DIPHENHYDRAMINE HCL 25 MG
25 CAPSULE ORAL EVERY 6 HOURS PRN
Status: DISCONTINUED | OUTPATIENT
Start: 2019-11-26 | End: 2019-11-27 | Stop reason: HOSPADM

## 2019-11-26 RX ORDER — AMOXICILLIN 250 MG
1-2 CAPSULE ORAL 2 TIMES DAILY
Qty: 30 TABLET | Refills: 0 | Status: SHIPPED | OUTPATIENT
Start: 2019-11-26 | End: 2019-12-10

## 2019-11-26 RX ADMIN — Medication 0.2 MG: at 05:28

## 2019-11-26 RX ADMIN — PROCHLORPERAZINE EDISYLATE 5 MG: 5 INJECTION INTRAMUSCULAR; INTRAVENOUS at 19:44

## 2019-11-26 RX ADMIN — CLINDAMYCIN PHOSPHATE 900 MG: 900 INJECTION, SOLUTION INTRAVENOUS at 17:53

## 2019-11-26 RX ADMIN — OXYCODONE HYDROCHLORIDE 10 MG: 5 TABLET ORAL at 22:10

## 2019-11-26 RX ADMIN — OXYCODONE HYDROCHLORIDE 10 MG: 5 TABLET ORAL at 04:22

## 2019-11-26 RX ADMIN — PIPERACILLIN SODIUM AND TAZOBACTAM SODIUM 3.38 G: 3; .375 INJECTION, POWDER, LYOPHILIZED, FOR SOLUTION INTRAVENOUS at 08:26

## 2019-11-26 RX ADMIN — OXYCODONE HYDROCHLORIDE 10 MG: 5 TABLET ORAL at 01:21

## 2019-11-26 RX ADMIN — OXYCODONE HYDROCHLORIDE 10 MG: 5 TABLET ORAL at 11:11

## 2019-11-26 RX ADMIN — VANCOMYCIN HYDROCHLORIDE 1000 MG: 1 INJECTION, SOLUTION INTRAVENOUS at 09:27

## 2019-11-26 RX ADMIN — VANCOMYCIN HYDROCHLORIDE 1000 MG: 1 INJECTION, SOLUTION INTRAVENOUS at 20:55

## 2019-11-26 RX ADMIN — PIPERACILLIN SODIUM AND TAZOBACTAM SODIUM 3.38 G: 3; .375 INJECTION, POWDER, LYOPHILIZED, FOR SOLUTION INTRAVENOUS at 17:05

## 2019-11-26 RX ADMIN — Medication 0.2 MG: at 10:26

## 2019-11-26 RX ADMIN — DIPHENHYDRAMINE HYDROCHLORIDE 25 MG: 25 CAPSULE ORAL at 05:28

## 2019-11-26 RX ADMIN — OXYCODONE HYDROCHLORIDE 10 MG: 5 TABLET ORAL at 14:59

## 2019-11-26 RX ADMIN — PIPERACILLIN SODIUM AND TAZOBACTAM SODIUM 3.38 G: 3; .375 INJECTION, POWDER, LYOPHILIZED, FOR SOLUTION INTRAVENOUS at 01:31

## 2019-11-26 RX ADMIN — VANCOMYCIN HYDROCHLORIDE 1000 MG: 1 INJECTION, SOLUTION INTRAVENOUS at 00:10

## 2019-11-26 RX ADMIN — PIPERACILLIN SODIUM AND TAZOBACTAM SODIUM 3.38 G: 3; .375 INJECTION, POWDER, LYOPHILIZED, FOR SOLUTION INTRAVENOUS at 23:43

## 2019-11-26 RX ADMIN — OXYCODONE HYDROCHLORIDE 10 MG: 5 TABLET ORAL at 07:58

## 2019-11-26 RX ADMIN — CLINDAMYCIN PHOSPHATE 900 MG: 900 INJECTION, SOLUTION INTRAVENOUS at 06:58

## 2019-11-26 RX ADMIN — ONDANSETRON 4 MG: 2 INJECTION INTRAMUSCULAR; INTRAVENOUS at 18:21

## 2019-11-26 ASSESSMENT — ACTIVITIES OF DAILY LIVING (ADL)
ADLS_ACUITY_SCORE: 10

## 2019-11-26 NOTE — PLAN OF CARE
BP (!) 111/91 (BP Location: Right arm)   Pulse 68   Temp 98.1  F (36.7  C) (Oral)   Resp 12   Wt 75.5 kg (166 lb 6.4 oz)   SpO2 100%   BMI 29.48 kg/m      Shift: 6256-1166   Reason for Admission: R. Buttocks Abscess   VS: VSS on RA  Neuros: A/Ox4, able to make needs known. Incredibly frustrated and angry.  GI/: No BMs this shift. Voiding adequately   Diet: Tolerating reg diet  Drains/Lines: PIV TKO  Activity: A1 to commode.   Pain/Nausea: c/o of severe R. Buttocks pain- IV Dilaudid given.?  Skin: Pt refusing cares at this point, unable to do a full skin assessment.  Social: Mother at bedside  New this shift: Pt 1 episode of emesis.   Plan of care: Continue on IV ABX (Vanco + clindamycin + Zosyn). Pt may require bedside I&D vs OR vs just IV ABX. Will continue to monitor and follow POC.

## 2019-11-26 NOTE — PLAN OF CARE
Vital signs:  Temp: 98.3  F (36.8  C) Temp src: Oral BP: 92/56 Pulse: 68 Heart Rate: 74 Resp: 16 SpO2: 100 % O2 Device: None (Room air)     Time: 1930-0730  Status: Right Buttocks Abscess.   Neuros: A&Ox4, anxious at times.   Cardiac: WDL, denies chest pain.   Respiratory: LS clear, denies SOB.   Pain: Pt c/o pain at right buttocks, pain managed with IV dilaudid and oxycodone with relief.   Nausea: Intermittent nausea, emesis x1.   Diet: Regular diet, pt ate supper.   GI/: Voiding adequately. Abdomen soft, +BS, +flatus, no BM.   Skin: Warm, intact. R buttocks abscess on gentle palpation appears to have grown overnight.   Lines: New PIV placed. LR infusing at 75 ml/hr. IV ABX per order.   Labs: WBC 12.7.   Activity: Ambulating independently. Pt is going outside to smoke. Patients sister stayed in room overnight.   New Changes this Shift: Gen. Surgery paged at 8196 pt reported difficulty falling asleep due to pain and itching. New order placed for one time dose on IV dilaudid 0.2 mg and prn Benadryl, given with some relief.   Plan: Continue POC.

## 2019-11-26 NOTE — PROGRESS NOTES
General Surgery Progress Note  November 26, 2019    Subjective:  Afebrile, no acute events overnight. Patient reports continued pain localized to the right buttock which made it difficult to sleep. She feels like the abscess has grown overnight. She had one episode of emesis and endorses continued nausea.     Discussed bedside incision and drainage of abscess with patient. She adamantly refused I&D without propofol/sedation (which she had received during a previous I&D in the ED). Initially asked to be discharged if she could not have her I&D with sedation.     Afebrile. HR wnl. Decreasing white count.      Objective:  BP 92/56 (BP Location: Right arm)   Pulse 68   Temp 98.3  F (36.8  C) (Oral)   Resp 16   Wt 75.5 kg (166 lb 6.4 oz)   SpO2 100%   BMI 29.48 kg/m        Physical Exam   Gen: awake, alert and lying in bed. Seems uncomfortable but in no acute distress.  CVS: RRR  Respiratory: no extra WOB, no respiratory distress on room air  Extremities: WWP  Right buttock/cleft with induration and small central area with fluctuance. No tenderness beyond area of induration.     Significant Labs (11/26):   WBC: 11.4 (12.7)  Hgb: 11.0 (12.2)     Imaging:  Examination:  CT PELVIS SOFT TISSUE W CONTRAST 11/25/2019 4:46 PM      History: 7/28/2014     Comparison: Abscess of anal and rectal regions.     Technique: CT of the abdomen and pelvis were obtained with contrast.  Sagittal and coronal reconstructions created and reviewed.     Findings:      Abdomen and pelvis: Visualized portions of the kidneys demonstrate  symmetric enhancement. Urinary bladder are unremarkable. Visualized  small intestine and colon are unremarkable. The appendix is not  visualized. Normal appearance of the uterus. Right ovarian corpus  luteum. No suspicious abdominal or pelvic lymphadenopathy. Small  amount of free fluid in the pelvis. No pneumoperitoneum.     Prominent soft tissue inflammation along the right gluteal cleft with  central fluid  collection measuring up to 3.2 cm with mild peripheral  enhancement.      Bones: No acute or suspicious osseous abnormality.                                                 Impression:   1. Inflammatory changes of the subcutaneous tissue in the right  gluteal cleft with central mildly rim enhancing fluid collection  concerning for abscess.  2. Small amount of free fluid in the pelvis, likely reactive.       A/P: Chhaya Mock is a 28 year old female with a history of chronic recurrent hidradenitis suppurativa who presents with 11 days of worsening right buttock pain and tenderness. She is status post incision and drainage of right buttock abscess on 11/9/2019 by [place] ED. Present symptoms and location of pain suggest a recurrence of the right buttock abscess. CT scan 11/25 concerning for abscess. Initial plan to perform bedside I&D, which patient refused without sedation. After significant discussion, patient agreed to one more day of IV antibiotics with plan for bedside incision and drainage tomorrow.    - Continue IV antibiotics  - Discussed possible incision and drainage today at bedside, however patient uncomfortable with this plan for now. Patient states she wants to continue one more day of antibiotics and will then undergo I&D if the same size or larger.     Seen with chief resident, will discuss with attending.     Noa Augustin, MS3    Resident/Fellow Attestation   I, Kathleen Dinh, was present with the medical student who participated in the service and in the documentation of the note.  I have verified the history and personally performed the physical exam and medical decision making.  I agree with the assessment and plan of care as documented in the note.      Kathleen Dinh MD  PGY-1 General surgery resident

## 2019-11-26 NOTE — DISCHARGE SUMMARY
[facility] Discharge Summary    Chhaya Mock MRN: [id number]   YOB: 1990 Age: 28 year old     Date of Admission:  11/25/2019  Date of Discharge::  11/26/2019  Admitting Physician:  Billy Galeas MD  Discharge Physician:  Billy Galeas MD  Primary Care Physician:         [facility]          Discharge Diagnosis:   Hidradenitis         Procedures:   None (patient refused bedside I&D)          Consultations:   PHARMACY TO DOSE VANCO  MEDICATION HISTORY IP PHARMACY CONSULT  VASCULAR ACCESS CARE ADULT IP CONSULT  VASCULAR ACCESS CARE ADULT IP CONSULT  VASCULAR ACCESS CARE ADULT IP CONSULT          HPI (from H&P):   Chhaya Mock is a 28 year old female with a history of chronic recurrent hidradenitis suppurativa who presented to the ED on 11/25/19 with 10 days of worsening right buttock pain and tenderness. She is status post incision and drainage of right buttock abscess on 11/9/2019 by the [place] ED. Symptoms include significant pain at her right buttocks. No fever/chills or drainage.           Hospital Course:   In the ED, WBC 12.9. Broad spectrum antibiotics were started with vanco, zosyn, and clindamycin. Imaging was more consistent with phlegmonous changes in the right buttocks so we decided to trial antibiotics first. She was admitted to the general surgery service overnight and the following morning we reevaluated her. Her WBC was trending down to 11.4, she remained afebrile with normal vital signs, but she was still having significant pain. We offered a bedside I&D with local anesthetic, but the patient refused. She stated that she would not have anyone touch her if she was not given propofol. We agreed with a trial of one more day of antibiotics instead. This morning she had greatly improved pain and was feeling better and an I&D was not necessary.      The patient was discharged with a 2-week course of Bactrim,  instructions to use alternating tylenol and ibuprofen with oxycodone for severe pain (#5 tablets oxycodone given), and phone numbers for two clinics in the area that specialize in hidradenitis for follow up. She was also instructed to follow up with her primary care physician.    At the time of discharge, she was tolerating PO intake, ambulating, voiding spontaneously without difficulty. The patient was discharged home in stable condition.         Pertinent Imaging Results:   CT pelvis soft tissue 11/25/19:  Findings:   Abdomen and pelvis: Visualized portions of the kidneys demonstrate  symmetric enhancement. Urinary bladder are unremarkable. Visualized  small intestine and colon are unremarkable. The appendix is not  visualized. Normal appearance of the uterus. Right ovarian corpus  luteum. No suspicious abdominal or pelvic lymphadenopathy. Small  amount of free fluid in the pelvis. No pneumoperitoneum.     Prominent soft tissue inflammation along the right gluteal cleft with  central fluid collection measuring up to 3.2 cm with mild peripheral  enhancement.      Bones: No acute or suspicious osseous abnormality.                                                                  Impression:   1. Inflammatory changes of the subcutaneous tissue in the right  gluteal cleft with central mildly rim enhancing fluid collection  concerning for abscess.  2. Small amount of free fluid in the pelvis, likely reactive.         Medications Prior to Admission:     No medications prior to admission.            Discharge Medications:     Discharge Medication List as of 11/27/2019 12:14 PM      START taking these medications    Details   acetaminophen (TYLENOL) 325 MG tablet Take 3 tablets (975 mg) by mouth 3 times daily, Disp-30 tablet, R-0, OTC      oxyCODONE (ROXICODONE) 5 MG tablet Take 1-2 tablets (5-10 mg) by mouth every 3 hours as needed for moderate to severe pain, Disp-5 tablet, R-0, Local Print      polyethylene glycol  (MIRALAX/GLYCOLAX) packet Take 17 g by mouth daily as needed for constipation, Disp-30 packet, R-0, E-Prescribe      prochlorperazine (COMPAZINE) 5 MG tablet Take 1 tablet (5 mg) by mouth every 6 hours as needed for nausea or vomiting, Disp-5 tablet, R-0, E-Prescribe      senna-docusate (SENOKOT-S/PERICOLACE) 8.6-50 MG tablet Take 1-2 tablets by mouth 2 times daily, Disp-30 tablet, R-0, E-Prescribe      sulfamethoxazole-trimethoprim (BACTRIM DS/SEPTRA DS) 800-160 MG tablet Take 1 tablet by mouth 2 times daily for 14 days, Disp-28 tablet, R-0, E-Prescribe                  Day of Discharge Physical Exam:   Temp:  [97.8  F (36.6  C)-99.6  F (37.6  C)] 98  F (36.7  C)  Pulse:  [76] 76  Heart Rate:  [78-92] 78  Resp:  [16-18] 16  BP: ()/(52-79) 90/57  SpO2:  [96 %-100 %] 96 %    General: A&Ox3, NAD, sitting up in bed, aggitated  Chest: NLB on room air  Abd: Soft, ND, NT  Right buttock with decreased area of induration. No fluctuance. No erythema.   Extremities: moving all extremities, ambulating without issue         Discharge Instructions and Follow-Up:        Reason for your hospital stay    Phlegmon of right buttock     Activity    Your activity upon discharge: activity as tolerated, no driving while taking narcotic pain medication     Discharge Instructions    - Antibiotics: take Bactrim twice daily for 2 weeks. Please finish the antibiotic course.   - Take tylenol and ibuprofen alternating for pain. You have been given a script for a few tablets of oxycodone.  - No driving while taking narcotic pain medications.   - If you develop constipation, take stool softeners such as colace or miralax but stop if you develop diarrhea. Wean yourself off of narcotics.   - If you develop any fever/chills, worsening pain, redness, swelling, or drainage from your wound please call ([facility] [phone]).   - Follow up with your primary care physician  - Please call one of the following numbers to set up an appointment in a  clinic that specializes in hidradenitis:  [facility] [phone]   [facility] [phone]          Follow Up and recommended labs and tests    Please call to follow up at either location (they specialize in hidradenitis)    [facility] [phone]   [facility] [phone]     Diet    Follow this diet upon discharge: Regular       Condition at discharge: Stable      - - - - - - - - - - - - - - - - - -  Kathleen Dinh DO PGY-1  General Surgery

## 2019-11-27 VITALS
SYSTOLIC BLOOD PRESSURE: 90 MMHG | TEMPERATURE: 98 F | WEIGHT: 166.4 LBS | HEART RATE: 76 BPM | DIASTOLIC BLOOD PRESSURE: 57 MMHG | RESPIRATION RATE: 16 BRPM | BODY MASS INDEX: 29.48 KG/M2 | OXYGEN SATURATION: 96 %

## 2019-11-27 LAB
ANION GAP SERPL CALCULATED.3IONS-SCNC: 7 MMOL/L (ref 3–14)
BUN SERPL-MCNC: 7 MG/DL (ref 7–30)
CALCIUM SERPL-MCNC: 8.3 MG/DL (ref 8.5–10.1)
CHLORIDE SERPL-SCNC: 106 MMOL/L (ref 94–109)
CO2 SERPL-SCNC: 24 MMOL/L (ref 20–32)
CREAT SERPL-MCNC: 0.9 MG/DL (ref 0.52–1.04)
ERYTHROCYTE [DISTWIDTH] IN BLOOD BY AUTOMATED COUNT: 12.8 % (ref 10–15)
GFR SERPL CREATININE-BSD FRML MDRD: 87 ML/MIN/{1.73_M2}
GLUCOSE SERPL-MCNC: 85 MG/DL (ref 70–99)
HCT VFR BLD AUTO: 36.4 % (ref 35–47)
HGB BLD-MCNC: 11.2 G/DL (ref 11.7–15.7)
MCH RBC QN AUTO: 28.9 PG (ref 26.5–33)
MCHC RBC AUTO-ENTMCNC: 30.8 G/DL (ref 31.5–36.5)
MCV RBC AUTO: 94 FL (ref 78–100)
PLATELET # BLD AUTO: 217 10E9/L (ref 150–450)
POTASSIUM SERPL-SCNC: 3.8 MMOL/L (ref 3.4–5.3)
RBC # BLD AUTO: 3.87 10E12/L (ref 3.8–5.2)
SODIUM SERPL-SCNC: 137 MMOL/L (ref 133–144)
VANCOMYCIN SERPL-MCNC: 24.8 MG/L
WBC # BLD AUTO: 11.4 10E9/L (ref 4–11)

## 2019-11-27 PROCEDURE — 25000125 ZZHC RX 250: Performed by: STUDENT IN AN ORGANIZED HEALTH CARE EDUCATION/TRAINING PROGRAM

## 2019-11-27 PROCEDURE — 25000128 H RX IP 250 OP 636: Performed by: STUDENT IN AN ORGANIZED HEALTH CARE EDUCATION/TRAINING PROGRAM

## 2019-11-27 PROCEDURE — 85027 COMPLETE CBC AUTOMATED: CPT | Performed by: STUDENT IN AN ORGANIZED HEALTH CARE EDUCATION/TRAINING PROGRAM

## 2019-11-27 PROCEDURE — 25000128 H RX IP 250 OP 636: Performed by: EMERGENCY MEDICINE

## 2019-11-27 PROCEDURE — 25000132 ZZH RX MED GY IP 250 OP 250 PS 637: Performed by: STUDENT IN AN ORGANIZED HEALTH CARE EDUCATION/TRAINING PROGRAM

## 2019-11-27 PROCEDURE — 80202 ASSAY OF VANCOMYCIN: CPT | Performed by: SURGERY

## 2019-11-27 PROCEDURE — 80048 BASIC METABOLIC PNL TOTAL CA: CPT | Performed by: STUDENT IN AN ORGANIZED HEALTH CARE EDUCATION/TRAINING PROGRAM

## 2019-11-27 PROCEDURE — 36415 COLL VENOUS BLD VENIPUNCTURE: CPT | Performed by: STUDENT IN AN ORGANIZED HEALTH CARE EDUCATION/TRAINING PROGRAM

## 2019-11-27 RX ORDER — PROCHLORPERAZINE MALEATE 5 MG
5 TABLET ORAL EVERY 6 HOURS PRN
Qty: 5 TABLET | Refills: 0 | Status: SHIPPED | OUTPATIENT
Start: 2019-11-27 | End: 2019-12-10

## 2019-11-27 RX ADMIN — PIPERACILLIN SODIUM AND TAZOBACTAM SODIUM 3.38 G: 3; .375 INJECTION, POWDER, LYOPHILIZED, FOR SOLUTION INTRAVENOUS at 06:30

## 2019-11-27 RX ADMIN — OXYCODONE HYDROCHLORIDE 10 MG: 5 TABLET ORAL at 03:25

## 2019-11-27 RX ADMIN — CLINDAMYCIN PHOSPHATE 900 MG: 900 INJECTION, SOLUTION INTRAVENOUS at 08:14

## 2019-11-27 RX ADMIN — OXYCODONE HYDROCHLORIDE 10 MG: 5 TABLET ORAL at 06:30

## 2019-11-27 RX ADMIN — VANCOMYCIN HYDROCHLORIDE 1000 MG: 1 INJECTION, SOLUTION INTRAVENOUS at 10:33

## 2019-11-27 RX ADMIN — CLINDAMYCIN PHOSPHATE 900 MG: 900 INJECTION, SOLUTION INTRAVENOUS at 00:15

## 2019-11-27 RX ADMIN — OXYCODONE HYDROCHLORIDE 10 MG: 5 TABLET ORAL at 10:05

## 2019-11-27 RX ADMIN — SENNOSIDES AND DOCUSATE SODIUM 2 TABLET: 8.6; 5 TABLET ORAL at 08:14

## 2019-11-27 RX ADMIN — PROCHLORPERAZINE EDISYLATE 5 MG: 5 INJECTION INTRAMUSCULAR; INTRAVENOUS at 10:00

## 2019-11-27 RX ADMIN — VANCOMYCIN HYDROCHLORIDE 1000 MG: 1 INJECTION, SOLUTION INTRAVENOUS at 03:18

## 2019-11-27 ASSESSMENT — ACTIVITIES OF DAILY LIVING (ADL)
ADLS_ACUITY_SCORE: 10

## 2019-11-27 NOTE — PROGRESS NOTES
General Surgery Progress Note  November 27, 2019    Subjective:  Afebrile, no acute events overnight. Patient states she is feeling better overnight. She has continued to have right buttock discomfort.      Objective:  /60 (BP Location: Right arm)   Pulse 76   Temp 98.9  F (37.2  C) (Oral)   Resp 16   Wt 75.5 kg (166 lb 6.4 oz)   SpO2 100%   BMI 29.48 kg/m        Physical Exam   Gen: awake, alert and lying in bed. Seems uncomfortable but in no acute distress.  CVS: RRR  Respiratory: no extra WOB, no respiratory distress on room air  Extremities: WWP  Right buttock/cleft with much reduced area of induration. No fluctuance palpable. Much improved pain with palpation.     Significant Labs:  WBC 11.4 (11.4)        A/P: Chhaya Mock is a 28 year old female with a history of chronic recurrent hidradenitis suppurativa who presents with 11 days of worsening right buttock pain and tenderness. She is status post incision and drainage of right buttock abscess on 11/9/2019 by [place] ED. Present symptoms and location of pain suggest a recurrence of the right buttock abscess. CT scan 11/25 concerning for abscess.    - Continue IV antibiotics for one more dose. Transition to oral antibiotics upon discharge.   - I&D not necessary as it has improved.   -Recommend once daily sitz baths to patient  -Plan for discharge later today following final IV dose of antibiotics    Seen with chief resident, will discuss with attending.     Kathleen Dinh MD  PGY-1 General surgery resident

## 2019-11-27 NOTE — PLAN OF CARE
1264-1016:  VSS. Pt anxious and tearful throughout the day. Frustrated with POC.   GI/: voiding. Refused stool softener this AM, open to taking tonight, no BM  Diet: pt having nausea in evening with attempt at eating. Zofran given. No oral intake much today.   Incisions/Drains: NA  Skin: R buttock area with abscess hard to touch.   IV: PIV removed by pt at 1330 for potential discharge. Replaced at 1645 with PIV infusing antbx and LR at 75 after  Activity: up ad oracio  Labs: WBC 11.4 this AM  PRN meds: oxy for pain.  Changes this shift: Pt wanted to discharge at 1300 due to difference in plan of care opinion. Decided not to discharge. Plan to do iv antbx and re-assess I&D tomorrow. MD at bedside multiple times today.   Compazine ordered for continued nausea and dry heaving.   Plan: Cont IV ANTBX.  Pt had visitors in jian.

## 2019-11-27 NOTE — PLAN OF CARE
Vital signs:  Temp: 99.6  F (37.6  C) Temp src: Oral BP: 97/58 Pulse: 76 Heart Rate: 89 Resp: 16 SpO2: 100 % O2 Device: None (Room air)    Activity: Up independently.   Neuros: A&O x4.   Cardiac: BP soft, Provider aware, page if SBP <90.   Respiratory: WDL on RA. Denies SOB.   GI/: Voiding, not saving. +BS.   Diet: Regular.   Skin: Right buttock abscess, hard to touch.   Lines: New left PIV with LR @75mL/hr. IV ABX given.  Pain/nausea: Oxy given x3. Denies nausea.  Plan: Possible I&D at bedside.

## 2019-11-27 NOTE — PLAN OF CARE
VSS. Patient discharged at 1300.  Belongings sent with patient. PIV removed.   To [facility] for medications. AVS signed and printed. Work note given to pt and faxed to desired number for pt.

## 2019-11-27 NOTE — PROGRESS NOTES
Antimicrobial Stewardship Team Note    Antimicrobial Stewardship Program - A joint venture between [facility] and  Physicians to optimize antibiotic management.  NOT a formal consult - Restricted Antimicrobial Review     Patient: Chhaya Mock  MRN: [id number]  Allergies: No known allergies    Brief Summary: Chhaya Mock is a 28 year old female with a history of tobacco use and chronic recurrent hidradenitis suppurativa (usually presents on axilla, anterior chest wall, inner thighs) who presented to ED on 11/25 with 10 days of worsening right buttock pain and tenderness. She is status post incision and drainage of right buttock abscess on 11/9/2019 by [facility]. Patient was discharged on doxycycline and oxycodone. Per patient, the abscess improved until about 10 days ago when she started to have discomfort. Pain sometimes radiates into lower back. No drainage, fevers, chills, redness, warmth - firm, painful nodule present. Nausea and dry heaving present. Present symptoms and location of pain suggest a recurrence of the right buttock abscess. 11/25 clindamycin, vancomycin, Zosyn started. 11/26, patient refused bedside I&D - will only do if sedated (received at [facility] during previous I&D).    WBCs 12.7>>11.4. Patient is afebrile, 100% oxygen on RA, stable and normal HR, RR, BP. No cultures taken - no micro results since June 2019 when she tested negative for Chlamydia, Wet prep, and Neisseria. CT indicative of abscess. US revealed 3 fluid collections about 1-2 cm and cobblestoning suggestive of cellulitis. History of Strep lugdunensis abscess in 2017.         Active Anti-infective Medications   (From admission, onward)                Start     Stop    11/26/19 0001  vancomycin in dextrose  1,000 mg,   Intravenous,   200 mL/hr,   EVERY 8 HOURS     Skin and Soft Tissue Infection        --    11/25/19 2300  clindamycin  900 mg,   Intravenous,   50 mL/hr,   EVERY 8 HOURS      abscess        --    11/25/19 2030  piperacillin-tazobactam  3.375 g,   Intravenous,   EVERY 6 HOURS     abscess        --          Assessment:   Buttock Abscess  Patient's infection is localized to the buttock. Afebrile, decreasing WBCs, vital signs stable. No cultures to guide therapy. Bacteria of concern are gram positives (Strep pyogenes and Staph aureus) and gram negatives given location of the abscess. Given patient's lack of history and risk factors for MRSA, continued MRSA coverage is not warranted. Recommend de-escalation to oral Augmentin or, if prefer to continue IV antibiotics, Unasyn. Duration dependent upon source control.    Recommendations:  1. Discontinue vancomycin and Zosyn and clindamycin  2. Initiate Augmentin 875-125 mg BID (or Unasyn 1.5 mg Q6 hours if IV therapy desired)    Discussed with ID Staff Harika Gallego MD; Elvia Roach, PharmD, BCIDP    Cristal LynnD, MPH  PGY1 Pharmacy Practice Resident  Pager: [phone]  Phone: [phone]    Vital Signs/Clinical Features:  Vitals         11/25 0700  -  11/26 0659 11/26 0700  -  11/27 0659 11/27 0700  -  11/27 1301   Most Recent    Temp ( F) 96.7 -  98.7    97.8 -  99.6    98 -  98.9     98 (36.7)    Pulse 68 -  99      76      76     76    Heart Rate 60 -  80    72 -  92      78     78    Resp 10 -  23    16 -  18      16     16    BP 92/56 -  113/72    90/53 -  119/79    90/57 -  107/60     90/57    SpO2 (%) 95 -  100    97 -  100    96 -  100     96            Labs  Estimated Creatinine Clearance: 90.5 mL/min (based on SCr of 0.9 mg/dL).  Recent Labs   Lab Test 06/02/17  1015 06/11/18  0007 10/09/18  0901 11/25/19  1055 11/26/19  0704 11/27/19  0745   CR 0.73 0.78 0.74 0.77 0.84 0.90       Recent Labs   Lab Test 07/28/14  1354 02/04/17  0109 06/11/18  0007 10/09/18  0901 11/25/19  1055 11/26/19  0704 11/27/19  0745   WBC 9.9 13.7* 6.5 7.1 12.7* 11.4* 11.4*   ANEU 5.7 9.3* 3.0 4.4 10.1*  --   --    ALYM 3.4 3.1 2.8 2.2 1.5  --   --     OSCAR 0.7 0.9 0.7 0.4 1.0  --   --    AEOS 0.1 0.2 0.0 0.1 0.1  --   --    HGB 13.6 13.3 13.0 12.4 12.2 11.0* 11.2*   HCT 41.9 41.8 39.3 38.3 38.9 35.1 36.4   MCV 92 96 90 92 94 93 94    210 163 216 222 206 217       Recent Labs   Lab Test 07/28/14  1354 02/04/17  0109 06/02/17  1015 06/11/18  0007 10/09/18  0901 11/25/19  1055   BILITOTAL 0.7 0.5 0.4 0.6 0.5 0.8   ALKPHOS 56 44 53 47 41 52   ALBUMIN 4.2 3.6 4.1 3.5 3.5 3.5   AST 31 24 9 18 19 11   ALT 13 23 14 10 16 17             Recent Labs   Lab Test 11/27/19  0837   VANCOMYCIN 24.8       Culture Results:  7-Day Micro Results       ** No results found for the last 168 hours. **            Recent Labs   Lab Test 01/16/18  2247 05/17/18  0120 06/12/18  0009 10/09/18  0841 06/21/19  0916   URINEPH 5.5 6.5 6.0 5.5 5.5   NITRITE Negative Negative Negative Negative Negative   LEUKEST Negative Negative Negative Negative Negative   WBCU 1 1 2 2 2                         Imaging: Poc Us Soft Tissue    Result Date: 11/25/2019  Limited Soft Tissue Ultrasound, performed and interpreted by me. Indication:  warmth pain. Evaluate for cellulitis vs abscess. Body location: buttock Findings:  There is cobblestoning suggestive of cellulitis in the evaluated area. There are 3 separate fluid collections measuring 1-2 cm to suggest abscess. No foreign body identified IMPRESSION: Abscess X 3 and Cellulitis      Ct Pelvis Soft Tissue W Contrast    Result Date: 11/25/2019  Examination:  CT PELVIS SOFT TISSUE W CONTRAST 11/25/2019 4:46 PM History: 7/28/2014 Comparison: Abscess of anal and rectal regions. Technique: CT of the abdomen and pelvis were obtained with contrast. Sagittal and coronal reconstructions created and reviewed. Findings: Abdomen and pelvis: Visualized portions of the kidneys demonstrate symmetric enhancement. Urinary bladder are unremarkable. Visualized small intestine and colon are unremarkable. The appendix is not visualized. Normal appearance of the uterus.  Right ovarian corpus luteum. No suspicious abdominal or pelvic lymphadenopathy. Small amount of free fluid in the pelvis. No pneumoperitoneum. Prominent soft tissue inflammation along the right gluteal cleft with central fluid collection measuring up to 3.2 cm with mild peripheral enhancement. Bones: No acute or suspicious osseous abnormality.     Impression: 1. Inflammatory changes of the subcutaneous tissue in the right gluteal cleft with central mildly rim enhancing fluid collection concerning for abscess. 2. Small amount of free fluid in the pelvis, likely reactive. I have personally reviewed the examination and initial interpretation and I agree with the findings. QUINTIN SMITH MD

## 2019-11-28 ENCOUNTER — APPOINTMENT (OUTPATIENT)
Dept: ULTRASOUND IMAGING | Facility: CLINIC | Age: 29
End: 2019-11-28
Attending: EMERGENCY MEDICINE

## 2019-11-28 ENCOUNTER — HOSPITAL ENCOUNTER (EMERGENCY)
Facility: CLINIC | Age: 29
Discharge: HOME | End: 2019-11-29
Attending: EMERGENCY MEDICINE | Admitting: EMERGENCY MEDICINE

## 2019-11-28 DIAGNOSIS — L02.31 ABSCESS OF BUTTOCK: ICD-10-CM

## 2019-11-28 LAB
BASOPHILS # BLD AUTO: 0 10E9/L (ref 0–0.2)
BASOPHILS NFR BLD AUTO: 0.2 %
DIFFERENTIAL METHOD BLD: ABNORMAL
EOSINOPHIL # BLD AUTO: 0.1 10E9/L (ref 0–0.7)
EOSINOPHIL NFR BLD AUTO: 0.5 %
ERYTHROCYTE [DISTWIDTH] IN BLOOD BY AUTOMATED COUNT: 12.8 % (ref 10–15)
GRAM STN SPEC: ABNORMAL
HCT VFR BLD AUTO: 36 % (ref 35–47)
HGB BLD-MCNC: 11.3 G/DL (ref 11.7–15.7)
IMM GRANULOCYTES # BLD: 0.1 10E9/L (ref 0–0.4)
IMM GRANULOCYTES NFR BLD: 0.4 %
LYMPHOCYTES # BLD AUTO: 2.3 10E9/L (ref 0.8–5.3)
LYMPHOCYTES NFR BLD AUTO: 17.1 %
Lab: ABNORMAL
MCH RBC QN AUTO: 29.5 PG (ref 26.5–33)
MCHC RBC AUTO-ENTMCNC: 31.4 G/DL (ref 31.5–36.5)
MCV RBC AUTO: 94 FL (ref 78–100)
MONOCYTES # BLD AUTO: 1.2 10E9/L (ref 0–1.3)
MONOCYTES NFR BLD AUTO: 8.9 %
NEUTROPHILS # BLD AUTO: 10 10E9/L (ref 1.6–8.3)
NEUTROPHILS NFR BLD AUTO: 72.9 %
NRBC # BLD AUTO: 0 10*3/UL
NRBC BLD AUTO-RTO: 0 /100
PLATELET # BLD AUTO: 236 10E9/L (ref 150–450)
RBC # BLD AUTO: 3.83 10E12/L (ref 3.8–5.2)
SPECIMEN SOURCE: ABNORMAL
WBC # BLD AUTO: 13.7 10E9/L (ref 4–11)

## 2019-11-28 PROCEDURE — 85025 COMPLETE CBC W/AUTO DIFF WBC: CPT | Performed by: EMERGENCY MEDICINE

## 2019-11-28 PROCEDURE — 10060 I&D ABSCESS SIMPLE/SINGLE: CPT | Performed by: EMERGENCY MEDICINE

## 2019-11-28 PROCEDURE — 87070 CULTURE OTHR SPECIMN AEROBIC: CPT | Performed by: EMERGENCY MEDICINE

## 2019-11-28 PROCEDURE — 96376 TX/PRO/DX INJ SAME DRUG ADON: CPT | Performed by: EMERGENCY MEDICINE

## 2019-11-28 PROCEDURE — 96374 THER/PROPH/DIAG INJ IV PUSH: CPT | Performed by: EMERGENCY MEDICINE

## 2019-11-28 PROCEDURE — 76882 US LMTD JT/FCL EVL NVASC XTR: CPT | Mod: RT

## 2019-11-28 PROCEDURE — 99285 EMERGENCY DEPT VISIT HI MDM: CPT | Mod: 25 | Performed by: EMERGENCY MEDICINE

## 2019-11-28 PROCEDURE — 87106 FUNGI IDENTIFICATION YEAST: CPT | Performed by: EMERGENCY MEDICINE

## 2019-11-28 PROCEDURE — 87205 SMEAR GRAM STAIN: CPT | Performed by: EMERGENCY MEDICINE

## 2019-11-28 PROCEDURE — 99285 EMERGENCY DEPT VISIT HI MDM: CPT | Mod: Z6 | Performed by: EMERGENCY MEDICINE

## 2019-11-28 PROCEDURE — 25000128 H RX IP 250 OP 636: Performed by: EMERGENCY MEDICINE

## 2019-11-28 RX ORDER — MORPHINE SULFATE 4 MG/ML
4 INJECTION, SOLUTION INTRAMUSCULAR; INTRAVENOUS
Status: COMPLETED | OUTPATIENT
Start: 2019-11-28 | End: 2019-11-28

## 2019-11-28 RX ADMIN — MORPHINE SULFATE 4 MG: 4 INJECTION INTRAVENOUS at 22:25

## 2019-11-28 RX ADMIN — MORPHINE SULFATE 4 MG: 4 INJECTION INTRAVENOUS at 23:15

## 2019-11-28 ASSESSMENT — ENCOUNTER SYMPTOMS
WOUND: 1
CHILLS: 0
NAUSEA: 0
FEVER: 0
VOMITING: 0

## 2019-11-28 ASSESSMENT — MIFFLIN-ST. JEOR: SCORE: 1452.1

## 2019-11-28 NOTE — ED AVS SNAPSHOT
[facility], [place], Emergency Department  [address] [place] [id number]  Phone:  [phone]                                    Chhaya Mock   MRN: [id number]    Department:  [facility], Emergency Department   Date of Visit:  11/28/2019           After Visit Summary Signature Page    I have received my discharge instructions, and my questions have been answered. I have discussed any challenges I see with this plan with the nurse or doctor.    ..........................................................................................................................................  Patient/Patient Representative Signature      ..........................................................................................................................................  Patient Representative Print Name and Relationship to Patient    ..................................................               ................................................  Date                                   Time    ..........................................................................................................................................  Reviewed by Signature/Title    ...................................................              ..............................................  Date                                               Time          22EPIC Rev 08/18

## 2019-11-29 ENCOUNTER — TELEPHONE (OUTPATIENT)
Dept: FAMILY MEDICINE | Facility: CLINIC | Age: 29
End: 2019-11-29

## 2019-11-29 VITALS
TEMPERATURE: 98.3 F | WEIGHT: 166 LBS | BODY MASS INDEX: 29.41 KG/M2 | HEIGHT: 63 IN | RESPIRATION RATE: 14 BRPM | OXYGEN SATURATION: 98 % | HEART RATE: 79 BPM | DIASTOLIC BLOOD PRESSURE: 60 MMHG | SYSTOLIC BLOOD PRESSURE: 102 MMHG

## 2019-11-29 PROCEDURE — 25000128 H RX IP 250 OP 636: Performed by: EMERGENCY MEDICINE

## 2019-11-29 PROCEDURE — 96376 TX/PRO/DX INJ SAME DRUG ADON: CPT | Performed by: EMERGENCY MEDICINE

## 2019-11-29 PROCEDURE — 25000125 ZZHC RX 250: Performed by: EMERGENCY MEDICINE

## 2019-11-29 PROCEDURE — 96375 TX/PRO/DX INJ NEW DRUG ADDON: CPT | Performed by: EMERGENCY MEDICINE

## 2019-11-29 PROCEDURE — 10060 I&D ABSCESS SIMPLE/SINGLE: CPT | Mod: Z6 | Performed by: EMERGENCY MEDICINE

## 2019-11-29 PROCEDURE — 25000132 ZZH RX MED GY IP 250 OP 250 PS 637: Performed by: EMERGENCY MEDICINE

## 2019-11-29 RX ORDER — ONDANSETRON 2 MG/ML
4 INJECTION INTRAMUSCULAR; INTRAVENOUS ONCE
Status: DISCONTINUED | OUTPATIENT
Start: 2019-11-29 | End: 2019-11-29

## 2019-11-29 RX ORDER — LIDOCAINE HYDROCHLORIDE AND EPINEPHRINE 10; 10 MG/ML; UG/ML
10 INJECTION, SOLUTION INFILTRATION; PERINEURAL ONCE
Status: DISCONTINUED | OUTPATIENT
Start: 2019-11-29 | End: 2019-11-29 | Stop reason: HOSPADM

## 2019-11-29 RX ORDER — LIDOCAINE HYDROCHLORIDE AND EPINEPHRINE 10; 10 MG/ML; UG/ML
INJECTION, SOLUTION INFILTRATION; PERINEURAL
Status: DISCONTINUED
Start: 2019-11-29 | End: 2019-11-29 | Stop reason: HOSPADM

## 2019-11-29 RX ORDER — PROCHLORPERAZINE MALEATE 10 MG
10 TABLET ORAL ONCE
Status: COMPLETED | OUTPATIENT
Start: 2019-11-29 | End: 2019-11-29

## 2019-11-29 RX ORDER — LORAZEPAM 2 MG/ML
0.5 INJECTION INTRAMUSCULAR ONCE
Status: COMPLETED | OUTPATIENT
Start: 2019-11-29 | End: 2019-11-29

## 2019-11-29 RX ORDER — OXYCODONE HYDROCHLORIDE 5 MG/1
5 TABLET ORAL EVERY 6 HOURS PRN
Qty: 4 TABLET | Refills: 0 | Status: SHIPPED | OUTPATIENT
Start: 2019-11-29 | End: 2019-12-10

## 2019-11-29 RX ORDER — LIDOCAINE/PRILOCAINE 2.5 %-2.5%
1 CREAM (GRAM) TOPICAL ONCE
Status: COMPLETED | OUTPATIENT
Start: 2019-11-29 | End: 2019-11-29

## 2019-11-29 RX ORDER — OXYCODONE HYDROCHLORIDE 5 MG/1
5 TABLET ORAL ONCE
Status: COMPLETED | OUTPATIENT
Start: 2019-11-29 | End: 2019-11-29

## 2019-11-29 RX ADMIN — LORAZEPAM 0.5 MG: 2 INJECTION INTRAMUSCULAR; INTRAVENOUS at 03:20

## 2019-11-29 RX ADMIN — OXYCODONE HYDROCHLORIDE 5 MG: 5 TABLET ORAL at 01:57

## 2019-11-29 RX ADMIN — LORAZEPAM 0.5 MG: 2 INJECTION INTRAMUSCULAR; INTRAVENOUS at 04:00

## 2019-11-29 RX ADMIN — LORAZEPAM 0.5 MG: 2 INJECTION INTRAMUSCULAR; INTRAVENOUS at 04:14

## 2019-11-29 RX ADMIN — PROCHLORPERAZINE MALEATE 10 MG: 10 TABLET, FILM COATED ORAL at 05:46

## 2019-11-29 RX ADMIN — LIDOCAINE AND PRILOCAINE 1 G: 25; 25 CREAM TOPICAL at 03:35

## 2019-11-29 NOTE — DISCHARGE INSTRUCTIONS
Follow up at the Burn Center either at [facility] ([phone]) or [facility] ([phone]) to discuss further definitive (more permanent) management of you Hidradenitis. Continue your antibiotics. Sit in a warm bath at least 4 times a day. Return with new or worsening symptoms.

## 2019-11-29 NOTE — ED PROVIDER NOTES
"      [facility] EMERGENCY DEPARTMENT ([facility])  November 28, 2019  History     Chief Complaint   Patient presents with     Wound Check     The history is provided by the patient and medical records.     Chhaya Mock is a 28 year old female with a history of chronic recurrent hidradenitis suppurativa, who presents to the Emergency Department for evaluation of recurrent right buttocks pain with wound drainage. Patient states she was hospitalized 2 days ago and was placed on IV antibiotics and was told to come back to the ED if she starts to drain or leak. Today, patient states she was sitting back and realize there was drops on the floor. Initially, she thought it was from her menstrual cycle but noticed that it started \"clumping in the bag\" and draining on its own. She reports concerns for staff infection and states that she does not touch her abscess. Patient reports of pain as worsening and has been having hot and cold flashes as well. She denies having any fever, chills, nausea or vomiting.      Per chart review, patient was recently admitted on 11/2519 to 11/26/19 for worsening right buttocks pain in the setting of recent I&D on 11/9/19. She was initially started on broad spectrum antibiotics with vancomycin, Zosyn and clindamycin. Her imaging was consistent with phlegmonous changes in the right buttocks and plan was to trial antibiotics initially. Patient was offered a bedside I&D with local anesthetic, but declined this unless she was given propofol. They instead decided to trial an additional day of antibiotics, however, she had significant improvement of pain and the I&D was no longer necessary. She was discharged with a two week course of Bactrim, as well as five tablets of oxycodone to alternate with Tylenol and ibuprofen.     I have reviewed the Medications, Allergies, Past Medical and Surgical History, and Social History in the [facility] system.    Past Medical History:   Diagnosis Date "     Carbuncle and furuncle      Tobacco use disorder     Smokes 1/2 ppd. Started smoking at 14 years     Vaginal yeast infection 06/02/2017     Past Surgical History:   Procedure Laterality Date     C ORAL SURGERY PROCEDURE  10/23/2008    [place] Teeth Extraction     Family History   Problem Relation Age of Onset     Hypertension Maternal Grandmother      Diabetes Maternal Uncle      Hypertension Maternal Uncle      Alcohol/Drug Maternal Aunt      Alcohol/Drug Maternal Uncle      Anesthesia Reaction Other      Social History     Tobacco Use     Smoking status: Current Every Day Smoker     Packs/day: 0.50     Years: 2.00     Pack years: 1.00     Types: Cigarettes     Smokeless tobacco: Never Used   Substance Use Topics     Alcohol use: No     Comment: occassionally     No current facility-administered medications for this encounter.      Current Outpatient Medications   Medication     acetaminophen (TYLENOL) 325 MG tablet     oxyCODONE (ROXICODONE) 5 MG tablet     polyethylene glycol (MIRALAX/GLYCOLAX) packet     prochlorperazine (COMPAZINE) 5 MG tablet     senna-docusate (SENOKOT-S/PERICOLACE) 8.6-50 MG tablet     sulfamethoxazole-trimethoprim (BACTRIM DS/SEPTRA DS) 800-160 MG tablet     Allergies   Allergen Reactions     No Known Allergies        Review of Systems   Constitutional: Negative for chills and fever.   Gastrointestinal: Negative for nausea and vomiting.   Skin: Positive for wound (R buttocks draining wound).   All other systems reviewed and are negative.      Physical Exam          Physical Exam  General: awake, alert, NAD  Head: normal cephalic  HEENT: pupils equal, conjugate gaze in tact  Neck: Supple  CV: regular rate and rhythm without murmur  Lungs: clear to ascultation  Abd: soft, non-tender, no guarding, no peritoneal signs  EXT: lower extremities without swelling or edema  Neuro: awake, answers questions appropriately. No focal deficits noted       ED Course   8:40 PM  The patient was seen and  "examined by Royal Dias MD, in Room ED33.           No results found for this or any previous visit (from the past 24 hour(s)).    Medications   morphine (PF) injection 4 mg (has no administration in time range)       Procedures        {EKG done?:285030::\" \"}    Critical Care time:  {none or minutes:870005::\"none\"}  {trauma activation or Fall?:613453::\" \"}  {Sepsis/Septic Shock/Stemi/Stroke:044603::\" \"}       Labs Ordered and Resulted from Time of ED Arrival Up to the Time of Departure from the ED - No data to display         Assessments & Plan (with Medical Decision Making)   Chhaya is a 28-year-old female who presents after being recently discharged from the General Surgery Service with concerns for now draining abscess and now ongoing pain.  Patient denies fevers, chills, nausea or vomiting which is reassuring.  Her exam does show a draining gluteal abscess unable to express a lot of fluid but concern for communication with other parts of the area of induration.  Plan at this point is to culture the wound, repeat an ultrasound to assess size of the pocket and repeat CBC.  Patient s CBC is elevated now to 13.7 which has increased from time of discharge.  Wound cultures were obtained.  Ultrasound showed increasing fluid collection of 3.8 cm which is similar but may be slightly larger than the CT scan on 11/25/2019.  Given the possibly worsening CT or worsening size of abscess, increasing white count, worsening symptoms, I did have surgery reconsulted.  They came and evaluated the patient in the Emergency Department and their recommendations are pending. Please follow-up with Dr. Gutierrez about surgery recommendations and plan.    This part of the medical record was transcribed by Sara Rosales Scribe, from a dictation done by Royal Dias MD.     I have reviewed the nursing notes.    I have reviewed the findings, diagnosis, plan and need for follow up with the patient.    New Prescriptions    No medications " on file       Final diagnoses:   None   I, Janeen Marroquin, am serving as a trained medical scribe to document services personally performed by Royal Dias MD, based on the provider's statements to me.      IRoyal MD, was physically present and have reviewed and verified the accuracy of this note documented by Janeen Marroquin.    11/28/2019   [facility], EMERGENCY DEPARTMENT

## 2019-11-29 NOTE — ED PROVIDER NOTES
The patient was accepted at shift change signout with a plan for me to follow-up with general surgery regarding the recommendations.  The patient declined to have a bedside I&D by the general surgery resident, and they did not feel was appropriate to take the patient the operating room.  They were recommending bedside I&D, felt that it would be very helpful for the patient.  I did have a long discussion with the patient.  I did express to her the concerns we had, and the reasons we felt uncomfortable performing procedural sedation here in the emergency department, she would have to lie prone for this, and this could cause potentially life-threatening complications.  She ultimately did agree to have a bedside incision and drainage.  Will applied EMLA to the site, she was given IV Ativan for anxiolysis.  I then did inject 1% lidocaine with epinephrine, perform single stab incision, and expressed a fairly significant amount of purulent drainage.  A small amount of packing was inserted, stopped and the patient was having difficulty tolerating this.  She is encouraged to sit in warm baths (cautioned to only use a very small amount of water tonight given her sedation) continue her antibiotics, and to return with any new or worsening symptoms.  She, the recommendation of surgery, is given the phone number for follow-up at 1 of the burn clinics in the area for potential definitive management of her hidradenitis tissue.  She will be given a small prescription (4 tablets) of oxycodone to be used as needed for pain.  She verbalizes understanding and is agreeable to the plan.        Procedure: Incision and Drainage   LOCATIONS:  Right buttock     ANESTHESIA:  Local field block using Lidocaine 1% with epinephrine, total of 5 mLs     PREPARATION:  Cleansed with Betadine     PROCEDURE:  Area was incised with # 11 Blade ([place]) with a Single Straight incision.  Wound treatment included Deloculation, Purulent Drainage and  Expression of Material.  Packing consisted of Plain Gauze.  Appropriate dressing was applied to cover the area.    Patient Status:        Patient tolerated the procedure well. There were no complications.                Dictation Disclaimer: Some of this Note has been completed with voice-recognition dictation software. Although errors are generally corrected real-time, there is the potential for a rare error to be present in the completed chart.       Amira Barfield MD  11/29/19 0512

## 2019-11-29 NOTE — CONSULTS
General Surgery Consultation Note  Date of Service: 11/28/2019 11:08 PM    Chhaya Mock  MRN: [id number]  female  28 year old    Chief Complaint:  Drainage and buttock pain    Assessment & Plan:  28 year old female with hidradenitis suppurativa who was recently discharged from the hospital after non-operative management of a phlegmon / developing abscess on the right buttock (patient refused drainage unless she were sedated with propofol, and area of concern subsided / became no longer amenable to drainage).   She now returns with an abscess in that same area, on PO antibiotics from recent discharge. She has a leukocytosis and CT evidence of a fluid collection which would require drainage.   Recommended bedside incision and drainage with continued antibiotics and follow up in either [facility] or [facility] [place] care at centers more specialized in hidradenitis.   The patient requested that she receive propofol for bedside I/D and refused I/D without propofol. Had a lengthy discussion with the patient regarding standard of care for simple abscess drainage (local analgesia, IV pain medication, and anxiolytic), and the risks of not undergoing I/D (enlarging abscess, increased pain, purulent drainage, bleeding, sepsis, etc) however she became irate and stated that her wishes were not being honored, though she was offered I/D with analgesia and anxiolysis.   ED to perform I/D if patient becomes agreeable.     - Strongly recommend I/D buttock abscess, standard of care in this instance  - Continue ABX  - Follow up as previously directed with [facility] or [facility] for care more specialized in hidradenitis     D/w chief resident, Dr. Pillai and staff, Dr. Gudelia Garza MD  Surgery Resident PGY-2      HPI:  28 year old female with hidradenitis suppurativa (typically axilla, chest, inner thighs), who was discharged from [facility] on [facility]/26 after non-operative management of a phlegmon /  developing abscess on the right buttock (patient refused drainage unless she were sedated with propofol), who now returns with an abscess in that same area, on PO antibiotics from recent discharge.   During her hospitalization, she received IV antibiotics, and an I/D was offered, however she did refuse this unless sedated with propofol. After a day of ABX, her symptoms subsided and there was no fluid collection to drain, therefore she was discharged home with bactrim x 14 days, pain medication, and referral to clinics specializing in hidradenitis, and instructions to follow up with her PCP.  Today, she returns with purulent drainage form the right buttock of one day duration, increased pain, inability to sit or work, and feeling of chills. Denies nausea, vomiting. Denies chest pain, shortness of breath. Last bowel movement was today, regularly goes daily. No dysuria.     Patient Active Problem List   Diagnosis     Contraceptive management     Hidradenitis suppurativa     Abscess       Past Surgical History:  Past Surgical History:   Procedure Laterality Date     C ORAL SURGERY PROCEDURE  10/23/2008    [place] Teeth Extraction       Past Medical History:  Past Medical History:   Diagnosis Date     Carbuncle and furuncle      Tobacco use disorder     Smokes 1/2 ppd. Started smoking at 14 years     Vaginal yeast infection 06/02/2017       Social History:  Social History     Tobacco Use     Smoking status: Current Every Day Smoker     Packs/day: 0.50     Years: 2.00     Pack years: 1.00     Types: Cigarettes     Smokeless tobacco: Never Used   Substance Use Topics     Alcohol use: No     Comment: occassionally       Family History:  Family History   Problem Relation Age of Onset     Hypertension Maternal Grandmother      Diabetes Maternal Uncle      Hypertension Maternal Uncle      Alcohol/Drug Maternal Aunt      Alcohol/Drug Maternal Uncle      Anesthesia Reaction Other         Allergies:  Allergies   Allergen Reactions  "    No Known Allergies        Active Medications:  Current Outpatient Medications   Medication Sig Dispense Refill     acetaminophen (TYLENOL) 325 MG tablet Take 3 tablets (975 mg) by mouth 3 times daily 30 tablet 0     oxyCODONE (ROXICODONE) 5 MG tablet Take 1-2 tablets (5-10 mg) by mouth every 3 hours as needed for moderate to severe pain 5 tablet 0     polyethylene glycol (MIRALAX/GLYCOLAX) packet Take 17 g by mouth daily as needed for constipation 30 packet 0     prochlorperazine (COMPAZINE) 5 MG tablet Take 1 tablet (5 mg) by mouth every 6 hours as needed for nausea or vomiting 5 tablet 0     senna-docusate (SENOKOT-S/PERICOLACE) 8.6-50 MG tablet Take 1-2 tablets by mouth 2 times daily 30 tablet 0     sulfamethoxazole-trimethoprim (BACTRIM DS/SEPTRA DS) 800-160 MG tablet Take 1 tablet by mouth 2 times daily for 14 days 28 tablet 0       ROS:  Otherwise negative    Physical Examination:   Vital Signs: /75   Pulse 86   Temp 98.3  F (36.8  C) (Oral)   Resp 16   Ht 1.6 m (5' 3\")   Wt 75.3 kg (166 lb)   LMP 11/06/2019   SpO2 100%   Breastfeeding No   BMI 29.41 kg/m    GEN: Uncomfortable appearing young woman resting in bed on her left side  Neuro: CNs grossly intact  EENT: normal  Chest: NLB on room air, regular rate  Abdomen: soft, non-tender, non-distended  Anorectal: stigmata of chronic hidradenitis on left gluteus; on right, several areas of drainage, with one prominent punctate area overlying a fluctuant, tender indurated area in the mid area of the gluteal fold. No involvement of the anal ring. exquisitely tender on examination; purulent drainage in gluteal fold  Extremities: no edema  Skin: WWP  Psych: normal affect    Labs/Imaging/Other:  Results for orders placed or performed during the hospital encounter of 11/28/19 (from the past 24 hour(s))   Wound Culture Aerobic Bacterial   Result Value Ref Range    Specimen Description Right Buttock Wound     Special Requests Specimen collected in eSwab " transport (white cap)     Culture Micro PENDING    Gram stain   Result Value Ref Range    Specimen Description Right Buttock Wound     Special Requests Specimen collected in eSwab transport (white cap)     Gram Stain Moderate  Gram positive cocci   (A)     Gram Stain Rare  Gram positive rods   (A)     Gram Stain Many  WBC'S seen  predominantly PMN's      CBC with platelets differential   Result Value Ref Range    WBC 13.7 (H) 4.0 - 11.0 10e9/L    RBC Count 3.83 3.8 - 5.2 10e12/L    Hemoglobin 11.3 (L) 11.7 - 15.7 g/dL    Hematocrit 36.0 35.0 - 47.0 %    MCV 94 78 - 100 fl    MCH 29.5 26.5 - 33.0 pg    MCHC 31.4 (L) 31.5 - 36.5 g/dL    RDW 12.8 10.0 - 15.0 %    Platelet Count 236 150 - 450 10e9/L    Diff Method Automated Method     % Neutrophils 72.9 %    % Lymphocytes 17.1 %    % Monocytes 8.9 %    % Eosinophils 0.5 %    % Basophils 0.2 %    % Immature Granulocytes 0.4 %    Nucleated RBCs 0 0 /100    Absolute Neutrophil 10.0 (H) 1.6 - 8.3 10e9/L    Absolute Lymphocytes 2.3 0.8 - 5.3 10e9/L    Absolute Monocytes 1.2 0.0 - 1.3 10e9/L    Absolute Eosinophils 0.1 0.0 - 0.7 10e9/L    Absolute Basophils 0.0 0.0 - 0.2 10e9/L    Abs Immature Granulocytes 0.1 0 - 0.4 10e9/L    Absolute Nucleated RBC 0.0    US Extremity Non Vascular Right    Narrative    EXAMINATION:  US EXTREMITY NON VASCULAR RIGHT 11/28/2019 10:57 PM.    COMPARISON: CT pelvis 11/25/2019.    HISTORY:  buttock/ abscess vs cellulitis    FINDINGS: Within the right gluteal cleft, there is a heterogeneously  hypodense collection containing echogenic debris measuring  approximately 3.8 x 1.8 x 2.4 cm. There is a skin defect overlying  this lesion, though there is no definite sonographic evidence for a  sinus tract to the skin. Color Doppler demonstrates peripheral  hypervascularity.      Impression    IMPRESSION: Within the right gluteal cleft there is a heterogeneously  hypodense peripherally hypervascular fluid collection measuring up to  3.8 cm, which  demonstrated a similar CT appearance on 11/25/2019. This  is consistent with an abscess. No definite sonographic evidence for a  sinus tract to the skin, though there is an overlying skin defect.

## 2019-11-29 NOTE — TELEPHONE ENCOUNTER
ED / Discharge Outreach Protocol    Patient Contact    Attempt # 1    Was call answered?  No.  Unable to leave message. Line was busy    Maria D Lamar RN   [facility]

## 2019-11-29 NOTE — ED TRIAGE NOTES
Pt. Presents to ED with complaints of a R buttocks wound that opened just prior to arriving in the ED. Pt. Reports thick pus and blood coming from the wound and pain with sitting. AVSS on RA. Pt. Reports being on ABX right now. Was discharged from hospital yesterday. Pt. Reports taking oxy and bactrim for pain and infection.

## 2019-11-29 NOTE — TELEPHONE ENCOUNTER
Routing to RN triage pool to help schedule an appointment for a hospital discharge follow up.  Thanks,  Juana Montiel

## 2019-11-30 LAB
BACTERIA SPEC CULT: ABNORMAL
BACTERIA SPEC CULT: ABNORMAL
Lab: ABNORMAL
SPECIMEN SOURCE: ABNORMAL

## 2019-12-02 ENCOUNTER — PATIENT OUTREACH (OUTPATIENT)
Dept: SURGERY | Facility: CLINIC | Age: 29
End: 2019-12-02

## 2019-12-02 DIAGNOSIS — L73.2 HIDRADENITIS SUPPURATIVA: Primary | ICD-10-CM

## 2019-12-02 NOTE — TELEPHONE ENCOUNTER
ED / Discharge Outreach Protocol     Patient Contact     Attempt # 2     Was call answered?  No.  Unable to leave message. Line was busy     Aleena Cleary RN on 12/2/2019 at 2:31 PM

## 2019-12-02 NOTE — PROGRESS NOTES
Chhaya Mock is a patient of the General Surgery Team that was hospitalized with hidradenitis and underwent incision and drainage of the buttock.  She was recently discharged from the hospital.  Attempted to contact patient via telephone for a status update and review post op teaching.  Line busy x 2.  Will attempt at a later time.      Of note:  Wound:  Incision and drainage buttock wound. Daily wound cares.  Follow-up:  [facility] or [facility]en[facility] (referral placed)  Restrictions:  - No activity restrictions  New medications:  Bactrim, Senna, Miralax, Tylenol, Compazine, Oxycodone  Equipment/Supplies:  None    Please contact:  [facility] Burn/Hidradenitis Clinic ([place]): [phone] (Fax: [phone]) or [facility] Burn/Hidradenitis Clinic ([place]):  [phone] (Fax: [phone])    ADDENDUM  12/03/19  10:29 AM  Attempted to reach patient via telephone x 2.  Line busy.  Unable to reach patient.

## 2019-12-03 NOTE — TELEPHONE ENCOUNTER
ED / Discharge Outreach Protocol     Patient Contact     Attempt # 3     Was call answered?  No.  Unable to leave message. Line was busy    Aleena Cleary RN on 12/3/2019 at 2:29 PM

## 2019-12-10 ENCOUNTER — OFFICE VISIT (OUTPATIENT)
Dept: FAMILY MEDICINE | Facility: CLINIC | Age: 29
End: 2019-12-10

## 2019-12-10 VITALS
WEIGHT: 168.4 LBS | OXYGEN SATURATION: 99 % | TEMPERATURE: 97.3 F | HEART RATE: 80 BPM | BODY MASS INDEX: 29.84 KG/M2 | HEIGHT: 63 IN | SYSTOLIC BLOOD PRESSURE: 122 MMHG | RESPIRATION RATE: 16 BRPM | DIASTOLIC BLOOD PRESSURE: 82 MMHG

## 2019-12-10 DIAGNOSIS — D64.9 ANEMIA, UNSPECIFIED TYPE: ICD-10-CM

## 2019-12-10 DIAGNOSIS — Z01.818 PREOP GENERAL PHYSICAL EXAM: Primary | ICD-10-CM

## 2019-12-10 DIAGNOSIS — E83.51 HYPOCALCEMIA: ICD-10-CM

## 2019-12-10 DIAGNOSIS — F17.210 CIGARETTE SMOKER MOTIVATED TO QUIT: ICD-10-CM

## 2019-12-10 DIAGNOSIS — L73.2 HIDRADENITIS SUPPURATIVA: ICD-10-CM

## 2019-12-10 PROCEDURE — 90472 IMMUNIZATION ADMIN EACH ADD: CPT | Performed by: NURSE PRACTITIONER

## 2019-12-10 PROCEDURE — 99214 OFFICE O/P EST MOD 30 MIN: CPT | Mod: 25 | Performed by: NURSE PRACTITIONER

## 2019-12-10 PROCEDURE — 90471 IMMUNIZATION ADMIN: CPT | Performed by: NURSE PRACTITIONER

## 2019-12-10 PROCEDURE — 90632 HEPA VACCINE ADULT IM: CPT | Performed by: NURSE PRACTITIONER

## 2019-12-10 PROCEDURE — 90746 HEPB VACCINE 3 DOSE ADULT IM: CPT | Performed by: NURSE PRACTITIONER

## 2019-12-10 ASSESSMENT — MIFFLIN-ST. JEOR: SCORE: 1462.99

## 2019-12-10 NOTE — PROGRESS NOTES
[address] [address]  [phone]  Dept: [phone]    PRE-OP EVALUATION:  Today's date: 12/10/2019    Chhaya Mock (: 1990) presents for pre-operative evaluation assessment as requested by Dr. Connell.  She requires evaluation and anesthesia risk assessment prior to undergoing surgery/procedure for treatment of Extraction hidradenitis from buttock with thigh graft     Proposed Surgery/ Procedure: Extraction hidradenitis  Date of Surgery/ Procedure: 2019  Time of Surgery/ Procedure: 7:30 AM  Hospital/Surgical Facility: [facility]  Fax number for surgical facility: [phone]  [facility]  Type of Anesthesia Anticipated: to be determined    Patient has a Health Care Directive or Living Will:  NO    1. NO - Do you have a history of heart attack, stroke, stent, bypass or surgery on an artery in the head, neck, heart or legs?  2. NO - Do you ever have any pain or discomfort in your chest?  3. NO - Do you have a history of  Heart Failure?  4. NO - Are you troubled by shortness of breath when: walking on the level, up a slight hill or at night?  5. NO - Do you currently have a cold, bronchitis or other respiratory infection?  6. NO - Do you have a cough, shortness of breath or wheezing?  7. NO - Do you sometimes get pains in the calves of your legs when you walk?  8. NO - Do you or anyone in your family have previous history of blood clots?  9. NO - Do you or does anyone in your family have a serious bleeding problem such as prolonged bleeding following surgeries or cuts?  10. NO - Have you ever had problems with anemia or been told to take iron pills?  11. NO - Have you had any abnormal blood loss such as black, tarry or bloody stools, or abnormal vaginal bleeding?  12. NO - Have you ever had a blood transfusion?  13. NO - Have you or any of your relatives ever had problems  with anesthesia?  14. NO - Do you have sleep apnea, excessive snoring or daytime drowsiness?  15. NO - Do you have any prosthetic heart valves?  16. NO - Do you have prosthetic joints?  17. NO - Is there any chance that you may be pregnant?      HPI:     HPI related to upcoming procedure: reoccuring severe hydranitis supperitiva requiring multiple treatments. On Bactrim currently   Will have skin graft from thigh to cover the area of her buttock on the right side, also has mild carbuncles right axilla they are watching, will be on her back after surgery to allow for healing    Smoker, plans to quit with the surgery    Mild anemia noted on last CBC, low calcium and no symptoms   Not taking any iron or calcium    See problem list for active medical problems.  Problems all longstanding and stable, except as noted/documented.  See ROS for pertinent symptoms related to these conditions.      MEDICAL HISTORY:     Patient Active Problem List    Diagnosis Date Noted     Abscess 11/25/2019     Priority: Medium     Hidradenitis suppurativa 06/02/2017     Priority: Medium     Contraceptive management 12/03/2008     Priority: Medium      Past Medical History:   Diagnosis Date     Carbuncle and furuncle      Tobacco use disorder     Smokes 1/2 ppd. Started smoking at 14 years     Vaginal yeast infection 06/02/2017     Past Surgical History:   Procedure Laterality Date     C ORAL SURGERY PROCEDURE  10/23/2008    [place] Teeth Extraction     Current Outpatient Medications   Medication Sig Dispense Refill     acetaminophen (TYLENOL) 325 MG tablet Take 3 tablets (975 mg) by mouth 3 times daily 30 tablet 0     oxyCODONE (ROXICODONE) 5 MG tablet Take 1 tablet (5 mg) by mouth every 6 hours as needed for pain 4 tablet 0     polyethylene glycol (MIRALAX/GLYCOLAX) packet Take 17 g by mouth daily as needed for constipation 30 packet 0     prochlorperazine (COMPAZINE) 5 MG tablet Take 1 tablet (5 mg) by mouth every 6 hours as needed for  "nausea or vomiting 5 tablet 0     senna-docusate (SENOKOT-S/PERICOLACE) 8.6-50 MG tablet Take 1-2 tablets by mouth 2 times daily 30 tablet 0     sulfamethoxazole-trimethoprim (BACTRIM DS/SEPTRA DS) 800-160 MG tablet Take 1 tablet by mouth 2 times daily for 14 days 28 tablet 0     OTC products: no recent use of OTC ASA, NSAIDS or Steroids    Allergies   Allergen Reactions     No Known Allergies       Latex Allergy: NO    Social History     Tobacco Use     Smoking status: Current Every Day Smoker     Packs/day: 0.50     Years: 2.00     Pack years: 1.00     Types: Cigarettes     Smokeless tobacco: Never Used   Substance Use Topics     Alcohol use: No     Comment: occassionally     History   Drug Use     Types: Marijuana     Comment: occassionally       REVIEW OF SYSTEMS:   Constitutional, neuro, ENT, endocrine, pulmonary, cardiac, gastrointestinal, genitourinary, musculoskeletal, integument and psychiatric systems are negative, except as otherwise noted.    EXAM:   /82 (BP Location: Right arm, Patient Position: Sitting, Cuff Size: Adult Regular)   Pulse 80   Temp 97.3  F (36.3  C) (Oral)   Resp 16   Ht 1.6 m (5' 3\")   Wt 76.4 kg (168 lb 6.4 oz)   SpO2 99%   BMI 29.83 kg/m      GENERAL APPEARANCE: healthy, alert and no distress     EYES: EOMI, PERRL     HENT: ear canals and TM's normal and nose and mouth without ulcers or lesions     NECK: no adenopathy, no asymmetry, masses, or scars and thyroid normal to palpation     RESP: lungs clear to auscultation - no rales, rhonchi or wheezes     CV: regular rates and rhythm, normal S1 S2, no S3 or S4 and no murmur, click or rub     ABDOMEN:  soft, nontender, no HSM or masses and bowel sounds normal     MS: extremities normal- no gross deformities noted, no evidence of inflammation in joints, FROM in all extremities.     SKIN: no suspicious lesions or rashes     NEURO: Normal strength and tone, sensory exam grossly normal, mentation intact and speech normal     " PSYCH: mentation appears normal. and affect normal/bright     LYMPHATICS: No cervical adenopathy    DIAGNOSTICS:   No labs or EKG required per preop guidelines     Recent Labs   Lab Test 11/28/19  2228 11/27/19  0745 11/26/19  0704  06/02/17  1015   HGB 11.3* 11.2* 11.0*   < >  --     217 206   < >  --    NA  --  137 137   < > 140   POTASSIUM  --  3.8 3.5   < > 3.6   CR  --  0.90 0.84   < > 0.73   A1C  --   --   --   --  5.1    < > = values in this interval not displayed.        IMPRESSION:   Reason for surgery/procedure: hydradenitis suppuritiva/ extraction from buttock and thigh graft   Diagnosis/reason for consult: preoperative evaluation     The proposed surgical procedure is considered INTERMEDIATE risk.    REVISED CARDIAC RISK INDEX  The patient has the following serious cardiovascular risks for perioperative complications such as (MI, PE, VFib and 3  AV Block):  No serious cardiac risks  INTERPRETATION: 0 risks: Class I (very low risk - 0.4% complication rate)    The patient has the following additional risks for perioperative complications:  Smoker, plans to quit with the surgery       ICD-10-CM    1. Preop general physical exam Z01.818    2. Hidradenitis suppurativa L73.2    3. Anemia, unspecified type D64.9    4. Hypocalcemia E83.51    5. Cigarette smoker motivated to quit F17.200        RECOMMENDATIONS:       Anemia  Anemia and does not require treatment prior to surgery.  Monitor Hemoglobin postoperatively.    Plans for smoking cessation, self help given     --Patient is on no chronic medications    Established care today, due for health maintenance exam, wished to discuss infertility we did not address due to preop, recommended she have with ObGyn or CNM    APPROVAL GIVEN to proceed with proposed procedure, without further diagnostic evaluation       Signed Electronically by: MARIOLA Sharif CNP    Copy of this evaluation report is provided to requesting physicianLauren Bean Preop  Guidelines    Revised Cardiac Risk Index

## 2020-01-17 ENCOUNTER — OFFICE VISIT (OUTPATIENT)
Dept: OBGYN | Facility: CLINIC | Age: 30
End: 2020-01-17
Payer: MEDICAID

## 2020-01-17 VITALS
HEART RATE: 83 BPM | DIASTOLIC BLOOD PRESSURE: 74 MMHG | BODY MASS INDEX: 30.11 KG/M2 | WEIGHT: 170 LBS | SYSTOLIC BLOOD PRESSURE: 116 MMHG

## 2020-01-17 DIAGNOSIS — Z31.69 ENCOUNTER FOR PRECONCEPTION CONSULTATION: ICD-10-CM

## 2020-01-17 DIAGNOSIS — Z13.29 SCREENING FOR THYROID DISORDER: ICD-10-CM

## 2020-01-17 DIAGNOSIS — Z01.419 WELL WOMAN EXAM WITH ROUTINE GYNECOLOGICAL EXAM: Primary | ICD-10-CM

## 2020-01-17 DIAGNOSIS — Z13.220 SCREENING, LIPID: ICD-10-CM

## 2020-01-17 DIAGNOSIS — E66.09 CLASS 1 OBESITY DUE TO EXCESS CALORIES WITH BODY MASS INDEX (BMI) OF 30.0 TO 30.9 IN ADULT, UNSPECIFIED WHETHER SERIOUS COMORBIDITY PRESENT: ICD-10-CM

## 2020-01-17 DIAGNOSIS — Z11.3 SCREEN FOR STD (SEXUALLY TRANSMITTED DISEASE): ICD-10-CM

## 2020-01-17 DIAGNOSIS — Z13.1 SCREENING FOR DIABETES MELLITUS: ICD-10-CM

## 2020-01-17 DIAGNOSIS — E66.811 CLASS 1 OBESITY DUE TO EXCESS CALORIES WITH BODY MASS INDEX (BMI) OF 30.0 TO 30.9 IN ADULT, UNSPECIFIED WHETHER SERIOUS COMORBIDITY PRESENT: ICD-10-CM

## 2020-01-17 DIAGNOSIS — F12.10 TETRAHYDROCANNABINOL (THC) USE DISORDER, MILD, ABUSE: ICD-10-CM

## 2020-01-17 LAB
HBA1C MFR BLD: 5.1 % (ref 0–5.6)
SPECIMEN SOURCE: ABNORMAL
TSH SERPL DL<=0.005 MIU/L-ACNC: 0.86 MU/L (ref 0.4–4)
WET PREP SPEC: ABNORMAL

## 2020-01-17 PROCEDURE — 84443 ASSAY THYROID STIM HORMONE: CPT | Performed by: OBSTETRICS & GYNECOLOGY

## 2020-01-17 PROCEDURE — 87389 HIV-1 AG W/HIV-1&-2 AB AG IA: CPT | Performed by: OBSTETRICS & GYNECOLOGY

## 2020-01-17 PROCEDURE — 99385 PREV VISIT NEW AGE 18-39: CPT | Performed by: OBSTETRICS & GYNECOLOGY

## 2020-01-17 PROCEDURE — 87591 N.GONORRHOEAE DNA AMP PROB: CPT | Performed by: OBSTETRICS & GYNECOLOGY

## 2020-01-17 PROCEDURE — 83036 HEMOGLOBIN GLYCOSYLATED A1C: CPT | Performed by: OBSTETRICS & GYNECOLOGY

## 2020-01-17 PROCEDURE — 36415 COLL VENOUS BLD VENIPUNCTURE: CPT | Performed by: OBSTETRICS & GYNECOLOGY

## 2020-01-17 PROCEDURE — 86803 HEPATITIS C AB TEST: CPT | Performed by: OBSTETRICS & GYNECOLOGY

## 2020-01-17 PROCEDURE — 86780 TREPONEMA PALLIDUM: CPT | Performed by: OBSTETRICS & GYNECOLOGY

## 2020-01-17 PROCEDURE — 87491 CHLMYD TRACH DNA AMP PROBE: CPT | Performed by: OBSTETRICS & GYNECOLOGY

## 2020-01-17 PROCEDURE — 87210 SMEAR WET MOUNT SALINE/INK: CPT | Performed by: OBSTETRICS & GYNECOLOGY

## 2020-01-17 NOTE — LETTER
January 21, 2020      Chhaya Mock  [address] [place] [address]        Dear Ms.Timberlake Mock,    We are writing to inform you of your test results.    We have attempted to call you several times with no return call. Your Vit D and iron levels were low - we would like to send prescription for both of these. Please call the clinic at [phone] to go over these results and send the prescriptions to your preferred pharmacy.    Resulted Orders   NEISSERIA GONORRHOEA PCR   Result Value Ref Range    Specimen Descrip Cervix     N Gonorrhea PCR Negative NEG^Negative      Comment:      Negative for N. gonorrhoeae rRNA by transcription mediated amplification.  A negative result by transcription mediated amplification does not preclude   the presence of N. gonorrhoeae infection because results are dependent on   proper and adequate collection, absence of inhibitors, and sufficient rRNA to   be detected.     CHLAMYDIA TRACHOMATIS PCR   Result Value Ref Range    Specimen Description Cervix     Chlamydia Trachomatis PCR Negative NEG^Negative      Comment:      Negative for C. trachomatis rRNA by transcription mediated amplification.  A negative result by transcription mediated amplification does not preclude   the presence of C. trachomatis infection because results are dependent on   proper and adequate collection, absence of inhibitors, and sufficient rRNA to   be detected.     Wet prep   Result Value Ref Range    Specimen Description Vagina     Wet Prep Moderate  Clue cells seen   (A)     Wet Prep Rare  WBC'S seen       Wet Prep No clue cells seen     Wet Prep No yeast seen    Treponema Abs w Reflex to RPR and Titer   Result Value Ref Range    Treponema Antibodies Nonreactive NR^Nonreactive   Hepatitis C antibody   Result Value Ref Range    Hepatitis C Antibody Nonreactive NR^Nonreactive      Comment:      Assay performance characteristics have not been established for newborns,   infants, and  children     HIV Antigen Antibody Combo   Result Value Ref Range    HIV Antigen Antibody Combo Nonreactive NR^Nonreactive          Comment:      HIV-1 p24 Ag & HIV-1/HIV-2 Ab Not Detected   Hemoglobin A1c   Result Value Ref Range    Hemoglobin A1C 5.1 0 - 5.6 %      Comment:      Normal <5.7% Prediabetes 5.7-6.4%  Diabetes 6.5% or higher - adopted from ADA   consensus guidelines.     TSH with free T4 reflex   Result Value Ref Range    TSH 0.86 0.40 - 4.00 mU/L       If you have any questions or concerns, please call the clinic at the number listed above.       Sincerely,        Lily Cardona MD

## 2020-01-17 NOTE — PATIENT INSTRUCTIONS
Start taking a prenatal vitamin daily.     Patient Education     Improving Your Fertility  Your healthcare provider may suggest some simple methods to help you get pregnant. Most focus on predicting ovulation. This is the time when sex has the best chance of success. Your healthcare provider may also advise working on other factors that can affect fertility.  Predicting ovulation  Conception is only possible when a woman is ovulating. One signal of ovulation is a surge in the hormone LH. Other signals include changes in body temperature and changes in cervical mucus.  Ovulation predictor kits  One of the best signals of ovulation is a sudden increase in the hormone LH. A simple urine test called an ovulation predictor kit is available at [facility]. It can help pinpoint this surge. Have sex the day you notice the surge. Keep having sex daily over the next 3 days, or as often as your healthcare provider suggests.  Body temperature changes  A woman s resting temperature or basal body temperature increases after ovulation.  By this point in the cycle, having sex will not increase your chances of getting pregnant. But it means ovulation has occurred.   Cervical mucus changes  Shortly before ovulation a woman s cervical mucus gets clear and stretchy. The mucus also increases in quantity. This makes it easier for sperm to travel through the cervix. Not all women notice these changes. But if you do, begin having sex daily until the mucus thickens again.  Working on other factors that affect fertility  Certain lifestyle and health habits can affect fertility. Be sure to talk with your healthcare provider about these issues. You may be able to make some simple changes to improve your chances of conception. Keep in mind, though, that it can take time for healthy changes to improve your fertility.  Weight problems  Being overweight or underweight can affect hormone levels. In women, this can affect ovulation. In men,  obesity can decrease sperm count.  Medicines, supplements, and herbal remedies  Medicines, supplements, and herbal remedies can affect hormone levels in men and women. They can also affect the quantity and quality of sperm. Be sure to tell your healthcare provider about any substances you take.  Sexually transmitted diseases  Sexually transmitted diseases (STDs) can scar the reproductive organs in both men and women. If you ve had an STD, be sure to tell your healthcare provider.  Testicular heat  A man s testicles are normally a few degrees cooler than the rest of his body. When the testicles are too warm, sperm production may decline. Try to not use hot tubs and saunas.  Smoking, alcohol, and drugs  Smoking can affect estrogen levels and decrease egg production in women. Men who smoke may have lowered sperm count. Drinking too much or using drugs can also decrease fertility in both men and women.   Chemical exposure  Certain chemicals can affect hormone levels. Talk with your healthcare provider if you re regularly exposed to strong chemicals. You should also ask about lubricants used during sex. Some types can be toxic to sperm.  Other factors  Getting too much exercise can decrease hormone production. It can also cause irregular menstruation in women. Ongoing stress may also affect fertility and cause ovulation problems.  Date Last Reviewed: 5/1/2018 2000-2019 [employer]. [address] [place] [address], [place] [address]. All rights reserved. This information is not intended as a substitute for professional medical care. Always follow your healthcare professional's instructions.           Patient Education     Prevention Guidelines, Women Ages 18 to 39  Screening tests and vaccines are an important part of managing your health. A screening test is done to find possible disorders or diseases in people who don't have any symptoms. The goal is to find a disease early so lifestyle changes can be made and  you can be watched more closely to reduce the risk of disease, or to detect it early enough to treat it most effectively. Screening tests are not considered diagnostic, but are used to determine if more testing is needed. Health counseling is essential, too. Below are guidelines for these, for women ages 18 to 39. Talk with your healthcare provider to make sure you re up-to-date on what you need.  Screening Who needs it How often   Alcohol misuse All women in this age group At routine exams   Blood pressure All women in this age group Yearly checkup if your blood pressure is normal  Normal blood pressure is less than 120/80 mm Hg  If your blood pressure reading is higher than normal, follow the advice of your healthcare provider   Breast cancer All women in this age group should talk with their healthcare providers about the need for clinical breast exams (CBE)1 Clinical breast exam every 3 years1   Cervical cancer Women ages 21 and older Women between ages 21 and 29 should have a Pap test every 3 years; women between ages 30 and 65 are advised to have a Pap test plus an HPV test every 5 years   Chlamydia Sexually active women ages 25 and younger, and women at increased risk for infection (such as having multiple sex partners) Every year if you're at risk or have symptoms   Depression All women in this age group At routine exams   Type 2 diabetes, prediabetes All women with no symptoms who are overweight or obese and have 1 or more other risk factors for diabetes At least every 3 years. Also, testing for diabetes during pregnancy after the 24th week.    Type 2 diabetes, prediabetes All women diagnosed with gestational diabetes Lifelong testing every 3 years   Type 2 diabetes All women with prediabetes Every year   Gonorrhea Sexually active women at increased risk for infection At routine exams   Hepatitis C Anyone at increased risk At routine exams   HIV All women should be tested at least once for HIV between the  ages of 13 and 64 At routine exams. Those with risk factors for HIV should be tested at least annually.    Obesity All women in this age group At routine exams   Syphilis Women at increased risk for infection should talk with their healthcare provider At routine exams   Tuberculosis Women at increased risk for infection should talk with their healthcare provider Ask your healthcare provider   Vision All women in this age group At least 1 complete exam in your 20s, and 2 in your 30s   Vaccine2 Who needs it How often   Chickenpox (varicella) All women in this age group who have no record of this infection or vaccine 2 doses; the second dose should be given 4 to 8 weeks after the first dose   Hepatitis A Women at increased risk for infection should talk with their healthcare provider 2 doses given at least 6 months apart   Hepatitis B Women at increased risk for infection should talk with their healthcare provider 3 doses over 6 months; second dose should be given 1 month after the first dose; the third dose should be given at least 2 months after the second dose and at least 4 months after the first dose   Haemophilus influenzae Type B (HIB) Women at increased risk for infection should talk with their healthcare provider 1 to 3 doses   Human papillomavirus (HPV) All women in this age group up to age 26 3 doses; the second dose should be given 1 to 2 months after the first dose and the third dose given 6 months after the first dose   Influenza (flu) All women in this age group Once a year   Measles, mumps, rubella (MMR) All women in this age group who have no record of these infections or vaccines 1 or 2 doses   Meningococcal Women at increased risk for infection should talk with their healthcare provider 1 or more doses   Pneumococcal conjugate vaccine (PCV13) and pneumococcal polysaccharide vaccine (PPSV23) Women at increased risk for infection should talk with their healthcare provider PCV13: 1 dose ages 19 to 65  (protects against 13 types of pneumococcal bacteria)  PPSV23: 1 to 2 doses through age 64, or 1 dose at 65 or older (protects against 23 types of pneumococcal bacteria)      Tetanus/diphtheria/pertussis (Td/Tdap) booster All women in this age group Td every 10 years, or a one-time dose of Tdap instead of a Td booster after age 18, then Td every 10 years   Counseling Who needs it How often   BRCA gene mutation testing for breast and ovarian cancer susceptibility Women with increased risk for having gene mutation When your risk is known   Breast cancer and chemoprevention Women at high risk for breast cancer When your risk is known   Diet and exercise Women who are overweight or obese When diagnosed, and then at routine exams   Domestic violence Women at the age in which they are able to have children At routine exams   Sexually transmitted infection prevention Women who are sexually active At routine exams   Skin cancer Prevention of skin cancer in fair-skinned adults At routine exams   Use of tobacco and the health effects it can cause All women in this age group Every visit   1 According to the ACS, women ages 20 to 39 years should have a clinical breast exam (CBE) as part of their routine health exam every 3 years. Breast self-exams are an option for women starting in their 20s. But the USPSTF does not recommend CBE.  Date Last Reviewed: 10/1/2017    5725-9389 [employer]. [address] [place] [address], [place] [address]. All rights reserved. This information is not intended as a substitute for professional medical care. Always follow your healthcare professional's instructions.

## 2020-01-17 NOTE — NURSING NOTE
"Chief Complaint   Patient presents with     Consult       Initial /74   Pulse 83   Wt 77.1 kg (170 lb)   BMI 30.11 kg/m   Estimated body mass index is 30.11 kg/m  as calculated from the following:    Height as of 12/10/19: 1.6 m (5' 3\").    Weight as of this encounter: 77.1 kg (170 lb).  BP completed using cuff size: regular    Questioned patient about current smoking habits.  Pt. has never smoked.          The following HM Due: NONE      The following patient reported/Care Every where data was sent to:  P ABSTRACT QUALITY INITIATIVES [[id number]]        N/a      Tereza Edwards MA                  "

## 2020-01-17 NOTE — PROGRESS NOTES
[facility]- OBGYN    CC: patient reports that she would like to have an annual exam and discuss fertility    Chhaya is a 29 year old  female who presents for annual exam. She states that she has been having regular intercourse since April or March and has not become pregnant.  Her partner has not had any prior pregnancies with any other partners.  She has been tracking her periods on her phone, but has not tried OPKs.  She recently had a surgery to remove a portion of hidradenitis on her buttock with graft and thus she has not been sexually active since then.     GYNECOLOGIC HISTORY:  G0  LMP- 2019  Menses- regular q25 days.  2-3 days of heavier menses and decreases on day 3-5.  She denies any low abdominal cramping  Sexually active with one male partner  STD hx- trichomonas and chlamydia about 5 years ago, treated  Pap hx- 17 NIL, next due 2020     HEALTH MAINTENANCE:  Cholesterol: (No results found for: CHOL History of abnormal lipids: No    Mammo: no . History of abnormal Mammo: Not applicable.  Regular Self Breast Exams: No    TSH: (  TSH   Date Value Ref Range Status   2017 0.54 0.40 - 4.00 mU/L Final    )  Pap; (  Lab Results   Component Value Date    PAP NIL 2017    )    HISTORY:  OB History    Para Term  AB Living   0 0 0 0 0 0   SAB TAB Ectopic Multiple Live Births   0 0 0 0 0     PMH- hidradenitis suppurativa, tobacco use    Past Surgical History:   Procedure Laterality Date     C ORAL SURGERY PROCEDURE  10/23/2008    [place] Teeth Extraction   removal of hidradenitis     Meds: PNV  Allergies- none     Family History   Problem Relation Age of Onset     Hypertension Maternal Grandmother      Diabetes Maternal Uncle      Hypertension Maternal Uncle      Alcohol/Drug Maternal Aunt      Alcohol/Drug Maternal Uncle      Anesthesia Reaction Other    Patient reports that she has a maternal grandmother with cancer and great aunts and uncles with cancer,  but is unsure what types  Social History     Patient denies any tobacco or alcohol use.  She states that she uses THC daily      ROS:   C:     NEGATIVE for fever, chills, change in weight  I:       NEGATIVE for worrisome rashes, moles or lesions  E:     NEGATIVE for vision changes or irritation  E/M: NEGATIVE for ear, mouth and throat problems  R:     NEGATIVE for significant cough or SOB  CV:   NEGATIVE for chest pain, palpitations or peripheral edema  GI:     NEGATIVE for nausea, abdominal pain, heartburn, or change in bowel habits  :   NEGATIVE for frequency, dysuria, hematuria, vaginal discharge, or irregular bleeding  M:     NEGATIVE for significant arthralgias or myalgia  N:      NEGATIVE for weakness, dizziness or paresthesias  E:      NEGATIVE for temperature intolerance, skin/hair changes  P:      NEGATIVE for changes in mood or affect.    EXAM:  /74   Pulse 83   Wt 77.1 kg (170 lb)   BMI 30.11 kg/m     BMI: Body mass index is 30.11 kg/m .  Constitutional: healthy, alert and no distress  Head: Normocephalic. No masses, lesions, tenderness or abnormalities  Neck: Neck supple. Trachea midline. No adenopathy. Thyroid symmetric, slightly enlarged size.   Cardiovascular: regular rate and rhythm   Respiratory: clear to auscultation bilaterally   Breasts: normal without suspicious masses, skin changes or axillary nodes.  Gastrointestinal: Abdomen soft, non-tender, non-distended. No masses, organomegaly.  :  Vulva:  No external lesions, no inguinal adenopathy.    Urethra:  Midline, non-tender, well supported, no discharge  Vagina:  Moist, pink, no abnormal discharge, no lesions  Uterus:  Normal size, non-tender, freely mobile  Ovaries:  No masses appreciated, non-tender  Rectal Exam: deferred  Musculoskeletal: extremities normal  Skin: no suspicious lesions or rashes  Psychiatric: Affect appropriate, cooperative,mentation appears normal.     ASSESSMENT and PLAN:  Chhaya is a 29 year old   female who presents for annual exam.     (Z01.419) Well woman exam with routine gynecological exam  (primary encounter diagnosis)  COUNSELING:   Reviewed preventive health counseling, as reflected in patient instructions       Regular exercise       Healthy diet/nutrition       Family planning   reports that she has been smoking cigarettes. She has a 1.00 pack-year smoking history. She has never used smokeless tobacco.    Body mass index is 30.11 kg/m .  Weight management plan: Discussed healthy diet and exercise guidelines    (Z11.3) Screen for STD (sexually transmitted disease)  Comment: STD screening requested  Plan: NEISSERIA GONORRHOEA PCR, CHLAMYDIA TRACHOMATIS        PCR, Wet prep, Treponema Abs w Reflex to RPR         and Titer, Hepatitis C antibody, HIV Antigen         Antibody Combo    (Z13.1) Screening for diabetes mellitus  Comment: Patient with obesity and no recent diabetes screening  Plan: Hemoglobin A1c    (Z13.220) Screening, lipid  Comment: Patient with no recent lipid screening  Plan: Lipid panel reflex to direct LDL Fasting    (Z13.29) Screening for thyroid disorder  Comment: Patient with slightly bulky thyroid to palpation.  Plan to test thyroid function  Plan: TSH with free T4 reflex    (F12.10) Tetrahydrocannabinol (THC) use disorder, mild, abuse  Comment: Patient with daily THC use.  Reviewed that this is not recommended especially when she has goals for pregnancy in the near future.  Reviewed optimization of health prior to pregnancy and during pregnancy attempts, which would include quitting THC use.   Plan: Patient reports no interest in quitting and states that only way she would quit is if she became pregnant.    (E66.09,  Z68.30) Class 1 obesity due to excess calories with body mass index (BMI) of 30.0 to 30.9 in adult, unspecified whether serious comorbidity present  Comment and Plan: Reviewed healthy diet and exercise for weight loss    Z31.69 Preconception encounter  Comment:  reviewed regular, timed, unprotected intercourse and encouraged patient to use ovulation predictor kits.  Encourage regular exercise and healthy diet as well as quitting THC to improve fertility.  Also reviewed definitions for infertility.  Patient has been trying for pregnancy since March or April.  Reviewed plan to use OPKs with timed intercourse over the next 4 months.  If not pregnant by April, then will plan for infertility work up at that time.   Continue daily PNV    Lily Cardona MD

## 2020-01-17 NOTE — LETTER
January 20, 2020      Chhaya Mock  [address] [place] [facility] [address]        Dear Ms.Timberlake Mock,    We are writing to inform you of your test results.    Your results showed negative HIV, negative Hep C, negative RPR, negative GC/CT, and normal thyroid results. Your Hgb A1C is normal, thus you do not have diabetes.     We have been trying to reach you in regards to your wet prep results. The number we have listed in your chart gives us a busy signal. Please call the clinic [phone] as we need to send treatment to your pharmacy for bacterial vaginosis.     Resulted Orders   NEISSERIA GONORRHOEA PCR   Result Value Ref Range    Specimen Descrip Cervix     N Gonorrhea PCR Negative NEG^Negative      Comment:      Negative for N. gonorrhoeae rRNA by transcription mediated amplification.  A negative result by transcription mediated amplification does not preclude   the presence of N. gonorrhoeae infection because results are dependent on   proper and adequate collection, absence of inhibitors, and sufficient rRNA to   be detected.     CHLAMYDIA TRACHOMATIS PCR   Result Value Ref Range    Specimen Description Cervix     Chlamydia Trachomatis PCR Negative NEG^Negative      Comment:      Negative for C. trachomatis rRNA by transcription mediated amplification.  A negative result by transcription mediated amplification does not preclude   the presence of C. trachomatis infection because results are dependent on   proper and adequate collection, absence of inhibitors, and sufficient rRNA to   be detected.     Wet prep   Result Value Ref Range    Specimen Description Vagina     Wet Prep Moderate  Clue cells seen   (A)     Wet Prep Rare  WBC'S seen       Wet Prep No clue cells seen     Wet Prep No yeast seen    Treponema Abs w Reflex to RPR and Titer   Result Value Ref Range    Treponema Antibodies Nonreactive NR^Nonreactive   Hepatitis C antibody   Result Value Ref Range    Hepatitis C Antibody  Nonreactive NR^Nonreactive      Comment:      Assay performance characteristics have not been established for newborns,   infants, and children     HIV Antigen Antibody Combo   Result Value Ref Range    HIV Antigen Antibody Combo Nonreactive NR^Nonreactive          Comment:      HIV-1 p24 Ag & HIV-1/HIV-2 Ab Not Detected   Hemoglobin A1c   Result Value Ref Range    Hemoglobin A1C 5.1 0 - 5.6 %      Comment:      Normal <5.7% Prediabetes 5.7-6.4%  Diabetes 6.5% or higher - adopted from ADA   consensus guidelines.     TSH with free T4 reflex   Result Value Ref Range    TSH 0.86 0.40 - 4.00 mU/L       If you have any questions or concerns, please call the clinic at the number listed above.       Sincerely,        Lily Cardona MD

## 2020-01-18 LAB
HCV AB SERPL QL IA: NONREACTIVE
HIV 1+2 AB+HIV1 P24 AG SERPL QL IA: NONREACTIVE
T PALLIDUM AB SER QL: NONREACTIVE

## 2020-11-07 ENCOUNTER — HEALTH MAINTENANCE LETTER (OUTPATIENT)
Age: 30
End: 2020-11-07

## 2020-11-10 ENCOUNTER — HOSPITAL ENCOUNTER (EMERGENCY)
Facility: CLINIC | Age: 30
Discharge: HOME | End: 2020-11-10
Attending: EMERGENCY MEDICINE | Admitting: EMERGENCY MEDICINE
Payer: COMMERCIAL

## 2020-11-10 ENCOUNTER — APPOINTMENT (OUTPATIENT)
Dept: GENERAL RADIOLOGY | Facility: CLINIC | Age: 30
End: 2020-11-10
Attending: EMERGENCY MEDICINE
Payer: COMMERCIAL

## 2020-11-10 VITALS
DIASTOLIC BLOOD PRESSURE: 79 MMHG | OXYGEN SATURATION: 97 % | RESPIRATION RATE: 18 BRPM | WEIGHT: 160 LBS | SYSTOLIC BLOOD PRESSURE: 125 MMHG | BODY MASS INDEX: 28.34 KG/M2 | HEART RATE: 100 BPM

## 2020-11-10 DIAGNOSIS — R05.9 COUGH: ICD-10-CM

## 2020-11-10 DIAGNOSIS — Z20.822 SUSPECTED COVID-19 VIRUS INFECTION: ICD-10-CM

## 2020-11-10 DIAGNOSIS — N89.8 VAGINAL DISCHARGE: ICD-10-CM

## 2020-11-10 LAB
FLUAV+FLUBV AG SPEC QL: NEGATIVE
FLUAV+FLUBV AG SPEC QL: NEGATIVE
HCG UR QL: NEGATIVE
INTERNAL QC OK POCT: YES
RSV AG SPEC QL: NEGATIVE
SARS-COV-2 RNA SPEC QL NAA+PROBE: NOT DETECTED
SPECIMEN SOURCE: NORMAL
WET PREP SPEC: NORMAL

## 2020-11-10 PROCEDURE — 87210 SMEAR WET MOUNT SALINE/INK: CPT | Performed by: EMERGENCY MEDICINE

## 2020-11-10 PROCEDURE — 87807 RSV ASSAY W/OPTIC: CPT | Performed by: EMERGENCY MEDICINE

## 2020-11-10 PROCEDURE — C9803 HOPD COVID-19 SPEC COLLECT: HCPCS

## 2020-11-10 PROCEDURE — 87491 CHLMYD TRACH DNA AMP PROBE: CPT | Performed by: EMERGENCY MEDICINE

## 2020-11-10 PROCEDURE — U0003 INFECTIOUS AGENT DETECTION BY NUCLEIC ACID (DNA OR RNA); SEVERE ACUTE RESPIRATORY SYNDROME CORONAVIRUS 2 (SARS-COV-2) (CORONAVIRUS DISEASE [COVID-19]), AMPLIFIED PROBE TECHNIQUE, MAKING USE OF HIGH THROUGHPUT TECHNOLOGIES AS DESCRIBED BY CMS-2020-01-R: HCPCS | Performed by: EMERGENCY MEDICINE

## 2020-11-10 PROCEDURE — 99284 EMERGENCY DEPT VISIT MOD MDM: CPT | Mod: 25

## 2020-11-10 PROCEDURE — 87591 N.GONORRHOEAE DNA AMP PROB: CPT | Performed by: EMERGENCY MEDICINE

## 2020-11-10 PROCEDURE — 94640 AIRWAY INHALATION TREATMENT: CPT

## 2020-11-10 PROCEDURE — 71045 X-RAY EXAM CHEST 1 VIEW: CPT

## 2020-11-10 PROCEDURE — 250N000013 HC RX MED GY IP 250 OP 250 PS 637: Performed by: EMERGENCY MEDICINE

## 2020-11-10 PROCEDURE — 99284 EMERGENCY DEPT VISIT MOD MDM: CPT | Performed by: EMERGENCY MEDICINE

## 2020-11-10 PROCEDURE — 81025 URINE PREGNANCY TEST: CPT | Performed by: EMERGENCY MEDICINE

## 2020-11-10 PROCEDURE — 87804 INFLUENZA ASSAY W/OPTIC: CPT | Mod: 59 | Performed by: EMERGENCY MEDICINE

## 2020-11-10 RX ORDER — ALBUTEROL SULFATE 90 UG/1
2 AEROSOL, METERED RESPIRATORY (INHALATION) ONCE
Status: COMPLETED | OUTPATIENT
Start: 2020-11-10 | End: 2020-11-10

## 2020-11-10 RX ORDER — ALBUTEROL SULFATE 90 UG/1
2 AEROSOL, METERED RESPIRATORY (INHALATION) EVERY 6 HOURS PRN
Qty: 1 INHALER | Refills: 0 | Status: SHIPPED | OUTPATIENT
Start: 2020-11-10 | End: 2024-05-29

## 2020-11-10 RX ORDER — ACETAMINOPHEN 500 MG
500-1000 TABLET ORAL EVERY 6 HOURS PRN
COMMUNITY
End: 2023-10-30

## 2020-11-10 RX ADMIN — ALBUTEROL SULFATE 2 PUFF: 90 AEROSOL, METERED RESPIRATORY (INHALATION) at 14:06

## 2020-11-10 NOTE — LETTER
November 10, 2020      To Whom It May Concern:      Chhaya Mock was seen in our Emergency Department today, 11/10/20.  Based on her evaluation today, there is a strong clinical suspicion of COVID-19 infection.  She should continue to self isolate at home for at least 10 days after the onset of her symptoms or until 24 hours after the complete resolution of her fevers, whichever is longer.  Please excuse her from work for this time frame.     Sincerely,        Mary Velázquez MD

## 2020-11-10 NOTE — ED NOTES
Reviewed discharge instructions. Gave Prescription. Discharged home. Patient denied further questions or concerns.

## 2020-11-10 NOTE — ED AVS SNAPSHOT
[facility]  [address]  [place] [address]  Phone: [phone]  Fax: [phone]                                    Chhaya Mock   MRN: [id number]    Department: [facility]   Date of Visit: 11/10/2020           After Visit Summary Signature Page    I have received my discharge instructions, and my questions have been answered. I have discussed any challenges I see with this plan with the nurse or doctor.    ..........................................................................................................................................  Patient/Patient Representative Signature      ..........................................................................................................................................  Patient Representative Print Name and Relationship to Patient    ..................................................               ................................................  Date                                   Time    ..........................................................................................................................................  Reviewed by Signature/Title    ...................................................              ..............................................  Date                                               Time          22EPIC Rev 08/18

## 2020-11-10 NOTE — DISCHARGE INSTRUCTIONS
TODAY'S VISIT:  You were seen today for suspected covid infection   -   - If you had any labs or imaging/radiology tests performed today, you should also discuss these tests with your usual provider.     FOLLOW-UP:  Please make an appointment to follow up with:  - Your Primary Care Provider. If you do not have a PCP, please call the [facility] (phone: [phone] for an appointment    - Have your provider review the results from today's visit with you again to make sure no further follow-up or additional testing is needed based on those results.     PRESCRIPTIONS / MEDICATIONS:  -Tylenol or ibuprofen as needed for pain or fever at home    OTHER INSTRUCTIONS:  -Make sure to stay well-hydrated.  -See below    RETURN TO THE EMERGENCY DEPARTMENT  Return to the Emergency Department at any time for any new or worsening symptoms or any concerns.    TODAY'S VISIT  YOU ARE BEING TESTED FOR COVID-19 (NOVEL CORONAVIRUS).   -  The cause of your symptoms is not yet known, but you are being tested for COVID-19.  -  It has been determined that you do not medically need to be hospitalized at this time, and  you can be monitored with self-isolation at home.     YOUR TESTS FOR THIS ILLNESS ARE CURRENTLY IN PROCESS.    -  These tests are performed by the [place] [facility].  -  Our staff will contact you with your results, likely within the next 24-72 hours.  -  If your test is positive, you will also be contacted by the [place] [facility].   -  Please remain under home isolation precautions until your healthcare provider and/or state and local health department tell you it is safe, and you no longer need to self-isolate at home.     SELF-ISOLATION INSTRUCTIONS  STAY HOME. Do not go to work, school, or public areas. Avoid using public transportation, ride-sharing (Uber/Lyft), or taxis. You should only leave your home if you require medical attention (See instructions below, please contact your  provider first.)   SEPARATE YOURSELF FROM OTHER PEOPLE AND ANIMALS. As much as possible, you should stay in a specific room and away from other people in your home. You should use a separate bathroom, if available. Avoid contact with pets and other animals. When possible, have another household member care for your  family and animals while you are sick.   DO NOT SHARE HOUSEHOLD ITEMS. Do not share dishes, drinking glasses, eating utensils, towels, bedding, etc., with others or pets in your home. These items should be washed with soap and water.   WEAR A FACEMASK. Wear a facemask if you need to be around other people, and cover your mouth and nose with tissue when you cough or sneeze.  WASH YOUR HANDS OFTEN. Wash your hands with soap and water for at least 20 second, or use hand  regularly. Avoid touching your face with unwashed hands.     SELF-MONITORING INSTRUCTIONS  SEEK PROMPT MEDICAL ATTENTION IF YOUR ILLNESS IS WORSENING. Watch closely for new or worsening symptoms, such as fever, cough, shortness of breath or difficulty breathing.   SIGN UP FOR [employer]. Sign up for the [employer] application, using the information at the end of this document. Your results and a message about those results can be sent through Swyzzle. If you do not have [employer], we will call you with your results, but it may take longer.  IF YOU NEED MEDICAL CARE OR HAVE A MEDICAL APPOINTMENT (and it is not an emergency):   -  CALL YOUR HEALTHCARE PROVIDER BEFORE GOING TO THE [facility]  -  TELL THEM THAT YOU ARE BEING TESTING FOR AND MAY HAVE COVID-19.  YOUR CLOSE CONTACTS SHOULD MONITOR THEIR HEALTH. If your close contacts develop symptoms, they should visit [email or web address] to determine if testing is needed, and where this is done.   If you have any questions, please contact your usual health care provider or the [place] [facility] ([facility]) at [phone]    EMERGENCY INSTRUCTIONS  IF YOU NEED EMERGENCY MEDICAL ATTENTION,  CALL 911 AND LET THEM KNOW YOU MAY HAVE ARE BEING EVALUATED FOR COVID-19.      WHAT IS COVID-19 (CORONAVIRUS)  COVID-19 is a viral respiratory illness caused by a newly identified coronavirus that was discovered in late 2019 in [place]. Coronaviruses are a large family of viruses. Some coronaviruses cause illnesses in people and others only circulate among animals. Rarely, animal coronaviruses can evolve and infect people. The virus causing COVID-19 may have emerged from an animal source, and it is now able to spread from person to person.     How does it spread?   The virus is thought to spread between people who are in close contact (within about six feet) through respiratory droplets produced when an infected person coughs, sneezes, or talks. It may also spread when one person touches a surface or object that has the virus on it and then touches their mouth, nose, or eyes. However, this is not thought to be the main way the virus is transmitted.    SYMPTOMS  This coronavirus causes mild to severe respiratory illness with often with fever, cough, and/or difficulty breathing. After exposure, symptoms typically present within 2 to 14 days.     TREATMENTS AND PREVENTION  Patients may also be asked to self-quarantine at home in order to prevent the spread of the coronavirus. There is no antiviral treatment recommended for COVID-19. People with COVID-19 may receive supportive care to help relieve symptoms.    Prevention  No vaccine is currently available for the coronavirus causing COVID-19. The best way to prevent spread of the illness is to avoid exposure through simple precautions. Prevention steps include:   Stay home if you're feeling sick.   Avoid close contact with others, and try to stay at least 6-feet away from others if you're ill.   Wash your hands often with soap and warm water for at least 20 seconds, especially after going to the bathroom; before eating; and after blowing your nose, coughing, or sneezing.  Use an alcohol-based hand  with at least 60 percent alcohol if soap and water are not readily available.   Clean and disinfect frequently touched objects and surfaces using a regular household cleaning spray or wipe.     Thank you for your understanding and cooperation.

## 2020-11-10 NOTE — ED TRIAGE NOTES
Pt arrives after three days of s/s related to covid. Reports niece was tested positive for covid and been around her for last week. Pt reports intermittent lose of taste and smell. Demonstrated tripod coughing and states that to caught breath while coughing has to position in tripod. Reports running nose as well. Thought was the flu in initially and now concerned for covid.

## 2020-11-10 NOTE — ED PROVIDER NOTES
"  History     Chief Complaint   Patient presents with     Cough     Pt reports intermittent coughing     Fever     \"Hot and cold\"      Suspected Covid     Contact with COVID patients and loss of taste      HPI  Chhaya Mock is a 29 year old female with a past medical history of hydradenitis suppurativa who presents to the emergency department with a chief complaint of cough.  Associated with subjective fevers and chills, patient states she feels hot and cold at home.  Her cough has been intermittent.  The patient states that she was recently exposed to her niece at a  who tested positive for Covid 19 infection.  Her niece was asymptomatic, but the patient states that for the past 3 days she has had many symptoms, including subjective fevers, cough, loss of taste and smell, and shortness of breath.  She also notes rhinorrhea and sore throat.  The patient had thought this might be related to influenza, but became suspicious when she lost her sense of taste, even when eating a meal of spicy red beans and rice that she had cooked herself at home and noted a loss of sense of smell, even when cleaning something at home with strongly scented Clorox wipes.  The patient notes that her work requires her to be tested in order to provide her with Covid pay while she is quarantining at home due to suspected Covid infection.    I have reviewed the Medications, Allergies, Past Medical and Surgical History, and Social History in the [employer] system.    Past Medical History:   Diagnosis Date     Carbuncle and furuncle      Tobacco use disorder     Smokes 1/2 ppd. Started smoking at 14 years     Vaginal yeast infection 2017     Past Surgical History:   Procedure Laterality Date     C ORAL SURGERY PROCEDURE  10/23/2008    [place] Teeth Extraction     No current facility-administered medications for this encounter.      Current Outpatient Medications   Medication     acetaminophen (TYLENOL) 500 MG tablet "     Allergies   Allergen Reactions     No Known Allergies      Past medical history, past surgical history, medications, and allergies were reviewed with the patient. Additional pertinent items: None    Social History     Socioeconomic History     Marital status: Single     Spouse name: Not on file     Number of children: Not on file     Years of education: Not on file     Highest education level: Not on file   Occupational History     Employer: [employer].     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     Comment: [employer]   Social Needs     Financial resource strain: Not on file     Food insecurity     Worry: Not on file     Inability: Not on file     Transportation needs     Medical: Not on file     Non-medical: Not on file   Tobacco Use     Smoking status: Current Every Day Smoker     Packs/day: 0.50     Years: 2.00     Pack years: 1.00     Types: Cigarettes     Smokeless tobacco: Never Used   Substance and Sexual Activity     Alcohol use: No     Comment: occassionally     Drug use: Yes     Types: Marijuana     Comment: occassionally     Sexual activity: Yes     Partners: Male   Lifestyle     Physical activity     Days per week: Not on file     Minutes per session: Not on file     Stress: Not on file   Relationships     Social connections     Talks on phone: Not on file     Gets together: Not on file     Attends [employer] service: Not on file     Active member of club or organization: Not on file     Attends meetings of clubs or organizations: Not on file     Relationship status: Not on file     Intimate partner violence     Fear of current or ex partner: Not on file     Emotionally abused: Not on file     Physically abused: Not on file     Forced sexual activity: Not on file   Other Topics Concern     Not on file   Social History Narrative     Not on file     Social history was reviewed with the patient. Additional pertinent items: None    Review of Systems  General: Positive for subjective  fevers and chills  Skin: No rash or diaphoresis  Eyes: No eye redness or discharge  Ears/Nose/Throat: No rhinorrhea or nasal congestion  Respiratory: See HPI  Cardiovascular: No chest pain or palpitations  Gastrointestinal: No nausea, vomiting, or diarrhea  Genitourinary: No urinary frequency, hematuria, or dysuria, positive for vaginal discharge  Musculoskeletal: No arthralgias or myalgias  Neurologic: No numbness or weakness  Psychiatric: No depression or SI  Hematologic/Lymphatic/Immunologic: No leg swelling, no easy bruising/bleeding  Endocrine: No polyuria/polydypsia    A complete review of systems was performed with pertinent positives and negatives noted in the HPI, and all other systems negative.    Physical Exam   BP: (!) 127/100  Pulse: 87  Resp: 16  Weight: 72.6 kg (160 lb)  SpO2: 100 %      General: Well nourished, well developed, NAD  HEENT: EOMI, anicteric. NCAT, MMM  Neck: no jugular venous distension, supple, nl ROM  Cardiac: Regular rate and rhythm. No murmurs, rubs, or gallops. Normal S1, S2.  Intact peripheral pulses  Pulm: no stridor, rales, rhonchi, slight bilateral wheezes are present  Abd: Soft, nontender, nondistended.  No masses palpated.  Genitourinary: External exam is normal, internal exam reveals moderate white vaginal discharge, vaginal mucosa is normal, cervix appears normal skin: Warm and dry to the touch.  No rash  Extremities: No LE edema, no cyanosis, w/w/p  Neuro: A&Ox3, no gross focal deficits    ED Course        Procedures                           Labs Ordered and Resulted from Time of ED Arrival Up to the Time of Departure from the ED   HCG QUAL URINE POCT - Normal   COVID-19 VIRUS (CORONAVIRUS) BY PCR   INFLUENZA A/B ANTIGEN   RSV RAPID ANTIGEN   WET PREP   CHLAMYDIA TRACHOMATIS PCR   NEISSERIA GONORRHOEAE PCR            Results for orders placed or performed during the hospital encounter of 11/10/20 (from the past 24 hour(s))   Influenza A & B Antigen   Result Value Ref  Range    Influenza A/B Agn Specimen Nasopharyngeal     Influenza A Negative NEG^Negative    Influenza B Negative NEG^Negative   Respiratory Syncytial Virus (RSV) Antigen   Result Value Ref Range    RSV Rapid Antigen Spec Type Nasopharyngeal     RSV Rapid Antigen Result Negative NEG^Negative   XR Chest Port 1 View    Narrative    CHEST ONE VIEW PORTABLE  11/10/2020 12:28 PM     HISTORY: Cough.    COMPARISON: 6/12/2018.      Impression    IMPRESSION: No acute cardiopulmonary disease.   hCG qual urine POCT   Result Value Ref Range    HCG Qual Urine Negative neg    Internal QC OK Yes        Labs, vital signs, and imaging studies were reviewed by me.    Medications   albuterol (PROAIR HFA/PROVENTIL HFA/VENTOLIN HFA) 108 (90 Base) MCG/ACT inhaler 2 puff (has no administration in time range)       Assessments & Plan (with Medical Decision Making)   Chhaya Mock is a 29 year old female who presents with cough. Ddx includes pneumonia, bronchitis, influenza, covid-19 infection, other viral syndrome.  Chest x-ray, Covid, and influenza testing ordered in the emergency department.  Patient's vital signs are within normal limits on arrival to the emergency department, however she does have a slight bilateral wheeze on exam.  Albuterol was administered in the emergency department to help alleviate her symptoms.    Influenza testing is negative.  Chest x-ray shows no acute disease process.  Covid testing is pending at this time.  Patient was advised that due to strong clinical suspicion of COVID-19 infection, she should self isolate at home until at least 10 days after the onset of her symptoms or 24 hours after the complete resolution of her fevers, whichever is longer.  Patient was provided with a note for work to this effect.    Patient also complained of vaginal discharge.  She states she has been putting off going to see her doctor regarding this due to the current COVID-19 pandemic.  Differential diagnosis for  this includes gonorrhea, chlamydia, bacterial vaginosis, trichomonas, yeast infection.  Wet prep and gonorrhea/committee testing were sent.    Wet prep shows no clue cells, trichomonas, or yeast    I have reviewed the nursing notes.    I have reviewed the findings, diagnosis, plan and need for follow up with the patient.    Patient to be discharged home. Advised to follow up with PCP within 1 week. To return to ER immediately with any new/worsening symptoms. Plan of care discussed with patient who expresses understanding and agrees with plan of care.    Prescription for albuterol was provided to the patient for symptomatic relief at home.    New Prescriptions    ALBUTEROL (PROAIR HFA/PROVENTIL HFA/VENTOLIN HFA) 108 (90 BASE) MCG/ACT INHALER    Inhale 2 puffs into the lungs every 6 hours as needed for shortness of breath / dyspnea or wheezing       Final diagnoses:   Suspected COVID-19 virus infection   Vaginal discharge       11/10/2020   [facility] EMERGENCY DEPARTMENT     Mary Velázquez MD  11/11/20 8180

## 2020-11-11 ENCOUNTER — TELEPHONE (OUTPATIENT)
Dept: EMERGENCY MEDICINE | Facility: CLINIC | Age: 30
End: 2020-11-11

## 2020-11-12 NOTE — TELEPHONE ENCOUNTER
[facility] [place] Emergency Department Lab result notification     Patient/parent Name  Chhaya    Reason for call  Patient requesting lab result    Lab Result  Component      Latest Ref Rng & Units 11/10/2020   Influenza A/B Agn Specimen Nasopharyngeal   Influenza A      NEG:Negative Negative   Influenza B      NEG:Negative Negative   COVID-19 Virus PCR to [facility] - Source Nasopharyngeal   COVID-19 Virus PCR to [facility] - Result Not Detected   RSV Rapid Antigen Spec Type Nasopharyngeal   RSV Rapid Antigen Result      NEG:Negative Negative     Recommendations/Instructions  Notified of results.  She has [employer] but does't know her username.  Relayed her username and changed her password      [RN Name]  Zeferino Zhang RN  [employer] [facility]  Emergency Dept Lab Result RN  Ph# [phone]

## 2020-11-12 NOTE — RESULT ENCOUNTER NOTE
Final result for both N. Gonorrhoeae PCR and Chlamydia Trachomatis PCR are NEGATIVE.  No treatment or change in treatment per [place] ED Lab Result protocol.

## 2021-03-27 ENCOUNTER — HEALTH MAINTENANCE LETTER (OUTPATIENT)
Age: 31
End: 2021-03-27

## 2021-09-11 ENCOUNTER — HEALTH MAINTENANCE LETTER (OUTPATIENT)
Age: 31
End: 2021-09-11

## 2022-02-26 ENCOUNTER — HOSPITAL ENCOUNTER (EMERGENCY)
Facility: CLINIC | Age: 32
Discharge: HOME | End: 2022-02-27
Attending: EMERGENCY MEDICINE | Admitting: EMERGENCY MEDICINE
Payer: COMMERCIAL

## 2022-02-26 DIAGNOSIS — K65.1 PERITONEAL ABSCESS (H): ICD-10-CM

## 2022-02-26 PROCEDURE — 56405 I&D VULVA/PERINEAL ABSCESS: CPT | Performed by: EMERGENCY MEDICINE

## 2022-02-26 PROCEDURE — 76857 US EXAM PELVIC LIMITED: CPT | Mod: 26 | Performed by: EMERGENCY MEDICINE

## 2022-02-26 PROCEDURE — 76857 US EXAM PELVIC LIMITED: CPT | Mod: 59 | Performed by: EMERGENCY MEDICINE

## 2022-02-26 PROCEDURE — 99284 EMERGENCY DEPT VISIT MOD MDM: CPT | Mod: 25 | Performed by: EMERGENCY MEDICINE

## 2022-02-26 PROCEDURE — 99285 EMERGENCY DEPT VISIT HI MDM: CPT | Mod: 25 | Performed by: EMERGENCY MEDICINE

## 2022-02-26 NOTE — Clinical Note
Chhaya Mock was seen and treated in our emergency department on 2/26/2022.  She may return to work on 03/02/2022.       If you have any questions or concerns, please don't hesitate to call.      Paul Aburto MD

## 2022-02-27 ENCOUNTER — ANCILLARY PROCEDURE (OUTPATIENT)
Dept: ULTRASOUND IMAGING | Facility: CLINIC | Age: 32
End: 2022-02-27
Attending: EMERGENCY MEDICINE
Payer: COMMERCIAL

## 2022-02-27 VITALS
HEART RATE: 53 BPM | BODY MASS INDEX: 21.42 KG/M2 | RESPIRATION RATE: 13 BRPM | WEIGHT: 153 LBS | HEIGHT: 71 IN | OXYGEN SATURATION: 100 % | DIASTOLIC BLOOD PRESSURE: 79 MMHG | TEMPERATURE: 98 F | SYSTOLIC BLOOD PRESSURE: 114 MMHG

## 2022-02-27 LAB
ALBUMIN UR-MCNC: 20 MG/DL
APPEARANCE UR: CLEAR
BACTERIA #/AREA URNS HPF: ABNORMAL /HPF
BILIRUB UR QL STRIP: NEGATIVE
C TRACH DNA SPEC QL NAA+PROBE: NEGATIVE
CLUE CELLS: PRESENT
COLOR UR AUTO: YELLOW
GLUCOSE UR STRIP-MCNC: NEGATIVE MG/DL
HGB UR QL STRIP: ABNORMAL
KETONES UR STRIP-MCNC: NEGATIVE MG/DL
LEUKOCYTE ESTERASE UR QL STRIP: NEGATIVE
MUCOUS THREADS #/AREA URNS LPF: PRESENT /LPF
N GONORRHOEA DNA SPEC QL NAA+PROBE: NEGATIVE
NITRATE UR QL: NEGATIVE
PH UR STRIP: 7 [PH] (ref 5–7)
RBC URINE: 11 /HPF
SP GR UR STRIP: 1.03 (ref 1–1.03)
SQUAMOUS EPITHELIAL: 7 /HPF
TRICHOMONAS, WET PREP: ABNORMAL
UROBILINOGEN UR STRIP-MCNC: 3 MG/DL
WBC URINE: <1 /HPF
WBC'S/HIGH POWER FIELD, WET PREP: ABNORMAL
YEAST, WET PREP: ABNORMAL

## 2022-02-27 PROCEDURE — 87491 CHLMYD TRACH DNA AMP PROBE: CPT | Performed by: EMERGENCY MEDICINE

## 2022-02-27 PROCEDURE — 250N000011 HC RX IP 250 OP 636: Performed by: EMERGENCY MEDICINE

## 2022-02-27 PROCEDURE — 250N000013 HC RX MED GY IP 250 OP 250 PS 637: Performed by: EMERGENCY MEDICINE

## 2022-02-27 PROCEDURE — 81001 URINALYSIS AUTO W/SCOPE: CPT | Performed by: EMERGENCY MEDICINE

## 2022-02-27 PROCEDURE — 250N000011 HC RX IP 250 OP 636

## 2022-02-27 PROCEDURE — 87591 N.GONORRHOEAE DNA AMP PROB: CPT | Performed by: EMERGENCY MEDICINE

## 2022-02-27 PROCEDURE — 87210 SMEAR WET MOUNT SALINE/INK: CPT | Performed by: EMERGENCY MEDICINE

## 2022-02-27 RX ORDER — ROPIVACAINE HYDROCHLORIDE 5 MG/ML
15 INJECTION, SOLUTION EPIDURAL; INFILTRATION; PERINEURAL ONCE
Status: COMPLETED | OUTPATIENT
Start: 2022-02-27 | End: 2022-02-27

## 2022-02-27 RX ORDER — PROPOFOL 10 MG/ML
INJECTION, EMULSION INTRAVENOUS
Status: COMPLETED
Start: 2022-02-27 | End: 2022-02-27

## 2022-02-27 RX ORDER — PROPOFOL 10 MG/ML
180 INJECTION, EMULSION INTRAVENOUS ONCE
Status: COMPLETED | OUTPATIENT
Start: 2022-02-27 | End: 2022-02-27

## 2022-02-27 RX ORDER — OXYCODONE HYDROCHLORIDE 5 MG/1
5 TABLET ORAL ONCE
Status: COMPLETED | OUTPATIENT
Start: 2022-02-27 | End: 2022-02-27

## 2022-02-27 RX ADMIN — PROPOFOL 180 MG: 10 INJECTION, EMULSION INTRAVENOUS at 00:29

## 2022-02-27 RX ADMIN — OXYCODONE HYDROCHLORIDE 5 MG: 5 TABLET ORAL at 01:43

## 2022-02-27 RX ADMIN — ROPIVACAINE HYDROCHLORIDE 15 ML: 5 INJECTION, SOLUTION EPIDURAL; INFILTRATION; PERINEURAL at 00:31

## 2022-02-27 NOTE — ED NOTES
Procedural sedation   Assisted by Yousif RN and PALMA Miller.     Procedure start time:  0029  70mg propofol administered    RASS -3 @ 0031    0032: Propofol 35mg administered  0034: Propofol 35mg administered    CO2 monitoring 41 @ 00:35  0035: Propofol 40mg administered    Procedure end time: 0039  Total time: 10 minutes.   TOTAL propofol: 180mg    Recover start time: 0039    0042 RASS -2    0045 RASS -1  Room air, moves all extremities, awake and talking to writer  Reba Gonzales RN on 2/27/2022 at 1:11 AM

## 2022-02-27 NOTE — ED PROVIDER NOTES
[facility] - [place], [place] EMERGENCY DEPARTMENT ([facility])    2/26/22          History     Chief Complaint   Patient presents with     Abscess     HPI  Chhaya Mock is a 31 year old female with a past medical history significant for hydradenitis suppurativa who presents to the ED for evaluation of abscess. Adjacent to her R labia majora.  History of similar abscesses in the past secondary to hidradenitis suppurativa.  No associated fevers or chills.  No drainage.  Symptoms have been progressive for several days, started as a small pimple as is typical for the patient    No other rashes.  No trauma.  No recent antibiotic use.    Past Medical History  Past Medical History:   Diagnosis Date     Carbuncle and furuncle      Tobacco use disorder     Smokes 1/2 ppd. Started smoking at 14 years     Vaginal yeast infection 06/02/2017     Past Surgical History:   Procedure Laterality Date     [facility] ORAL SURGERY PROCEDURE  10/23/2008    [place] Teeth Extraction     acetaminophen (TYLENOL) 500 MG tablet  albuterol (PROAIR HFA/PROVENTIL HFA/VENTOLIN HFA) 108 (90 Base) MCG/ACT inhaler      Allergies   Allergen Reactions     No Known Allergies      Family History  Family History   Problem Relation Age of Onset     Hypertension Maternal Grandmother      Diabetes Maternal Uncle      Hypertension Maternal Uncle      Alcohol/Drug Maternal Aunt      Alcohol/Drug Maternal Uncle      Anesthesia Reaction Other      Social History   Social History     Tobacco Use     Smoking status: Current Every Day Smoker     Packs/day: 0.50     Years: 2.00     Pack years: 1.00     Types: Cigarettes     Smokeless tobacco: Never Used   Substance Use Topics     Alcohol use: No     Comment: occassionally     Drug use: Yes     Types: Marijuana     Comment: occassionally      Past medical history, past surgical history, medications, allergies, family history, and social history were reviewed with the patient. No additional pertinent items.      "  Review of Systems  A complete review of systems was performed with pertinent positives and negatives noted in the HPI, and all other systems negative.    Physical Exam   BP: 128/88  Pulse: 60  Temp: 98  F (36.7  C)  Resp: 16  Height: 180.3 cm (5' 11\")  Weight: 69.4 kg (153 lb)  SpO2: 100 %  Physical Exam  GEN: Well appearing, non toxic, cooperative and conversant.   HEENT: The head is normocephalic and atraumatic. Pupils are equal round and reactive to light. Extraocular motions are intact. There is no facial swelling.   CV: Regular rate   PULM: Unlabored breathing   : 5 x 3 cm area of induration along the right perineal area adjacent to the right labia majora.   EXT: Full range of motion.  No edema.  NEURO: Cranial nerves II through XII are intact and symmetric. Bilateral upper and lower extremities grossly show full range of motion without any focal deficits.     PSYCH: Calm and cooperative, interactive.     ED [facility]    PROCEDURE: -Incision/Drainage    Date/Time: 2/27/2022 2:35 AM  Performed by: Paul Aburto MD  Authorized by: Paul Aburto MD     Risks, benefits and alternatives discussed.      UNIVERSAL PROTOCOL   Site Marked: Yes  Prior Images Obtained and Reviewed:  Yes  Required items: Required blood products, implants, devices and special equipment available    Patient identity confirmed:  Verbally with patient, provided demographic data and hospital-assigned identification number  Patient was reevaluated immediately before administering moderate or deep sedation or anesthesia  Confirmation Checklist:  Patient's identity using two indicators  Time out: Immediately prior to the procedure a time out was called    Universal Protocol: the Joint Commission Universal Protocol was followed    Preparation: Patient was prepped and draped in usual sterile fashion      LOCATION:      Type:  Abscess    Size:  Perineal R groin     Location:  " Anogenital    PRE-PROCEDURE DETAILS:     Skin preparation:  Chloraprep    PROCEDURE TYPE:     Complexity:  Simple    ANESTHESIA (see MAR for exact dosages):     Anesthesia method:  Local infiltration    Local anesthetic:  Lidocaine 1% WITH epi (ropivacaine 0.5% 5 cc )    PROCEDURE DETAILS:     Needle aspiration: no      Incision types:  Single straight    Incision depth:  Dermal    Scalpel blade:  11    Wound management:  Probed and deloculated    Drainage:  Purulent    Drainage amount:  Moderate    Wound treatment:  Wound left open    Packing materials:  1/4 in iodoform gauze    PROCEDURE    Patient Tolerance:  Patient tolerated the procedure well with no immediate complications    Results for orders placed during the hospital encounter of 02/26/22    POC US SOFT TISSUE    Impression  Limited Soft Tissue Ultrasound, performed and interpreted by me.    Indication:  Skin redness warmth pain. Evaluate for cellulitis vs abscess.    Body location: perineum    Findings:  There is no cobblestoning suggestive of cellulitis in the evaluated area. There is a fluid collection measuring 3x2  to suggest abscess. No foreign body identified    IMPRESSION: Abscess                     Results for orders placed or performed during the hospital encounter of 02/26/22   POC US SOFT TISSUE     Status: None    Impression    Limited Soft Tissue Ultrasound, performed and interpreted by me.    Indication:  Skin redness warmth pain. Evaluate for cellulitis vs abscess.     Body location: perineum    Findings:  There is no cobblestoning suggestive of cellulitis in the evaluated area. There is a fluid collection measuring 3x2  to suggest abscess. No foreign body identified    IMPRESSION: Abscess         Wet preparation     Status: Abnormal    Specimen: Vagina; Swab   Result Value Ref Range    Trichomonas Absent Absent    Yeast Absent Absent    Clue Cells Present (A) Absent    WBCs/high power field 1+ (A) None     Medications   ropivacaine  (NAROPIN) injection 15 mL (15 mLs Infiltration Given 2/27/22 0031)   propofol (DIPRIVAN) injection 10 mg/mL vial (180 mg Intravenous Given 2/27/22 0029)   oxyCODONE (ROXICODONE) tablet 5 mg (5 mg Oral Given 2/27/22 0143)        Assessments & Plan (with Medical Decision Making)   31-year-old female who presents with abscess as noted above.  No evidence of significant surrounding cellulitis.  On ultrasound abscess appears to be fairly simple no extensive adjacent pockets requiring extensive debridement or disturbance of loculations.    Patient tolerated I&D well as noted above under procedural sedation with propofol  Please see Dr. Kumar's note for sedation details    Patient recovered post sedation and is appropriate for discharge with wound check in 2 days as described.  Strict ED return precautions given discussed.  Patient agrees with plan.    - Patient agrees to our plan and is ready and eager for discharge. Care plan, follow up plan, and reasons to return immediately to the ED were dicussed in detail and summarized as noted in the discharge instructions.      I have reviewed the nursing notes. I have reviewed the findings, diagnosis, plan and need for follow up with the patient.    New Prescriptions    No medications on file       Final diagnoses:   Peritoneal abscess (H)       --  Paul Aburto MD  [facility] EMERGENCY DEPARTMENT  2/26/2022     Paul Aburto MD  02/27/22 [id number]

## 2022-02-27 NOTE — ED PROVIDER NOTES
[facility], [place] Procedure Note        Sedation:      Performed by: Dina Kumar MD  Authorized by: Dina Kumar MD    Pre-Procedure Assessment done at 0000.    Sedation Level:  Deep Sedation    Indication:  Sedation is required to allow for Incision and drainage of abcess    Consent obtained from patient after discussing the risks, benefits and alternatives.    PO Intake:  Appropriately NPO for procedure    ASA Class:  Class 1 - HEALTHY PATIENT    Mallampati:  Grade 1:  Soft palate, uvula, tonsillar pillars, and posterior pharyngeal wall visible    Lungs: Lungs Clear with good breath sounds bilaterally.     Heart: Normal heart sounds and rate    History and physical reviewed and no updates needed. I have reviewed the lab findings, diagnostic data, medications, and the plan for sedation. I have determined this patient to be an appropriate candidate for the planned sedation and procedure and have reassessed the patient IMMEDIATELY PRIOR to sedation and procedure.      Sedation Post Procedure Summary:    Prior to the start of the procedure and with procedural staff participation, I verbally confirmed the patient s identity using two indicators, relevant allergies, that the procedure was appropriate and matched the consent or emergent situation, and that the correct equipment/implants were available. Immediately prior to starting the procedure I conducted the Time Out with the procedural staff and re-confirmed the patient s name, procedure, and site/side. (The Joint Commission universal protocol was followed.)  Yes      Sedatives: Propofol    Vital signs, airway, End Tidal CO2 and pulse oximetry were monitored and remained stable throughout the procedure and sedation was maintained until the procedure was complete.  The patient was monitored by staff until sedation discharge criteria were met.    Patient tolerance: Patient tolerated the procedure well with no immediate complications.    Time of  sedation in minutes:  15 minutes from beginning to end of physician one to one monitoring.       Dina Kumar MD  02/27/22 0042

## 2022-02-27 NOTE — ED TRIAGE NOTES
Patient reports abscess to right upper and inner thigh, near outer labia. Denies fever or chills. Reports severe pain and swelling.

## 2022-02-27 NOTE — ED NOTES
Patient reports severe pain, oxycodone administered as ordered (see eMAR). Patient alert, oriented, and ambulating independently at this time. Offered repositioning and other comfort measures.  Reba Gonzales RN on 2/27/2022 at 1:48 AM

## 2022-02-27 NOTE — DISCHARGE INSTRUCTIONS
Return to the emergency department for any worsening redness, swelling, drainage, pain, fever or any other concerns as given or discussed.     No dressing change needed until follow up in 2 days     You can take tylenol as needed for pain. The maximum daily dose is 4000 mg and the maximum single dose at a time is 1000mg. For your condition, your should take 1000mg every 6 hours as needed for pain.    You can take ibuprofen for pain. The maximum daily dose is 2400 mg and the maximum single dose as a time is 800mg. For your condition, your should take 600mg every 4 hours as needed for pain.

## 2022-04-23 ENCOUNTER — HEALTH MAINTENANCE LETTER (OUTPATIENT)
Age: 32
End: 2022-04-23

## 2022-10-29 ENCOUNTER — HEALTH MAINTENANCE LETTER (OUTPATIENT)
Age: 32
End: 2022-10-29

## 2022-11-11 NOTE — ED AVS SNAPSHOT
[facility], [place], Emergency Department    [address]    [facility] [address]    Phone:  [phone]    Fax:  [phone]                                       Chhaya Mock   MRN: [id number]    Department:  [facility], Emergency Department   Date of Visit:  1/16/2018           After Visit Summary Signature Page     I have received my discharge instructions, and my questions have been answered. I have discussed any challenges I see with this plan with the nurse or doctor.    ..........................................................................................................................................  Patient/Patient Representative Signature      ..........................................................................................................................................  Patient Representative Print Name and Relationship to Patient    ..................................................               ................................................  Date                                            Time    ..........................................................................................................................................  Reviewed by Signature/Title    ...................................................              ..............................................  Date                                                            Time           all other ROS negative except as per HPI

## 2023-04-05 ENCOUNTER — HOSPITAL ENCOUNTER (EMERGENCY)
Facility: CLINIC | Age: 33
Discharge: HOME | End: 2023-04-06
Attending: EMERGENCY MEDICINE | Admitting: EMERGENCY MEDICINE
Payer: COMMERCIAL

## 2023-04-05 VITALS
DIASTOLIC BLOOD PRESSURE: 86 MMHG | HEART RATE: 90 BPM | TEMPERATURE: 98.7 F | OXYGEN SATURATION: 100 % | SYSTOLIC BLOOD PRESSURE: 132 MMHG | RESPIRATION RATE: 16 BRPM

## 2023-04-05 DIAGNOSIS — K12.0 ORAL APHTHOUS ULCER: ICD-10-CM

## 2023-04-05 PROCEDURE — 99282 EMERGENCY DEPT VISIT SF MDM: CPT

## 2023-04-06 ASSESSMENT — ENCOUNTER SYMPTOMS
CHILLS: 0
FEVER: 0

## 2023-04-06 NOTE — ED PROVIDER NOTES
History     Chief Complaint:  Herpes concern     The history is provided by the patient.      Chhaya Mock is a 32 year old female who presents for herpes concern. Patient states she noticed a white sore in her left upper inner lip today. Patient is concerned this might be a symptom of herpes. She reports no fever or chills. She reports no pain or other medical problems at this time. She declines STD testing.     Independent Historian:   None - Patient Only    ROS:  Review of Systems   Constitutional: Negative for chills and fever.   HENT: Positive for mouth sores.    All other systems reviewed and are negative.    Allergies:  No known drug allergies      Medications:    The patient is not currently taking any prescribed medications.     Past Medical History:    Tobacco use disorder  Lead poisoning (as a small child)  Hidradenitis suppurativa  Anemia  Hypocalcemia    Past Surgical History:    [place] teeth extraction  D&C    Family History:    Anesthesia reaction in a family member    Social History:  Patient presents to the ED via private vehicle alone     Physical Exam     Patient Vitals for the past 24 hrs:   BP Temp Temp src Pulse Resp SpO2   04/05/23 2348 132/86 98.7  F (37.1  C) Temporal 90 16 100 %      Physical Exam  General: Patient is alert, awake and interactive when I enter the room  Head: The scalp, face, and head appear normal  Eyes: Conjunctivae are normal  ENT: The nose is normal, Pinnae are normal, External acoustic canals are normal. Small aphthous ulcer on left upper lip.  No other oral ulcers noted.  Neck: Trachea midline  CV: Pulses are normal.   Resp: No respiratory distress   Musc: Normal muscular tone, moving all extremities.  Skin: No rash or lesions noted  Neuro: Speech is normal and fluent. Face is symmetric.   Psych: Normal affect.  Appropriate interactions.    Emergency Department Course     Emergency Department Course & Assessments:    Independent Interpretation (X-rays,  CTs, rhythm strip):  None    Assessments/Consultations/Discussion of Management or Tests:  ED Course as of 04/06/23 0005 Wed Apr 05, 2023   1615 I obtained history and examined the patient as noted above.      Social Determinants of Health affecting care:   None    Disposition:  The patient was discharged to home.     Impression & Plan      Medical Decision Making:  Patient is a 32-year-old female who presents emergency department with oral ulcer.  This appears to be an oral abscess ulcer on exam.  No concern for herpes or additional oral pathology.  Offered STD testing as she was requesting this in triage.  However patient declines.    Diagnosis:    ICD-10-CM    1. Oral aphthous ulcer  K12.0          Scribe Disclosure:  PHILIP, Hanna Camacho, am serving as a scribe at 12:03 AM on 4/6/2023 to document services personally performed by Billy Sims MD based on my observations and the provider's statements to me.     4/5/2023   Billy Sims MD Battista, Christopher Joseph, MD  04/06/23 0358

## 2023-04-06 NOTE — ED TRIAGE NOTES
Pt has white bump on left upper lip. BF accusing pt of having herpes. Now pt wants to be tested for STIs.

## 2023-06-01 ENCOUNTER — HEALTH MAINTENANCE LETTER (OUTPATIENT)
Age: 33
End: 2023-06-01

## 2023-07-29 ENCOUNTER — HOSPITAL ENCOUNTER (EMERGENCY)
Facility: CLINIC | Age: 33
Discharge: HOME | End: 2023-07-30
Attending: FAMILY MEDICINE | Admitting: FAMILY MEDICINE
Payer: COMMERCIAL

## 2023-07-29 VITALS
HEART RATE: 89 BPM | WEIGHT: 140 LBS | BODY MASS INDEX: 19.6 KG/M2 | HEIGHT: 71 IN | OXYGEN SATURATION: 99 % | TEMPERATURE: 98.6 F | SYSTOLIC BLOOD PRESSURE: 147 MMHG | DIASTOLIC BLOOD PRESSURE: 94 MMHG | RESPIRATION RATE: 20 BRPM

## 2023-07-29 DIAGNOSIS — N93.8 DYSFUNCTIONAL UTERINE BLEEDING: ICD-10-CM

## 2023-07-29 LAB
ALBUMIN UR-MCNC: 20 MG/DL
APPEARANCE UR: CLEAR
BASOPHILS # BLD AUTO: 0 10E3/UL (ref 0–0.2)
BASOPHILS NFR BLD AUTO: 0 %
BILIRUB UR QL STRIP: NEGATIVE
COLOR UR AUTO: YELLOW
EOSINOPHIL # BLD AUTO: 0.1 10E3/UL (ref 0–0.7)
EOSINOPHIL NFR BLD AUTO: 1 %
ERYTHROCYTE [DISTWIDTH] IN BLOOD BY AUTOMATED COUNT: 14 % (ref 10–15)
GLUCOSE UR STRIP-MCNC: NEGATIVE MG/DL
HCT VFR BLD AUTO: 38 % (ref 35–47)
HGB BLD-MCNC: 12.4 G/DL (ref 11.7–15.7)
HGB UR QL STRIP: ABNORMAL
IMM GRANULOCYTES # BLD: 0 10E3/UL
IMM GRANULOCYTES NFR BLD: 0 %
KETONES UR STRIP-MCNC: ABNORMAL MG/DL
LEUKOCYTE ESTERASE UR QL STRIP: NEGATIVE
LYMPHOCYTES # BLD AUTO: 3.9 10E3/UL (ref 0.8–5.3)
LYMPHOCYTES NFR BLD AUTO: 35 %
MCH RBC QN AUTO: 31.2 PG (ref 26.5–33)
MCHC RBC AUTO-ENTMCNC: 32.6 G/DL (ref 31.5–36.5)
MCV RBC AUTO: 96 FL (ref 78–100)
MONOCYTES # BLD AUTO: 0.8 10E3/UL (ref 0–1.3)
MONOCYTES NFR BLD AUTO: 7 %
MUCOUS THREADS #/AREA URNS LPF: PRESENT /LPF
NEUTROPHILS # BLD AUTO: 6.4 10E3/UL (ref 1.6–8.3)
NEUTROPHILS NFR BLD AUTO: 57 %
NITRATE UR QL: NEGATIVE
NRBC # BLD AUTO: 0 10E3/UL
NRBC BLD AUTO-RTO: 0 /100
PH UR STRIP: 5.5 [PH] (ref 5–7)
PLATELET # BLD AUTO: 256 10E3/UL (ref 150–450)
RBC # BLD AUTO: 3.98 10E6/UL (ref 3.8–5.2)
RBC URINE: 33 /HPF
SP GR UR STRIP: 1.03 (ref 1–1.03)
SQUAMOUS EPITHELIAL: 5 /HPF
UROBILINOGEN UR STRIP-MCNC: 2 MG/DL
WBC # BLD AUTO: 11.3 10E3/UL (ref 4–11)
WBC URINE: 1 /HPF

## 2023-07-29 PROCEDURE — 80053 COMPREHEN METABOLIC PANEL: CPT | Performed by: FAMILY MEDICINE

## 2023-07-29 PROCEDURE — 99284 EMERGENCY DEPT VISIT MOD MDM: CPT | Performed by: FAMILY MEDICINE

## 2023-07-29 PROCEDURE — 36415 COLL VENOUS BLD VENIPUNCTURE: CPT | Performed by: FAMILY MEDICINE

## 2023-07-29 PROCEDURE — 81001 URINALYSIS AUTO W/SCOPE: CPT | Performed by: FAMILY MEDICINE

## 2023-07-29 PROCEDURE — 99284 EMERGENCY DEPT VISIT MOD MDM: CPT | Mod: 25 | Performed by: FAMILY MEDICINE

## 2023-07-29 PROCEDURE — 85025 COMPLETE CBC W/AUTO DIFF WBC: CPT | Performed by: FAMILY MEDICINE

## 2023-07-30 ENCOUNTER — APPOINTMENT (OUTPATIENT)
Dept: ULTRASOUND IMAGING | Facility: CLINIC | Age: 33
End: 2023-07-30
Attending: FAMILY MEDICINE
Payer: COMMERCIAL

## 2023-07-30 LAB
ALBUMIN SERPL BCG-MCNC: 4 G/DL (ref 3.5–5.2)
ALP SERPL-CCNC: 44 U/L (ref 35–104)
ALT SERPL W P-5'-P-CCNC: 10 U/L (ref 0–50)
ANION GAP SERPL CALCULATED.3IONS-SCNC: 11 MMOL/L (ref 7–15)
AST SERPL W P-5'-P-CCNC: 16 U/L (ref 0–45)
BILIRUB SERPL-MCNC: 0.3 MG/DL
BUN SERPL-MCNC: 12.5 MG/DL (ref 6–20)
C TRACH DNA SPEC QL NAA+PROBE: NEGATIVE
CALCIUM SERPL-MCNC: 8.4 MG/DL (ref 8.6–10)
CHLORIDE SERPL-SCNC: 104 MMOL/L (ref 98–107)
CLUE CELLS: PRESENT
CREAT SERPL-MCNC: 0.8 MG/DL (ref 0.51–0.95)
DEPRECATED HCO3 PLAS-SCNC: 23 MMOL/L (ref 22–29)
GFR SERPL CREATININE-BSD FRML MDRD: >90 ML/MIN/1.73M2
GLUCOSE SERPL-MCNC: 94 MG/DL (ref 70–99)
N GONORRHOEA DNA SPEC QL NAA+PROBE: NEGATIVE
POTASSIUM SERPL-SCNC: 3.5 MMOL/L (ref 3.4–5.3)
PROT SERPL-MCNC: 6.8 G/DL (ref 6.4–8.3)
SODIUM SERPL-SCNC: 138 MMOL/L (ref 136–145)
TRICHOMONAS, WET PREP: ABNORMAL
WBC'S/HIGH POWER FIELD, WET PREP: ABNORMAL
YEAST, WET PREP: ABNORMAL

## 2023-07-30 PROCEDURE — 87491 CHLMYD TRACH DNA AMP PROBE: CPT | Performed by: FAMILY MEDICINE

## 2023-07-30 PROCEDURE — 87210 SMEAR WET MOUNT SALINE/INK: CPT | Performed by: FAMILY MEDICINE

## 2023-07-30 PROCEDURE — 76856 US EXAM PELVIC COMPLETE: CPT

## 2023-07-30 PROCEDURE — 87591 N.GONORRHOEAE DNA AMP PROB: CPT | Performed by: FAMILY MEDICINE

## 2023-07-30 ASSESSMENT — ACTIVITIES OF DAILY LIVING (ADL): ADLS_ACUITY_SCORE: 35

## 2023-07-30 NOTE — ED PROVIDER NOTES
[facility] EMERGENCY DEPARTMENT ([facility])    7/29/23      ED PROVIDER NOTE        History     Chief Complaint   Patient presents with    Vaginal Bleeding     HPI  Chhaya Mock is a 32 year old female with a past medical history significant for oral aphthous ulcer and peritoneal abscess who presents to the emergency department for evaluation of irregular menstrual bleeding.  Patient states that she had an irregular period in May with some increased abdominal discomfort and then had a normal period in June but July had her period and is now bleeding again after her period for 3 to 4 days.  She has had some increase in abdominal cramping denies any nausea vomiting no change in bowel or bladder no other significant medical complaints were noted.  Patient notes that she does have some discharge and has been treated for BV within the last few months..    Past Medical History  Past Medical History:   Diagnosis Date    Carbuncle and furuncle     Tobacco use disorder     Smokes 1/2 ppd. Started smoking at 14 years    Vaginal yeast infection 06/02/2017     Past Surgical History:   Procedure Laterality Date    [facility] ORAL SURGERY PROCEDURE  10/23/2008    [place] Teeth Extraction     acetaminophen (TYLENOL) 500 MG tablet  albuterol (PROAIR HFA/PROVENTIL HFA/VENTOLIN HFA) 108 (90 Base) MCG/ACT inhaler      Allergies   Allergen Reactions    No Known Allergies      Family History  Family History   Problem Relation Age of Onset    Hypertension Maternal Grandmother     Diabetes Maternal Uncle     Hypertension Maternal Uncle     Alcohol/Drug Maternal Aunt     Alcohol/Drug Maternal Uncle     Anesthesia Reaction Other      Social History   Social History     Tobacco Use    Smoking status: Every Day     Packs/day: 0.50     Years: 2.00     Pack years: 1.00     Types: Cigarettes    Smokeless tobacco: Never   Substance Use Topics    Alcohol use: No     Comment: occassionally    Drug use: Yes     Types: Marijuana      "Comment: occassionally         A medically appropriate review of systems was performed with pertinent positives and negatives noted in the HPI, and all other systems negative.    Physical Exam   BP: (!) 147/94  Pulse: 89  Temp: 98.6  F (37  C)  Resp: 20  Height: 180.3 cm (5' 11\")  Weight: 63.5 kg (140 lb)  SpO2: 99 %  Physical Exam  Constitutional:       General: She is not in acute distress.     Appearance: Normal appearance. She is not diaphoretic.   HENT:      Head: Atraumatic.      Mouth/Throat:      Mouth: Mucous membranes are moist.   Eyes:      General: No scleral icterus.     Conjunctiva/sclera: Conjunctivae normal.   Cardiovascular:      Rate and Rhythm: Normal rate.      Heart sounds: Normal heart sounds.   Pulmonary:      Effort: No respiratory distress.      Breath sounds: Normal breath sounds.   Abdominal:      General: Abdomen is flat.   Genitourinary:     Vagina: Vaginal discharge present. No bleeding.      Cervix: No cervical motion tenderness.      Uterus: Tender.       Adnexa: Right adnexa normal and left adnexa normal.   Musculoskeletal:      Cervical back: Neck supple.   Skin:     General: Skin is warm.      Findings: No rash.   Neurological:      Mental Status: She is alert.           ED Course, Procedures, & Data      Procedures       Results for orders placed or performed during the hospital encounter of 07/29/23   US Pelvic Complete w Transvaginal     Status: None    Narrative    EXAM: US PELVIC TRANSABDOMINAL AND TRANSVAGINAL  LOCATION: [facility]  DATE: 7/30/2023    INDICATION: Pelvic pain  COMPARISON: None.  TECHNIQUE: Transabdominal scans were performed. Endovaginal ultrasound was performed to better visualize the adnexa.    FINDINGS:    UTERUS: 7.6 x 4.3 x 2.8 cm. Normal in size and position with no masses.    ENDOMETRIUM: 4.9 mm. Normal smooth endometrium.    RIGHT OVARY: 4.0 x 2.6 x 2.1 cm. Subtle hyperechoic region in the right ovary " measuring 1.6 cm, potentially a hemorrhagic cyst. Right ovarian arterial and venous flow documented.    LEFT OVARY: 2.6 x 2.5 x 1.6 cm. Unremarkable    No significant free fluid.      Impression    IMPRESSION:    1. Possible 1.6 cm right ovarian hemorrhagic cyst. Short-term follow-up ultrasound recommended in 6-12 weeks given suboptimal visualization.  2. No other acute abnormalities identified in the pelvis.   Comprehensive metabolic panel     Status: Abnormal   Result Value Ref Range    Sodium 138 136 - 145 mmol/L    Potassium 3.5 3.4 - 5.3 mmol/L    Chloride 104 98 - 107 mmol/L    Carbon Dioxide (CO2) 23 22 - 29 mmol/L    Anion Gap 11 7 - 15 mmol/L    Urea Nitrogen 12.5 6.0 - 20.0 mg/dL    Creatinine 0.80 0.51 - 0.95 mg/dL    Calcium 8.4 (L) 8.6 - 10.0 mg/dL    Glucose 94 70 - 99 mg/dL    Alkaline Phosphatase 44 35 - 104 U/L    AST 16 0 - 45 U/L    ALT 10 0 - 50 U/L    Protein Total 6.8 6.4 - 8.3 g/dL    Albumin 4.0 3.5 - 5.2 g/dL    Bilirubin Total 0.3 <=1.2 mg/dL    GFR Estimate >90 >60 mL/min/1.73m2   UA with Microscopic reflex to Culture     Status: Abnormal    Specimen: Urine, Midstream   Result Value Ref Range    Color Urine Yellow Colorless, Straw, Light Yellow, Yellow    Appearance Urine Clear Clear    Glucose Urine Negative Negative mg/dL    Bilirubin Urine Negative Negative    Ketones Urine Trace (A) Negative mg/dL    Specific Gravity Urine 1.035 1.003 - 1.035    Blood Urine Large (A) Negative    pH Urine 5.5 5.0 - 7.0    Protein Albumin Urine 20 (A) Negative mg/dL    Urobilinogen Urine 2.0 Normal, 2.0 mg/dL    Nitrite Urine Negative Negative    Leukocyte Esterase Urine Negative Negative    Mucus Urine Present (A) None Seen /LPF    RBC Urine 33 (H) <=2 /HPF    WBC Urine 1 <=5 /HPF    Squamous Epithelials Urine 5 (H) <=1 /HPF    Narrative    Urine Culture not indicated   CBC with platelets and differential     Status: Abnormal   Result Value Ref Range    WBC Count 11.3 (H) 4.0 - 11.0 10e3/uL    RBC Count  3.98 3.80 - 5.20 10e6/uL    Hemoglobin 12.4 11.7 - 15.7 g/dL    Hematocrit 38.0 35.0 - 47.0 %    MCV 96 78 - 100 fL    MCH 31.2 26.5 - 33.0 pg    MCHC 32.6 31.5 - 36.5 g/dL    RDW 14.0 10.0 - 15.0 %    Platelet Count 256 150 - 450 10e3/uL    % Neutrophils 57 %    % Lymphocytes 35 %    % Monocytes 7 %    % Eosinophils 1 %    % Basophils 0 %    % Immature Granulocytes 0 %    NRBCs per 100 WBC 0 <1 /100    Absolute Neutrophils 6.4 1.6 - 8.3 10e3/uL    Absolute Lymphocytes 3.9 0.8 - 5.3 10e3/uL    Absolute Monocytes 0.8 0.0 - 1.3 10e3/uL    Absolute Eosinophils 0.1 0.0 - 0.7 10e3/uL    Absolute Basophils 0.0 0.0 - 0.2 10e3/uL    Absolute Immature Granulocytes 0.0 <=0.4 10e3/uL    Absolute NRBCs 0.0 10e3/uL   Wet prep     Status: Abnormal    Specimen: Vagina; Swab   Result Value Ref Range    Trichomonas Absent Absent    Yeast Absent Absent    Clue Cells Present (A) Absent    WBCs/high power field 1+ (A) None   Chlamydia trachomatis PCR     Status: Normal    Specimen: Vagina; Swab   Result Value Ref Range    Chlamydia trachomatis Negative Negative   Neisseria gonorrhoea PCR     Status: Normal    Specimen: Vagina; Swab   Result Value Ref Range    Neisseria gonorrhoeae Negative Negative   CBC with platelets differential     Status: Abnormal    Narrative    The following orders were created for panel order CBC with platelets differential.  Procedure                               Abnormality         Status                     ---------                               -----------         ------                     CBC with platelets and d...[[id number]]  Abnormal            Final result                 Please view results for these tests on the individual orders.     Medications - No data to display  Labs Ordered and Resulted from Time of ED Arrival to Time of ED Departure   COMPREHENSIVE METABOLIC PANEL - Abnormal       Result Value    Sodium 138      Potassium 3.5      Chloride 104      Carbon Dioxide (CO2) 23      Anion Gap 11       Urea Nitrogen 12.5      Creatinine 0.80      Calcium 8.4 (*)     Glucose 94      Alkaline Phosphatase 44      AST 16      ALT 10      Protein Total 6.8      Albumin 4.0      Bilirubin Total 0.3      GFR Estimate >90     ROUTINE UA WITH MICROSCOPIC REFLEX TO CULTURE - Abnormal    Color Urine Yellow      Appearance Urine Clear      Glucose Urine Negative      Bilirubin Urine Negative      Ketones Urine Trace (*)     Specific Gravity Urine 1.035      Blood Urine Large (*)     pH Urine 5.5      Protein Albumin Urine 20 (*)     Urobilinogen Urine 2.0      Nitrite Urine Negative      Leukocyte Esterase Urine Negative      Mucus Urine Present (*)     RBC Urine 33 (*)     WBC Urine 1      Squamous Epithelials Urine 5 (*)    CBC WITH PLATELETS AND DIFFERENTIAL - Abnormal    WBC Count 11.3 (*)     RBC Count 3.98      Hemoglobin 12.4      Hematocrit 38.0      MCV 96      MCH 31.2      MCHC 32.6      RDW 14.0      Platelet Count 256      % Neutrophils 57      % Lymphocytes 35      % Monocytes 7      % Eosinophils 1      % Basophils 0      % Immature Granulocytes 0      NRBCs per 100 WBC 0      Absolute Neutrophils 6.4      Absolute Lymphocytes 3.9      Absolute Monocytes 0.8      Absolute Eosinophils 0.1      Absolute Basophils 0.0      Absolute Immature Granulocytes 0.0      Absolute NRBCs 0.0       US Pelvic Complete w Transvaginal   Final Result   IMPRESSION:      1. Possible 1.6 cm right ovarian hemorrhagic cyst. Short-term follow-up ultrasound recommended in 6-12 weeks given suboptimal visualization.   2. No other acute abnormalities identified in the pelvis.             Critical care was not performed.     Medical Decision Making  The patient's presentation was of moderate complexity (an acute illness with systemic symptoms).    The patient's evaluation involved:  ordering and/or review of 3+ test(s) in this encounter (see separate area of note for details)    The patient's management necessitated moderate risk  (prescription drug management including medications given in the ED).    Assessment & Plan        I have reviewed the nursing notes. I have reviewed the findings, diagnosis, plan and need for follow up with the patient.    Patient's wet prep eventually demonstrated clue cells prescription for Flagyl 500 twice daily was called into [facility] in [place].  Patient will be following up with outpatient OB/GYN for any further evaluation.    Final diagnoses:   Dysfunctional uterine bleeding         [facility] EMERGENCY DEPARTMENT  7/29/2023     Morales Campuzano MD  07/31/23 [id number]

## 2023-07-30 NOTE — ED TRIAGE NOTES
Reports this is her second abnormal menstrual for he month stating she had her regular menstrual - and then began cramping and spotting this afternoon. Reports in  she also had her regular menstrual followed by a 4 day coarse of bleeding. She had a negative pregnancy test then.      Triage Assessment       Row Name [id number]       Triage Assessment (Adult)    Airway WDL WDL       Respiratory WDL    Respiratory WDL WDL       Skin Circulation/Temperature WDL    Skin Circulation/Temperature WDL WDL       Cardiac WDL    Cardiac WDL WDL       Peripheral/Neurovascular WDL    Peripheral Neurovascular WDL WDL

## 2023-07-30 NOTE — DISCHARGE INSTRUCTIONS
Discharged home plan on following up with [facility] for further evaluation or return if marked increase in pain or bleeding.

## 2023-07-31 ENCOUNTER — TELEPHONE (OUTPATIENT)
Dept: EMERGENCY MEDICINE | Facility: CLINIC | Age: 33
End: 2023-07-31
Payer: COMMERCIAL

## 2023-07-31 NOTE — RESULT ENCOUNTER NOTE
At 1PM, Dr Morales Campuzano notified of the Wet Prep result.  He will contact Chhaya and treat her for BV

## 2023-07-31 NOTE — TELEPHONE ENCOUNTER
"[facility] Emergency Department Lab result notification     Caller/Patient name  Chhaya    Reason for call  Patient requesting lab result  \"I think this means I have bacterial vaginosis and I would like to be treated    Lab Result  Component      Latest Ref Rng 7/30/2023  1:06 AM   Trichomonas      Absent  Absent    Yeast      Absent  Absent    Clue cells      Absent  Present !    WBCs/high power field      None  1+ !    Chlamydia Trachomatis PCR      Negative  Negative    N Gonorrhea PCR      Negative  Negative       Legend:  ! Abnormal  Current symptoms  Having vaginal discharge, whitish/yellowish  Lower abdominal cramping    Recommendations/Instructions  RN will consult with Obs unit Provider for recommendations in treatment    At 12:45PM RN consulted with OBS unit Provider Maria.  She will review and call back with   Recommendations  At 1PM, Dr Morales Campuzano notified of the Wet Prep result.  He will contact Marielosdao and treat her for BV      Zeferino Zhang RN  [facility] [email or web address] [facility]  Emergency Dept Lab Result RN  Ph# [phone]     "

## 2023-07-31 NOTE — RESULT ENCOUNTER NOTE
Final result for both N. Gonorrhoeae PCR and Chlamydia Trachomatis PCR are NEGATIVE.  No treatment or change in treatment per [facility] ED Lab Result N. Gonorrhea AND/OR Chlamydia T. protocol.

## 2023-08-02 ENCOUNTER — OFFICE VISIT (OUTPATIENT)
Dept: OBGYN | Facility: CLINIC | Age: 33
End: 2023-08-02
Attending: FAMILY MEDICINE
Payer: COMMERCIAL

## 2023-08-02 VITALS
WEIGHT: 139.4 LBS | OXYGEN SATURATION: 100 % | DIASTOLIC BLOOD PRESSURE: 81 MMHG | HEART RATE: 86 BPM | BODY MASS INDEX: 19.44 KG/M2 | SYSTOLIC BLOOD PRESSURE: 114 MMHG

## 2023-08-02 DIAGNOSIS — L73.2 HYDRADENITIS: ICD-10-CM

## 2023-08-02 DIAGNOSIS — N76.0 BV (BACTERIAL VAGINOSIS): ICD-10-CM

## 2023-08-02 DIAGNOSIS — F43.23 ADJUSTMENT DISORDER WITH MIXED ANXIETY AND DEPRESSED MOOD: ICD-10-CM

## 2023-08-02 DIAGNOSIS — Z12.4 SCREENING FOR MALIGNANT NEOPLASM OF CERVIX: ICD-10-CM

## 2023-08-02 DIAGNOSIS — N93.8 DYSFUNCTIONAL UTERINE BLEEDING: Primary | ICD-10-CM

## 2023-08-02 DIAGNOSIS — B96.89 BV (BACTERIAL VAGINOSIS): ICD-10-CM

## 2023-08-02 DIAGNOSIS — Z87.42 HISTORY OF PID: ICD-10-CM

## 2023-08-02 LAB
MIS SERPL-MCNC: 2 NG/ML (ref 0.58–8.1)
PROGEST SERPL-MCNC: 0.5 NG/ML
PROLACTIN SERPL 3RD IS-MCNC: 4 NG/ML (ref 5–23)
SHBG SERPL-SCNC: 64 NMOL/L (ref 30–135)

## 2023-08-02 PROCEDURE — 87624 HPV HI-RISK TYP POOLED RSLT: CPT | Performed by: OBSTETRICS & GYNECOLOGY

## 2023-08-02 PROCEDURE — 83520 IMMUNOASSAY QUANT NOS NONAB: CPT | Performed by: OBSTETRICS & GYNECOLOGY

## 2023-08-02 PROCEDURE — 84144 ASSAY OF PROGESTERONE: CPT | Performed by: OBSTETRICS & GYNECOLOGY

## 2023-08-02 PROCEDURE — 36415 COLL VENOUS BLD VENIPUNCTURE: CPT | Performed by: OBSTETRICS & GYNECOLOGY

## 2023-08-02 PROCEDURE — 84270 ASSAY OF SEX HORMONE GLOBUL: CPT | Performed by: OBSTETRICS & GYNECOLOGY

## 2023-08-02 PROCEDURE — 84146 ASSAY OF PROLACTIN: CPT | Performed by: OBSTETRICS & GYNECOLOGY

## 2023-08-02 PROCEDURE — 99204 OFFICE O/P NEW MOD 45 MIN: CPT | Performed by: OBSTETRICS & GYNECOLOGY

## 2023-08-02 PROCEDURE — 84403 ASSAY OF TOTAL TESTOSTERONE: CPT | Performed by: OBSTETRICS & GYNECOLOGY

## 2023-08-02 PROCEDURE — G0145 SCR C/V CYTO,THINLAYER,RESCR: HCPCS | Performed by: OBSTETRICS & GYNECOLOGY

## 2023-08-02 RX ORDER — METRONIDAZOLE 500 MG/1
500 TABLET ORAL 2 TIMES DAILY
Qty: 14 TABLET | Refills: 0 | Status: SHIPPED | OUTPATIENT
Start: 2023-08-02 | End: 2023-10-30

## 2023-08-02 ASSESSMENT — ANXIETY QUESTIONNAIRES
2. NOT BEING ABLE TO STOP OR CONTROL WORRYING: NEARLY EVERY DAY
7. FEELING AFRAID AS IF SOMETHING AWFUL MIGHT HAPPEN: NEARLY EVERY DAY
GAD7 TOTAL SCORE: 21
6. BECOMING EASILY ANNOYED OR IRRITABLE: NEARLY EVERY DAY
1. FEELING NERVOUS, ANXIOUS, OR ON EDGE: NEARLY EVERY DAY
IF YOU CHECKED OFF ANY PROBLEMS ON THIS QUESTIONNAIRE, HOW DIFFICULT HAVE THESE PROBLEMS MADE IT FOR YOU TO DO YOUR WORK, TAKE CARE OF THINGS AT HOME, OR GET ALONG WITH OTHER PEOPLE: EXTREMELY DIFFICULT
3. WORRYING TOO MUCH ABOUT DIFFERENT THINGS: NEARLY EVERY DAY
5. BEING SO RESTLESS THAT IT IS HARD TO SIT STILL: NEARLY EVERY DAY
GAD7 TOTAL SCORE: 21

## 2023-08-02 ASSESSMENT — PATIENT HEALTH QUESTIONNAIRE - PHQ9
5. POOR APPETITE OR OVEREATING: NEARLY EVERY DAY
SUM OF ALL RESPONSES TO PHQ QUESTIONS 1-9: 22

## 2023-08-02 NOTE — PROGRESS NOTES
Assessment & Plan     Dysfunctional uterine bleeding  Reviewed normal ultrasound. May have been ovulating.   No sign of structural cause of intermenstrual bleeding.   GC/CT neg  Labs today:  - Progesterone  - Testosterone Free and Total  - Anti-Mullerian hormone  - Prolactin    Future Day 3 labs  - Follicle stimulating hormone; Future  - Estradiol; Future  - Luteinizing Hormone; Future    History of PID  Would like to conceive and hasn't in spite of no contraception   - XR Hysterosalpingogram; Future    Screening for malignant neoplasm of cervix    - Pap screen with HPV - recommended age 30 - 65 years    BV (bacterial vaginosis)  Reviewed wet prep from ER  - metroNIDAZOLE (FLAGYL) 500 MG tablet; Take 1 tablet (500 mg) by mouth 2 times daily    Hydradenitis  Stable but flares  - Adult Dermatology Referral; Future    Adjustment disorder with mixed anxiety and depressed mood  Would like to start therapy.   - Adult Mental Health  Referral; Future    Review of the result(s) of each unique test - ER labs, ultrasound   Ordering of each unique test  Prescription drug management         Nicotine/Tobacco Cessation:  She reports that she has been smoking cigarettes. She has a 1.00 pack-year smoking history. She has never used smokeless tobacco.  Nicotine/Tobacco Cessation Plan:   Information offered: Patient not interested at this time          No follow-ups on file.    Norma Carreon MD  [facility] is a 32 year [facility], presenting for the following health issues:  Vaginal Problem (ABNORMAL BLEEDING)      HPI   Presents for evaluation of abnormal uterine bleeding.   End of April had a period.  Second year as a practicing Yazidism. Second time through Ramadan. Ramadan was end of April to end of May this year.   May 5-9 was regular period.   Returned May 28th-June 1st.   Period June 23rd-26th.  July 16-20.   July 29th period, spotting still now.   US done  7/30.    Prior to Ramadan periods were normal. Usually max 2 days early.   Not heavy flow typically.   More cramping in the last year, since her first Ramadan.   Periods have been regular until May.     Would like to conceive. Proven paternity.     Since May has had more anxiety/depression.  Feels like she might have abnormal hormones. Related to hydradenitis suppuritiva. Flares every time she has a period.   Feels like she has excessive hair growth, increased size of labia.     Causing her a lot of stress/distress.   Hydradenitis suppuritiva has been very mentally/emotionally exhausting.   Depression Screening Follow-up        8/2/2023    11:58 AM   PHQ   PHQ-9 Total Score 22   Q9: Thoughts of better off dead/self-harm past 2 weeks Nearly every day               Follow Up    Follow Up Actions Taken  Mental Health Referral placed    Discussed the following ways the patient can remain in a safe environment:  be around others    I have reviewed the results of the [place] Screening and proposed plan of care. The patient agrees with the follow up and safety plan.    Norma Carreon MD      Has thick hair - no hair loss over crown  Does have acne problems. Breakouts during periods.   Currently does not have a dermatologist, used to see Dr. Marcano.   Dark spots under her breast.     History of gonorrhea, chlamydia, trichomonas. One did result in a pelvic infection.     ER labs showed BV, no treatment yet.       Past Medical History:   Diagnosis Date    Carbuncle and furuncle     Tobacco use disorder     Smokes 1/2 ppd. Started smoking at 14 years    Vaginal yeast infection 06/02/2017       Past Surgical History:   Procedure Laterality Date    [facility] [place] ORAL SURGERY PROCEDURE  10/23/2008    [place] Teeth Extraction       Family History   Problem Relation Age of Onset    Hypertension Maternal Grandmother     Diabetes Maternal Uncle     Hypertension Maternal Uncle     Alcohol/Drug Maternal Aunt     Alcohol/Drug  Maternal Uncle     Anesthesia Reaction Other        Social History     Socioeconomic History    Marital status: Single     Spouse name: Not on file    Number of children: Not on file    Years of education: Not on file    Highest education level: Not on file   Occupational History     Employer: [employer].     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     Comment: [employer]   Tobacco Use    Smoking status: Every Day     Packs/day: 0.50     Years: 2.00     Pack years: 1.00     Types: Cigarettes    Smokeless tobacco: Never   Substance and Sexual Activity    Alcohol use: No     Comment: occassionally    Drug use: Yes     Types: Marijuana     Comment: occassionally    Sexual activity: Yes     Partners: Male   Other Topics Concern    Not on file   Social History Narrative    Not on file     Social Determinants of Health     Financial Resource Strain: Not on file   Food Insecurity: Not on file   Transportation Needs: Not on file   Physical Activity: Not on file   Stress: Not on file   Social Connections: Not on file   Intimate Partner Violence: Not on file   Housing Stability: Not on file       Current Outpatient Medications   Medication    albuterol (PROAIR HFA/PROVENTIL HFA/VENTOLIN HFA) 108 (90 Base) MCG/ACT inhaler    acetaminophen (TYLENOL) 500 MG tablet     No current facility-administered medications for this visit.          Allergies   Allergen Reactions    No Known Allergies            Review of Systems   Constitutional, HEENT, cardiovascular, pulmonary, gi and gu systems are negative, except as otherwise noted.      Objective    /81 (BP Location: Left arm, Patient Position: Sitting, Cuff Size: Adult Regular)   Pulse 86   Wt 63.2 kg (139 lb 6.4 oz)   LMP 07/29/2023 (Exact Date)   SpO2 100%   BMI 19.44 kg/m    Body mass index is 19.44 kg/m .  Physical Exam   GENERAL: healthy, alert and no distress  ABDOMEN: soft, nontender, no hepatosplenomegaly, no masses and bowel sounds normal    (female): normal female external genitalia, normal urethral meatus, vaginal mucosa, normal cervix/adnexa/uterus without masses or discharge  MS: no gross musculoskeletal defects noted, no edema  SKIN: no suspicious lesions or rashes. Evidence of scarring from hydradenitis on thighs. Prior healed skin graft visible.   PSYCH: mentation appears normal, affect normal/bright        Narrative & Impression   EXAM: US PELVIC TRANSABDOMINAL AND TRANSVAGINAL  LOCATION: [facility]  DATE: 7/30/2023     INDICATION: Pelvic pain  COMPARISON: None.  TECHNIQUE: Transabdominal scans were performed. Endovaginal ultrasound was performed to better visualize the adnexa.     FINDINGS:     UTERUS: 7.6 x 4.3 x 2.8 cm. Normal in size and position with no masses.     ENDOMETRIUM: 4.9 mm. Normal smooth endometrium.     RIGHT OVARY: 4.0 x 2.6 x 2.1 cm. Subtle hyperechoic region in the right ovary measuring 1.6 cm, potentially a hemorrhagic cyst. Right ovarian arterial and venous flow documented.     LEFT OVARY: 2.6 x 2.5 x 1.6 cm. Unremarkable     No significant free fluid.                                                                      IMPRESSION:     1. Possible 1.6 cm right ovarian hemorrhagic cyst. Short-term follow-up ultrasound recommended in 6-12 weeks given suboptimal visualization.  2. No other acute abnormalities identified in the pelvis.           Component      Latest Ref [facility] 7/29/2023  11:32 PM   WBC      4.0 - 11.0 10e3/uL 11.3 (H)    RBC Count      3.80 - 5.20 10e6/uL 3.98    Hemoglobin      11.7 - 15.7 g/dL 12.4    Hematocrit      35.0 - 47.0 % 38.0    MCV      78 - 100 fL 96    MCH      26.5 - 33.0 pg 31.2    MCHC      31.5 - 36.5 g/dL 32.6    RDW      10.0 - 15.0 % 14.0    Platelet Count      150 - 450 10e3/uL 256    % Neutrophils      % 57    % Lymphocytes      % 35    % Monocytes      % 7    % Eosinophils      % 1    % Basophils      % 0    % Immature Granulocytes      % 0     NRBCs per 100 WBC      <1 /100 0    Absolute Neutrophils      1.6 - 8.3 10e3/uL 6.4    Absolute Lymphocytes      0.8 - 5.3 10e3/uL 3.9    Absolute Monocytes      0.0 - 1.3 10e3/uL 0.8    Absolute Eosinophils      0.0 - 0.7 10e3/uL 0.1    Absolute Basophils      0.0 - 0.2 10e3/uL 0.0    Absolute Immature Granulocytes      <=0.4 10e3/uL 0.0    Absolute NRBCs      10e3/uL 0.0    Sodium      136 - 145 mmol/L 138    Potassium      3.4 - 5.3 mmol/L 3.5    Chloride      98 - 107 mmol/L 104    Carbon Dioxide (CO2)      22 - 29 mmol/L 23    Anion Gap      7 - 15 mmol/L 11    Urea Nitrogen      6.0 - 20.0 mg/dL 12.5    Creatinine      0.51 - 0.95 mg/dL 0.80    Calcium      8.6 - 10.0 mg/dL 8.4 (L)    Glucose      70 - 99 mg/dL 94    Alkaline Phosphatase      35 - 104 U/L 44    AST      0 - 45 U/L 16    ALT      0 - 50 U/L 10    Protein Total      6.4 - 8.3 g/dL 6.8    Albumin      3.5 - 5.2 g/dL 4.0    Bilirubin Total      <=1.2 mg/dL 0.3    GFR Estimate      >60 mL/min/1.73m2 >90    Color Urine      Colorless, Straw, Light Yellow, Yellow     Appearance Urine      Clear     Glucose Urine      Negative mg/dL    Bilirubin Urine      Negative     Ketones Urine      Negative mg/dL    Specific Gravity Urine      1.003 - 1.035     Blood Urine      Negative     pH Urine      5.0 - 7.0     Protein Albumin Urine      Negative mg/dL    Urobilinogen mg/dL      Normal, 2.0 mg/dL    Nitrite Urine      Negative     Leukocyte Esterase Urine      Negative     Mucus Urine      None Seen /LPF    RBC Urine      <=2 /HPF    WBC Urine      <=5 /HPF    Squamous Epithelial /HPF Urine      <=1 /HPF    Trichomonas      Absent     Yeast      Absent     Clue cells      Absent     WBCs/high power field      None     Chlamydia Trachomatis PCR      Negative     N Gonorrhea PCR      Negative       Component      Latest Ref [facility] 7/29/2023  11:45 PM   WBC      4.0 - 11.0 10e3/uL    RBC Count      3.80 - 5.20 10e6/uL    Hemoglobin      11.7 - 15.7 g/dL     Hematocrit      35.0 - 47.0 %    MCV      78 - 100 fL    MCH      26.5 - 33.0 pg    MCHC      31.5 - 36.5 g/dL    RDW      10.0 - 15.0 %    Platelet Count      150 - 450 10e3/uL    % Neutrophils      %    % Lymphocytes      %    % Monocytes      %    % Eosinophils      %    % Basophils      %    % Immature Granulocytes      %    NRBCs per 100 WBC      <1 /100    Absolute Neutrophils      1.6 - 8.3 10e3/uL    Absolute Lymphocytes      0.8 - 5.3 10e3/uL    Absolute Monocytes      0.0 - 1.3 10e3/uL    Absolute Eosinophils      0.0 - 0.7 10e3/uL    Absolute Basophils      0.0 - 0.2 10e3/uL    Absolute Immature Granulocytes      <=0.4 10e3/uL    Absolute NRBCs      10e3/uL    Sodium      136 - 145 mmol/L    Potassium      3.4 - 5.3 mmol/L    Chloride      98 - 107 mmol/L    Carbon Dioxide (CO2)      22 - 29 mmol/L    Anion Gap      7 - 15 mmol/L    Urea Nitrogen      6.0 - 20.0 mg/dL    Creatinine      0.51 - 0.95 mg/dL    Calcium      8.6 - 10.0 mg/dL    Glucose      70 - 99 mg/dL    Alkaline Phosphatase      35 - 104 U/L    AST      0 - 45 U/L    ALT      0 - 50 U/L    Protein Total      6.4 - 8.3 g/dL    Albumin      3.5 - 5.2 g/dL    Bilirubin Total      <=1.2 mg/dL    GFR Estimate      >60 mL/min/1.73m2    Color Urine      Colorless, Straw, Light Yellow, Yellow  Yellow    Appearance Urine      Clear  Clear    Glucose Urine      Negative mg/dL Negative    Bilirubin Urine      Negative  Negative    Ketones Urine      Negative mg/dL Trace !    Specific Gravity Urine      1.003 - 1.035  1.035    Blood Urine      Negative  Large !    pH Urine      5.0 - 7.0  5.5    Protein Albumin Urine      Negative mg/dL 20 !    Urobilinogen mg/dL      Normal, 2.0 mg/dL 2.0    Nitrite Urine      Negative  Negative    Leukocyte Esterase Urine      Negative  Negative    Mucus Urine      None Seen /LPF Present !    RBC Urine      <=2 /HPF 33 (H)    WBC Urine      <=5 /HPF 1    Squamous Epithelial /HPF Urine      <=1 /HPF 5 (H)     Trichomonas      Absent     Yeast      Absent     Clue cells      Absent     WBCs/high power field      None     Chlamydia Trachomatis PCR      Negative     N Gonorrhea PCR      Negative       Component      Latest Ref [facility] 7/30/2023  1:06 AM   WBC      4.0 - 11.0 10e3/uL    RBC Count      3.80 - 5.20 10e6/uL    Hemoglobin      11.7 - 15.7 g/dL    Hematocrit      35.0 - 47.0 %    MCV      78 - 100 fL    MCH      26.5 - 33.0 pg    MCHC      31.5 - 36.5 g/dL    RDW      10.0 - 15.0 %    Platelet Count      150 - 450 10e3/uL    % Neutrophils      %    % Lymphocytes      %    % Monocytes      %    % Eosinophils      %    % Basophils      %    % Immature Granulocytes      %    NRBCs per 100 WBC      <1 /100    Absolute Neutrophils      1.6 - 8.3 10e3/uL    Absolute Lymphocytes      0.8 - 5.3 10e3/uL    Absolute Monocytes      0.0 - 1.3 10e3/uL    Absolute Eosinophils      0.0 - 0.7 10e3/uL    Absolute Basophils      0.0 - 0.2 10e3/uL    Absolute Immature Granulocytes      <=0.4 10e3/uL    Absolute NRBCs      10e3/uL    Sodium      136 - 145 mmol/L    Potassium      3.4 - 5.3 mmol/L    Chloride      98 - 107 mmol/L    Carbon Dioxide (CO2)      22 - 29 mmol/L    Anion Gap      7 - 15 mmol/L    Urea Nitrogen      6.0 - 20.0 mg/dL    Creatinine      0.51 - 0.95 mg/dL    Calcium      8.6 - 10.0 mg/dL    Glucose      70 - 99 mg/dL    Alkaline Phosphatase      35 - 104 U/L    AST      0 - 45 U/L    ALT      0 - 50 U/L    Protein Total      6.4 - 8.3 g/dL    Albumin      3.5 - 5.2 g/dL    Bilirubin Total      <=1.2 mg/dL    GFR Estimate      >60 mL/min/1.73m2    Color Urine      Colorless, Straw, Light Yellow, Yellow     Appearance Urine      Clear     Glucose Urine      Negative mg/dL    Bilirubin Urine      Negative     Ketones Urine      Negative mg/dL    Specific Gravity Urine      1.003 - 1.035     Blood Urine      Negative     pH Urine      5.0 - 7.0     Protein Albumin Urine      Negative mg/dL    Urobilinogen mg/dL       Normal, 2.0 mg/dL    Nitrite Urine      Negative     Leukocyte Esterase Urine      Negative     Mucus Urine      None Seen /LPF    RBC Urine      <=2 /HPF    WBC Urine      <=5 /HPF    Squamous Epithelial /HPF Urine      <=1 /HPF    Trichomonas      Absent  Absent    Yeast      Absent  Absent    Clue cells      Absent  Present !    WBCs/high power field      None  1+ !    Chlamydia Trachomatis PCR      Negative  Negative    N Gonorrhea PCR      Negative  Negative       Legend:  (H) High  (L) Low  ! Abnormal

## 2023-08-04 LAB
BKR LAB AP GYN ADEQUACY: NORMAL
BKR LAB AP GYN INTERPRETATION: NORMAL
BKR LAB AP HPV REFLEX: NORMAL
BKR LAB AP LMP: NORMAL
BKR LAB AP PREVIOUS ABNORMAL: NORMAL
PATH REPORT.COMMENTS IMP SPEC: NORMAL
PATH REPORT.COMMENTS IMP SPEC: NORMAL
PATH REPORT.RELEVANT HX SPEC: NORMAL

## 2023-08-06 LAB
TESTOST FREE SERPL-MCNC: 0.21 NG/DL
TESTOST SERPL-MCNC: 18 NG/DL (ref 8–60)

## 2023-08-07 LAB
HUMAN PAPILLOMA VIRUS 16 DNA: NEGATIVE
HUMAN PAPILLOMA VIRUS 18 DNA: NEGATIVE
HUMAN PAPILLOMA VIRUS FINAL DIAGNOSIS: ABNORMAL
HUMAN PAPILLOMA VIRUS OTHER HR: POSITIVE

## 2023-08-08 ENCOUNTER — PATIENT OUTREACH (OUTPATIENT)
Dept: OBGYN | Facility: CLINIC | Age: 33
End: 2023-08-08
Payer: COMMERCIAL

## 2023-08-24 ENCOUNTER — TELEPHONE (OUTPATIENT)
Dept: OBGYN | Facility: CLINIC | Age: 33
End: 2023-08-24
Payer: COMMERCIAL

## 2023-08-24 DIAGNOSIS — N93.8 DYSFUNCTIONAL UTERINE BLEEDING: Primary | ICD-10-CM

## 2023-08-24 NOTE — TELEPHONE ENCOUNTER
Reason for Call:  Appointment Request    Patient requesting this type of appt:  Called to schedule her 3 day lab appt for 8/25/23- is wondering what the other appt she needs to schedule is and if the team could reach out to get that scheduled with her     Requested provider:  Norma Carreon PCP    Reason patient unable to be scheduled:  Called to schedule her 3 day lab appt for 8/25/23- is wondering what the other appt she needs to schedule is and if the team could reach out to get that scheduled with her     When does patient want to be seen/preferred time: 1-2 days    Comments: Called to schedule her 3 day lab appt for 8/25/23- is wondering what the other appt she needs to schedule is and if the team could reach out to get that scheduled with her     Could we [facility] this information to you [facility] or would you prefer to receive a phone call?:   Patient would prefer a phone call   Okay to leave a detailed message?: Yes at Cell number on file:    Telephone Information:   Mobile [phone]       Call taken on 8/24/2023 at 3:07 PM by Sheryl Casey

## 2023-08-25 ENCOUNTER — LAB (OUTPATIENT)
Dept: LAB | Facility: CLINIC | Age: 33
End: 2023-08-25
Payer: COMMERCIAL

## 2023-08-25 DIAGNOSIS — N93.8 DYSFUNCTIONAL UTERINE BLEEDING: ICD-10-CM

## 2023-08-25 LAB
ESTRADIOL SERPL-MCNC: 39 PG/ML
FSH SERPL IRP2-ACNC: 6.1 MIU/ML
HCG UR QL: NEGATIVE
LH SERPL-ACNC: 5.9 MIU/ML

## 2023-08-25 PROCEDURE — 83001 ASSAY OF GONADOTROPIN (FSH): CPT

## 2023-08-25 PROCEDURE — 82670 ASSAY OF TOTAL ESTRADIOL: CPT

## 2023-08-25 PROCEDURE — 83002 ASSAY OF GONADOTROPIN (LH): CPT

## 2023-08-25 PROCEDURE — 81025 URINE PREGNANCY TEST: CPT

## 2023-08-25 PROCEDURE — 36415 COLL VENOUS BLD VENIPUNCTURE: CPT

## 2023-08-25 NOTE — TELEPHONE ENCOUNTER
HSG scheduled at [facility]  Date and time of HS2023 2PM  Lab time: 1PM  Performing Provider: Mel Gaspar  LMP Date: 2023  UPT ordered: Yes  [employer] Message Sent (passcode): Not Applicable  Date: 2023

## 2023-08-29 ENCOUNTER — LAB (OUTPATIENT)
Dept: LAB | Facility: CLINIC | Age: 33
End: 2023-08-29
Payer: COMMERCIAL

## 2023-08-29 ENCOUNTER — HOSPITAL ENCOUNTER (OUTPATIENT)
Dept: GENERAL RADIOLOGY | Facility: CLINIC | Age: 33
Discharge: HOME | End: 2023-08-29
Attending: OBSTETRICS & GYNECOLOGY | Admitting: OBSTETRICS & GYNECOLOGY
Payer: COMMERCIAL

## 2023-08-29 DIAGNOSIS — N97.1 TUBAL OCCLUSION: Primary | ICD-10-CM

## 2023-08-29 DIAGNOSIS — Z87.42 HISTORY OF PID: ICD-10-CM

## 2023-08-29 DIAGNOSIS — N93.8 DYSFUNCTIONAL UTERINE BLEEDING: Primary | ICD-10-CM

## 2023-08-29 LAB — HCG UR QL: NEGATIVE

## 2023-08-29 PROCEDURE — 81025 URINE PREGNANCY TEST: CPT

## 2023-08-29 PROCEDURE — 74740 X-RAY FEMALE GENITAL TRACT: CPT | Mod: 26 | Performed by: RADIOLOGY

## 2023-08-29 PROCEDURE — 255N000002 HC RX 255 OP 636: Mod: JZ | Performed by: OBSTETRICS & GYNECOLOGY

## 2023-08-29 PROCEDURE — 74740 X-RAY FEMALE GENITAL TRACT: CPT

## 2023-08-29 PROCEDURE — 250N000009 HC RX 250

## 2023-08-29 PROCEDURE — 58340 CATHETER FOR HYSTEROGRAPHY: CPT | Performed by: OBSTETRICS & GYNECOLOGY

## 2023-08-29 PROCEDURE — 58340 CATHETER FOR HYSTEROGRAPHY: CPT

## 2023-08-29 RX ORDER — DOXYCYCLINE 100 MG/1
100 CAPSULE ORAL 2 TIMES DAILY
Qty: 14 CAPSULE | Refills: 0 | Status: SHIPPED | OUTPATIENT
Start: 2023-08-29 | End: 2023-08-29

## 2023-08-29 RX ORDER — DOXYCYCLINE HYCLATE 100 MG
100 TABLET ORAL 2 TIMES DAILY
Qty: 10 TABLET | Refills: 0 | Status: SHIPPED | OUTPATIENT
Start: 2023-08-29 | End: 2023-09-03

## 2023-08-29 RX ORDER — ONDANSETRON 4 MG/1
4 TABLET, ORALLY DISINTEGRATING ORAL EVERY 8 HOURS PRN
Qty: 8 TABLET | Refills: 0 | Status: SHIPPED | OUTPATIENT
Start: 2023-08-29 | End: 2024-05-29

## 2023-08-29 RX ORDER — LIDOCAINE HYDROCHLORIDE 10 MG/ML
INJECTION, SOLUTION EPIDURAL; INFILTRATION; INTRACAUDAL; PERINEURAL
Status: COMPLETED
Start: 2023-08-29 | End: 2023-08-29

## 2023-08-29 RX ORDER — IOPAMIDOL 510 MG/ML
100 INJECTION, SOLUTION INTRAVASCULAR ONCE
Status: COMPLETED | OUTPATIENT
Start: 2023-08-29 | End: 2023-08-29

## 2023-08-29 RX ADMIN — LIDOCAINE HYDROCHLORIDE 10 ML: 10 INJECTION, SOLUTION EPIDURAL; INFILTRATION; INTRACAUDAL; PERINEURAL at 14:28

## 2023-08-29 RX ADMIN — IOPAMIDOL 20 ML: 510 INJECTION, SOLUTION INTRAVASCULAR at 14:28

## 2023-08-30 NOTE — PROCEDURES
Hysterosalpingogram (HSG) Procedure Note    Indication:  History of PID and Fertility testing    The risks and benefits of the procedure were discussed with the patient and consent was signed. A timeout was performed and the correct patient and procedure were verified. A speculum exam was performed and the cervix was visualized and swabbed with 3 swabs of chlorhexidine scrub.  A paracervical block was performed with a total of 10 mL of 1% lidocaine. A single-tooth tenaculum was placed on the anterior lip of the cervix.    An HSG catheter was introduced through the cervix.  At this point the balloon was insufflated with 2 mL of air within the cervix, speculum was then removed.   Omnipaque contrast, approximately 10 ml, was insufflated under fluoroscopic guidance, increased resistance was met and the balloon ultimately expelled from the cervix so was replaced and then another attempt at insufflating with contrast was attempted.  The uterus was normal shape and the right tube filled with contrast and spillage was noted into the peritoneal cavity. The left tube filled but no definitive spillage was seen and  the patient requested that the procedure be terminated prior to us confirming that there was a tubal blockage on the left.     The instruments were removed from the vagina. EBL was minimal. There were no complications.    Mel Gaspar MD

## 2023-10-30 ENCOUNTER — OFFICE VISIT (OUTPATIENT)
Dept: OBGYN | Facility: CLINIC | Age: 33
End: 2023-10-30
Payer: COMMERCIAL

## 2023-10-30 VITALS
SYSTOLIC BLOOD PRESSURE: 104 MMHG | BODY MASS INDEX: 20.1 KG/M2 | WEIGHT: 144.1 LBS | OXYGEN SATURATION: 100 % | DIASTOLIC BLOOD PRESSURE: 74 MMHG | TEMPERATURE: 98.6 F | HEART RATE: 86 BPM

## 2023-10-30 DIAGNOSIS — Z86.2 HISTORY OF ANEMIA: ICD-10-CM

## 2023-10-30 DIAGNOSIS — F17.210 CIGARETTE SMOKER MOTIVATED TO QUIT: ICD-10-CM

## 2023-10-30 DIAGNOSIS — N97.1 TUBAL OCCLUSION: Primary | ICD-10-CM

## 2023-10-30 DIAGNOSIS — E83.51 HYPOCALCEMIA: ICD-10-CM

## 2023-10-30 PROBLEM — D64.9 ANEMIA, UNSPECIFIED TYPE: Status: RESOLVED | Noted: 2019-12-10 | Resolved: 2023-10-30

## 2023-10-30 PROBLEM — Z94.5 STATUS POST SKIN GRAFT: Status: ACTIVE | Noted: 2020-01-07

## 2023-10-30 PROCEDURE — 99214 OFFICE O/P EST MOD 30 MIN: CPT | Performed by: OBSTETRICS & GYNECOLOGY

## 2023-10-30 NOTE — Clinical Note
Hi Dr. Brooke,  Can you look at her HSG again. I don't understand the addendum. My partner and I looked at the images and it appears to us that her left tube is patent and her right tubal has distal occlusion.  Thank you Norma Carreon MD/[facility]

## 2023-10-30 NOTE — PROGRESS NOTES
Assessment & Plan     Tubal occlusion  Reviewed HSG results and images.   Discussed that on HSG right fallopian tube appears to have distal occlusion.  Left fallopian tube appears patent.  Discussed next steps for management could include further evaluation with diagnostic laparoscopy for adhesions or endometriosis.  Discussed what diagnostic laparoscopy process would be like: Procedure, risks, alternatives, recovery.  Questions were answered.  She is considering doing this maybe next year.      Hypocalcemia  Reviewed previous BMPs in chart.  Hypocalcemia had previously been added to the problem list by a different provider.  Her calcium has been in the 7-8 range in the past.  Discussed that 8.4 represents a mildly calcium level.  Discussed with Dr. Sol in family medicine who recommended starting with limited lab work-up.  - Parathyroid Hormone Intact; Future  - Ionized Calcium; Future  - Vitamin D Deficiency; Future    Cigarette smoker motivated to quit  Willingness to quit questionnaire scores were noted by me after the appointment had concluded.  I have sent Chhaya [employer] message to plan neck steps.    History of anemia  Discussed previous anemia at the time that she was undergoing multiple procedures and complications for her hidradenitis suppurativa including skin grafts.  More recently that this anemia has resolved.  This was removed from her problem list.    Review of the result(s) of each unique test - labs, hsg  Ordering of each unique test             No follow-ups on file.    Norma Carreon MD  [facility]    Ebonie Shaver is a 32 year old, presenting for the following health issues:  Follow Up (Hsg FOLLOW UP )      HPI   Presents for follow-up of irregular menses and infertility.   Last seen by me 8/2/23.   Lmp 10/9/23-10/13/23.  Has not done ovulation testing. Not currently actively trying to conceive, just wants to understand what is going on with her  body.     She is also wondering about hypocalcemia and anemia in her problem list. She is not aware of these diagnoses and would like to make sure any workup she needs is done.       Past Medical History:   Diagnosis Date    Carbuncle and furuncle     Tobacco use disorder     Smokes 1/2 ppd. Started smoking at 14 years    Vaginal yeast infection 06/02/2017       Past Surgical History:   Procedure Laterality Date    [facility] ORAL SURGERY PROCEDURE  10/23/2008    [place] Teeth Extraction       Family History   Problem Relation Age of Onset    Hypertension Maternal Grandmother     Diabetes Maternal Uncle     Hypertension Maternal Uncle     Alcohol/Drug Maternal Aunt     Alcohol/Drug Maternal Uncle     Anesthesia Reaction Other        Social History     Socioeconomic History    Marital status: Single     Spouse name: Not on file    Number of children: Not on file    Years of education: Not on file    Highest education level: Not on file   Occupational History     Employer: [employer].     Comment: [employer]    Occupation: Senior - [employer]     Comment: [employer]   Tobacco Use    Smoking status: Every Day     Packs/day: 0.50     Years: 2.00     Additional pack years: 0.00     Total pack years: 1.00     Types: Cigarettes    Smokeless tobacco: Never   Substance and Sexual Activity    Alcohol use: No     Comment: occassionally    Drug use: Yes     Types: Marijuana     Comment: occassionally    Sexual activity: Yes     Partners: Male   Other Topics Concern    Not on file   Social History Narrative    Not on file     Social Determinants of Health     Financial Resource Strain: Not on file   Food Insecurity: Not on file   Transportation Needs: Not on file   Physical Activity: Not on file   Stress: Not on file   Social Connections: Not on file   Interpersonal Safety: Not on file   Housing Stability: Not on file       Current Outpatient Medications   Medication    albuterol (PROAIR HFA/PROVENTIL HFA/VENTOLIN HFA) 108 (90 Base)  MCG/ACT inhaler    acetaminophen (TYLENOL) 500 MG tablet    metroNIDAZOLE (FLAGYL) 500 MG tablet    ondansetron (ZOFRAN ODT) 4 MG ODT tab     No current facility-administered medications for this visit.          Allergies   Allergen Reactions    No Known Allergies            Review of Systems   Constitutional, HEENT, cardiovascular, pulmonary, gi and gu systems are negative, except as otherwise noted.      Objective    /74 (BP Location: Left arm, Patient Position: Sitting, Cuff Size: Adult Regular)   Pulse 86   Temp 98.6  F (37  C) (Oral)   Wt 65.4 kg (144 lb 1.6 oz)   LMP 10/09/2023 (Exact Date)   SpO2 100%   BMI 20.10 kg/m    Body mass index is 20.1 kg/m .  Physical Exam   GENERAL: healthy, alert and no distress  MS: no gross musculoskeletal defects noted, no edema  PSYCH: mentation appears normal, affect normal/bright    Component      Latest Ref Rng 8/2/2023  12:04 PM 8/25/2023  2:56 PM   Free Testosterone Calculated      ng/dL 0.21     Testosterone Total      8 - 60 ng/dL 18     Progesterone      ng/mL 0.5     Anti-Mullerian Hormone      0.580 - 8.100 ng/mL 2.000     Prolactin      5 - 23 ng/mL 4 (L)     Sex Hormone Binding Globulin      30 - 135 nmol/L 64     FSH      mIU/mL  6.1    Estradiol      pg/mL  39    Luteinizing Hormone      mIU/mL  5.9       Legend:  (L) Low        Study is incorrectly marked. The right fallopian tube is patent. The  left fallopian tube does not demonstrate definitive spillage into the  peritoneal cavity.     I have personally reviewed the examination and initial interpretation  and I agree with the findings.     ALEX CHOI MD         SYSTEM ID:  [id number]   Addended by Alex Choi MD on 8/29/2023  4:09 PM     Study Result    Narrative & Impression   EXAMINATION: Hysterosalpingogram, 8/29/2023     CLINICAL HISTORY: History of PID         COMPARISON: Pelvic ultrasound 7/30/2023, CT pelvis 11/25/2019     PROCEDURE COMMENT:  Catheter was placed by   Mel Gaspar, OB/GYN  Contrast: 20mL Isovue 300   Fluoroscopy time: 0.7 minutes     FINDINGS:   There was normal contrast filling of the uterus and left fallopian  tube with spillage into the peritoneal cavity. However, the balloon  fell out of the cervix. Following replacement of the catheter, normal  contrast filling of the uterus and left fallopian tube was  redemonstrated. There was contrast filling of the right fallopian tube  without definitive evidence of spillage into the peritoneal cavity.  The exam was terminated secondary to patient discomfort.                                                                      IMPRESSION:   1.  Normal uterus and left fallopian tube.  2.  Contrast opacification of the right fallopian tube without  definitive evidence of spillage into the peritoneal cavity.     I have personally reviewed the examination and initial interpretation  and I agree with the findings.     ALEX CHOI MD      EXAM: US PELVIC TRANSABDOMINAL AND TRANSVAGINAL  LOCATION: [facility]  DATE: 7/30/2023     INDICATION: Pelvic pain  COMPARISON: None.  TECHNIQUE: Transabdominal scans were performed. Endovaginal ultrasound was performed to better visualize the adnexa.     FINDINGS:     UTERUS: 7.6 x 4.3 x 2.8 cm. Normal in size and position with no masses.     ENDOMETRIUM: 4.9 mm. Normal smooth endometrium.     RIGHT OVARY: 4.0 x 2.6 x 2.1 cm. Subtle hyperechoic region in the right ovary measuring 1.6 cm, potentially a hemorrhagic cyst. Right ovarian arterial and venous flow documented.     LEFT OVARY: 2.6 x 2.5 x 1.6 cm. Unremarkable     No significant free fluid.                                                                      IMPRESSION:     1. Possible 1.6 cm right ovarian hemorrhagic cyst. Short-term follow-up ultrasound recommended in 6-12 weeks given suboptimal visualization.  2. No other acute abnormalities identified in the  pelvis.        Component      Latest Ref Rng 7/29/2023  11:32 PM   WBC      4.0 - 11.0 10e3/uL 11.3 (H)    RBC Count      3.80 - 5.20 10e6/uL 3.98    Hemoglobin      11.7 - 15.7 g/dL 12.4    Hematocrit      35.0 - 47.0 % 38.0    MCV      78 - 100 fL 96    MCH      26.5 - 33.0 pg 31.2    MCHC      31.5 - 36.5 g/dL 32.6    RDW      10.0 - 15.0 % 14.0    Platelet Count      150 - 450 10e3/uL 256    % Neutrophils      % 57    % Lymphocytes      % 35    % Monocytes      % 7    % Eosinophils      % 1    % Basophils      % 0    % Immature Granulocytes      % 0    NRBCs per 100 WBC      <1 /100 0    Absolute Neutrophils      1.6 - 8.3 10e3/uL 6.4    Absolute Lymphocytes      0.8 - 5.3 10e3/uL 3.9    Absolute Monocytes      0.0 - 1.3 10e3/uL 0.8    Absolute Eosinophils      0.0 - 0.7 10e3/uL 0.1    Absolute Basophils      0.0 - 0.2 10e3/uL 0.0    Absolute Immature Granulocytes      <=0.4 10e3/uL 0.0    Absolute NRBCs      10e3/uL 0.0    Sodium      136 - 145 mmol/L 138    Potassium      3.4 - 5.3 mmol/L 3.5    Chloride      98 - 107 mmol/L 104    Carbon Dioxide (CO2)      22 - 29 mmol/L 23    Anion Gap      7 - 15 mmol/L 11    Urea Nitrogen      6.0 - 20.0 mg/dL 12.5    Creatinine      0.51 - 0.95 mg/dL 0.80    Calcium      8.6 - 10.0 mg/dL 8.4 (L)    Glucose      70 - 99 mg/dL 94    Alkaline Phosphatase      35 - 104 U/L 44    AST      0 - 45 U/L 16    ALT      0 - 50 U/L 10    Protein Total      6.4 - 8.3 g/dL 6.8    Albumin      3.5 - 5.2 g/dL 4.0    Bilirubin Total      <=1.2 mg/dL 0.3    GFR Estimate      >60 mL/min/1.73m2 >90       Legend:  (H) High  (L) Low    Component      Latest Ref Rn 8/2/2023  12:04 PM 8/25/2023  2:56 PM   Free Testosterone Calculated      ng/dL 0.21     Testosterone Total      8 - 60 ng/dL 18     Progesterone      ng/mL 0.5     Anti-Mullerian Hormone      0.580 - 8.100 ng/mL 2.000     Prolactin      5 - 23 ng/mL 4 (L)     Sex Hormone Binding Globulin      30 - 135 nmol/L 64     FSH      mIU/mL   6.1    Estradiol      pg/mL  39    Luteinizing Hormone      mIU/mL  5.9       Legend:  (L) Low    Component      Latest Ref [facility] 7/29/2023  11:32 PM   WBC      4.0 - 11.0 10e3/uL 11.3 (H)    RBC Count      3.80 - 5.20 10e6/uL 3.98    Hemoglobin      11.7 - 15.7 g/dL 12.4    Hematocrit      35.0 - 47.0 % 38.0    MCV      78 - 100 fL 96    MCH      26.5 - 33.0 pg 31.2    MCHC      31.5 - 36.5 g/dL 32.6    RDW      10.0 - 15.0 % 14.0    Platelet Count      150 - 450 10e3/uL 256       Legend:  (H) High    Component      Latest Ref Rng 11/26/2019  7:04 AM 11/27/2019  7:45 AM   WBC      4.0 - 11.0 10e9/L 11.4 (H)  11.4 (H)    RBC Count      3.8 - 5.2 10e12/L 3.77 (L)  3.87    Hemoglobin      11.7 - 15.7 g/dL 11.0 (L)  11.2 (L)    Hematocrit      35.0 - 47.0 % 35.1  36.4    MCV      78 - 100 fl 93  94    MCH      26.5 - 33.0 pg 29.2  28.9    MCHC      31.5 - 36.5 g/dL 31.3 (L)  30.8 (L)    RDW      10.0 - 15.0 % 12.9  12.8    Platelet Count      150 - 450 10e9/L 206  217       Legend:  (H) High  (L) Low

## 2023-12-15 ENCOUNTER — HOSPITAL ENCOUNTER (EMERGENCY)
Facility: CLINIC | Age: 33
Discharge: HOME | End: 2023-12-15
Attending: EMERGENCY MEDICINE | Admitting: EMERGENCY MEDICINE
Payer: COMMERCIAL

## 2023-12-15 ENCOUNTER — APPOINTMENT (OUTPATIENT)
Dept: GENERAL RADIOLOGY | Facility: CLINIC | Age: 33
End: 2023-12-15
Attending: EMERGENCY MEDICINE
Payer: COMMERCIAL

## 2023-12-15 VITALS
OXYGEN SATURATION: 100 % | RESPIRATION RATE: 18 BRPM | HEIGHT: 63 IN | HEART RATE: 66 BPM | WEIGHT: 125 LBS | BODY MASS INDEX: 22.15 KG/M2 | DIASTOLIC BLOOD PRESSURE: 77 MMHG | SYSTOLIC BLOOD PRESSURE: 112 MMHG | TEMPERATURE: 98 F

## 2023-12-15 DIAGNOSIS — S63.501A WRIST SPRAIN, RIGHT, INITIAL ENCOUNTER: Primary | ICD-10-CM

## 2023-12-15 PROCEDURE — 250N000013 HC RX MED GY IP 250 OP 250 PS 637: Performed by: EMERGENCY MEDICINE

## 2023-12-15 PROCEDURE — 73130 X-RAY EXAM OF HAND: CPT | Mod: RT

## 2023-12-15 PROCEDURE — 99283 EMERGENCY DEPT VISIT LOW MDM: CPT | Performed by: EMERGENCY MEDICINE

## 2023-12-15 RX ORDER — NAPROXEN 500 MG/1
500 TABLET ORAL 2 TIMES DAILY WITH MEALS
Qty: 30 TABLET | Refills: 0 | Status: SHIPPED | OUTPATIENT
Start: 2023-12-15 | End: 2023-12-30

## 2023-12-15 RX ORDER — IBUPROFEN 600 MG/1
600 TABLET, FILM COATED ORAL ONCE
Status: COMPLETED | OUTPATIENT
Start: 2023-12-15 | End: 2023-12-15

## 2023-12-15 RX ORDER — OXYCODONE HYDROCHLORIDE 5 MG/1
5 TABLET ORAL ONCE
Status: COMPLETED | OUTPATIENT
Start: 2023-12-15 | End: 2023-12-15

## 2023-12-15 RX ADMIN — IBUPROFEN 600 MG: 600 TABLET, FILM COATED ORAL at 03:00

## 2023-12-15 RX ADMIN — OXYCODONE HYDROCHLORIDE 5 MG: 5 TABLET ORAL at 03:00

## 2023-12-15 ASSESSMENT — ACTIVITIES OF DAILY LIVING (ADL): ADLS_ACUITY_SCORE: 35

## 2023-12-15 NOTE — ED PROVIDER NOTES
ED Provider Note  [facility]      History     Chief Complaint   Patient presents with    Fall    Wrist Pain     HPI  Chhaya Mock is a 32 year old female who presents to the emergency department seeking evaluation of a right hand injury sustained from a fall. Patient reports that she tripped while bringing in groceries and fell with her right hand outstretched. She now states that her 4 fingers on her right hand are numb.    Past Medical History  Past Medical History:   Diagnosis Date    Carbuncle and furuncle     Tobacco use disorder     Smokes 1/2 ppd. Started smoking at 14 years    Vaginal yeast infection 06/02/2017     Past Surgical History:   Procedure Laterality Date    [facility] ORAL SURGERY PROCEDURE  10/23/2008    [place] Teeth Extraction     albuterol (PROAIR HFA/PROVENTIL HFA/VENTOLIN HFA) 108 (90 Base) MCG/ACT inhaler  naproxen (NAPROSYN) 500 MG tablet  ondansetron (ZOFRAN ODT) 4 MG ODT tab      Allergies   Allergen Reactions    No Known Allergies      Family History  Family History   Problem Relation Age of Onset    Hypertension Maternal Grandmother     Diabetes Maternal Uncle     Hypertension Maternal Uncle     Alcohol/Drug Maternal Aunt     Alcohol/Drug Maternal Uncle     Anesthesia Reaction Other      Social History   Social History     Tobacco Use    Smoking status: Every Day     Packs/day: 0.50     Years: 2.00     Additional pack years: 0.00     Total pack years: 1.00     Types: Cigarettes    Smokeless tobacco: Never   Substance Use Topics    Alcohol use: No     Comment: occassionally    Drug use: Yes     Types: Marijuana     Comment: occassionally      Past medical history, past surgical history, medications, allergies, family history, and social history were reviewed with the patient. No additional pertinent items.      A medically appropriate review of systems was performed with pertinent positives and negatives noted in the HPI, and all other systems  "negative.    Physical Exam   BP: 114/79  Pulse: 96  Temp: 98  F (36.7  C)  Resp: 16  Height: 160 cm (5' 3\")  Weight: 56.7 kg (125 lb)  SpO2: 98 %  Physical Exam  Vitals and nursing note reviewed.   Constitutional:       General: She is not in acute distress.     Appearance: She is well-developed. She is not ill-appearing.   HENT:      Head: Normocephalic and atraumatic.      Right Ear: External ear normal.      Left Ear: External ear normal.      Nose: Nose normal.   Eyes:      Extraocular Movements: Extraocular movements intact.      Conjunctiva/sclera: Conjunctivae normal.   Pulmonary:      Effort: Pulmonary effort is normal. No respiratory distress.   Abdominal:      General: There is no distension.   Musculoskeletal:         General: No swelling or deformity.      Cervical back: Normal range of motion and neck supple.      Comments: Right hand tenderness around the wrist as well as the proximal hand.  She does have range of motion in all the fingers.  There is pain on range of motion.  She reports numbness in some of the fingers however sensation remains intact   Skin:     General: Skin is warm and dry.   Neurological:      Mental Status: Mental status is at baseline.      Comments: Alert, oriented   Psychiatric:         Mood and Affect: Mood normal.         Behavior: Behavior normal.         ED Course, Procedures, & Data      Procedures         Results for orders placed or performed during the hospital encounter of 12/15/23   XR Hand Right 3 Views     Status: None    Narrative    EXAM: RIGHT HAND THREE VIEWS  LOCATION: [facility]  DATE/TIME: 12/15/2023 3:14 AM CST    INDICATION: Fall. Pain.  COMPARISON: None.      Impression    IMPRESSION: No visualized acute fracture or malalignment of the right hand.             Results for orders placed or performed during the hospital encounter of 12/15/23   XR Hand Right 3 Views     Status: None    Narrative    EXAM: RIGHT HAND " THREE VIEWS  LOCATION: [facility]  DATE/TIME: 12/15/2023 3:14 AM CST    INDICATION: Fall. Pain.  COMPARISON: None.      Impression    IMPRESSION: No visualized acute fracture or malalignment of the right hand.      Medications   ibuprofen (ADVIL/MOTRIN) tablet 600 mg (600 mg Oral $Given 12/15/23 0300)   oxyCODONE (ROXICODONE) tablet 5 mg (5 mg Oral $Given 12/15/23 0300)     Labs Ordered and Resulted from Time of ED Arrival to Time of ED Departure - No data to display  XR Hand Right 3 Views   Final Result   IMPRESSION: No visualized acute fracture or malalignment of the right hand.            Medical Decision Making  The patient's presentation is strongly suggestive of low complexity (an acute and uncomplicated illness or injury).    The patient's evaluation involved:  review of external note(s) from 3+ sources (Most recent H&P in addition to clinic and ED note)  review of 2 test result(s) ordered prior to this encounter (Most recent BMP and CBC)  X-ray independently interpreted  The patient's management involved moderate risk (prescription drug management including medications given in the ED).    Assessment & Plan    32-year-old female presents to us with a chief complaint of hand pain after a fall.  X-ray shows no evidence of fracture.  Will place the patient in a brace and sent her home with some pain medications.  I have reviewed the nursing notes. I have reviewed the findings, diagnosis, plan and need for follow up with the patient.    New Prescriptions    NAPROXEN (NAPROSYN) 500 MG TABLET    Take 1 tablet (500 mg) by mouth 2 times daily (with meals) for 15 days       Final diagnoses:   Wrist sprain, right, initial encounter   IBoris am serving as a trained medical scribe to document services personally performed by Ruben Fernandez DO, based on the provider's statements to me.     IRuben DO, was physically present and have reviewed  and verified the accuracy of this note documented by Boris Shipley.      Ruben Fernandez DO  [facility] EMERGENCY DEPARTMENT  12/15/2023     Ruben Fernandez DO  12/15/23 0421

## 2023-12-15 NOTE — ED NOTES
Pt given discharge instructions and paperwork with prescription. Wrist brace placed. Pt has no questions or concerns. VSS. A&O x4. Ambulatory with steady gait

## 2023-12-15 NOTE — ED TRIAGE NOTES
Around 2330 last night pt. was falling and tried to catch self with right wrist,  Now c/o right  wrist pain.  C./O  tingling in whole right arm.     Triage Assessment (Adult)       Row Name 12/15/23 0129          Triage Assessment    Airway WDL WDL        Respiratory WDL    Respiratory WDL WDL        Skin Circulation/Temperature WDL    Skin Circulation/Temperature WDL WDL        Cardiac WDL    Cardiac WDL WDL        Peripheral/Neurovascular WDL    Peripheral Neurovascular WDL WDL        Cognitive/Neuro/Behavioral WDL    Cognitive/Neuro/Behavioral WDL WDL

## 2023-12-15 NOTE — DISCHARGE INSTRUCTIONS
Follow-up with your primary care provider.  Return to the emergency department as needed for any new or worsening symptoms.]     <-- Click to add NO pertinent Past Medical History

## 2024-05-27 ENCOUNTER — APPOINTMENT (OUTPATIENT)
Dept: CT IMAGING | Facility: CLINIC | Age: 34
DRG: 153 | End: 2024-05-27
Attending: STUDENT IN AN ORGANIZED HEALTH CARE EDUCATION/TRAINING PROGRAM
Payer: COMMERCIAL

## 2024-05-27 ENCOUNTER — HOSPITAL ENCOUNTER (INPATIENT)
Facility: CLINIC | Age: 34
LOS: 1 days | Discharge: LEFT BEFORE DISCHARGE | DRG: 153 | End: 2024-05-28
Attending: STUDENT IN AN ORGANIZED HEALTH CARE EDUCATION/TRAINING PROGRAM | Admitting: OTOLARYNGOLOGY
Payer: COMMERCIAL

## 2024-05-27 DIAGNOSIS — J04.30 SUPRAGLOTTITIS WITHOUT AIRWAY OBSTRUCTION: ICD-10-CM

## 2024-05-27 LAB
ANION GAP SERPL CALCULATED.3IONS-SCNC: 10 MMOL/L (ref 7–15)
BASOPHILS # BLD AUTO: 0 10E3/UL (ref 0–0.2)
BASOPHILS NFR BLD AUTO: 0 %
BUN SERPL-MCNC: 9.6 MG/DL (ref 6–20)
CALCIUM SERPL-MCNC: 8.8 MG/DL (ref 8.6–10)
CHLORIDE SERPL-SCNC: 103 MMOL/L (ref 98–107)
CREAT BLD-MCNC: 0.9 MG/DL (ref 0.5–1)
CREAT SERPL-MCNC: 0.92 MG/DL (ref 0.51–0.95)
CRP SERPL-MCNC: <3 MG/L
DEPRECATED HCO3 PLAS-SCNC: 26 MMOL/L (ref 22–29)
EGFRCR SERPLBLD CKD-EPI 2021: 84 ML/MIN/1.73M2
EGFRCR SERPLBLD CKD-EPI 2021: >60 ML/MIN/1.73M2
EOSINOPHIL # BLD AUTO: 0.1 10E3/UL (ref 0–0.7)
EOSINOPHIL NFR BLD AUTO: 1 %
ERYTHROCYTE [DISTWIDTH] IN BLOOD BY AUTOMATED COUNT: 14 % (ref 10–15)
GLUCOSE SERPL-MCNC: 90 MG/DL (ref 70–99)
GROUP A STREP BY PCR: NOT DETECTED
HCG SERPL QL: NEGATIVE
HCG UR QL: NEGATIVE
HCT VFR BLD AUTO: 42.2 % (ref 35–47)
HGB BLD-MCNC: 13.6 G/DL (ref 11.7–15.7)
IMM GRANULOCYTES # BLD: 0 10E3/UL
IMM GRANULOCYTES NFR BLD: 0 %
INR PPP: 0.9 (ref 0.85–1.15)
LYMPHOCYTES # BLD AUTO: 2 10E3/UL (ref 0.8–5.3)
LYMPHOCYTES NFR BLD AUTO: 18 %
MCH RBC QN AUTO: 30.5 PG (ref 26.5–33)
MCHC RBC AUTO-ENTMCNC: 32.2 G/DL (ref 31.5–36.5)
MCV RBC AUTO: 95 FL (ref 78–100)
MONOCYTES # BLD AUTO: 0.5 10E3/UL (ref 0–1.3)
MONOCYTES NFR BLD AUTO: 4 %
MRSA DNA SPEC QL NAA+PROBE: NEGATIVE
NEUTROPHILS # BLD AUTO: 8.2 10E3/UL (ref 1.6–8.3)
NEUTROPHILS NFR BLD AUTO: 77 %
NRBC # BLD AUTO: 0 10E3/UL
NRBC BLD AUTO-RTO: 0 /100
PLATELET # BLD AUTO: 247 10E3/UL (ref 150–450)
POTASSIUM SERPL-SCNC: 4.1 MMOL/L (ref 3.4–5.3)
RBC # BLD AUTO: 4.46 10E6/UL (ref 3.8–5.2)
SA TARGET DNA: NEGATIVE
SODIUM SERPL-SCNC: 139 MMOL/L (ref 135–145)
WBC # BLD AUTO: 10.8 10E3/UL (ref 4–11)

## 2024-05-27 PROCEDURE — 85610 PROTHROMBIN TIME: CPT | Performed by: STUDENT IN AN ORGANIZED HEALTH CARE EDUCATION/TRAINING PROGRAM

## 2024-05-27 PROCEDURE — 96366 THER/PROPH/DIAG IV INF ADDON: CPT | Performed by: STUDENT IN AN ORGANIZED HEALTH CARE EDUCATION/TRAINING PROGRAM

## 2024-05-27 PROCEDURE — 250N000011 HC RX IP 250 OP 636: Performed by: STUDENT IN AN ORGANIZED HEALTH CARE EDUCATION/TRAINING PROGRAM

## 2024-05-27 PROCEDURE — 80048 BASIC METABOLIC PNL TOTAL CA: CPT | Performed by: STUDENT IN AN ORGANIZED HEALTH CARE EDUCATION/TRAINING PROGRAM

## 2024-05-27 PROCEDURE — 81025 URINE PREGNANCY TEST: CPT | Performed by: STUDENT IN AN ORGANIZED HEALTH CARE EDUCATION/TRAINING PROGRAM

## 2024-05-27 PROCEDURE — 86140 C-REACTIVE PROTEIN: CPT | Performed by: STUDENT IN AN ORGANIZED HEALTH CARE EDUCATION/TRAINING PROGRAM

## 2024-05-27 PROCEDURE — 99221 1ST HOSP IP/OBS SF/LOW 40: CPT | Mod: GC | Performed by: OTOLARYNGOLOGY

## 2024-05-27 PROCEDURE — 96361 HYDRATE IV INFUSION ADD-ON: CPT | Performed by: STUDENT IN AN ORGANIZED HEALTH CARE EDUCATION/TRAINING PROGRAM

## 2024-05-27 PROCEDURE — 96374 THER/PROPH/DIAG INJ IV PUSH: CPT | Mod: 59 | Performed by: STUDENT IN AN ORGANIZED HEALTH CARE EDUCATION/TRAINING PROGRAM

## 2024-05-27 PROCEDURE — 82565 ASSAY OF CREATININE: CPT

## 2024-05-27 PROCEDURE — 250N000013 HC RX MED GY IP 250 OP 250 PS 637: Performed by: STUDENT IN AN ORGANIZED HEALTH CARE EDUCATION/TRAINING PROGRAM

## 2024-05-27 PROCEDURE — 96365 THER/PROPH/DIAG IV INF INIT: CPT | Mod: 59 | Performed by: STUDENT IN AN ORGANIZED HEALTH CARE EDUCATION/TRAINING PROGRAM

## 2024-05-27 PROCEDURE — 87640 STAPH A DNA AMP PROBE: CPT | Performed by: STUDENT IN AN ORGANIZED HEALTH CARE EDUCATION/TRAINING PROGRAM

## 2024-05-27 PROCEDURE — 87040 BLOOD CULTURE FOR BACTERIA: CPT | Performed by: STUDENT IN AN ORGANIZED HEALTH CARE EDUCATION/TRAINING PROGRAM

## 2024-05-27 PROCEDURE — 36415 COLL VENOUS BLD VENIPUNCTURE: CPT | Performed by: STUDENT IN AN ORGANIZED HEALTH CARE EDUCATION/TRAINING PROGRAM

## 2024-05-27 PROCEDURE — 84703 CHORIONIC GONADOTROPIN ASSAY: CPT | Performed by: STUDENT IN AN ORGANIZED HEALTH CARE EDUCATION/TRAINING PROGRAM

## 2024-05-27 PROCEDURE — 250N000009 HC RX 250: Performed by: STUDENT IN AN ORGANIZED HEALTH CARE EDUCATION/TRAINING PROGRAM

## 2024-05-27 PROCEDURE — 258N000003 HC RX IP 258 OP 636: Performed by: STUDENT IN AN ORGANIZED HEALTH CARE EDUCATION/TRAINING PROGRAM

## 2024-05-27 PROCEDURE — 87651 STREP A DNA AMP PROBE: CPT | Performed by: STUDENT IN AN ORGANIZED HEALTH CARE EDUCATION/TRAINING PROGRAM

## 2024-05-27 PROCEDURE — 96375 TX/PRO/DX INJ NEW DRUG ADDON: CPT | Performed by: STUDENT IN AN ORGANIZED HEALTH CARE EDUCATION/TRAINING PROGRAM

## 2024-05-27 PROCEDURE — 99285 EMERGENCY DEPT VISIT HI MDM: CPT | Mod: 25 | Performed by: STUDENT IN AN ORGANIZED HEALTH CARE EDUCATION/TRAINING PROGRAM

## 2024-05-27 PROCEDURE — 96376 TX/PRO/DX INJ SAME DRUG ADON: CPT | Performed by: STUDENT IN AN ORGANIZED HEALTH CARE EDUCATION/TRAINING PROGRAM

## 2024-05-27 PROCEDURE — 70491 CT SOFT TISSUE NECK W/DYE: CPT

## 2024-05-27 PROCEDURE — 85025 COMPLETE CBC W/AUTO DIFF WBC: CPT | Performed by: STUDENT IN AN ORGANIZED HEALTH CARE EDUCATION/TRAINING PROGRAM

## 2024-05-27 PROCEDURE — 120N000002 HC R&B MED SURG/OB UMMC

## 2024-05-27 PROCEDURE — 99285 EMERGENCY DEPT VISIT HI MDM: CPT | Performed by: STUDENT IN AN ORGANIZED HEALTH CARE EDUCATION/TRAINING PROGRAM

## 2024-05-27 RX ORDER — HYDROMORPHONE HYDROCHLORIDE 1 MG/ML
0.5 INJECTION, SOLUTION INTRAMUSCULAR; INTRAVENOUS; SUBCUTANEOUS ONCE
Status: COMPLETED | OUTPATIENT
Start: 2024-05-27 | End: 2024-05-27

## 2024-05-27 RX ORDER — CALCIUM CARBONATE 500 MG/1
1000 TABLET, CHEWABLE ORAL 4 TIMES DAILY PRN
Status: DISCONTINUED | OUTPATIENT
Start: 2024-05-27 | End: 2024-05-28 | Stop reason: HOSPADM

## 2024-05-27 RX ORDER — LIDOCAINE 40 MG/G
CREAM TOPICAL
Status: DISCONTINUED | OUTPATIENT
Start: 2024-05-27 | End: 2024-05-28 | Stop reason: HOSPADM

## 2024-05-27 RX ORDER — DEXAMETHASONE SODIUM PHOSPHATE 10 MG/ML
10 INJECTION, SOLUTION INTRAMUSCULAR; INTRAVENOUS ONCE
Status: COMPLETED | OUTPATIENT
Start: 2024-05-27 | End: 2024-05-27

## 2024-05-27 RX ORDER — HYDROMORPHONE HYDROCHLORIDE 1 MG/ML
0.5 INJECTION, SOLUTION INTRAMUSCULAR; INTRAVENOUS; SUBCUTANEOUS EVERY 30 MIN PRN
Status: COMPLETED | OUTPATIENT
Start: 2024-05-27 | End: 2024-05-27

## 2024-05-27 RX ORDER — AMPICILLIN AND SULBACTAM 2; 1 G/1; G/1
3 INJECTION, POWDER, FOR SOLUTION INTRAMUSCULAR; INTRAVENOUS EVERY 6 HOURS
Status: DISCONTINUED | OUTPATIENT
Start: 2024-05-27 | End: 2024-05-28 | Stop reason: HOSPADM

## 2024-05-27 RX ORDER — DEXAMETHASONE SODIUM PHOSPHATE 10 MG/ML
6 INJECTION, SOLUTION INTRAMUSCULAR; INTRAVENOUS ONCE
Status: COMPLETED | OUTPATIENT
Start: 2024-05-27 | End: 2024-05-27

## 2024-05-27 RX ORDER — ONDANSETRON 2 MG/ML
4 INJECTION INTRAMUSCULAR; INTRAVENOUS EVERY 6 HOURS PRN
Status: DISCONTINUED | OUTPATIENT
Start: 2024-05-27 | End: 2024-05-28 | Stop reason: HOSPADM

## 2024-05-27 RX ORDER — PIPERACILLIN SODIUM, TAZOBACTAM SODIUM 4; .5 G/20ML; G/20ML
4.5 INJECTION, POWDER, LYOPHILIZED, FOR SOLUTION INTRAVENOUS ONCE
Status: COMPLETED | OUTPATIENT
Start: 2024-05-27 | End: 2024-05-27

## 2024-05-27 RX ORDER — SODIUM CHLORIDE, SODIUM LACTATE, POTASSIUM CHLORIDE, CALCIUM CHLORIDE 600; 310; 30; 20 MG/100ML; MG/100ML; MG/100ML; MG/100ML
INJECTION, SOLUTION INTRAVENOUS CONTINUOUS
Status: DISCONTINUED | OUTPATIENT
Start: 2024-05-27 | End: 2024-05-27

## 2024-05-27 RX ORDER — SODIUM CHLORIDE, SODIUM LACTATE, POTASSIUM CHLORIDE, CALCIUM CHLORIDE 600; 310; 30; 20 MG/100ML; MG/100ML; MG/100ML; MG/100ML
INJECTION, SOLUTION INTRAVENOUS CONTINUOUS
Status: DISCONTINUED | OUTPATIENT
Start: 2024-05-27 | End: 2024-05-28

## 2024-05-27 RX ORDER — AMOXICILLIN 250 MG
2 CAPSULE ORAL 2 TIMES DAILY PRN
Status: DISCONTINUED | OUTPATIENT
Start: 2024-05-27 | End: 2024-05-28 | Stop reason: HOSPADM

## 2024-05-27 RX ORDER — IOPAMIDOL 755 MG/ML
100 INJECTION, SOLUTION INTRAVASCULAR ONCE
Status: COMPLETED | OUTPATIENT
Start: 2024-05-27 | End: 2024-05-27

## 2024-05-27 RX ORDER — ONDANSETRON 4 MG/1
4 TABLET, ORALLY DISINTEGRATING ORAL EVERY 6 HOURS PRN
Status: DISCONTINUED | OUTPATIENT
Start: 2024-05-27 | End: 2024-05-28 | Stop reason: HOSPADM

## 2024-05-27 RX ORDER — IBUPROFEN 100 MG/5ML
600 SUSPENSION, ORAL (FINAL DOSE FORM) ORAL EVERY 6 HOURS PRN
Status: DISCONTINUED | OUTPATIENT
Start: 2024-05-27 | End: 2024-05-28 | Stop reason: HOSPADM

## 2024-05-27 RX ORDER — DEXAMETHASONE SODIUM PHOSPHATE 10 MG/ML
8 INJECTION, SOLUTION INTRAMUSCULAR; INTRAVENOUS EVERY 8 HOURS
Status: COMPLETED | OUTPATIENT
Start: 2024-05-27 | End: 2024-05-28

## 2024-05-27 RX ORDER — AMOXICILLIN 250 MG
1 CAPSULE ORAL 2 TIMES DAILY PRN
Status: DISCONTINUED | OUTPATIENT
Start: 2024-05-27 | End: 2024-05-28 | Stop reason: HOSPADM

## 2024-05-27 RX ORDER — ACETAMINOPHEN 325 MG/10.15ML
650 LIQUID ORAL EVERY 6 HOURS PRN
Status: DISCONTINUED | OUTPATIENT
Start: 2024-05-27 | End: 2024-05-28 | Stop reason: HOSPADM

## 2024-05-27 RX ADMIN — HYDROMORPHONE HYDROCHLORIDE 0.5 MG: 1 INJECTION, SOLUTION INTRAMUSCULAR; INTRAVENOUS; SUBCUTANEOUS at 15:12

## 2024-05-27 RX ADMIN — SODIUM CHLORIDE 50 ML: 9 INJECTION, SOLUTION INTRAVENOUS at 13:49

## 2024-05-27 RX ADMIN — HYDROMORPHONE HYDROCHLORIDE 0.5 MG: 1 INJECTION, SOLUTION INTRAMUSCULAR; INTRAVENOUS; SUBCUTANEOUS at 21:21

## 2024-05-27 RX ADMIN — IOPAMIDOL 90 ML: 755 INJECTION, SOLUTION INTRAVENOUS at 13:52

## 2024-05-27 RX ADMIN — HYDROMORPHONE HYDROCHLORIDE 0.5 MG: 1 INJECTION, SOLUTION INTRAMUSCULAR; INTRAVENOUS; SUBCUTANEOUS at 21:57

## 2024-05-27 RX ADMIN — SODIUM CHLORIDE 1000 ML: 9 INJECTION, SOLUTION INTRAVENOUS at 14:52

## 2024-05-27 RX ADMIN — DEXAMETHASONE SODIUM PHOSPHATE 8 MG: 10 INJECTION, SOLUTION INTRAMUSCULAR; INTRAVENOUS at 23:09

## 2024-05-27 RX ADMIN — DEXAMETHASONE SODIUM PHOSPHATE 6 MG: 10 INJECTION, SOLUTION INTRAMUSCULAR; INTRAVENOUS at 15:13

## 2024-05-27 RX ADMIN — VANCOMYCIN HYDROCHLORIDE 1500 MG: 10 INJECTION, POWDER, LYOPHILIZED, FOR SOLUTION INTRAVENOUS at 15:25

## 2024-05-27 RX ADMIN — DEXAMETHASONE SODIUM PHOSPHATE 10 MG: 10 INJECTION, SOLUTION INTRAMUSCULAR; INTRAVENOUS at 14:57

## 2024-05-27 RX ADMIN — HYDROMORPHONE HYDROCHLORIDE 0.5 MG: 1 INJECTION, SOLUTION INTRAMUSCULAR; INTRAVENOUS; SUBCUTANEOUS at 18:28

## 2024-05-27 RX ADMIN — AMPICILLIN SODIUM AND SULBACTAM SODIUM 3 G: 2; 1 INJECTION, POWDER, FOR SOLUTION INTRAMUSCULAR; INTRAVENOUS at 21:16

## 2024-05-27 RX ADMIN — SODIUM CHLORIDE, POTASSIUM CHLORIDE, SODIUM LACTATE AND CALCIUM CHLORIDE: 600; 310; 30; 20 INJECTION, SOLUTION INTRAVENOUS at 19:35

## 2024-05-27 RX ADMIN — IBUPROFEN 600 MG: 200 SUSPENSION ORAL at 21:42

## 2024-05-27 ASSESSMENT — ENCOUNTER SYMPTOMS: SORE THROAT: 1

## 2024-05-27 ASSESSMENT — COLUMBIA-SUICIDE SEVERITY RATING SCALE - C-SSRS
1. IN THE PAST MONTH, HAVE YOU WISHED YOU WERE DEAD OR WISHED YOU COULD GO TO SLEEP AND NOT WAKE UP?: NO
6. HAVE YOU EVER DONE ANYTHING, STARTED TO DO ANYTHING, OR PREPARED TO DO ANYTHING TO END YOUR LIFE?: NO
2. HAVE YOU ACTUALLY HAD ANY THOUGHTS OF KILLING YOURSELF IN THE PAST MONTH?: NO

## 2024-05-27 ASSESSMENT — ACTIVITIES OF DAILY LIVING (ADL)
ADLS_ACUITY_SCORE: 35

## 2024-05-27 NOTE — PROGRESS NOTES
Transfer from [facility] [place] [place] ED. Woke up this morning with a sore throat, found to have epiglottis abscess. Still able to manage airway, verbal on arrival and not In distress

## 2024-05-27 NOTE — ED TRIAGE NOTES
Patient presents due to sharp pains and difficulty swallowing due to the pain. Patient has numbness present to back of tongue. Patient reports waking up with pain this morning. 7/10 sharp pain that increases with swallowing.     Triage Assessment (Adult)       Row Name 05/27/24 1154          Triage Assessment    Airway WDL WDL        Respiratory WDL    Respiratory WDL WDL        Skin Circulation/Temperature WDL    Skin Circulation/Temperature WDL WDL        Cardiac WDL    Cardiac WDL WDL        Peripheral/Neurovascular WDL    Peripheral Neurovascular WDL WDL        Cognitive/Neuro/Behavioral WDL    Cognitive/Neuro/Behavioral WDL WDL

## 2024-05-27 NOTE — ED PROVIDER NOTES
"ED Provider Note  [facility]      History     Chief Complaint   Patient presents with    Pharyngitis     Patient presents due to sharp pains and difficulty swallowing due to the pain. Patient has numbness present to back of tongue. Patient reports waking up with pain this morning. 7/10 sharp pain that increases with swallowing.     The history is provided by the patient and medical records.   Pharyngitis    Chhaya Mock is a 33 year old female with a history of anemia and previous abscesses presents to the emergency department due to numbness and back of tongue and painful swallowing.    Patient states she woke up this morning, sat up, and was experiencing numbness in her tongue and throat along with swelling and pain.  Patient states that when she swallows it feels like she is \"swallowing glass\".  Patient has had sore throats in the past, including pneumonia, but has never experienced symptoms like this before.  Patient can feel when she is biting her tongue.  Patient was also nauseous in the shower this morning.  Patient denies new fever or daily medication use.    Past Medical History  Past Medical History:   Diagnosis Date    Carbuncle and furuncle     Tobacco use disorder     Smokes 1/2 ppd. Started smoking at 14 years    Vaginal yeast infection 06/02/2017     Past Surgical History:   Procedure Laterality Date    [facility] ORAL SURGERY PROCEDURE  10/23/2008    [place] Teeth Extraction     albuterol (PROAIR HFA/PROVENTIL HFA/VENTOLIN HFA) 108 (90 Base) MCG/ACT inhaler  ondansetron (ZOFRAN ODT) 4 MG ODT tab      Allergies   Allergen Reactions    No Known Allergies      Family History  Family History   Problem Relation Age of Onset    Hypertension Maternal Grandmother     Diabetes Maternal Uncle     Hypertension Maternal Uncle     Alcohol/Drug Maternal Aunt     Alcohol/Drug Maternal Uncle     Anesthesia Reaction Other      Social History   Social History     Tobacco Use    " "Smoking status: Every Day     Current packs/day: 0.50     Average packs/day: 0.5 packs/day for 2.0 years (1.0 ttl pk-yrs)     Types: Cigarettes    Smokeless tobacco: Never   Substance Use Topics    Alcohol use: No     Comment: occassionally    Drug use: Yes     Types: Marijuana     Comment: occassionally      A medically appropriate review of systems was performed with pertinent positives and negatives noted in the HPI, and all other systems negative.    Physical Exam   BP: 112/78  Pulse: 88  Temp: 98.5  F (36.9  C)  Resp: 16  Height: 160 cm (5' 3\")  Weight: 62.1 kg (137 lb)  SpO2: 100 %  Physical Exam  Constitutional:       General: She is not in acute distress.     Appearance: Normal appearance. She is not diaphoretic.   HENT:      Head: Atraumatic.      Mouth/Throat:      Mouth: Mucous membranes are moist.      Comments: Normal-appearing oropharynx with no trismus, no fullness under the tongue, no uvular deviation, no erythema or exudate  Eyes:      General: No scleral icterus.     Conjunctiva/sclera: Conjunctivae normal.   Cardiovascular:      Rate and Rhythm: Normal rate.      Heart sounds: Normal heart sounds.   Pulmonary:      Effort: No respiratory distress.      Breath sounds: Normal breath sounds.   Abdominal:      General: Abdomen is flat.   Musculoskeletal:      Cervical back: Neck supple.   Skin:     General: Skin is warm.      Findings: No rash.   Neurological:      Mental Status: She is alert and oriented to person, place, and time. Mental status is at baseline.           ED Course, Procedures, & Data      Procedures                No results found for any visits on 05/27/24.  Medications - No data to display  Labs Ordered and Resulted from Time of ED Arrival to Time of ED Departure - No data to display  No orders to display          Critical care was not performed.     Medical Decision Making  The patient's presentation was of high complexity (an acute health issue posing potential threat to life or " bodily function).    The patient's evaluation involved:  ordering and/or review of 3+ test(s) in this encounter (see separate area of note for details)  discussion of management or test interpretation with another health professional (ENT, radiology)    The patient's management necessitated high risk (a decision regarding hospitalization).    Assessment & Plan    Labs as reviewed and interpreted by me are reassuring, and CT scan shows pharyngitis, but on my read I do see a localized fluid collection consistent with an abscess in the context of patient having hidradenitis suppurativa, and how painful her throat is, without any evidence of pharyngitis on the throat exam, more likely patient with an abscess.  Consulted ENT who agreed with plan for IV antibiotics and recommended steroids as well as transfer to the [facility].  Called [facility] regarding read, who said that they will update read  Patient then signed out to Dr. Tomlinson at [facility] emergency room who accepted patient for ER to ER transfer  At this time, patient was not intubated as she is protecting her airway, not drooling, and is well-appearing here in the emergency room.    I have reviewed the nursing notes. I have reviewed the findings, diagnosis, plan and need for follow up with the patient.    New Prescriptions    No medications on file       Final diagnoses:   None   I, Veto Harris, am serving as a trained medical scribe to document services personally performed by Luis Griffin MD, based to the provider's statements to me.     ILuis MD, was physically present and have reviewed and verified the accuracy of this note documented by Veto Harris.     Luis Griffin MD  [facility] EMERGENCY DEPARTMENT  5/27/2024     Luis Griffin MD  05/27/24 6231

## 2024-05-27 NOTE — PHARMACY-VANCOMYCIN DOSING SERVICE
"Pharmacy Vancomycin Initial Note  Date of Service May 27, 2024  Patient's  1990  33 year old, female    Indication: Sepsis    Current estimated CrCl = Estimated Creatinine Clearance: 87.2 mL/min (based on SCr of 0.9 mg/dL).    Creatinine for last 3 days  2024:  1:05 PM Creatinine 0.92 mg/dL;  1:13 PM Creatinine POCT 0.9 mg/dL    Recent Vancomycin Level(s) for last 3 days  No results found for requested labs within last 3 days.      Vancomycin IV Administrations (past 72 hours)        No vancomycin orders with administrations in past 72 hours.                    Nephrotoxins and other renal medications (From now, onward)      Start     Dose/Rate Route Frequency Ordered Stop    24 1440  piperacillin-tazobactam (ZOSYN) 4.5 g vial to attach to  mL bag        Note to Pharmacy: For [facility], [facility] and [facility]: For Zosyn-naive patients, use the \"Zosyn initial dose + extended infusion\" order panel.    4.5 g  over 30 Minutes Intravenous ONCE 24 1439              Contrast Orders - past 72 hours (72h ago, onward)      Start     Dose/Rate Route Frequency Stop    24 1345  iopamidol (ISOVUE-370) solution 100 mL         100 mL Intravenous ONCE 24 1352            [employer] Prediction of Planned Initial Vancomycin Regimen  Regimen: 1500 mg IV every 24 hours.  Exposure target: AUC24 (range)400-600 mg/L.hr   AUC24,ss: 421 mg/L.hr  Probability of AUC24 > 400: 56 %  Ctrough,ss: 10.3 mg/L  Probability of Ctrough,ss > 20: 8 %  Probability of nephrotoxicity (Lodise CALISTA ): 6 %          Plan:  Start vancomycin 1500 mg IV q24h.   Vancomycin monitoring method: AUC  Vancomycin therapeutic monitoring goal: 400-600 mg*h/L  Pharmacy will check vancomycin levels as appropriate in 1-3 Days.    Serum creatinine levels will be ordered daily for the first week of therapy and at least twice weekly for subsequent weeks.      Judit Hicks, PharmD  [phone]   Also available on [employer]     "

## 2024-05-27 NOTE — ED NOTES
Bed: ED27  Expected date:   Expected time:   Means of arrival:   Comments:  Tx from [facility]. Epiglottic abscess

## 2024-05-28 VITALS
BODY MASS INDEX: 24.27 KG/M2 | OXYGEN SATURATION: 100 % | DIASTOLIC BLOOD PRESSURE: 85 MMHG | SYSTOLIC BLOOD PRESSURE: 136 MMHG | HEART RATE: 64 BPM | TEMPERATURE: 98.5 F | WEIGHT: 137 LBS | HEIGHT: 63 IN | RESPIRATION RATE: 16 BRPM

## 2024-05-28 LAB
CREAT SERPL-MCNC: 0.71 MG/DL (ref 0.51–0.95)
CRP SERPL-MCNC: 8.53 MG/L
EGFRCR SERPLBLD CKD-EPI 2021: >90 ML/MIN/1.73M2
ERYTHROCYTE [DISTWIDTH] IN BLOOD BY AUTOMATED COUNT: 13.8 % (ref 10–15)
HCT VFR BLD AUTO: 37.6 % (ref 35–47)
HGB BLD-MCNC: 12 G/DL (ref 11.7–15.7)
MCH RBC QN AUTO: 30.5 PG (ref 26.5–33)
MCHC RBC AUTO-ENTMCNC: 31.9 G/DL (ref 31.5–36.5)
MCV RBC AUTO: 95 FL (ref 78–100)
PLATELET # BLD AUTO: 218 10E3/UL (ref 150–450)
RBC # BLD AUTO: 3.94 10E6/UL (ref 3.8–5.2)
WBC # BLD AUTO: 11.7 10E3/UL (ref 4–11)

## 2024-05-28 PROCEDURE — 250N000013 HC RX MED GY IP 250 OP 250 PS 637: Performed by: STUDENT IN AN ORGANIZED HEALTH CARE EDUCATION/TRAINING PROGRAM

## 2024-05-28 PROCEDURE — 85027 COMPLETE CBC AUTOMATED: CPT | Performed by: STUDENT IN AN ORGANIZED HEALTH CARE EDUCATION/TRAINING PROGRAM

## 2024-05-28 PROCEDURE — 82565 ASSAY OF CREATININE: CPT | Performed by: STUDENT IN AN ORGANIZED HEALTH CARE EDUCATION/TRAINING PROGRAM

## 2024-05-28 PROCEDURE — 250N000011 HC RX IP 250 OP 636

## 2024-05-28 PROCEDURE — 36415 COLL VENOUS BLD VENIPUNCTURE: CPT | Performed by: STUDENT IN AN ORGANIZED HEALTH CARE EDUCATION/TRAINING PROGRAM

## 2024-05-28 PROCEDURE — 250N000011 HC RX IP 250 OP 636: Performed by: STUDENT IN AN ORGANIZED HEALTH CARE EDUCATION/TRAINING PROGRAM

## 2024-05-28 PROCEDURE — 86140 C-REACTIVE PROTEIN: CPT | Performed by: STUDENT IN AN ORGANIZED HEALTH CARE EDUCATION/TRAINING PROGRAM

## 2024-05-28 RX ORDER — DIPHENHYDRAMINE HCL 25 MG
25 CAPSULE ORAL EVERY 6 HOURS PRN
Status: DISCONTINUED | OUTPATIENT
Start: 2024-05-28 | End: 2024-05-28 | Stop reason: HOSPADM

## 2024-05-28 RX ORDER — ACETAMINOPHEN 325 MG/10.15ML
650 LIQUID ORAL EVERY 6 HOURS PRN
Qty: 500 ML | Refills: 0 | Status: SHIPPED | OUTPATIENT
Start: 2024-05-28 | End: 2024-05-29

## 2024-05-28 RX ORDER — LANOLIN ALCOHOL/MO/W.PET/CERES
3 CREAM (GRAM) TOPICAL
Status: DISCONTINUED | OUTPATIENT
Start: 2024-05-28 | End: 2024-05-28 | Stop reason: HOSPADM

## 2024-05-28 RX ORDER — DIPHENHYDRAMINE HYDROCHLORIDE 50 MG/ML
25 INJECTION INTRAMUSCULAR; INTRAVENOUS EVERY 6 HOURS PRN
Status: DISCONTINUED | OUTPATIENT
Start: 2024-05-28 | End: 2024-05-28 | Stop reason: HOSPADM

## 2024-05-28 RX ORDER — ENOXAPARIN SODIUM 100 MG/ML
40 INJECTION SUBCUTANEOUS EVERY 24 HOURS
Status: DISCONTINUED | OUTPATIENT
Start: 2024-05-28 | End: 2024-05-28 | Stop reason: HOSPADM

## 2024-05-28 RX ORDER — HYDROXYZINE HYDROCHLORIDE 25 MG/1
25 TABLET, FILM COATED ORAL EVERY 6 HOURS PRN
Status: DISCONTINUED | OUTPATIENT
Start: 2024-05-28 | End: 2024-05-28 | Stop reason: HOSPADM

## 2024-05-28 RX ORDER — HYDROXYZINE HYDROCHLORIDE 50 MG/1
50 TABLET, FILM COATED ORAL EVERY 6 HOURS PRN
Status: DISCONTINUED | OUTPATIENT
Start: 2024-05-28 | End: 2024-05-28 | Stop reason: HOSPADM

## 2024-05-28 RX ORDER — ALBUTEROL SULFATE 90 UG/1
2 AEROSOL, METERED RESPIRATORY (INHALATION) EVERY 6 HOURS PRN
Status: DISCONTINUED | OUTPATIENT
Start: 2024-05-28 | End: 2024-05-28 | Stop reason: HOSPADM

## 2024-05-28 RX ORDER — OXYCODONE HYDROCHLORIDE 5 MG/1
5 TABLET ORAL EVERY 4 HOURS PRN
Status: DISCONTINUED | OUTPATIENT
Start: 2024-05-28 | End: 2024-05-28

## 2024-05-28 RX ORDER — OXYCODONE HYDROCHLORIDE 10 MG/1
10 TABLET ORAL EVERY 4 HOURS PRN
Status: DISCONTINUED | OUTPATIENT
Start: 2024-05-28 | End: 2024-05-28

## 2024-05-28 RX ADMIN — AMPICILLIN SODIUM AND SULBACTAM SODIUM 3 G: 2; 1 INJECTION, POWDER, FOR SOLUTION INTRAMUSCULAR; INTRAVENOUS at 09:20

## 2024-05-28 RX ADMIN — IBUPROFEN 600 MG: 200 SUSPENSION ORAL at 03:35

## 2024-05-28 RX ADMIN — HYDROXYZINE HYDROCHLORIDE 50 MG: 50 TABLET ORAL at 07:38

## 2024-05-28 RX ADMIN — ENOXAPARIN SODIUM 40 MG: 40 INJECTION SUBCUTANEOUS at 10:43

## 2024-05-28 RX ADMIN — DEXAMETHASONE SODIUM PHOSPHATE 8 MG: 10 INJECTION, SOLUTION INTRAMUSCULAR; INTRAVENOUS at 07:38

## 2024-05-28 RX ADMIN — HYDROXYZINE HYDROCHLORIDE 25 MG: 25 TABLET, FILM COATED ORAL at 01:42

## 2024-05-28 RX ADMIN — AMPICILLIN SODIUM AND SULBACTAM SODIUM 3 G: 2; 1 INJECTION, POWDER, FOR SOLUTION INTRAMUSCULAR; INTRAVENOUS at 03:13

## 2024-05-28 RX ADMIN — AMPICILLIN SODIUM AND SULBACTAM SODIUM 3 G: 2; 1 INJECTION, POWDER, FOR SOLUTION INTRAMUSCULAR; INTRAVENOUS at 14:27

## 2024-05-28 RX ADMIN — ACETAMINOPHEN 650 MG: 325 SOLUTION ORAL at 07:38

## 2024-05-28 ASSESSMENT — ACTIVITIES OF DAILY LIVING (ADL)
ADLS_ACUITY_SCORE: 35

## 2024-05-28 NOTE — H&P
Otolaryngology H&PNote  May 27, 2024  CC: epiglottic abscess     HPI: Chhaya Mock is a 33 year old female with a past medical history of hidradinitis suppurativa who presents for throat pain. This morning she woke up with a pain in her neck and the feeling of swallowing glass. She has been unable to eat or drink. She presented to [facility] where she had a CT scan that showed a small epiglottic abscess.  She denies any shortness of breath, is able to lie flat and swallow her secretions. She has a history of HS but has not had any MRSA infections. She vapes but no longer smokes cigarettes. She is not on any immunosuppressants.     Past Medical History:   Diagnosis Date    Carbuncle and furuncle     Hidradenitis suppurativa     Tobacco use disorder     Smokes 1/2 ppd. Started smoking at 14 years    Vaginal yeast infection 06/02/2017       Past Surgical History:   Procedure Laterality Date    [facility] ORAL SURGERY PROCEDURE  10/23/2008    [place] Teeth Extraction       Current Outpatient Medications   Medication Sig Dispense Refill    albuterol (PROAIR HFA/PROVENTIL HFA/VENTOLIN HFA) 108 (90 Base) MCG/ACT inhaler Inhale 2 puffs into the lungs every 6 hours as needed for shortness of breath / dyspnea or wheezing 1 Inhaler 0    ondansetron (ZOFRAN ODT) 4 MG ODT tab Take 1 tablet (4 mg) by mouth every 8 hours as needed for nausea 8 tablet 0          Allergies   Allergen Reactions    No Known Allergies        Social History     Socioeconomic History    Marital status: Single     Spouse name: Not on file    Number of children: Not on file    Years of education: Not on file    Highest education level: Not on file   Occupational History     Employer: [employer].     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     Comment: [employer]   Tobacco Use    Smoking status: Every Day     Current packs/day: 0.50     Average packs/day: 0.5 packs/day for 2.0 years (1.0 ttl pk-yrs)     Types: Cigarettes     "Smokeless tobacco: Never   Substance and Sexual Activity    Alcohol use: No     Comment: occassionally    Drug use: Yes     Types: Marijuana     Comment: occassionally    Sexual activity: Yes     Partners: Male   Other Topics Concern    Not on file   Social History Narrative    Not on file     Social Determinants of Health     Financial Resource Strain: Not on file   Food Insecurity: Not on file   Transportation Needs: Not on file   Physical Activity: Not on file   Stress: Not on file   Social Connections: Not on file   Interpersonal Safety: Not on file   Housing Stability: Not on file       Family History   Problem Relation Age of Onset    Hypertension Maternal Grandmother     Diabetes Maternal Uncle     Hypertension Maternal Uncle     Alcohol/Drug Maternal Aunt     Alcohol/Drug Maternal Uncle     Anesthesia Reaction Other        ROS: 12 point review of systems is negative unless noted in HPI.    PHYSICAL EXAM:  /67   Pulse 90   Temp 98.5  F (36.9  C) (Oral)   Resp 16   Ht 1.6 m (5' 3\")   Wt 62.1 kg (137 lb)   LMP 05/12/2024   SpO2 100%   BMI 24.27 kg/m    General: laying in chair no acute distress  HEAD: normocephalic, atraumatic  Face: symmetrical, CN VII intact bilaterally (HB 1), no swelling, edema, or erythema. Sensation V1-V3 intact and equal bilaterally.   Eyes: EOMI without spontaneous or gaze evoked nystagmus, PERRL, clear sclera  Ears: no tragal tenderness, external ear canal open and clear bilaterally, TMs clear bilaterally  Nose: no anterior drainage, intact and midline septum without perforation or hematoma   Mouth: moist, no ulcers, no jaw or tooth tenderness, tongue midline and symmetric  Oropharynx: tonsils within normal limits, uvula midline, no oropharyngeal erythema  Neck: no LAD, trachea midline  Neuro: cranial nerves 2-12 grossly intact  Respiratory: breathing non-labored on RA, no stridor  Skin: no rashes or skin lesions of the face/neck  Psych: pleasant affect  Cardio: " extremities warm and well perfused     FIBEROPTIC ENDOSCOPY:  Due to throat pain, fiberoptic laryngoscopy was indicated. After obtaining verbal consent, the nose was topically decongested and anesthetized. The fiberoptic laryngoscope was passed under endoscopic vision through the L nasal passage. The turbinates were normal. The inferior and middle meati were clear bilaterally without purulence, masses, or polyps. The nasopharynx was clear. The eustachian tubes were clear. The soft palate appeared normal with good mobility. The epiglottis was slightly thickened and retroflexed with prominent edematous lingual tonsils.  The larynx was fully visible with mobile cords. The arytenoids were clear, mild edema of the left piriform and AE fold.   ROUTINE IP LABS (Last four results)  BMP  Recent Labs   Lab 05/27/24  1313 05/27/24  1305   NA  --  139   POTASSIUM  --  4.1   CHLORIDE  --  103   FRANCY  --  8.8   CO2  --  26   BUN  --  9.6   CR 0.9 0.92   GLC  --  90     CBC  Recent Labs   Lab 05/27/24  1305   WBC 10.8   RBC 4.46   HGB 13.6   HCT 42.2   MCV 95   MCH 30.5   MCHC 32.2   RDW 14.0        INR  Recent Labs   Lab 05/27/24  1305   INR 0.90       Imaging:  CLINICAL ADDENDUM:   Clinical information in this report has been modified from the previous version as follows: Patient has history of frequent soft tissue abscesses. There is high clinical suspicion for mucosal abscess. Given this additional history the fluid collection at   the level of the vallecula is concerning for pharyngeal abscess  FINDINGS:      MUCOSAL SPACES/SOFT TISSUES: There is mild edema involving the epiglottis and left lateral oropharynx. There is fluid at the left aspect of the vallecula measuring 10 x 8 mm. There is some apparent peripheral contrast enhancement although this may   represent enhancing normal mucosal enhancement adjacent lingual tonsil and epiglottis, less likely peripheral organization. Normal vocal cords and infraglottic trachea.  Normal parapharyngeal space and subcutaneous soft tissues. Normal oral cavity,    spaces, and floor of mouth structures.     LYMPH NODES: No pathologic lymph nodes by size or morphology criteria.      SALIVARY GLANDS: Normal parotid and submandibular glands.     THYROID: Normal.      VESSELS: Vascular structures of the neck are patent.     VISUALIZED INTRACRANIAL/ORBITS/SINUSES: No abnormality of the visualized intracranial compartment or orbits. Visualized paranasal sinuses and mastoid air cells are clear.     OTHER: No destructive osseous lesion. The included lung apices are clear.                                                                      IMPRESSION:   1.  There is mild edema involving the epiglottis and left lateral oropharynx. This is likely infectious/inflammatory process. There is some fluid at the left aspect of the vallecula measuring 10 x 8 mm. There is some apparent peripheral contrast   enhancement although this may represent normal mucosal enhancement adjacent to the lingual tonsil and epiglottis, less likely peripheral organization.  2.  Findings are consistent with pharyngitis.      Assessment and Plan  Chhaya Mock is a 33 year old female with a past medical history of Hidradenitis suppurativa who presents for throat pain, found to have supraglottitis with a small abscess in the vallecula. She is antibiotic naive. Will plan to admit her for antibiotics and steroids.     N: tylenol, ibuprofen   ENT: decadron 8mg q8h x2 more doses  P: continuous pulse ox  CV: n/a   ID: follow up MRSA nares   Unsayn   Trend CRP   GI:  soft diet   FEN: IVF until tolerating PO   SCDs for ppx      Discussed with Dr. Mira Romano MD  Otolaryngology-Head & Neck Surgery, PGY-4  Please page ENT with questions by dialing * [phone] and entering job code [phone] when prompted.    I discussed this case with the resident last night and reviewed a video of the laryngeal exam and  her imaging. I agreed with the overnight plan. I saw the patient at bedside this am. No dyspnea, swallowing is improved and she is tolerating a PO diet. She does c/o abdominal pain and her labs show increased WBC and CRP. Will monitor for now, consider transitioning to PO abx.   Salma Meyers MD

## 2024-05-28 NOTE — PROGRESS NOTES
"Otolaryngology Progress Note  May 28, 2024    SUBJECTIVE: No acute events overnight. Throat pain is improving, no longer endorsing pain with swallowing. Tolerated PO intake yesterday. Breathing comfortably, no shortness of breath or issues with swallowing.    OBJECTIVE:   /85   Pulse 64   Temp 97.8  F (36.6  C) (Oral)   Resp 16   Ht 1.6 m (5' 3\")   Wt 62.1 kg (137 lb)   LMP 05/12/2024   SpO2 100%   BMI 24.27 kg/m    General: laying in chair no acute distress  HEAD: normocephalic, atraumatic  Face: symmetrical, CN VII intact bilaterally (HB 1), no swelling, edema, or erythema. Sensation V1-V3 intact and equal bilaterally.   Eyes: EOMI without spontaneous or gaze evoked nystagmus, PERRL, clear sclera  Ears: no tragal tenderness, external ear canal open and clear bilaterally, TMs clear bilaterally  Nose: no anterior drainage, intact and midline septum without perforation or hematoma   Mouth: moist, no ulcers, no jaw or tooth tenderness, tongue midline and symmetric  Oropharynx: tonsils within normal limits, uvula midline, no oropharyngeal erythema  Neck: no LAD, trachea midline  Neuro: cranial nerves 2-12 grossly intact  Respiratory: breathing non-labored on RA, no stridor  Skin: no rashes or skin lesions of the face/neck  Psych: pleasant affect  Cardio: extremities warm and well perfused     5/27 FIBEROPTIC ENDOSCOPY:  Due to throat pain, fiberoptic laryngoscopy was indicated. After obtaining verbal consent, the nose was topically decongested and anesthetized. The fiberoptic laryngoscope was passed under endoscopic vision through the L nasal passage. The turbinates were normal. The inferior and middle meati were clear bilaterally without purulence, masses, or polyps. The nasopharynx was clear. The eustachian tubes were clear. The soft palate appeared normal with good mobility. The epiglottis was slightly thickened and retroflexed with prominent edematous lingual tonsils.  The larynx was fully visible " with mobile cords. The arytenoids were clear, mild edema of the left piriform and AE fold.     Intake/Output Summary (Last 24 hours) at 5/28/2024 1233  Last data filed at 5/28/2024 1000  Gross per 24 hour   Intake 1985 ml   Output --   Net 1985 ml     LABS:  ROUTINE IP LABS (Last four results)  BMP  Recent Labs   Lab 05/28/24  0709 05/27/24  1313 05/27/24  1305   NA  --   --  139   POTASSIUM  --   --  4.1   CHLORIDE  --   --  103   FRANCY  --   --  8.8   CO2  --   --  26   BUN  --   --  9.6   CR 0.71 0.9 0.92   GLC  --   --  90     CBC  Recent Labs   Lab 05/28/24  0709 05/27/24  1305   WBC 11.7* 10.8   RBC 3.94 4.46   HGB 12.0 13.6   HCT 37.6 42.2   MCV 95 95   MCH 30.5 30.5   MCHC 31.9 32.2   RDW 13.8 14.0    247     INR  Recent Labs   Lab 05/27/24  1305   INR 0.90       ASSESSMENT & PLAN: Chhaya Mock is a 33 year old female with a past medical history of Hidradenitis suppurativa who presents for throat pain, found to have supraglottitis with a small abscess in the vallecula. Admitted 5/27 for antibiotics and steroids.    Neuro:  - Pain control: tylenol, ibuprofen    HEENT:  - initially administered 10 mg and 6 mg decadron on arrival to ED 5/27 afternoon  - completed additional decadron 8mg q8h x 2 doses (5/27 PM, 5/28 AM)    Respiratory:  - PTA albuterol prn  - supplemental O2 PRN to keep sats >92%    CV/heme:  - hemodynamically stable    FEN/GI:  - regular diet  - discontinued mIVF given patient tolerating PO intake  - Bowel regimen: senna    :  - voiding independently    Endo  No hx diabetes  - initially administered 10 mg and 6 mg decadron on arrival to ED 5/27 afternoon  - completed additional decadron 8mg q8h x 2 doses (5/27 PM, 5/28 AM)    ID:  - continue Unasyn  - Vanc discontinued after negative MRSA swab 5/27  - trend CRP, WBC    PPX:  - lovenox 40 mg daily  - SCDs  - IS    Therapies:   - None indicated    Dispo: Floor, ENT primary    -- Patient and above plan discussed with   Mira    Clinically Significant Risk Factors Present on Admission                                  Medically Ready for Discharge: Anticipated Tomorrow      Jackie Noble MD  Otolaryngology-Head & Neck Surgery - PGY-1  To contact ENT please dial [phone] and enter job code [phone].

## 2024-05-28 NOTE — DISCHARGE SUMMARY
Discharge Summary  Chhaya Mock  [id number]  1990    Date of Admission: 5/27/2024  Date of Discharge: 5/28/2024    Admission Diagnosis: Supraglottitis without airway obstruction  Discharge Diagnosis: Same    Procedures: None  Pathology: None    HPI: Chhaya Mock is a 33 year old female with history of of hidradinitis suppurativa who presents for a one day history of throat pain and on CT exam was found to have a small epiglottic abscess.    Hospital Course: The patient was admitted to the hospital for airway monitoring, steroids, and IV antibiotics. Her hospital course was unremarkable. Recommendation was made for patient to remain inpatient for a full 24 hours of IV antibiotics with subsequent labs to monitor for improvement, however patient became agitated, emotional and verbalized strong intention to leave AMA. Thorough discussions were had advising against this due to possible risk of infection progress. Return precautions were extensively discussed including increased pain with swallowing, difficulty breathing, and changes to voice. Patient did ultimately elect to leave AMA.    At discharge, the patient's pain was well controlled, the patient was voiding on her own, and she was ambulating and tolerating a regular diet.     Discharge Exam:  Vitals:    05/28/24 0615 05/28/24 0630 05/28/24 0743 05/28/24 1600   BP:   136/85    BP Location:       Patient Position:       Cuff Size:       Pulse: 67 65 64    Resp:   16    Temp:   97.8  F (36.6  C) 98.5  F (36.9  C)   TempSrc:   Oral Oral   SpO2: 98% 98% 100%    Weight:       Height:           General: A&O x 3, sitting in the corner of the room, distressed  HEENT: PERRL, EOMI. Normal voice  Respiratory: Breathing non-labored on room air, no stridor, no accessory muscle use.     Discharge Medications:     Medication List        Started      acetaminophen 325 MG/10.15ML liquid  Commonly known as: TYLENOL  650 mg, Oral, EVERY 6 HOURS  PRN     amoxicillin-clavulanate 875-125 MG tablet  Commonly known as: AUGMENTIN  1 tablet, Oral, 2 TIMES DAILY              Discharge Procedure Orders   Reason for your hospital stay   Order Comments: Airway monitoring     Activity   Order Comments: No restrictions     Order Specific Question Answer Comments   Is discharge order? Yes      [facility]ult [facility] Follow-up and recommended labs and tests   Order Comments: Follow-up with PCP     Diet   Order Comments: Ok to eat     Order Specific Question Answer Comments   Is discharge order? Yes        Dispo: To home in good condition. All of the patient's questions/concerns have been addressed at this time.     Jackie Noble MD  Otolaryngology-Head & Neck Surgery  Please contact ENT by dialing [phone] and entering job code [phone].

## 2024-05-28 NOTE — ED PROVIDER NOTES
The patient was transferred over from the [facility] for evaluation by ENT.  On arrival she was assessed continuing to have patent airway no breathing problems.  Pain with swallowing.    IV fluids and pain medication were ordered.  ENT was formally consulted.  They evaluated the patient and plan to admit to their service for further management.     Gregg Rayo MD  05/27/24 2791

## 2024-05-28 NOTE — PLAN OF CARE
Goal Outcome Evaluation:    VSS. RA. Anxious this morning. No c/o pain with swallowing. Declined prn ibuprofen.Tolerating regular diet. Voiding spontaneous. Up independently. PIV tko ns for antibiotic

## 2024-05-28 NOTE — PROGRESS NOTES
Discharge paperwork reviewed with patient. Pt has no other questions and understands. IV removed prior to leaving. Pt belongings sent with.

## 2024-05-29 ENCOUNTER — TELEPHONE (OUTPATIENT)
Dept: OTOLARYNGOLOGY | Facility: CLINIC | Age: 34
End: 2024-05-29

## 2024-05-29 ENCOUNTER — APPOINTMENT (OUTPATIENT)
Dept: CT IMAGING | Facility: CLINIC | Age: 34
End: 2024-05-29
Attending: FAMILY MEDICINE
Payer: COMMERCIAL

## 2024-05-29 ENCOUNTER — APPOINTMENT (OUTPATIENT)
Dept: ULTRASOUND IMAGING | Facility: CLINIC | Age: 34
End: 2024-05-29
Attending: EMERGENCY MEDICINE
Payer: COMMERCIAL

## 2024-05-29 ENCOUNTER — ANESTHESIA (OUTPATIENT)
Dept: SURGERY | Facility: CLINIC | Age: 34
End: 2024-05-29
Payer: COMMERCIAL

## 2024-05-29 ENCOUNTER — HOSPITAL ENCOUNTER (OUTPATIENT)
Facility: CLINIC | Age: 34
Discharge: HOME | End: 2024-05-30
Attending: EMERGENCY MEDICINE | Admitting: OBSTETRICS & GYNECOLOGY
Payer: COMMERCIAL

## 2024-05-29 ENCOUNTER — ANESTHESIA EVENT (OUTPATIENT)
Dept: SURGERY | Facility: CLINIC | Age: 34
End: 2024-05-29
Payer: COMMERCIAL

## 2024-05-29 DIAGNOSIS — R10.30 LOWER ABDOMINAL PAIN: ICD-10-CM

## 2024-05-29 DIAGNOSIS — Z98.890 S/P LAPAROSCOPIC PROCEDURE: Primary | ICD-10-CM

## 2024-05-29 DIAGNOSIS — K66.1 HEMOPERITONEUM: ICD-10-CM

## 2024-05-29 DIAGNOSIS — N83.202 HEMORRHAGIC CYST OF LEFT OVARY: ICD-10-CM

## 2024-05-29 DIAGNOSIS — N83.202 RUPTURED CYST OF LEFT OVARY: ICD-10-CM

## 2024-05-29 LAB
ABO/RH(D): NORMAL
ALBUMIN SERPL BCG-MCNC: 4 G/DL (ref 3.5–5.2)
ALBUMIN UR-MCNC: NEGATIVE MG/DL
ALP SERPL-CCNC: 45 U/L (ref 40–150)
ALT SERPL W P-5'-P-CCNC: 12 U/L (ref 0–50)
ANION GAP SERPL CALCULATED.3IONS-SCNC: 10 MMOL/L (ref 7–15)
ANTIBODY SCREEN: NEGATIVE
APPEARANCE UR: CLEAR
APTT PPP: 26 SECONDS (ref 22–38)
AST SERPL W P-5'-P-CCNC: 19 U/L (ref 0–45)
BASOPHILS # BLD AUTO: 0 10E3/UL (ref 0–0.2)
BASOPHILS NFR BLD AUTO: 0 %
BILIRUB SERPL-MCNC: 0.4 MG/DL
BILIRUB UR QL STRIP: NEGATIVE
BLD PROD TYP BPU: NORMAL
BLD PROD TYP BPU: NORMAL
BLOOD COMPONENT TYPE: NORMAL
BLOOD COMPONENT TYPE: NORMAL
BUN SERPL-MCNC: 10.9 MG/DL (ref 6–20)
C TRACH DNA SPEC QL NAA+PROBE: NEGATIVE
CALCIUM SERPL-MCNC: 9 MG/DL (ref 8.6–10)
CHLORIDE SERPL-SCNC: 105 MMOL/L (ref 98–107)
CLUE CELLS: ABNORMAL
CODING SYSTEM: NORMAL
CODING SYSTEM: NORMAL
COLOR UR AUTO: ABNORMAL
CREAT SERPL-MCNC: 0.77 MG/DL (ref 0.51–0.95)
CROSSMATCH: NORMAL
CROSSMATCH: NORMAL
DEPRECATED HCO3 PLAS-SCNC: 24 MMOL/L (ref 22–29)
EGFRCR SERPLBLD CKD-EPI 2021: >90 ML/MIN/1.73M2
EOSINOPHIL # BLD AUTO: 0 10E3/UL (ref 0–0.7)
EOSINOPHIL NFR BLD AUTO: 0 %
ERYTHROCYTE [DISTWIDTH] IN BLOOD BY AUTOMATED COUNT: 14.3 % (ref 10–15)
ERYTHROCYTE [DISTWIDTH] IN BLOOD BY AUTOMATED COUNT: 14.4 % (ref 10–15)
FIBRINOGEN PPP-MCNC: 238 MG/DL (ref 170–490)
GLUCOSE SERPL-MCNC: 114 MG/DL (ref 70–99)
GLUCOSE UR STRIP-MCNC: NEGATIVE MG/DL
HCG SERPL QL: NEGATIVE
HCT VFR BLD AUTO: 30 % (ref 35–47)
HCT VFR BLD AUTO: 32.5 % (ref 35–47)
HGB BLD-MCNC: 10.7 G/DL (ref 11.7–15.7)
HGB BLD-MCNC: 9.6 G/DL (ref 11.7–15.7)
HGB UR QL STRIP: ABNORMAL
HOLD SPECIMEN: NORMAL
HOLD SPECIMEN: NORMAL
IMM GRANULOCYTES # BLD: 0.2 10E3/UL
IMM GRANULOCYTES NFR BLD: 1 %
INR PPP: 1.14 (ref 0.85–1.15)
KETONES UR STRIP-MCNC: NEGATIVE MG/DL
LEUKOCYTE ESTERASE UR QL STRIP: NEGATIVE
LIPASE SERPL-CCNC: 17 U/L (ref 13–60)
LYMPHOCYTES # BLD AUTO: 2.1 10E3/UL (ref 0.8–5.3)
LYMPHOCYTES NFR BLD AUTO: 11 %
MCH RBC QN AUTO: 30.3 PG (ref 26.5–33)
MCH RBC QN AUTO: 31.5 PG (ref 26.5–33)
MCHC RBC AUTO-ENTMCNC: 32 G/DL (ref 31.5–36.5)
MCHC RBC AUTO-ENTMCNC: 32.9 G/DL (ref 31.5–36.5)
MCV RBC AUTO: 95 FL (ref 78–100)
MCV RBC AUTO: 96 FL (ref 78–100)
MONOCYTES # BLD AUTO: 1 10E3/UL (ref 0–1.3)
MONOCYTES NFR BLD AUTO: 5 %
MUCOUS THREADS #/AREA URNS LPF: PRESENT /LPF
N GONORRHOEA DNA SPEC QL NAA+PROBE: NEGATIVE
NEUTROPHILS # BLD AUTO: 15.8 10E3/UL (ref 1.6–8.3)
NEUTROPHILS NFR BLD AUTO: 83 %
NITRATE UR QL: NEGATIVE
NRBC # BLD AUTO: 0 10E3/UL
NRBC BLD AUTO-RTO: 0 /100
PH UR STRIP: 6 [PH] (ref 5–7)
PLATELET # BLD AUTO: 177 10E3/UL (ref 150–450)
PLATELET # BLD AUTO: 202 10E3/UL (ref 150–450)
POTASSIUM SERPL-SCNC: 3.5 MMOL/L (ref 3.4–5.3)
PROT SERPL-MCNC: 6.6 G/DL (ref 6.4–8.3)
RBC # BLD AUTO: 3.17 10E6/UL (ref 3.8–5.2)
RBC # BLD AUTO: 3.4 10E6/UL (ref 3.8–5.2)
RBC URINE: 15 /HPF
SODIUM SERPL-SCNC: 139 MMOL/L (ref 135–145)
SP GR UR STRIP: 1.02 (ref 1–1.03)
SPECIMEN EXPIRATION DATE: NORMAL
SQUAMOUS EPITHELIAL: <1 /HPF
TRICHOMONAS, WET PREP: ABNORMAL
UNIT ABO/RH: NORMAL
UNIT ABO/RH: NORMAL
UNIT NUMBER: NORMAL
UNIT NUMBER: NORMAL
UNIT STATUS: NORMAL
UNIT STATUS: NORMAL
UNIT TYPE ISBT: 5100
UNIT TYPE ISBT: 5100
UROBILINOGEN UR STRIP-MCNC: 2 MG/DL
WBC # BLD AUTO: 10.3 10E3/UL (ref 4–11)
WBC # BLD AUTO: 19 10E3/UL (ref 4–11)
WBC URINE: 1 /HPF
WBC'S/HIGH POWER FIELD, WET PREP: ABNORMAL
YEAST, WET PREP: ABNORMAL

## 2024-05-29 PROCEDURE — 87210 SMEAR WET MOUNT SALINE/INK: CPT | Performed by: EMERGENCY MEDICINE

## 2024-05-29 PROCEDURE — 258N000001 HC RX 258: Performed by: OBSTETRICS & GYNECOLOGY

## 2024-05-29 PROCEDURE — 250N000009 HC RX 250: Performed by: NURSE ANESTHETIST, CERTIFIED REGISTERED

## 2024-05-29 PROCEDURE — G0378 HOSPITAL OBSERVATION PER HR: HCPCS

## 2024-05-29 PROCEDURE — 250N000011 HC RX IP 250 OP 636: Performed by: FAMILY MEDICINE

## 2024-05-29 PROCEDURE — 999N000141 HC STATISTIC PRE-PROCEDURE NURSING ASSESSMENT: Performed by: OBSTETRICS & GYNECOLOGY

## 2024-05-29 PROCEDURE — 96375 TX/PRO/DX INJ NEW DRUG ADDON: CPT | Performed by: EMERGENCY MEDICINE

## 2024-05-29 PROCEDURE — 96361 HYDRATE IV INFUSION ADD-ON: CPT | Mod: 59

## 2024-05-29 PROCEDURE — 80053 COMPREHEN METABOLIC PANEL: CPT | Performed by: EMERGENCY MEDICINE

## 2024-05-29 PROCEDURE — 360N000076 HC SURGERY LEVEL 3, PER MIN: Performed by: OBSTETRICS & GYNECOLOGY

## 2024-05-29 PROCEDURE — 85610 PROTHROMBIN TIME: CPT | Performed by: STUDENT IN AN ORGANIZED HEALTH CARE EDUCATION/TRAINING PROGRAM

## 2024-05-29 PROCEDURE — 258N000003 HC RX IP 258 OP 636: Performed by: NURSE ANESTHETIST, CERTIFIED REGISTERED

## 2024-05-29 PROCEDURE — 85025 COMPLETE CBC W/AUTO DIFF WBC: CPT | Performed by: EMERGENCY MEDICINE

## 2024-05-29 PROCEDURE — 99291 CRITICAL CARE FIRST HOUR: CPT | Mod: 25 | Performed by: EMERGENCY MEDICINE

## 2024-05-29 PROCEDURE — 88305 TISSUE EXAM BY PATHOLOGIST: CPT | Mod: 26 | Performed by: PATHOLOGY

## 2024-05-29 PROCEDURE — 49320 DIAG LAPARO SEPARATE PROC: CPT | Performed by: NURSE ANESTHETIST, CERTIFIED REGISTERED

## 2024-05-29 PROCEDURE — 258N000003 HC RX IP 258 OP 636: Performed by: EMERGENCY MEDICINE

## 2024-05-29 PROCEDURE — 96374 THER/PROPH/DIAG INJ IV PUSH: CPT | Mod: 59 | Performed by: EMERGENCY MEDICINE

## 2024-05-29 PROCEDURE — 49320 DIAG LAPARO SEPARATE PROC: CPT | Performed by: STUDENT IN AN ORGANIZED HEALTH CARE EDUCATION/TRAINING PROGRAM

## 2024-05-29 PROCEDURE — 99140 ANES COMP EMERGENCY COND: CPT | Mod: QX | Performed by: NURSE ANESTHETIST, CERTIFIED REGISTERED

## 2024-05-29 PROCEDURE — 74177 CT ABD & PELVIS W/CONTRAST: CPT | Mod: 26 | Performed by: RADIOLOGY

## 2024-05-29 PROCEDURE — 258N000003 HC RX IP 258 OP 636: Performed by: FAMILY MEDICINE

## 2024-05-29 PROCEDURE — 96376 TX/PRO/DX INJ SAME DRUG ADON: CPT

## 2024-05-29 PROCEDURE — 87491 CHLMYD TRACH DNA AMP PROBE: CPT | Performed by: EMERGENCY MEDICINE

## 2024-05-29 PROCEDURE — 74177 CT ABD & PELVIS W/CONTRAST: CPT

## 2024-05-29 PROCEDURE — 250N000011 HC RX IP 250 OP 636: Performed by: STUDENT IN AN ORGANIZED HEALTH CARE EDUCATION/TRAINING PROGRAM

## 2024-05-29 PROCEDURE — 85027 COMPLETE CBC AUTOMATED: CPT | Performed by: OBSTETRICS & GYNECOLOGY

## 2024-05-29 PROCEDURE — 84703 CHORIONIC GONADOTROPIN ASSAY: CPT | Performed by: EMERGENCY MEDICINE

## 2024-05-29 PROCEDURE — 76856 US EXAM PELVIC COMPLETE: CPT | Mod: 26 | Performed by: RADIOLOGY

## 2024-05-29 PROCEDURE — 86923 COMPATIBILITY TEST ELECTRIC: CPT | Performed by: STUDENT IN AN ORGANIZED HEALTH CARE EDUCATION/TRAINING PROGRAM

## 2024-05-29 PROCEDURE — 36415 COLL VENOUS BLD VENIPUNCTURE: CPT | Performed by: EMERGENCY MEDICINE

## 2024-05-29 PROCEDURE — 76705 ECHO EXAM OF ABDOMEN: CPT | Performed by: EMERGENCY MEDICINE

## 2024-05-29 PROCEDURE — 250N000013 HC RX MED GY IP 250 OP 250 PS 637: Performed by: STUDENT IN AN ORGANIZED HEALTH CARE EDUCATION/TRAINING PROGRAM

## 2024-05-29 PROCEDURE — 258N000003 HC RX IP 258 OP 636: Performed by: STUDENT IN AN ORGANIZED HEALTH CARE EDUCATION/TRAINING PROGRAM

## 2024-05-29 PROCEDURE — 83690 ASSAY OF LIPASE: CPT | Performed by: EMERGENCY MEDICINE

## 2024-05-29 PROCEDURE — 250N000011 HC RX IP 250 OP 636: Mod: JZ | Performed by: OBSTETRICS & GYNECOLOGY

## 2024-05-29 PROCEDURE — 85384 FIBRINOGEN ACTIVITY: CPT | Performed by: STUDENT IN AN ORGANIZED HEALTH CARE EDUCATION/TRAINING PROGRAM

## 2024-05-29 PROCEDURE — 36415 COLL VENOUS BLD VENIPUNCTURE: CPT | Performed by: OBSTETRICS & GYNECOLOGY

## 2024-05-29 PROCEDURE — 710N000010 HC RECOVERY PHASE 1, LEVEL 2, PER MIN: Performed by: OBSTETRICS & GYNECOLOGY

## 2024-05-29 PROCEDURE — 250N000011 HC RX IP 250 OP 636: Performed by: NURSE ANESTHETIST, CERTIFIED REGISTERED

## 2024-05-29 PROCEDURE — 85730 THROMBOPLASTIN TIME PARTIAL: CPT | Performed by: STUDENT IN AN ORGANIZED HEALTH CARE EDUCATION/TRAINING PROGRAM

## 2024-05-29 PROCEDURE — 370N000017 HC ANESTHESIA TECHNICAL FEE, PER MIN: Performed by: OBSTETRICS & GYNECOLOGY

## 2024-05-29 PROCEDURE — 87591 N.GONORRHOEAE DNA AMP PROB: CPT | Performed by: EMERGENCY MEDICINE

## 2024-05-29 PROCEDURE — 86900 BLOOD TYPING SEROLOGIC ABO: CPT | Performed by: OBSTETRICS & GYNECOLOGY

## 2024-05-29 PROCEDURE — 250N000011 HC RX IP 250 OP 636: Performed by: EMERGENCY MEDICINE

## 2024-05-29 PROCEDURE — 76830 TRANSVAGINAL US NON-OB: CPT | Mod: 26 | Performed by: RADIOLOGY

## 2024-05-29 PROCEDURE — 81001 URINALYSIS AUTO W/SCOPE: CPT | Performed by: EMERGENCY MEDICINE

## 2024-05-29 PROCEDURE — 250N000013 HC RX MED GY IP 250 OP 250 PS 637

## 2024-05-29 PROCEDURE — 99140 ANES COMP EMERGENCY COND: CPT | Mod: QK | Performed by: STUDENT IN AN ORGANIZED HEALTH CARE EDUCATION/TRAINING PROGRAM

## 2024-05-29 PROCEDURE — 76705 ECHO EXAM OF ABDOMEN: CPT | Mod: 26 | Performed by: EMERGENCY MEDICINE

## 2024-05-29 PROCEDURE — 250N000025 HC SEVOFLURANE, PER MIN: Performed by: OBSTETRICS & GYNECOLOGY

## 2024-05-29 PROCEDURE — 250N000011 HC RX IP 250 OP 636

## 2024-05-29 PROCEDURE — 88305 TISSUE EXAM BY PATHOLOGIST: CPT | Mod: TC | Performed by: OBSTETRICS & GYNECOLOGY

## 2024-05-29 PROCEDURE — 76830 TRANSVAGINAL US NON-OB: CPT

## 2024-05-29 PROCEDURE — 272N000001 HC OR GENERAL SUPPLY STERILE: Performed by: OBSTETRICS & GYNECOLOGY

## 2024-05-29 PROCEDURE — 96361 HYDRATE IV INFUSION ADD-ON: CPT | Performed by: EMERGENCY MEDICINE

## 2024-05-29 PROCEDURE — 96376 TX/PRO/DX INJ SAME DRUG ADON: CPT | Performed by: EMERGENCY MEDICINE

## 2024-05-29 RX ORDER — KETOROLAC TROMETHAMINE 15 MG/ML
15 INJECTION, SOLUTION INTRAMUSCULAR; INTRAVENOUS EVERY 6 HOURS
Status: DISCONTINUED | OUTPATIENT
Start: 2024-05-30 | End: 2024-05-30 | Stop reason: HOSPADM

## 2024-05-29 RX ORDER — IBUPROFEN 800 MG/1
800 TABLET, FILM COATED ORAL EVERY 6 HOURS PRN
Qty: 30 TABLET | Refills: 0 | Status: SHIPPED | OUTPATIENT
Start: 2024-05-29 | End: 2024-08-09

## 2024-05-29 RX ORDER — NALOXONE HYDROCHLORIDE 0.4 MG/ML
0.1 INJECTION, SOLUTION INTRAMUSCULAR; INTRAVENOUS; SUBCUTANEOUS
Status: DISCONTINUED | OUTPATIENT
Start: 2024-05-29 | End: 2024-05-29 | Stop reason: HOSPADM

## 2024-05-29 RX ORDER — ACETAMINOPHEN 325 MG/1
975 TABLET ORAL EVERY 6 HOURS
Status: DISCONTINUED | OUTPATIENT
Start: 2024-05-30 | End: 2024-05-30 | Stop reason: HOSPADM

## 2024-05-29 RX ORDER — SODIUM CHLORIDE, SODIUM LACTATE, POTASSIUM CHLORIDE, CALCIUM CHLORIDE 600; 310; 30; 20 MG/100ML; MG/100ML; MG/100ML; MG/100ML
INJECTION, SOLUTION INTRAVENOUS CONTINUOUS
Status: DISCONTINUED | OUTPATIENT
Start: 2024-05-29 | End: 2024-05-29

## 2024-05-29 RX ORDER — OXYCODONE HYDROCHLORIDE 5 MG/1
5 TABLET ORAL EVERY 4 HOURS PRN
Status: DISCONTINUED | OUTPATIENT
Start: 2024-05-29 | End: 2024-05-30 | Stop reason: HOSPADM

## 2024-05-29 RX ORDER — BISACODYL 10 MG
10 SUPPOSITORY, RECTAL RECTAL DAILY PRN
Status: DISCONTINUED | OUTPATIENT
Start: 2024-05-29 | End: 2024-05-30 | Stop reason: HOSPADM

## 2024-05-29 RX ORDER — KETOROLAC TROMETHAMINE 15 MG/ML
15 INJECTION, SOLUTION INTRAMUSCULAR; INTRAVENOUS EVERY 6 HOURS PRN
Status: DISCONTINUED | OUTPATIENT
Start: 2024-05-29 | End: 2024-05-30 | Stop reason: HOSPADM

## 2024-05-29 RX ORDER — ONDANSETRON 4 MG/1
4 TABLET, ORALLY DISINTEGRATING ORAL EVERY 6 HOURS PRN
Status: DISCONTINUED | OUTPATIENT
Start: 2024-05-29 | End: 2024-05-29

## 2024-05-29 RX ORDER — IBUPROFEN 800 MG/1
800 TABLET, FILM COATED ORAL EVERY 6 HOURS
Status: DISCONTINUED | OUTPATIENT
Start: 2024-05-30 | End: 2024-05-30 | Stop reason: HOSPADM

## 2024-05-29 RX ORDER — FENTANYL CITRATE 50 UG/ML
50 INJECTION, SOLUTION INTRAMUSCULAR; INTRAVENOUS EVERY 5 MIN PRN
Status: DISCONTINUED | OUTPATIENT
Start: 2024-05-29 | End: 2024-05-29 | Stop reason: HOSPADM

## 2024-05-29 RX ORDER — AMOXICILLIN 250 MG
2 CAPSULE ORAL 2 TIMES DAILY
Status: DISCONTINUED | OUTPATIENT
Start: 2024-05-29 | End: 2024-05-29

## 2024-05-29 RX ORDER — FENTANYL CITRATE 0.05 MG/ML
INJECTION, SOLUTION INTRAMUSCULAR; INTRAVENOUS PRN
Status: DISCONTINUED | OUTPATIENT
Start: 2024-05-29 | End: 2024-05-29

## 2024-05-29 RX ORDER — AMOXICILLIN 250 MG
1 CAPSULE ORAL 2 TIMES DAILY
Status: DISCONTINUED | OUTPATIENT
Start: 2024-05-29 | End: 2024-05-30 | Stop reason: HOSPADM

## 2024-05-29 RX ORDER — ACETAMINOPHEN 325 MG/1
975 TABLET ORAL EVERY 6 HOURS PRN
Qty: 50 TABLET | Refills: 0 | Status: SHIPPED | OUTPATIENT
Start: 2024-05-29 | End: 2024-08-09

## 2024-05-29 RX ORDER — SODIUM CHLORIDE, SODIUM LACTATE, POTASSIUM CHLORIDE, CALCIUM CHLORIDE 600; 310; 30; 20 MG/100ML; MG/100ML; MG/100ML; MG/100ML
INJECTION, SOLUTION INTRAVENOUS CONTINUOUS PRN
Status: DISCONTINUED | OUTPATIENT
Start: 2024-05-29 | End: 2024-05-29

## 2024-05-29 RX ORDER — HYDROXYZINE HYDROCHLORIDE 25 MG/1
25 TABLET, FILM COATED ORAL EVERY 6 HOURS PRN
Status: DISCONTINUED | OUTPATIENT
Start: 2024-05-29 | End: 2024-05-30 | Stop reason: HOSPADM

## 2024-05-29 RX ORDER — ONDANSETRON 2 MG/ML
4 INJECTION INTRAMUSCULAR; INTRAVENOUS EVERY 6 HOURS PRN
Status: DISCONTINUED | OUTPATIENT
Start: 2024-05-29 | End: 2024-05-29

## 2024-05-29 RX ORDER — ONDANSETRON 4 MG/1
4 TABLET, ORALLY DISINTEGRATING ORAL EVERY 30 MIN PRN
Status: DISCONTINUED | OUTPATIENT
Start: 2024-05-29 | End: 2024-05-29 | Stop reason: HOSPADM

## 2024-05-29 RX ORDER — CALCIUM CARBONATE 500 MG/1
1000 TABLET, CHEWABLE ORAL 4 TIMES DAILY PRN
Status: DISCONTINUED | OUTPATIENT
Start: 2024-05-29 | End: 2024-05-29

## 2024-05-29 RX ORDER — HYDROMORPHONE HYDROCHLORIDE 1 MG/ML
0.4 INJECTION, SOLUTION INTRAMUSCULAR; INTRAVENOUS; SUBCUTANEOUS EVERY 5 MIN PRN
Status: DISCONTINUED | OUTPATIENT
Start: 2024-05-29 | End: 2024-05-29 | Stop reason: HOSPADM

## 2024-05-29 RX ORDER — ACETAMINOPHEN 650 MG/1
650 SUPPOSITORY RECTAL EVERY 4 HOURS PRN
Status: DISCONTINUED | OUTPATIENT
Start: 2024-05-29 | End: 2024-05-29

## 2024-05-29 RX ORDER — ACETAMINOPHEN 325 MG/1
975 TABLET ORAL ONCE
Status: DISCONTINUED | OUTPATIENT
Start: 2024-05-29 | End: 2024-05-29 | Stop reason: HOSPADM

## 2024-05-29 RX ORDER — ACETAMINOPHEN 325 MG/1
650 TABLET ORAL EVERY 4 HOURS PRN
Status: DISCONTINUED | OUTPATIENT
Start: 2024-05-29 | End: 2024-05-29

## 2024-05-29 RX ORDER — MEPERIDINE HYDROCHLORIDE 25 MG/ML
12.5 INJECTION INTRAMUSCULAR; INTRAVENOUS; SUBCUTANEOUS EVERY 5 MIN PRN
Status: DISCONTINUED | OUTPATIENT
Start: 2024-05-29 | End: 2024-05-29 | Stop reason: HOSPADM

## 2024-05-29 RX ORDER — OXYCODONE HYDROCHLORIDE 5 MG/1
5 TABLET ORAL ONCE
Status: COMPLETED | OUTPATIENT
Start: 2024-05-29 | End: 2024-05-29

## 2024-05-29 RX ORDER — HYDROMORPHONE HCL IN WATER/PF 6 MG/30 ML
0.2 PATIENT CONTROLLED ANALGESIA SYRINGE INTRAVENOUS
Status: DISCONTINUED | OUTPATIENT
Start: 2024-05-29 | End: 2024-05-30

## 2024-05-29 RX ORDER — HALOPERIDOL 5 MG/ML
1 INJECTION INTRAMUSCULAR
Status: DISCONTINUED | OUTPATIENT
Start: 2024-05-29 | End: 2024-05-29 | Stop reason: HOSPADM

## 2024-05-29 RX ORDER — ACETAMINOPHEN 325 MG/1
650 TABLET ORAL EVERY 6 HOURS
Status: DISCONTINUED | OUTPATIENT
Start: 2024-06-02 | End: 2024-05-30 | Stop reason: HOSPADM

## 2024-05-29 RX ORDER — OXYCODONE HYDROCHLORIDE 10 MG/1
10 TABLET ORAL EVERY 4 HOURS PRN
Status: DISCONTINUED | OUTPATIENT
Start: 2024-05-29 | End: 2024-05-30 | Stop reason: HOSPADM

## 2024-05-29 RX ORDER — DEXTROSE, SODIUM CHLORIDE, SODIUM LACTATE, POTASSIUM CHLORIDE, AND CALCIUM CHLORIDE 5; .6; .31; .03; .02 G/100ML; G/100ML; G/100ML; G/100ML; G/100ML
INJECTION, SOLUTION INTRAVENOUS CONTINUOUS
Status: DISCONTINUED | OUTPATIENT
Start: 2024-05-29 | End: 2024-05-30 | Stop reason: HOSPADM

## 2024-05-29 RX ORDER — DEXAMETHASONE SODIUM PHOSPHATE 4 MG/ML
INJECTION, SOLUTION INTRA-ARTICULAR; INTRALESIONAL; INTRAMUSCULAR; INTRAVENOUS; SOFT TISSUE PRN
Status: DISCONTINUED | OUTPATIENT
Start: 2024-05-29 | End: 2024-05-29

## 2024-05-29 RX ORDER — CITRIC ACID/SODIUM CITRATE 334-500MG
30 SOLUTION, ORAL ORAL ONCE
Status: COMPLETED | OUTPATIENT
Start: 2024-05-29 | End: 2024-05-29

## 2024-05-29 RX ORDER — NALOXONE HYDROCHLORIDE 0.4 MG/ML
0.4 INJECTION, SOLUTION INTRAMUSCULAR; INTRAVENOUS; SUBCUTANEOUS
Status: DISCONTINUED | OUTPATIENT
Start: 2024-05-29 | End: 2024-05-30 | Stop reason: HOSPADM

## 2024-05-29 RX ORDER — NALOXONE HYDROCHLORIDE 0.4 MG/ML
0.2 INJECTION, SOLUTION INTRAMUSCULAR; INTRAVENOUS; SUBCUTANEOUS
Status: DISCONTINUED | OUTPATIENT
Start: 2024-05-29 | End: 2024-05-30 | Stop reason: HOSPADM

## 2024-05-29 RX ORDER — LIDOCAINE HYDROCHLORIDE 20 MG/ML
INJECTION, SOLUTION INFILTRATION; PERINEURAL PRN
Status: DISCONTINUED | OUTPATIENT
Start: 2024-05-29 | End: 2024-05-29

## 2024-05-29 RX ORDER — HYDROMORPHONE HYDROCHLORIDE 1 MG/ML
0.5 INJECTION, SOLUTION INTRAMUSCULAR; INTRAVENOUS; SUBCUTANEOUS ONCE
Status: COMPLETED | OUTPATIENT
Start: 2024-05-29 | End: 2024-05-29

## 2024-05-29 RX ORDER — BUPIVACAINE HYDROCHLORIDE 2.5 MG/ML
INJECTION, SOLUTION INFILTRATION; PERINEURAL PRN
Status: DISCONTINUED | OUTPATIENT
Start: 2024-05-29 | End: 2024-05-29 | Stop reason: HOSPADM

## 2024-05-29 RX ORDER — IOPAMIDOL 755 MG/ML
85 INJECTION, SOLUTION INTRAVASCULAR ONCE
Status: COMPLETED | OUTPATIENT
Start: 2024-05-29 | End: 2024-05-29

## 2024-05-29 RX ORDER — HYDRALAZINE HYDROCHLORIDE 20 MG/ML
2.5-5 INJECTION INTRAMUSCULAR; INTRAVENOUS EVERY 10 MIN PRN
Status: DISCONTINUED | OUTPATIENT
Start: 2024-05-29 | End: 2024-05-29 | Stop reason: HOSPADM

## 2024-05-29 RX ORDER — DEXAMETHASONE SODIUM PHOSPHATE 4 MG/ML
4 INJECTION, SOLUTION INTRA-ARTICULAR; INTRALESIONAL; INTRAMUSCULAR; INTRAVENOUS; SOFT TISSUE
Status: DISCONTINUED | OUTPATIENT
Start: 2024-05-29 | End: 2024-05-29 | Stop reason: HOSPADM

## 2024-05-29 RX ORDER — ACETAMINOPHEN 325 MG/1
975 TABLET ORAL ONCE
Status: COMPLETED | OUTPATIENT
Start: 2024-05-29 | End: 2024-05-29

## 2024-05-29 RX ORDER — PROCHLORPERAZINE MALEATE 10 MG
10 TABLET ORAL EVERY 6 HOURS PRN
Status: DISCONTINUED | OUTPATIENT
Start: 2024-05-29 | End: 2024-05-30 | Stop reason: HOSPADM

## 2024-05-29 RX ORDER — ONDANSETRON 2 MG/ML
INJECTION INTRAMUSCULAR; INTRAVENOUS PRN
Status: DISCONTINUED | OUTPATIENT
Start: 2024-05-29 | End: 2024-05-29

## 2024-05-29 RX ORDER — AMOXICILLIN 250 MG
2 CAPSULE ORAL 2 TIMES DAILY PRN
Status: DISCONTINUED | OUTPATIENT
Start: 2024-05-29 | End: 2024-05-29

## 2024-05-29 RX ORDER — KETOROLAC TROMETHAMINE 15 MG/ML
15 INJECTION, SOLUTION INTRAMUSCULAR; INTRAVENOUS ONCE
Status: COMPLETED | OUTPATIENT
Start: 2024-05-29 | End: 2024-05-29

## 2024-05-29 RX ORDER — AMOXICILLIN 250 MG
1-2 CAPSULE ORAL 2 TIMES DAILY
Qty: 30 TABLET | Refills: 0 | Status: SHIPPED | OUTPATIENT
Start: 2024-05-29 | End: 2024-08-09

## 2024-05-29 RX ORDER — HYDROMORPHONE HYDROCHLORIDE 1 MG/ML
0.2 INJECTION, SOLUTION INTRAMUSCULAR; INTRAVENOUS; SUBCUTANEOUS EVERY 5 MIN PRN
Status: DISCONTINUED | OUTPATIENT
Start: 2024-05-29 | End: 2024-05-29 | Stop reason: HOSPADM

## 2024-05-29 RX ORDER — IBUPROFEN 600 MG/1
600 TABLET, FILM COATED ORAL EVERY 6 HOURS PRN
Status: DISCONTINUED | OUTPATIENT
Start: 2024-05-29 | End: 2024-05-29

## 2024-05-29 RX ORDER — FENTANYL CITRATE 50 UG/ML
25 INJECTION, SOLUTION INTRAMUSCULAR; INTRAVENOUS EVERY 5 MIN PRN
Status: DISCONTINUED | OUTPATIENT
Start: 2024-05-29 | End: 2024-05-29 | Stop reason: HOSPADM

## 2024-05-29 RX ORDER — SODIUM CHLORIDE, SODIUM LACTATE, POTASSIUM CHLORIDE, CALCIUM CHLORIDE 600; 310; 30; 20 MG/100ML; MG/100ML; MG/100ML; MG/100ML
INJECTION, SOLUTION INTRAVENOUS CONTINUOUS
Status: DISCONTINUED | OUTPATIENT
Start: 2024-05-29 | End: 2024-05-29 | Stop reason: HOSPADM

## 2024-05-29 RX ORDER — PROPOFOL 10 MG/ML
INJECTION, EMULSION INTRAVENOUS PRN
Status: DISCONTINUED | OUTPATIENT
Start: 2024-05-29 | End: 2024-05-29

## 2024-05-29 RX ORDER — AMOXICILLIN 250 MG
1 CAPSULE ORAL 2 TIMES DAILY
Status: DISCONTINUED | OUTPATIENT
Start: 2024-05-29 | End: 2024-05-29

## 2024-05-29 RX ORDER — POLYETHYLENE GLYCOL 3350 17 G/17G
17 POWDER, FOR SOLUTION ORAL DAILY PRN
Status: DISCONTINUED | OUTPATIENT
Start: 2024-05-30 | End: 2024-05-30 | Stop reason: HOSPADM

## 2024-05-29 RX ORDER — ONDANSETRON 2 MG/ML
4 INJECTION INTRAMUSCULAR; INTRAVENOUS EVERY 30 MIN PRN
Status: DISCONTINUED | OUTPATIENT
Start: 2024-05-29 | End: 2024-05-29 | Stop reason: HOSPADM

## 2024-05-29 RX ORDER — LABETALOL HYDROCHLORIDE 5 MG/ML
10 INJECTION, SOLUTION INTRAVENOUS
Status: DISCONTINUED | OUTPATIENT
Start: 2024-05-29 | End: 2024-05-29 | Stop reason: HOSPADM

## 2024-05-29 RX ORDER — ONDANSETRON 2 MG/ML
4 INJECTION INTRAMUSCULAR; INTRAVENOUS EVERY 6 HOURS PRN
Status: DISCONTINUED | OUTPATIENT
Start: 2024-05-29 | End: 2024-05-30 | Stop reason: HOSPADM

## 2024-05-29 RX ORDER — LIDOCAINE 40 MG/G
CREAM TOPICAL
Status: DISCONTINUED | OUTPATIENT
Start: 2024-05-29 | End: 2024-05-30 | Stop reason: HOSPADM

## 2024-05-29 RX ORDER — LIDOCAINE 40 MG/G
CREAM TOPICAL
Status: DISCONTINUED | OUTPATIENT
Start: 2024-05-29 | End: 2024-05-29

## 2024-05-29 RX ORDER — ONDANSETRON 4 MG/1
4 TABLET, ORALLY DISINTEGRATING ORAL EVERY 6 HOURS PRN
Status: DISCONTINUED | OUTPATIENT
Start: 2024-05-29 | End: 2024-05-30 | Stop reason: HOSPADM

## 2024-05-29 RX ORDER — AMOXICILLIN 250 MG
1 CAPSULE ORAL 2 TIMES DAILY PRN
Status: DISCONTINUED | OUTPATIENT
Start: 2024-05-29 | End: 2024-05-29

## 2024-05-29 RX ADMIN — FENTANYL CITRATE 100 MCG: 0.05 INJECTION, SOLUTION INTRAMUSCULAR; INTRAVENOUS at 19:12

## 2024-05-29 RX ADMIN — SODIUM CHLORIDE, POTASSIUM CHLORIDE, SODIUM LACTATE AND CALCIUM CHLORIDE: 600; 310; 30; 20 INJECTION, SOLUTION INTRAVENOUS at 19:03

## 2024-05-29 RX ADMIN — IOPAMIDOL 85 ML: 755 INJECTION, SOLUTION INTRAVENOUS at 10:10

## 2024-05-29 RX ADMIN — SODIUM CHLORIDE 1000 ML: 9 INJECTION, SOLUTION INTRAVENOUS at 06:56

## 2024-05-29 RX ADMIN — SODIUM CHLORIDE, POTASSIUM CHLORIDE, SODIUM LACTATE AND CALCIUM CHLORIDE: 600; 310; 30; 20 INJECTION, SOLUTION INTRAVENOUS at 21:07

## 2024-05-29 RX ADMIN — KETOROLAC TROMETHAMINE 15 MG: 15 INJECTION, SOLUTION INTRAMUSCULAR; INTRAVENOUS at 18:09

## 2024-05-29 RX ADMIN — SODIUM CHLORIDE, SODIUM LACTATE, POTASSIUM CHLORIDE, CALCIUM CHLORIDE AND DEXTROSE MONOHYDRATE: 5; 600; 310; 30; 20 INJECTION, SOLUTION INTRAVENOUS at 12:38

## 2024-05-29 RX ADMIN — HYDROMORPHONE HYDROCHLORIDE 0.5 MG: 1 INJECTION, SOLUTION INTRAMUSCULAR; INTRAVENOUS; SUBCUTANEOUS at 06:41

## 2024-05-29 RX ADMIN — LIDOCAINE HYDROCHLORIDE 100 MG: 20 INJECTION, SOLUTION INFILTRATION; PERINEURAL at 19:15

## 2024-05-29 RX ADMIN — KETOROLAC TROMETHAMINE 15 MG: 15 INJECTION INTRAMUSCULAR; INTRAVENOUS at 07:31

## 2024-05-29 RX ADMIN — PHENYLEPHRINE HYDROCHLORIDE 100 MCG: 10 INJECTION INTRAVENOUS at 19:15

## 2024-05-29 RX ADMIN — OXYCODONE HYDROCHLORIDE 5 MG: 5 TABLET ORAL at 21:41

## 2024-05-29 RX ADMIN — ONDANSETRON 4 MG: 2 INJECTION INTRAMUSCULAR; INTRAVENOUS at 20:57

## 2024-05-29 RX ADMIN — SODIUM CITRATE AND CITRIC ACID MONOHYDRATE 30 ML: 500; 334 SOLUTION ORAL at 18:35

## 2024-05-29 RX ADMIN — ACETAMINOPHEN 650 MG: 325 TABLET, FILM COATED ORAL at 16:49

## 2024-05-29 RX ADMIN — SUCCINYLCHOLINE CHLORIDE 100 MG: 20 INJECTION, SOLUTION INTRAMUSCULAR; INTRAVENOUS; PARENTERAL at 19:15

## 2024-05-29 RX ADMIN — PROPOFOL 200 MG: 10 INJECTION, EMULSION INTRAVENOUS at 19:15

## 2024-05-29 RX ADMIN — HYDROMORPHONE HYDROCHLORIDE 0.5 MG: 1 INJECTION, SOLUTION INTRAMUSCULAR; INTRAVENOUS; SUBCUTANEOUS at 12:38

## 2024-05-29 RX ADMIN — HYDROXYZINE HYDROCHLORIDE 25 MG: 25 TABLET, FILM COATED ORAL at 23:58

## 2024-05-29 RX ADMIN — SODIUM CHLORIDE 1000 ML: 9 INJECTION, SOLUTION INTRAVENOUS at 10:34

## 2024-05-29 RX ADMIN — Medication 40 MG: at 19:31

## 2024-05-29 RX ADMIN — SUGAMMADEX 200 MG: 100 INJECTION, SOLUTION INTRAVENOUS at 20:57

## 2024-05-29 RX ADMIN — HYDROMORPHONE HYDROCHLORIDE 0.2 MG: 0.2 INJECTION, SOLUTION INTRAMUSCULAR; INTRAVENOUS; SUBCUTANEOUS at 23:58

## 2024-05-29 RX ADMIN — HYDROMORPHONE HYDROCHLORIDE 0.5 MG: 1 INJECTION, SOLUTION INTRAMUSCULAR; INTRAVENOUS; SUBCUTANEOUS at 10:33

## 2024-05-29 RX ADMIN — SODIUM CHLORIDE, POTASSIUM CHLORIDE, SODIUM LACTATE AND CALCIUM CHLORIDE: 600; 310; 30; 20 INJECTION, SOLUTION INTRAVENOUS at 16:46

## 2024-05-29 RX ADMIN — MEPERIDINE HYDROCHLORIDE 12.5 MG: 25 INJECTION INTRAMUSCULAR; INTRAVENOUS; SUBCUTANEOUS at 21:52

## 2024-05-29 RX ADMIN — DEXAMETHASONE SODIUM PHOSPHATE 8 MG: 4 INJECTION, SOLUTION INTRA-ARTICULAR; INTRALESIONAL; INTRAMUSCULAR; INTRAVENOUS; SOFT TISSUE at 19:20

## 2024-05-29 RX ADMIN — MIDAZOLAM 2 MG: 1 INJECTION INTRAMUSCULAR; INTRAVENOUS at 19:03

## 2024-05-29 ASSESSMENT — ACTIVITIES OF DAILY LIVING (ADL)
ADLS_ACUITY_SCORE: 35
ADLS_ACUITY_SCORE: 36
ADLS_ACUITY_SCORE: 35
ADLS_ACUITY_SCORE: 35
ADLS_ACUITY_SCORE: 36
ADLS_ACUITY_SCORE: 35

## 2024-05-29 ASSESSMENT — COLUMBIA-SUICIDE SEVERITY RATING SCALE - C-SSRS
2. HAVE YOU ACTUALLY HAD ANY THOUGHTS OF KILLING YOURSELF IN THE PAST MONTH?: NO
1. IN THE PAST MONTH, HAVE YOU WISHED YOU WERE DEAD OR WISHED YOU COULD GO TO SLEEP AND NOT WAKE UP?: NO
6. HAVE YOU EVER DONE ANYTHING, STARTED TO DO ANYTHING, OR PREPARED TO DO ANYTHING TO END YOUR LIFE?: NO

## 2024-05-29 ASSESSMENT — LIFESTYLE VARIABLES: TOBACCO_USE: 1

## 2024-05-29 NOTE — CONSULTS
EGS Surgery Consult  May 29, 2024    Chhaya Mock  : 1990    Date of Service: 2024 1:29 PM    Assessment and Plan:  Chhaya Mock is a 33 year old female with history of endometriosis and multiple prior sexually transmitted infections presenting to the ED after sudden onset of low abdominal pain after intercourse with findings of ruptured ovarian cyst on US and CT imaging. WBC count of 19k on arrival, vital signs within normal limits. Abdominal exam benign, non-acute. EGS consulted for r/o of diverticular disease. CT and US imaging reviewed with chief resident; small volume of free fluid restricted to pelvis that is most c/w ruptured ovarian cyst.     - no indication for surgery by EGS  - management per OB/Gyn  - EGS will sign off     Discussed with Dr. Paige (Chief Resident) who discussed with staff (Dr. Galeas).     Teresita Dalton MD   General Surgery PGY1   [phone]    History of Present Illness:    Chhaya Mock is a 33 year old female with Pmhx of hidratenitis suppuritiva, endometriosis, and prior STI with gonorrhea, chlamydia, and trichomonas previously treated with antibiotics presenting with sudden onset low abdominal pain after intercourse. Pain began suddenly after sexual relations around 10pm last night. Pain was rated a 10+/10 on onset and has been constant since, currently rated a 9/10. Not much better after pain medications in the ED. Denies fevers/chills, nausea/vomiting, constipation, diarrhea, or change in bowel habits. Denies history of IBD or other chronic GI issues and has normal, regular bowel movements most days. Has had a colposcopy in the past for hx of abnormal uterine bleeding but has never had or needed a colonoscopy. No known history of rectal bleeding. Has never been hospitalized for a GI issue in the past before. Notably she was recently hospitalized and discharged after assessment for a pharyngeal abscess. She denies  airway or throat complaints today.     Past Medical History:  Past Medical History:   Diagnosis Date    Carbuncle and furuncle     Hidradenitis suppurativa     Tobacco use disorder     Smokes 1/2 ppd. Started smoking at 14 years    Vaginal yeast infection 06/02/2017       Past Surgical History  Past Surgical History:   Procedure Laterality Date    [facility] ORAL SURGERY PROCEDURE  10/23/2008    [place] Teeth Extraction       Family History:  Family History   Problem Relation Age of Onset    Hypertension Maternal Grandmother     Diabetes Maternal Uncle     Hypertension Maternal Uncle     Alcohol/Drug Maternal Aunt     Alcohol/Drug Maternal Uncle     Anesthesia Reaction Other        Social History:  Social History     Socioeconomic History    Marital status: Single     Spouse name: Not on file    Number of children: Not on file    Years of education: Not on file    Highest education level: Not on file   Occupational History     Employer: [employer].     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     Comment: [employer]   Tobacco Use    Smoking status: Every Day     Current packs/day: 0.50     Average packs/day: 0.5 packs/day for 2.0 years (1.0 ttl pk-yrs)     Types: Cigarettes    Smokeless tobacco: Never   Substance and Sexual Activity    Alcohol use: No     Comment: occassionally    Drug use: Yes     Types: Marijuana     Comment: occassionally    Sexual activity: Yes     Partners: Male   Other Topics Concern    Not on file   Social History Narrative    Not on file     Social Determinants of Health     Financial Resource Strain: Not on file   Food Insecurity: Not on file   Transportation Needs: Not on file   Physical Activity: Not on file   Stress: Not on file   Social Connections: Not on file   Interpersonal Safety: Not on file   Housing Stability: Not on file       Medications:  No current outpatient medications on file.       Allergies:     Allergies   Allergen Reactions    No Known Allergies        Review  "of Symptoms:  A 10 point review of symptoms has been conducted and is negative except for that mentioned in the above HPI.    Physical Exam:    Blood pressure 110/74, pulse 70, temperature 98.1  F (36.7  C), temperature source Oral, resp. rate 16, height 1.6 m (5' 3\"), weight 62.6 kg (138 lb), last menstrual period 05/12/2024, SpO2 100%, not currently breastfeeding.  Gen:    Lying in bed in NAD, A&OX3, non-toxic appearing  HEENT: Normocephalic and atraumatic, mild vocal hoarseness but no stridor, trachea midline, no palpable swelling or tenderness in the soft tissues of neck  CV:  RRR on monitor  Pulm:  Non-labored breathing on room air, conversational  Abd:  Soft, non-distended, minimal tenderness with deep palpation of RLQ, no guarding, trace rebound tenderness with deep palpation of LLQ  Ext:  Warm and well perfused, no obvious deformities  Psych:  Mood frustrated, appropriate    Labs:  CBC RESULTS:   Recent Labs   Lab Test 05/29/24  0558   WBC 19.0*   RBC 3.40*   HGB 10.7*   HCT 32.5*   MCV 96   MCH 31.5   MCHC 32.9   RDW 14.3        Last Basic Metabolic Panel:  Lab Results   Component Value Date     05/29/2024     11/27/2019      Lab Results   Component Value Date    POTASSIUM 3.5 05/29/2024    POTASSIUM 3.8 11/27/2019     Lab Results   Component Value Date    CHLORIDE 105 05/29/2024    CHLORIDE 106 11/27/2019     Lab Results   Component Value Date    FRANCY 9.0 05/29/2024    FRANCY 8.3 11/27/2019     Lab Results   Component Value Date    CO2 24 05/29/2024    CO2 24 11/27/2019     Lab Results   Component Value Date    BUN 10.9 05/29/2024    BUN 7 11/27/2019     Lab Results   Component Value Date    CR 0.77 05/29/2024    CR 0.90 11/27/2019     Lab Results   Component Value Date     05/29/2024    GLC 85 11/27/2019       Imaging:  Results for orders placed or performed during the hospital encounter of 05/29/24   US Pelvis Cmplt w Transvag & Doppler LmtPel Duplex Limited    Impression    " IMPRESSION:   1.  Normal appearance of the uterus.  2.  Complex ascites demonstrated with a suspected large hematoma  adjacent to/surrounding the left ovary. The underlying left ovary  itself appears to be within normal limits with no features of ovarian  torsion on grayscale or color imaging. Negative beta hCG excludes  ruptured ectopic. Findings are nonspecific but possibly represent  ruptured ovarian cyst. Correlation with any risk factors for pelvic  inflammatory disease for tubo-ovarian abscess suggested although this  appears more solid than cystic/fluid appearing. Patient is tender on  the left. If there is clinical concern for non-gynecologic cause of  patient's symptoms and the complex ascites with hematoma, further  evaluation with CT abdomen and pelvis with contrast could be  considered.  3.  Right ovary within normal limits with suspected corpus luteum.  There was a possible hemorrhagic cyst in the right ovary on prior  ultrasound, no longer demonstrated.    STANLEY BARRAGAN MD         SYSTEM ID:  [id number]   CT Abdomen Pelvis w Contrast    Impression    IMPRESSION:   1.  There is a small irregular rim-enhancing cyst in the right  ovary(probably a corpus luteal cyst) with intermediate density  mass/hematoma surrounding the left ovary, and moderate amount of  intermediate density free fluid in the pelvis and perihepatic region.  Findings likely represent a ruptured ovarian cyst with hemoperitoneum.  Tubal/ovarian mass cannot be ruled out on CT scan. Recommend follow up  ultrasound to confirm resolution and if further evaluation is  considered necessary, MRI pelvis may be done.  2. 1.2 cm rim-enhancing cyst along the sigmoid colon/ovary, could be  an inflamed diverticulum or cyst from the left ovary. Ectopic  pregnancy is unlikely since beta hcg is negative.  3. The appendix is normal.    PRAVEEN DUNN MD         SYSTEM ID:  [id number]

## 2024-05-29 NOTE — H&P
Gynecology Consult Note    Reason for Consult: Abdominal pain, hemoperitoneum    HPI: Chhaya Mock is a 33 year old  who presented to the emergency department with new onset lower abdominal pain. Of note, the patient was in the hospital yesterday with a supraglottic/epiglottic abscess for which she received steroids, antibiotics and was discharged to home. She had intercourse last night with subsequent onset of severe lower abdominal pain at approximately 0000. She notes that the pain increased and eventually she had to get in the bath to try and alleviate the pain, this was around 0200. At 0400 her pain increased to 10/10 and she presented to care. She endorses sharp pain in her lower abdomen without relieving factors. Denies fevers, chills, nausea, vomiting. Reports that the pain is similar to a ruptured ovarian cyst that she had in the past. She also notes constipation and an inability to pass gas. Last BM on . She has passed gas during this admission and notes that her pain improved. She states that her pain is currently an 8/10 though her primary complaint is being hungry from NPO status. She denies any lightheadedness, dizziness, chest pain, SOB, fevers, chills, dysuria, hematuria, vaginal bleeding, vaginal discharge.     OBHx:     GynHx: LMP   Menses- regular, q26 days, last 4-5 days at a time  Sexual activity- currently sexually active   Last pap- NILM, HPV Other HR positive (23)  STIs- History of chlamydia per patient report, treated when young  Contraception Hx: None at this time    PMH:   Past Medical History:   Diagnosis Date    Carbuncle and furuncle     Hidradenitis suppurativa     Tobacco use disorder     Smokes 1/2 ppd. Started smoking at 14 years    Vaginal yeast infection 2017       PSH:   Past Surgical History:   Procedure Laterality Date    [facility] ORAL SURGERY PROCEDURE  10/23/2008    [place] Teeth Extraction       Social Hx:   Social History     Tobacco  "Use    Smoking status: Every Day     Current packs/day: 0.50     Average packs/day: 0.5 packs/day for 2.0 years (1.0 ttl pk-yrs)     Types: Cigarettes    Smokeless tobacco: Never   Substance Use Topics    Alcohol use: No     Comment: occassionally    Drug use: Yes     Types: Marijuana     Comment: occassionally   - Smokes hookah twice daily, vapes THC    Family History: family history includes Alcohol/Drug in her maternal aunt and maternal uncle; Anesthesia Reaction in an other family member; Diabetes in her maternal uncle; Hypertension in her maternal grandmother and maternal uncle.  No family history of breast, ovarian or uterine cancer. No history of DVT or migranes.    ROS:   Negative except per HPI    Objective:   /64 (BP Location: Right arm)   Pulse 71   Temp 98.4  F (36.9  C) (Oral)   Resp 18   Ht 1.6 m (5' 3\")   Wt 62.6 kg (138 lb)   LMP 05/12/2024   SpO2 96%   BMI 24.45 kg/m    Constitutional: Healthy appearing female, no acute distress  HEENT: Normal appearance.  Neck supple, thyroid normal in size without nodularity or masses.  Cardiovascular: Regular rate  Respiratory: Breathing comfortably on room air  Gastrointestinal: Abdomen soft, non distended without rebound or guarding. Minimally tender to palpation in the RLQ. No masses, organomegaly    Labs/Imaging:  Results for orders placed or performed during the hospital encounter of 05/29/24   US Pelvis Cmplt w Transvag & Doppler LmtPel Duplex Limited     Status: None    Narrative    EXAMINATION: US PELVIS COMPLETE W TRANSVAGINAL AND DOPPLER LIMITED,  5/29/2024 8:17 AM     COMPARISON: Pelvic ultrasound 7/30/2023.    HISTORY: Pelvic pain, r/o cyst vs torsion. Negative beta hCG.    TECHNIQUE: The was scanned in standard fashion with transabdominal and  transvaginal transducer(s) using grey scale, color Doppler, and  spectral flow techniques.    FINDINGS:  Both ovaries are normal in size and there is normal blood flow to both  ovaries.  Right " ovary is better visualized on transabdominal imaging  due to positioning with flow clearly demonstrated on same. No evidence  of an adnexal mass.  The right ovary measures 4.5 x 2.4 x 3.2 cm with  a volume of 18.2 cc. The left ovary measures 2.5 x 1.7 x 3.2 cm with a  volume of 8.9 cc. Follicles bilaterally with corpus luteum in the  right ovary. Patient was reportedly tender on the left side during  imaging as per technologist.    The uterus measures 8.1 x 3.3 x 5.0 cm. No evidence of a focal  fibroid.  The endometrium is within normal limits and measures 7.    Ascites is demonstrated bilaterally in the adnexal regions and in the  cul-de-sac however there is echogenic material noted adjacent to and  surrounding the left ovary with a 4.3 x 2.7 x 4.1 cm area measured by  the technologist. No internal vascularity in this region.      Impression    IMPRESSION:   1.  Normal appearance of the uterus.  2.  Complex ascites demonstrated with a suspected large hematoma  adjacent to/surrounding the left ovary. The underlying left ovary  itself appears to be within normal limits with no features of ovarian  torsion on grayscale or color imaging. Negative beta hCG excludes  ruptured ectopic. Findings are nonspecific but possibly represent  ruptured ovarian cyst. Correlation with any risk factors for pelvic  inflammatory disease for tubo-ovarian abscess suggested although this  appears more solid than cystic/fluid appearing. Patient is tender on  the left. If there is clinical concern for non-gynecologic cause of  patient's symptoms and the complex ascites with hematoma, further  evaluation with CT abdomen and pelvis with contrast could be  considered.  3.  Right ovary within normal limits with suspected corpus luteum.  There was a possible hemorrhagic cyst in the right ovary on prior  ultrasound, no longer demonstrated.    STANLEY BARRAGAN MD         SYSTEM ID:  [id number]   POC US ABDOMEN LIMITED     Status: None    Impression     .Bedside FAST (Focused Assessment with Sonography in Trauma), performed and interpreted by me.   Indication: Abdominal pain    With the patient in Trendelenburg, the RUQ, LUQ and subxiphoid views were examined for intraabdominal and thoracic free fluid and pericardial effusion. With the patient in reverse Trendelenburg, the suprapubic view was examined for intraabdominal free fluid. Image quality was satisfactory..     Findings: Abnormal. There was free fluid present in RUQ, perihepatic space        IMPRESSION:  Positive FAST showing free fluid present in RUQ, periheatic space     CT Abdomen Pelvis w Contrast     Status: None    Narrative    EXAMINATION: CT ABDOMEN PELVIS W CONTRAST, 5/29/2024 10:24 AM    TECHNIQUE: Axial CT images from the lung bases through the symphysis  pubis were obtained  with IV contrast. Coronal and sagittal reformats  also provided. Contrast dose: 85cc of isovue 370    COMPARISON: Ultrasound pelvis 5/29/2024    HISTORY: lower abdominal pain with LLQ pain and elevated wbc    FINDINGS:    Lung Bases:   The visualized lung bases and lower mediastinal structures are  unremarkable.    ABDOMEN:  Liver: Subcentimeter hypodensities in segment 8 are too small to  characterize but likely represent cysts.    Biliary/Gallbladder: No CT evidence of calculi, wall thickening or  pericholecystic fluid. No intra or extrahepatic biliary dilatation.    Spleen: Normal size. No focal lesions.    Pancreas: No evidence of pancreatic mass.    Adrenals: Normal.    Kidneys: No hydronephrosis, calculi or mass. Subcentimeter cyst in the  right lower pole.    Urinary bladder: Unremarkable.    Reproductive organs: The uterus is normal. Surrounding the left ovary  there is a intermediate density mass/hematoma measuring 6.3 x 4.9 cm  (series 4, image 223). There is an irregular cyst measuring 2.1 cm in  the right adnexa. Moderate amount of intermediate density free fluid  in the pelvis and perihepatic region. There is  a 1.2 cm rim-enhancing  cyst along the sigmoid colon, which could be an inflamed diverticulum  or cyst from the left ovary.    Gastrointestinal: The stomach and duodenum are unremarkable. Small and  large bowel are normal in caliber and without abnormal wall  thickening. The appendix is normal.    Mesentery/Peritoneum: No pneumoperitoneum.    Lymph nodes: No lymphadenopathy.    Vasculature: Major abdominal arteries are patent.    Bones and soft tissues: No aggressive lytic or sclerotic lesions.      Impression    IMPRESSION:   1.  There is a small irregular rim-enhancing cyst in the right  ovary(probably a corpus luteal cyst) with intermediate density  mass/hematoma surrounding the left ovary, and moderate amount of  intermediate density free fluid in the pelvis and perihepatic region.  Findings likely represent a ruptured ovarian cyst with hemoperitoneum.  Tubal/ovarian mass cannot be ruled out on CT scan. Recommend follow up  ultrasound to confirm resolution and if further evaluation is  considered necessary, MRI pelvis may be done.  2. 1.2 cm rim-enhancing cyst along the sigmoid colon/ovary, could be  an inflamed diverticulum or cyst from the left ovary. Ectopic  pregnancy is unlikely since beta hcg is negative.  3. The appendix is normal.    PRAVEEN DUNN MD         SYSTEM ID:  [id number]   Comprehensive metabolic panel     Status: Abnormal   Result Value Ref Range    Sodium 139 135 - 145 mmol/L    Potassium 3.5 3.4 - 5.3 mmol/L    Carbon Dioxide (CO2) 24 22 - 29 mmol/L    Anion Gap 10 7 - 15 mmol/L    Urea Nitrogen 10.9 6.0 - 20.0 mg/dL    Creatinine 0.77 0.51 - 0.95 mg/dL    GFR Estimate >90 >60 mL/min/1.73m2    Calcium 9.0 8.6 - 10.0 mg/dL    Chloride 105 98 - 107 mmol/L    Glucose 114 (H) 70 - 99 mg/dL    Alkaline Phosphatase 45 40 - 150 U/L    AST 19 0 - 45 U/L    ALT 12 0 - 50 U/L    Protein Total 6.6 6.4 - 8.3 g/dL    Albumin 4.0 3.5 - 5.2 g/dL    Bilirubin Total 0.4 <=1.2 mg/dL   Lipase     Status: Normal    Result Value Ref Range    Lipase 17 13 - 60 U/L   UA with Microscopic reflex to Culture     Status: Abnormal    Specimen: Urine, Midstream   Result Value Ref Range    Color Urine Light Yellow Colorless, Straw, Light Yellow, Yellow    Appearance Urine Clear Clear    Glucose Urine Negative Negative mg/dL    Bilirubin Urine Negative Negative    Ketones Urine Negative Negative mg/dL    Specific Gravity Urine 1.025 1.003 - 1.035    Blood Urine Moderate (A) Negative    pH Urine 6.0 5.0 - 7.0    Protein Albumin Urine Negative Negative mg/dL    Urobilinogen Urine 2.0 Normal, 2.0 mg/dL    Nitrite Urine Negative Negative    Leukocyte Esterase Urine Negative Negative    Mucus Urine Present (A) None Seen /LPF    RBC Urine 15 (H) <=2 /HPF    WBC Urine 1 <=5 /HPF    Squamous Epithelials Urine <1 <=1 /HPF    Narrative    Urine Culture not indicated   [place] Draw     Status: None    Narrative    The following orders were created for panel order [place] Draw.  Procedure                               Abnormality         Status                     ---------                               -----------         ------                     Extra Blue Top Tube[[id number]]                              Final result               Extra Red Top Tube[[id number]]                               Final result                 Please view results for these tests on the individual orders.   CBC with platelets and differential     Status: Abnormal   Result Value Ref Range    WBC Count 19.0 (H) 4.0 - 11.0 10e3/uL    RBC Count 3.40 (L) 3.80 - 5.20 10e6/uL    Hemoglobin 10.7 (L) 11.7 - 15.7 g/dL    Hematocrit 32.5 (L) 35.0 - 47.0 %    MCV 96 78 - 100 fL    MCH 31.5 26.5 - 33.0 pg    MCHC 32.9 31.5 - 36.5 g/dL    RDW 14.3 10.0 - 15.0 %    Platelet Count 202 150 - 450 10e3/uL    % Neutrophils 83 %    % Lymphocytes 11 %    % Monocytes 5 %    % Eosinophils 0 %    % Basophils 0 %    % Immature Granulocytes 1 %    NRBCs per 100 WBC 0 <1 /100    Absolute  Neutrophils 15.8 (H) 1.6 - 8.3 10e3/uL    Absolute Lymphocytes 2.1 0.8 - 5.3 10e3/uL    Absolute Monocytes 1.0 0.0 - 1.3 10e3/uL    Absolute Eosinophils 0.0 0.0 - 0.7 10e3/uL    Absolute Basophils 0.0 0.0 - 0.2 10e3/uL    Absolute Immature Granulocytes 0.2 <=0.4 10e3/uL    Absolute NRBCs 0.0 10e3/uL   Extra Blue Top Tube     Status: None   Result Value Ref Range    Hold Specimen JIC    Extra Red Top Tube     Status: None   Result Value Ref Range    Hold Specimen JIC    HCG qualitative Blood     Status: Normal   Result Value Ref Range    hCG Serum Qualitative Negative Negative   Wet prep     Status: Abnormal    Specimen: Vagina; Swab   Result Value Ref Range    Trichomonas Absent Absent    Yeast Absent Absent    Clue Cells Absent Absent    WBCs/high power field 1+ (A) None   Chlamydia trachomatis PCR     Status: Normal    Specimen: Vagina; Swab   Result Value Ref Range    Chlamydia trachomatis Negative Negative   Neisseria gonorrhoea PCR     Status: Normal    Specimen: Vagina; Swab   Result Value Ref Range    Neisseria gonorrhoeae Negative Negative   CBC with platelets differential     Status: Abnormal    Narrative    The following orders were created for panel order CBC with platelets differential.  Procedure                               Abnormality         Status                     ---------                               -----------         ------                     CBC with platelets and d...[[id number]]  Abnormal            Final result                 Please view results for these tests on the individual orders.   ABO/Rh type and screen     Status: None (In process)    Narrative    The following orders were created for panel order ABO/Rh type and screen.  Procedure                               Abnormality         Status                     ---------                               -----------         ------                     Adult Type and Screen[[id number]]                            In process                    Please view results for these tests on the individual orders.        Assessment/Plan:   Chhaya Mock is a 33 year old  with hemoperitoneum concerning for possible ruptured hemorrhagic cyst. She was seen and evaluated by general surgery on the [place] without concern for a ruptured diverticulum. At this time, the patient remains hemodynamically stable. Discussed with the patient that ruptured hemorrhagic cysts often become hemostatic spontaneously though occasionally if there is ongoing bleeding, require surgical intervention with an exploratory laparoscopy, possible ovarian cystectomy, possible oophorectomy, and possible laparotomy. We discussed that we would recommend that she be admitted for serial abdominal exams and labs to ensure that there is no evidence of ongoing bleeding at this time. We also discussed the clinical course for hemoperitoneum with significant irritation from blood in the abdomen that typically resolves as the blood is resorbed and treatment is mostly centered on expectant management. We also discussed the possibility of TOA and PID though imaging findings more consistent with hemoperitoneum than purulence. She remains afebrile and suspect that her leukocytosis is secondary to demargination and inflammation in response to her hemoperitoneum though low threshold to initiate antibiotics with onset of findings consistent with systemic infection. Plan to continue amox-clav for previously diagnosed epiglottitis.     # Hemoperitoneum   # Suspected ruptured hemorrhagic cyst  - NPO at this time, mIVF at 125 ml/hr  - Hemoglobin downtrending since last evening, stat CBC and coags at this time  - Continue q6 hr cbc and coags at this time though space prn   - Continue with serial abdominal exams  - Consider repeat imaging with worsening abdominal pain   - Possible surgical intervention with exploratory laparoscopy if patient develops hemodynamic instability, continues to show  worsening anemia, has uncontrolled pain, or develops signs of infection for source control  - Transfuse as needed for Hgb <7 or between 7-8 with symptomatic anemia   - Consider ovulation suppression to prevent recurrent hemorrhagic cysts     # Supraglottitis without airway obstruction   - Continue pta amox-clav     Discussed with Dr. Chasity Guzman MD  OBGYN PGY-4  May 29, 2024 5:24 PM

## 2024-05-29 NOTE — ED PROVIDER NOTES
ED Provider Note  [facility]      History     Chief Complaint   Patient presents with    Abdominal Pain    Pelvic Pain    Constipation     HPI  Chhaya Mock is a 33 year old female who presents to the emergency department for evaluation of lower abdominal/pelvic pain.  Patient reports that she was recently in the emergency department/hospital for supraglottitis/mild epiglottic abscess.  Patient was treated with antibiotics, steroids, discharged last night around 1700.  Patient states that she went home, ate.  Patient states that after eating she felt nauseous.  Patient later had sexual intercourse.  Patient states that she did not have pain with sexual intercourse however did states he had the urge to urinate.  Patient states that she stopped, urinating, and continued intercourse without difficulty.  Patient reports that after intercourse she developed severe lower abdominal/pelvic pain.  Pain started around 2130 last night.  Patient describes the pain as a severe sharp pain all throughout her lower abdomen and pelvic region.  No relieving factors.  Patient denies any fever, chills, nausea, vomiting.  Patient reports similar pain in the past with ruptured ovarian cyst.  Patient also reports feeling constipated, and difficulty and pain with passing gas.  Patient states her last bowel movement was Monday however did not eat or drink much while in the hospital.  Patient also was receiving IV Dilaudid while in the hospital.  Denies any dysuria, hematuria.  Patient denies any vaginal bleeding, vaginal discharge.  No other complaints.    Past Medical History  Past Medical History:   Diagnosis Date    Carbuncle and furuncle     Hidradenitis suppurativa     Tobacco use disorder     Smokes 1/2 ppd. Started smoking at 14 years    Vaginal yeast infection 06/02/2017     Past Surgical History:   Procedure Laterality Date    [facility] ORAL SURGERY PROCEDURE  10/23/2008    [place] [facility]  "Extraction     No current outpatient medications on file.    Allergies   Allergen Reactions    No Known Allergies      Family History  Family History   Problem Relation Age of Onset    Hypertension Maternal Grandmother     Diabetes Maternal Uncle     Hypertension Maternal Uncle     Alcohol/Drug Maternal Aunt     Alcohol/Drug Maternal Uncle     Anesthesia Reaction Other      Social History   Social History     Tobacco Use    Smoking status: Every Day     Current packs/day: 0.50     Average packs/day: 0.5 packs/day for 2.0 years (1.0 ttl pk-yrs)     Types: Cigarettes    Smokeless tobacco: Never   Substance Use Topics    Alcohol use: No     Comment: occassionally    Drug use: Yes     Types: Marijuana     Comment: occassionally      Past medical history, past surgical history, medications, allergies, family history, and social history were reviewed with the patient. No additional pertinent items.   A medically appropriate review of systems was performed with pertinent positives and negatives noted in the HPI, and all other systems negative.    Physical Exam   BP: 108/73  Pulse: 78  Temp: 97.9  F (36.6  C)  Resp: 18  Height: 160 cm (5' 3\")  Weight: 62.6 kg (138 lb)  SpO2: 100 %  Physical Exam  General: Afebrile, no acute distress   HEENT: Normocephalic, atraumatic, conjunctivae normal. MMM  Neck: non-tender, supple  Cardio: regular rate. regular rhythm   Resp: Normal work of breathing, no respiratory distress, lungs clear bilaterally, no wheezing, rhonchi, rales  Chest/Back: no visual signs of trauma, no CVA tenderness   Abdomen: soft, non distension, +diffuse TTP throughout entire abdomen however more tender in lower abdomen, +voluntary guarding   exam with normal external genitalia, no vaginal discharge, no CMT, positive left adnexal tenderness  Neuro: alert and fully oriented. CN II-XII grossly intact. Grossly normal strength and sensation in all extremities.   MSK: no deformities. Normal range of " motion  Integumentary/Skin: no rash visualized, normal color  Psych: normal affect, normal behavior      ED Course, Procedures, & Data        Results for orders placed or performed during the hospital encounter of 05/29/24   US Pelvis Cmplt w Transvag & Doppler LmtPel Duplex Limited     Status: None    Narrative    EXAMINATION: US PELVIS COMPLETE W TRANSVAGINAL AND DOPPLER LIMITED,  5/29/2024 8:17 AM     COMPARISON: Pelvic ultrasound 7/30/2023.    HISTORY: Pelvic pain, r/o cyst vs torsion. Negative beta hCG.    TECHNIQUE: The was scanned in standard fashion with transabdominal and  transvaginal transducer(s) using grey scale, color Doppler, and  spectral flow techniques.    FINDINGS:  Both ovaries are normal in size and there is normal blood flow to both  ovaries.  Right ovary is better visualized on transabdominal imaging  due to positioning with flow clearly demonstrated on same. No evidence  of an adnexal mass.  The right ovary measures 4.5 x 2.4 x 3.2 cm with  a volume of 18.2 cc. The left ovary measures 2.5 x 1.7 x 3.2 cm with a  volume of 8.9 cc. Follicles bilaterally with corpus luteum in the  right ovary. Patient was reportedly tender on the left side during  imaging as per technologist.    The uterus measures 8.1 x 3.3 x 5.0 cm. No evidence of a focal  fibroid.  The endometrium is within normal limits and measures 7.    Ascites is demonstrated bilaterally in the adnexal regions and in the  cul-de-sac however there is echogenic material noted adjacent to and  surrounding the left ovary with a 4.3 x 2.7 x 4.1 cm area measured by  the technologist. No internal vascularity in this region.      Impression    IMPRESSION:   1.  Normal appearance of the uterus.  2.  Complex ascites demonstrated with a suspected large hematoma  adjacent to/surrounding the left ovary. The underlying left ovary  itself appears to be within normal limits with no features of ovarian  torsion on grayscale or color imaging. Negative beta  hCG excludes  ruptured ectopic. Findings are nonspecific but possibly represent  ruptured ovarian cyst. Correlation with any risk factors for pelvic  inflammatory disease for tubo-ovarian abscess suggested although this  appears more solid than cystic/fluid appearing. Patient is tender on  the left. If there is clinical concern for non-gynecologic cause of  patient's symptoms and the complex ascites with hematoma, further  evaluation with CT abdomen and pelvis with contrast could be  considered.  3.  Right ovary within normal limits with suspected corpus luteum.  There was a possible hemorrhagic cyst in the right ovary on prior  ultrasound, no longer demonstrated.    STANLEY BARRAGAN MD         SYSTEM ID:  [id number]   POC US ABDOMEN LIMITED     Status: None    Impression    .Bedside FAST (Focused Assessment with Sonography in Trauma), performed and interpreted by me.   Indication: Abdominal pain    With the patient in Trendelenburg, the RUQ, LUQ and subxiphoid views were examined for intraabdominal and thoracic free fluid and pericardial effusion. With the patient in reverse Trendelenburg, the suprapubic view was examined for intraabdominal free fluid. Image quality was satisfactory..     Findings: Abnormal. There was free fluid present in RUQ, perihepatic space        IMPRESSION:  Positive FAST showing free fluid present in RUQ, periheatic space     CT Abdomen Pelvis w Contrast     Status: None    Narrative    EXAMINATION: CT ABDOMEN PELVIS W CONTRAST, 5/29/2024 10:24 AM    TECHNIQUE: Axial CT images from the lung bases through the symphysis  pubis were obtained  with IV contrast. Coronal and sagittal reformats  also provided. Contrast dose: 85cc of isovue 370    COMPARISON: Ultrasound pelvis 5/29/2024    HISTORY: lower abdominal pain with LLQ pain and elevated wbc    FINDINGS:    Lung Bases:   The visualized lung bases and lower mediastinal structures are  unremarkable.    ABDOMEN:  Liver: Subcentimeter  hypodensities in segment 8 are too small to  characterize but likely represent cysts.    Biliary/Gallbladder: No CT evidence of calculi, wall thickening or  pericholecystic fluid. No intra or extrahepatic biliary dilatation.    Spleen: Normal size. No focal lesions.    Pancreas: No evidence of pancreatic mass.    Adrenals: Normal.    Kidneys: No hydronephrosis, calculi or mass. Subcentimeter cyst in the  right lower pole.    Urinary bladder: Unremarkable.    Reproductive organs: The uterus is normal. Surrounding the left ovary  there is a intermediate density mass/hematoma measuring 6.3 x 4.9 cm  (series 4, image 223). There is an irregular cyst measuring 2.1 cm in  the right adnexa. Moderate amount of intermediate density free fluid  in the pelvis and perihepatic region. There is a 1.2 cm rim-enhancing  cyst along the sigmoid colon, which could be an inflamed diverticulum  or cyst from the left ovary.    Gastrointestinal: The stomach and duodenum are unremarkable. Small and  large bowel are normal in caliber and without abnormal wall  thickening. The appendix is normal.    Mesentery/Peritoneum: No pneumoperitoneum.    Lymph nodes: No lymphadenopathy.    Vasculature: Major abdominal arteries are patent.    Bones and soft tissues: No aggressive lytic or sclerotic lesions.      Impression    IMPRESSION:   1.  There is a small irregular rim-enhancing cyst in the right  ovary(probably a corpus luteal cyst) with intermediate density  mass/hematoma surrounding the left ovary, and moderate amount of  intermediate density free fluid in the pelvis and perihepatic region.  Findings likely represent a ruptured ovarian cyst with hemoperitoneum.  Tubal/ovarian mass cannot be ruled out on CT scan. Recommend follow up  ultrasound to confirm resolution and if further evaluation is  considered necessary, MRI pelvis may be done.  2. 1.2 cm rim-enhancing cyst along the sigmoid colon/ovary, could be  an inflamed diverticulum or cyst  from the left ovary. Ectopic  pregnancy is unlikely since beta hcg is negative.  3. The appendix is normal.    PRAVEEN DUNN MD         SYSTEM ID:  [id number]   Comprehensive metabolic panel     Status: Abnormal   Result Value Ref Range    Sodium 139 135 - 145 mmol/L    Potassium 3.5 3.4 - 5.3 mmol/L    Carbon Dioxide (CO2) 24 22 - 29 mmol/L    Anion Gap 10 7 - 15 mmol/L    Urea Nitrogen 10.9 6.0 - 20.0 mg/dL    Creatinine 0.77 0.51 - 0.95 mg/dL    GFR Estimate >90 >60 mL/min/1.73m2    Calcium 9.0 8.6 - 10.0 mg/dL    Chloride 105 98 - 107 mmol/L    Glucose 114 (H) 70 - 99 mg/dL    Alkaline Phosphatase 45 40 - 150 U/L    AST 19 0 - 45 U/L    ALT 12 0 - 50 U/L    Protein Total 6.6 6.4 - 8.3 g/dL    Albumin 4.0 3.5 - 5.2 g/dL    Bilirubin Total 0.4 <=1.2 mg/dL   Lipase     Status: Normal   Result Value Ref Range    Lipase 17 13 - 60 U/L   UA with Microscopic reflex to Culture     Status: Abnormal    Specimen: Urine, Midstream   Result Value Ref Range    Color Urine Light Yellow Colorless, Straw, Light Yellow, Yellow    Appearance Urine Clear Clear    Glucose Urine Negative Negative mg/dL    Bilirubin Urine Negative Negative    Ketones Urine Negative Negative mg/dL    Specific Gravity Urine 1.025 1.003 - 1.035    Blood Urine Moderate (A) Negative    pH Urine 6.0 5.0 - 7.0    Protein Albumin Urine Negative Negative mg/dL    Urobilinogen Urine 2.0 Normal, 2.0 mg/dL    Nitrite Urine Negative Negative    Leukocyte Esterase Urine Negative Negative    Mucus Urine Present (A) None Seen /LPF    RBC Urine 15 (H) <=2 /HPF    WBC Urine 1 <=5 /HPF    Squamous Epithelials Urine <1 <=1 /HPF    Narrative    Urine Culture not indicated   [place] Draw     Status: None    Narrative    The following orders were created for panel order [place] Draw.  Procedure                               Abnormality         Status                     ---------                               -----------         ------                     Extra Blue Top  Tube[[id number]]                              Final result               Extra Red Top Tube[[id number]]                               Final result                 Please view results for these tests on the individual orders.   CBC with platelets and differential     Status: Abnormal   Result Value Ref Range    WBC Count 19.0 (H) 4.0 - 11.0 10e3/uL    RBC Count 3.40 (L) 3.80 - 5.20 10e6/uL    Hemoglobin 10.7 (L) 11.7 - 15.7 g/dL    Hematocrit 32.5 (L) 35.0 - 47.0 %    MCV 96 78 - 100 fL    MCH 31.5 26.5 - 33.0 pg    MCHC 32.9 31.5 - 36.5 g/dL    RDW 14.3 10.0 - 15.0 %    Platelet Count 202 150 - 450 10e3/uL    % Neutrophils 83 %    % Lymphocytes 11 %    % Monocytes 5 %    % Eosinophils 0 %    % Basophils 0 %    % Immature Granulocytes 1 %    NRBCs per 100 WBC 0 <1 /100    Absolute Neutrophils 15.8 (H) 1.6 - 8.3 10e3/uL    Absolute Lymphocytes 2.1 0.8 - 5.3 10e3/uL    Absolute Monocytes 1.0 0.0 - 1.3 10e3/uL    Absolute Eosinophils 0.0 0.0 - 0.7 10e3/uL    Absolute Basophils 0.0 0.0 - 0.2 10e3/uL    Absolute Immature Granulocytes 0.2 <=0.4 10e3/uL    Absolute NRBCs 0.0 10e3/uL   Extra Blue Top Tube     Status: None   Result Value Ref Range    Hold Specimen JIC    Extra Red Top Tube     Status: None   Result Value Ref Range    Hold Specimen JIC    HCG qualitative Blood     Status: Normal   Result Value Ref Range    hCG Serum Qualitative Negative Negative   Wet prep     Status: Abnormal    Specimen: Vagina; Swab   Result Value Ref Range    Trichomonas Absent Absent    Yeast Absent Absent    Clue Cells Absent Absent    WBCs/high power field 1+ (A) None   Chlamydia trachomatis PCR     Status: Normal    Specimen: Vagina; Swab   Result Value Ref Range    Chlamydia trachomatis Negative Negative   Neisseria gonorrhoea PCR     Status: Normal    Specimen: Vagina; Swab   Result Value Ref Range    Neisseria gonorrhoeae Negative Negative   CBC with platelets differential     Status: Abnormal    Narrative    The following orders were  created for panel order CBC with platelets differential.  Procedure                               Abnormality         Status                     ---------                               -----------         ------                     CBC with platelets and d...[[id number]]  Abnormal            Final result                 Please view results for these tests on the individual orders.     Medications   dextrose 5% in lactated ringers infusion ( Intravenous $New Bag 5/29/24 1238)   lidocaine 1 % 0.1-1 mL (has no administration in time range)   lidocaine (LMX4) cream (has no administration in time range)   sodium chloride (PF) 0.9% PF flush 3 mL (has no administration in time range)   sodium chloride (PF) 0.9% PF flush 3 mL (has no administration in time range)   senna-docusate (SENOKOT-S/PERICOLACE) 8.6-50 MG per tablet 1 tablet (has no administration in time range)     Or   senna-docusate (SENOKOT-S/PERICOLACE) 8.6-50 MG per tablet 2 tablet (has no administration in time range)   calcium carbonate (TUMS) chewable tablet 1,000 mg (has no administration in time range)   acetaminophen (TYLENOL) tablet 650 mg (has no administration in time range)     Or   acetaminophen (TYLENOL) Suppository 650 mg (has no administration in time range)   ibuprofen (ADVIL/MOTRIN) tablet 600 mg (has no administration in time range)   senna-docusate (SENOKOT-S/PERICOLACE) 8.6-50 MG per tablet 1 tablet (has no administration in time range)     Or   senna-docusate (SENOKOT-S/PERICOLACE) 8.6-50 MG per tablet 2 tablet (has no administration in time range)   ondansetron (ZOFRAN ODT) ODT tab 4 mg (has no administration in time range)     Or   ondansetron (ZOFRAN) injection 4 mg (has no administration in time range)   lactated ringers infusion (has no administration in time range)   amoxicillin-clavulanate (AUGMENTIN) 875-125 MG per tablet 1 tablet (has no administration in time range)   HYDROmorphone (PF) (DILAUDID) injection 0.5 mg (0.5 mg  Intravenous $Given 5/29/24 0641)   sodium chloride 0.9% BOLUS 1,000 mL (0 mLs Intravenous Stopped 5/29/24 0913)   ketorolac (TORADOL) injection 15 mg (15 mg Intravenous $Given 5/29/24 0731)   sodium chloride 0.9% BOLUS 1,000 mL (0 mLs Intravenous Stopped 5/29/24 1147)   HYDROmorphone (PF) (DILAUDID) injection 0.5 mg (0.5 mg Intravenous $Given 5/29/24 1033)   iopamidol (ISOVUE-370) solution 85 mL (85 mLs Intravenous $Given 5/29/24 1010)   sodium chloride (PF) 0.9% PF flush 71 mL (71 mLs Intravenous $Given 5/29/24 1010)   HYDROmorphone (PF) (DILAUDID) injection 0.5 mg (0.5 mg Intravenous $Given 5/29/24 1238)     Labs Ordered and Resulted from Time of ED Arrival to Time of ED Departure   COMPREHENSIVE METABOLIC PANEL - Abnormal       Result Value    Sodium 139      Potassium 3.5      Carbon Dioxide (CO2) 24      Anion Gap 10      Urea Nitrogen 10.9      Creatinine 0.77      GFR Estimate >90      Calcium 9.0      Chloride 105      Glucose 114 (*)     Alkaline Phosphatase 45      AST 19      ALT 12      Protein Total 6.6      Albumin 4.0      Bilirubin Total 0.4     ROUTINE UA WITH MICROSCOPIC REFLEX TO CULTURE - Abnormal    Color Urine Light Yellow      Appearance Urine Clear      Glucose Urine Negative      Bilirubin Urine Negative      Ketones Urine Negative      Specific Gravity Urine 1.025      Blood Urine Moderate (*)     pH Urine 6.0      Protein Albumin Urine Negative      Urobilinogen Urine 2.0      Nitrite Urine Negative      Leukocyte Esterase Urine Negative      Mucus Urine Present (*)     RBC Urine 15 (*)     WBC Urine 1      Squamous Epithelials Urine <1     CBC WITH PLATELETS AND DIFFERENTIAL - Abnormal    WBC Count 19.0 (*)     RBC Count 3.40 (*)     Hemoglobin 10.7 (*)     Hematocrit 32.5 (*)     MCV 96      MCH 31.5      MCHC 32.9      RDW 14.3      Platelet Count 202      % Neutrophils 83      % Lymphocytes 11      % Monocytes 5      % Eosinophils 0      % Basophils 0      % Immature Granulocytes 1       NRBCs per 100 WBC 0      Absolute Neutrophils 15.8 (*)     Absolute Lymphocytes 2.1      Absolute Monocytes 1.0      Absolute Eosinophils 0.0      Absolute Basophils 0.0      Absolute Immature Granulocytes 0.2      Absolute NRBCs 0.0     WET PREPARATION - Abnormal    Trichomonas Absent      Yeast Absent      Clue Cells Absent      WBCs/high power field 1+ (*)    LIPASE - Normal    Lipase 17     HCG QUALITATIVE PREGNANCY - Normal    hCG Serum Qualitative Negative     CHLAMYDIA TRACHOMATIS PCR - Normal    Chlamydia trachomatis Negative     NEISSERIA GONORRHOEAE PCR - Normal    Neisseria gonorrhoeae Negative       CT Abdomen Pelvis w Contrast   Final Result   IMPRESSION:    1.  There is a small irregular rim-enhancing cyst in the right   ovary(probably a corpus luteal cyst) with intermediate density   mass/hematoma surrounding the left ovary, and moderate amount of   intermediate density free fluid in the pelvis and perihepatic region.   Findings likely represent a ruptured ovarian cyst with hemoperitoneum.   Tubal/ovarian mass cannot be ruled out on CT scan. Recommend follow up   ultrasound to confirm resolution and if further evaluation is   considered necessary, MRI pelvis may be done.   2. 1.2 cm rim-enhancing cyst along the sigmoid colon/ovary, could be   an inflamed diverticulum or cyst from the left ovary. Ectopic   pregnancy is unlikely since beta hcg is negative.   3. The appendix is normal.      PRAVEEN DUNN MD            SYSTEM ID:  [id number]      US Pelvis Cmplt w Transvag & Doppler LmtPel Duplex Limited   Final Result   IMPRESSION:    1.  Normal appearance of the uterus.   2.  Complex ascites demonstrated with a suspected large hematoma   adjacent to/surrounding the left ovary. The underlying left ovary   itself appears to be within normal limits with no features of ovarian   torsion on grayscale or color imaging. Negative beta hCG excludes   ruptured ectopic. Findings are nonspecific but possibly  represent   ruptured ovarian cyst. Correlation with any risk factors for pelvic   inflammatory disease for tubo-ovarian abscess suggested although this   appears more solid than cystic/fluid appearing. Patient is tender on   the left. If there is clinical concern for non-gynecologic cause of   patient's symptoms and the complex ascites with hematoma, further   evaluation with CT abdomen and pelvis with contrast could be   considered.   3.  Right ovary within normal limits with suspected corpus luteum.   There was a possible hemorrhagic cyst in the right ovary on prior   ultrasound, no longer demonstrated.      STANLEY BARRAGAN MD            SYSTEM ID:  [id number]      POC US ABDOMEN LIMITED   Final Result   .Bedside FAST (Focused Assessment with Sonography in Trauma), performed and interpreted by me.    Indication: Abdominal pain      With the patient in Trendelenburg, the RUQ, LUQ and subxiphoid views were examined for intraabdominal and thoracic free fluid and pericardial effusion. With the patient in reverse Trendelenburg, the suprapubic view was examined for intraabdominal free fluid. Image quality was satisfactory..       Findings: Abnormal. There was free fluid present in RUQ, perihepatic space           IMPRESSION:  Positive FAST showing free fluid present in RUQ, periheatic space            Critical Care Addendum  My initial assessment, based on my review of vital signs, focused history, physical exam, discussion with morning provider, and interpretation of multiple labs, Bedside US and pelvic US , established a high suspicion that Chhaya Mock has  severe abdominal pain with free fluid in the abdomen , which requires immediate intervention, and therefore she is critically ill.     After the initial assessment, the care team initiated multiple lab tests, initiated IV fluid administration, and initiated medication therapy with IV pain medication  to provide stabilization care and signed out to  morning provider. Due to the critical nature of this patient, I reassessed nursing observations, vital signs, physical exam, mental status, neurologic status, and respiratory status multiple times prior to her disposition.     Time also spent performing documentation, reviewing test results, and coordination of care.     Critical care time (excluding teaching time and procedures): 30 minutes.       Assessment & Plan    Chhaya Mock is a 33 year old female who presents to the emergency department for evaluation of lower abdominal/pelvic pain.  Upon arrival patient is nontoxic-appearing, afebrile, moderate distress secondary to pain.  Patient hemodynamically stable vital signs within normal limits.  Differential diagnosis includes but is not limited to cystitis versus pyelonephritis versus pregnancy versus ovarian cyst versus ovarian torsion versus constipation versus ileus versus appendicitis versus bacterial vaginitis versus STI among others.  Patient was treated with IV Dilaudid, IVF bolus.     Comprehensive labs remarkable for leukocytosis of 19 (prior 11.7 on 5/28/2024) -patient is currently on steroids, hemoglobin of 10.7 (prior 12 on 5/28/2024).  No acute metabolic or electrolyte abnormality, no transaminitis.  Given patient's lower abdominal pain/pelvic pain that started after intercourse, left adnexal tenderness on examination, pelvic ultrasound was performed. I performed bedside POCUS which demonstrates positive FAST with free fluid in RUQ perihepatic space.     Signed out to morning provider pending pelvic ultrasound, if negative plan on reevaluation, consider CT of the abdomen pelvis.. Patient understands and agrees with the plan.     I have reviewed the nursing notes. I have reviewed the findings, diagnosis, plan and need for follow up with the patient.    There are no discharge medications for this patient.      Final diagnoses:   Lower abdominal pain   Hemorrhagic cyst of left ovary    Ruptured cyst of left ovary   Hemoperitoneum - secondary rupture ovarian cyst       Isabelle Bennett MD  [facility] EMERGENCY DEPARTMENT  5/29/2024     Isabelle Bennett MD  05/30/24 0146

## 2024-05-29 NOTE — ED TRIAGE NOTES
Patient states she was here this morning. She was admitted a couple days ago for abscess on vocal cords. She cannot fart or burp without hurting. She has not pooped in 3 days. Patient has a lot of pain in stomach and pelvic region.   VSS

## 2024-05-29 NOTE — DISCHARGE SUMMARY
[facility]  Gynecology Discharge Summary    Admission Date:  24   Discharge Date:  24    Admission Attending: Mel Gaspar MD  Discharge Attending: Dina Freeman MD    Admission Diagnosis:  - Suspected ruptured hemorrhagic cyst  - Hemoperitoneum  - Epiglottitis   - Hydradenitis suppurativa  - Tobacco use disorder    Discharge Diagnosis:  - Left Hemorrhagic Cyst  - Hemoperitoneum    Procedures Performed:  - Diagnostic Laparoscopy, Left cystectomy    Admission History:  Chhaya Mock is a 33 year old  who presented to the emergency department with new onset lower abdominal pain. Of note, the patient was in the hospital yesterday with a supraglottic/epiglottic abscess for which she received steroids, antibiotics and was discharged to home. She had intercourse last night with subsequent onset of severe lower abdominal pain at approximately 0000. She notes that the pain increased and eventually she had to get in the bath to try and alleviate the pain, this was around 0200. At 0400 her pain increased to 10/10 and she presented to care. She endorses sharp pain in her lower abdomen without relieving factors. Denies fevers, chills, nausea, vomiting. Reports that the pain is similar to a ruptured ovarian cyst that she had in the past. She also notes constipation and an inability to pass gas. Last BM on . She has passed gas during this admission and notes that her pain improved. She states that her pain is currently an 8/10 though her primary complaint is being hungry from NPO status. She denies any lightheadedness, dizziness, chest pain, SOB, fevers, chills, dysuria, hematuria, vaginal bleeding, vaginal discharge.     Operative Course:  She underwent a diagnostic laparoscopy, which was uncomplicated. EBL was 150 cc. See operative report for further details.    Operative Findings:   EUA revealed small uterus. No adnexal masses noted. External  genitalia, vaginal vault and cervix appeared normal. 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 Chronic inflammatory changes to the peritoneum. Normal appearing uterus. Right Ovary with likely corpus luteum cyst. And patent right fallopian tube on chromopertubation. Left ovary notable for ruptured hemorrhagic cyst, cystectomy performed. Surgiflow applied to cystectomy bed. Left fallopian tube appears to be partially occluded on chrompertubation. Evacuation of hemoperitoneum. Appendix visualized. Hemostatic surgical sites at end of case.     Hospital Course:  On HD#1, the patient underwent a transvaginal ultrasound demonstrating a normal appearing uterus and complex ascites demonstrated with a suspected large hematoma  adjacent to/surrounding the left ovary. Right ovary within normal limits with suspected corpus luteum. This was followed by a CT abdomen and pelvis which showed a small irregular rim-enhancing cyst in the right ovary (probably a corpus luteal cyst) with intermediate density mass/hematoma surrounding the left ovary, and moderate amount of intermediate density free fluid in the pelvis and perihepatic region. General surgery evaluated the patient and had a low suspicion for diverticular disease. A CBC showed leukocytosis with a WBC of 19.0 from 11.7 one day prior. Hgb had also down trended from 12.0 to 10.7. She was admitted for serial abdominal exams and labs. On HD#1 the patient was consented for the procedure above in the setting of decreasing Hgb.     Her postoperative course was uncomplicated. She was initially maintained on IV fluids with IV pain medications and cruz catheter in place. On POD#1, diet was advanced, cruz catheter was removed, and she was transitioned to PO pain medications. On POD#1 she was tolerating regular diet, ambulating  without difficulty, voiding spontaneously, and controlling pain with PO medications, and she was deemed stable for discharge. Hemoglobin at the time of discharge was 9.5.    Discharge Plans:  - The patient was discharged to home  - PO Activity:   - No lifting >15 lbs or strenuous exercise for 2 weeks   - No driving while taking narcotics or until you can slam on the breaks without pain  - She was instructed to call or return to ED if she has any of the following:    - Temperature greater than 100.4F   - Pain not controlled by pain medications   - Uncontrolled nausea/vomiting   - Foul-smelling vaginal discharge   - Vaginal bleeding soaking 1 pad per hour for 2 hours in a row  - She will follow up with Dr. Gaspar in 2-3 weeks    - Discharge medications include:  -      Review of your medicines        START taking        Dose / Directions   acetaminophen 325 MG tablet  Commonly known as: TYLENOL  Used for: S/P laparoscopic L ovarian cystectomy      Dose: 975 mg  Take 3 tablets (975 mg) by mouth every 6 hours as needed for mild pain  Quantity: 50 tablet  Refills: 0     ibuprofen 800 MG tablet  Commonly known as: ADVIL/MOTRIN  Used for: S/P laparoscopic L ovarian cystectomy      Dose: 800 mg  Take 1 tablet (800 mg) by mouth every 6 hours as needed for other (mild and/or inflammatory pain)  Quantity: 30 tablet  Refills: 0     oxyCODONE 5 MG tablet  Commonly known as: ROXICODONE  Used for: S/P laparoscopic L ovarian cystectomy      Dose: 5-10 mg  Take 1-2 tablets (5-10 mg) by mouth every 4 hours as needed for moderate pain  Quantity: 20 tablet  Refills: 0     senna-docusate 8.6-50 MG tablet  Commonly known as: SENOKOT-S/PERICOLACE  Used for: S/P laparoscopic L ovarian cystectomy      Dose: 1-2 tablet  Take 1-2 tablets by mouth 2 times daily  Quantity: 30 tablet  Refills: 0               Where to get your medicines        These medications were sent to [place], [place] - [address]  [address]  [facility] [address], [facility] [address]      Phone: [phone]   acetaminophen 325 MG tablet  ibuprofen 800 MG tablet  oxyCODONE 5 MG tablet  senna-docusate 8.6-50 MG tablet           Gigi Guzman MD  OBGYN PGY-4  May 30, 2024 4:33 PM

## 2024-05-29 NOTE — PROGRESS NOTES
Gynecology Progress Note    Subjective:  Patient feeling unchanged at this time. She notes that her pain is unchanged. No lightheadedness, dizziness, chest pain, SOB, nausea, vomiting.    Objective:  Vitals:    24 1025 24 1200 24 1330 24 1549   BP: 107/81 116/81 110/74 104/64   BP Location:    Right arm   Pulse: 64  70 71   Resp: 16   18   Temp: 98.1  F (36.7  C)   98.4  F (36.9  C)   TempSrc: Oral   Oral   SpO2: 100% 100%  96%   Weight:       Height:           General:  A&Ox3. NAD. Resting in bed  CV: RRR.  Pulm: CTAB. Normal respiratory effort.  Abd: Soft, non-distended. Minimally tender in the RLQ. Normoactive bowel sounds in all 4 quadrants.   Ext: Trace edema    Assessment/Plan:  Chhaya Mock is a 33 year old  female with suspected hemorrhagic ovarian cyst and hemoperitoneum. Discussed with the patient that her hemoglobin is continuing to downtrend though her WBC has normalized. We have increasing suspicion for a hemorrhagic cyst and decreasing suspicion for an intraabdominal abscess. Discussed with the patient that given her decreasing hemoglobin, we would recommend that she undergo exploratory laparoscopy, possible ovarian cystectomy (left or right), possible oophorectomy (left or right) and any other indicated procedures. We discussed the risks of bleeding, infection, and damage to surrounding tissue. We discussed that the amount of bleeding from the procedure itself is minimal though there is always a chance that we could encounter more bleeding than anticipated. The patient notes that she would be amenable to a blood transfusion if needed. We discussed the risks of infection and reaction from blood transfusion and the patient is amenable if necessary. In regards to infection, we discussed that there are no preoperative antibiotics needed for this procedure though postoperatively we would monitor for fevers, chills, nausea, vomiting, chest pain, and shortness  of breath. Finally, we discussed the possibility of damage to surrounding tissue including the bladder, bowel, fallopian tubes, ovaries, ureters, uterus, nerves, and blood vessels in the pelvis. Discussed that if anything were damaged, we would attempt to correct that during the case, discussed that we could have other specialists help us correct any damage to structures, but that there is always the chance of needing another procedure in the future to correct something that is damaged during this procedure. The patient is amenable to proceeding and written consent for an exam under anesthesia, exploratory laparoscopy, possible ovarian cystectomy (left or right), possible oophorectomy (left or right), any other indicated procedures, and blood transfusion.    # Hemoperitoneum   # Suspected ruptured hemorrhagic cyst  - NPO at this time, mIVF at 125 ml/hr  - Continue q6 hr cbc and coags at this time though space prn   - Continue with serial abdominal exams pre and post op  - Transfuse as needed for Hgb <7 or between 7-8 with symptomatic anemia   - Consent signed for exam under anesthesia, exploratory laparoscopy, possible ovarian cystectomy (left or right), possible oophorectomy (left or right), any other indicated procedures as well as blood transfusion  - SCDs for DVT ppx  - No preoperative abx indicated   - Proceed to OR as able   - Consider ovulation suppression to prevent recurrent hemorrhagic cysts      # Supraglottitis without airway obstruction   - Continue pta amox-clav     Gigi Guzman MD  OBGYN PGY-4  May 29, 2024 6:05 PM

## 2024-05-29 NOTE — TELEPHONE ENCOUNTER
No retinal holes or tears seen on exam. Recommended OBSERVATION. We reviewed the signs and symptoms of retinal tear/retinal detachment and the importance of prompt evaluation should there be increasing floaters, new flashing lights, or decreasing peripheral vision in either eye at any time. Patient understands condition, prognosis and need for follow up care. This patient needed ED follow up with Dr. Meyers. TRISTIAN with scheduled appt info

## 2024-05-29 NOTE — ANESTHESIA PREPROCEDURE EVALUATION
Anesthesia Pre-Procedure Evaluation    Patient: Chhaya Mock   MRN: [id number] : 1990        Procedure : Procedure(s):  Diagnostic laparoscopy, possible left ovarian cystectomy          Past Medical History:   Diagnosis Date    Carbuncle and furuncle     Hidradenitis suppurativa     Tobacco use disorder     Smokes 1/2 ppd. Started smoking at 14 years    Vaginal yeast infection 2017      Past Surgical History:   Procedure Laterality Date    [facility] ORAL SURGERY PROCEDURE  10/23/2008    [facility] Teeth Extraction      Allergies   Allergen Reactions    No Known Allergies       Social History     Tobacco Use    Smoking status: Every Day     Current packs/day: 0.50     Average packs/day: 0.5 packs/day for 2.0 years (1.0 ttl pk-yrs)     Types: Cigarettes    Smokeless tobacco: Never   Substance Use Topics    Alcohol use: No     Comment: occassionally      Wt Readings from Last 1 Encounters:   24 62.6 kg (138 lb)        Anesthesia Evaluation   Pt has had prior anesthetic. Type: General.        ROS/MED HX  ENT/Pulmonary: Comment: Supraglottitis with a small abscess in the vallecula since 24, improving  -- Evaluated by ENT via FOB 24 - airway pattent.   -- No stridor or dyspnea. Presented with painful swallowing   -- Started on decadron 8mg q8h, and Unasyn  -- ENT  FIBEROPTIC ENDOSCOPY NOTE:  The fiberoptic laryngoscope was passed under endoscopic vision through the L nasal passage. The turbinates were normal. The inferior and middle meati were clear bilaterally without purulence, masses, or polyps. The nasopharynx was clear. The eustachian tubes were clear. The soft palate appeared normal with good mobility. The epiglottis was slightly thickened and retroflexed with prominent edematous lingual tonsils.  The larynx was fully visible with mobile cords. The arytenoids were clear, mild edema of the left piriform and AE fold.     (+)                tobacco use, Current use, 1 packs/day,                       Neurologic:       Cardiovascular:  - neg cardiovascular ROS     METS/Exercise Tolerance:     Hematologic: Comments: Acute blood loss anemia after ruptured hemorrhagic ovarian cyst   -- Hgb 12-->10.7-->9.6  -- T&C ordered    (+)      anemia,          Musculoskeletal:       GI/Hepatic: Comment: Admitted with abdominal pain  -- Found to have Hemoperitoneum & Likely ruptured Hemorrhagic cyst of left ovary      Renal/Genitourinary:  - neg Renal ROS     Endo:       Psychiatric/Substance Use:     (+)     Recreational drug usage: Cannabis.    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      Malignancy:       Other: Comment: Hidradenitis suppurativa           Physical Exam    Airway  airway exam normal      Mallampati: I   TM distance: > 3 FB   Neck ROM: full   Mouth opening: > 3 cm    Respiratory Devices and Support         Dental       (+) Completely normal teeth      Cardiovascular   cardiovascular exam normal          Pulmonary   pulmonary exam normal                OUTSIDE LABS:  CBC:   Lab Results   Component Value Date    WBC 10.3 05/29/2024    WBC 19.0 (H) 05/29/2024    HGB 9.6 (L) 05/29/2024    HGB 10.7 (L) 05/29/2024    HCT 30.0 (L) 05/29/2024    HCT 32.5 (L) 05/29/2024     05/29/2024     05/29/2024     BMP:   Lab Results   Component Value Date     05/29/2024     05/27/2024    POTASSIUM 3.5 05/29/2024    POTASSIUM 4.1 05/27/2024    CHLORIDE 105 05/29/2024    CHLORIDE 103 05/27/2024    CO2 24 05/29/2024    CO2 26 05/27/2024    BUN 10.9 05/29/2024    BUN 9.6 05/27/2024    CR 0.77 05/29/2024    CR 0.71 05/28/2024     (H) 05/29/2024    GLC 90 05/27/2024     COAGS:   Lab Results   Component Value Date    PTT 26 05/29/2024    INR 1.14 05/29/2024    FIBR 238 05/29/2024     POC:   Lab Results   Component Value Date    HCG Negative 05/27/2024    HCGS Negative 05/29/2024     HEPATIC:   Lab Results   Component Value Date    ALBUMIN 4.0 05/29/2024    PROTTOTAL 6.6  05/29/2024    ALT 12 05/29/2024    AST 19 05/29/2024    ALKPHOS 45 05/29/2024    BILITOTAL 0.4 05/29/2024     OTHER:   Lab Results   Component Value Date    A1C 5.1 01/17/2020    FRANCY 9.0 05/29/2024    LIPASE 17 05/29/2024    TSH 0.86 01/17/2020       Anesthesia Plan    ASA Status:  3, emergent    NPO Status:  ELEVATED Aspiration Risk/Unknown    Anesthesia Type: General.     - Airway: ETT   Induction: RSI.   Maintenance: Balanced.   Techniques and Equipment:     - Airway: Video-Laryngoscope     - Lines/Monitors: 2nd IV     Consents    Anesthesia Plan(s) and associated risks, benefits, and realistic alternatives discussed. Questions answered and patient/representative(s) expressed understanding.     - Discussed:     - Discussed with:  Patient      - Extended Intubation/Ventilatory Support Discussed: Yes.      - Patient is DNR/DNI Status: No     Use of blood products discussed: Yes.     - Discussed with: Patient.     - Consented: consented to blood products     Postoperative Care    Pain management: IV analgesics, Oral pain medications.   PONV prophylaxis: Ondansetron (or other 5HT-3), Dexamethasone or Solumedrol     Comments:               Shwetha Bains MD    I have reviewed the pertinent notes and labs in the chart from the past 30 days and (re)examined the patient.  Any updates or changes from those notes are reflected in this note.

## 2024-05-29 NOTE — ED PROVIDER NOTES
Patient seen by Dr. Bennett initially for left lower quadrant pain.  Labs drawn and reviewed initially patient had a pelvic ultrasound with Doppler study revealed concerning for left hemorrhagic ovarian cyst with rupture with moderate amount of free fluid in the pelvis.  Patient with ongoing symptoms treated with IV Dilaudid here in the ER.  We did do a CT scan with elevated white count CT scan shows possible sigmoid cyst also but also findings of hemoperitoneum with most likely ruptured left ovarian cyst.  Discussed with GYN reviewed findings recommended gen surgery to see also surgery did see the patient in the ER but felt more likely this was a GYN problem.    Patient here in the ER somewhat frustrated as she has not eaten in the last several days.  Was able to get some apple juice at this point with her ongoing symptoms hemoperitoneum hemoglobin slightly down we will plan to admit to GYN as a direct transfer to the [facility] under the care of .  Patient is aware this is more comfortable here Dilaudid repeated IV for pain control does agree with plan was able to eat and will be transferred once bed is available on the [facility]  Vitals stable in the ER.     Patient to be admitted as noted to the GYN service on the [facility] for ongoing management of patient's pain along with concern for hemoperitoneum trending hemoglobins etc. and further management.    Results for orders placed or performed during the hospital encounter of 05/29/24   US Pelvis Cmplt w Transvag & Doppler LmtPel Duplex Limited     Status: None    Narrative    EXAMINATION: US PELVIS COMPLETE W TRANSVAGINAL AND DOPPLER LIMITED,  5/29/2024 8:17 AM     COMPARISON: Pelvic ultrasound 7/30/2023.    HISTORY: Pelvic pain, r/o cyst vs torsion. Negative beta hCG.    TECHNIQUE: The was scanned in standard fashion with transabdominal and  transvaginal transducer(s) using grey scale, color Doppler, and  spectral flow techniques.    FINDINGS:  Both  ovaries are normal in size and there is normal blood flow to both  ovaries.  Right ovary is better visualized on transabdominal imaging  due to positioning with flow clearly demonstrated on same. No evidence  of an adnexal mass.  The right ovary measures 4.5 x 2.4 x 3.2 cm with  a volume of 18.2 cc. The left ovary measures 2.5 x 1.7 x 3.2 cm with a  volume of 8.9 cc. Follicles bilaterally with corpus luteum in the  right ovary. Patient was reportedly tender on the left side during  imaging as per technologist.    The uterus measures 8.1 x 3.3 x 5.0 cm. No evidence of a focal  fibroid.  The endometrium is within normal limits and measures 7.    Ascites is demonstrated bilaterally in the adnexal regions and in the  cul-de-sac however there is echogenic material noted adjacent to and  surrounding the left ovary with a 4.3 x 2.7 x 4.1 cm area measured by  the technologist. No internal vascularity in this region.      Impression    IMPRESSION:   1.  Normal appearance of the uterus.  2.  Complex ascites demonstrated with a suspected large hematoma  adjacent to/surrounding the left ovary. The underlying left ovary  itself appears to be within normal limits with no features of ovarian  torsion on grayscale or color imaging. Negative beta hCG excludes  ruptured ectopic. Findings are nonspecific but possibly represent  ruptured ovarian cyst. Correlation with any risk factors for pelvic  inflammatory disease for tubo-ovarian abscess suggested although this  appears more solid than cystic/fluid appearing. Patient is tender on  the left. If there is clinical concern for non-gynecologic cause of  patient's symptoms and the complex ascites with hematoma, further  evaluation with CT abdomen and pelvis with contrast could be  considered.  3.  Right ovary within normal limits with suspected corpus luteum.  There was a possible hemorrhagic cyst in the right ovary on prior  ultrasound, no longer demonstrated.    STANLEY BARRAGAN MD          SYSTEM ID:  [id number]   POC US ABDOMEN LIMITED     Status: None    Impression    .Bedside FAST (Focused Assessment with Sonography in Trauma), performed and interpreted by me.   Indication: Abdominal pain    With the patient in Trendelenburg, the RUQ, LUQ and subxiphoid views were examined for intraabdominal and thoracic free fluid and pericardial effusion. With the patient in reverse Trendelenburg, the suprapubic view was examined for intraabdominal free fluid. Image quality was satisfactory..     Findings: Abnormal. There was free fluid present in RUQ, perihepatic space        IMPRESSION:  Positive FAST showing free fluid present in RUQ, periheatic space     CT Abdomen Pelvis w Contrast     Status: None    Narrative    EXAMINATION: CT ABDOMEN PELVIS W CONTRAST, 5/29/2024 10:24 AM    TECHNIQUE: Axial CT images from the lung bases through the symphysis  pubis were obtained  with IV contrast. Coronal and sagittal reformats  also provided. Contrast dose: 85cc of isovue 370    COMPARISON: Ultrasound pelvis 5/29/2024    HISTORY: lower abdominal pain with LLQ pain and elevated wbc    FINDINGS:    Lung Bases:   The visualized lung bases and lower mediastinal structures are  unremarkable.    ABDOMEN:  Liver: Subcentimeter hypodensities in segment 8 are too small to  characterize but likely represent cysts.    Biliary/Gallbladder: No CT evidence of calculi, wall thickening or  pericholecystic fluid. No intra or extrahepatic biliary dilatation.    Spleen: Normal size. No focal lesions.    Pancreas: No evidence of pancreatic mass.    Adrenals: Normal.    Kidneys: No hydronephrosis, calculi or mass. Subcentimeter cyst in the  right lower pole.    Urinary bladder: Unremarkable.    Reproductive organs: The uterus is normal. Surrounding the left ovary  there is a intermediate density mass/hematoma measuring 6.3 x 4.9 cm  (series 4, image 223). There is an irregular cyst measuring 2.1 cm in  the right adnexa. Moderate  amount of intermediate density free fluid  in the pelvis and perihepatic region. There is a 1.2 cm rim-enhancing  cyst along the sigmoid colon, which could be an inflamed diverticulum  or cyst from the left ovary.    Gastrointestinal: The stomach and duodenum are unremarkable. Small and  large bowel are normal in caliber and without abnormal wall  thickening. The appendix is normal.    Mesentery/Peritoneum: No pneumoperitoneum.    Lymph nodes: No lymphadenopathy.    Vasculature: Major abdominal arteries are patent.    Bones and soft tissues: No aggressive lytic or sclerotic lesions.      Impression    IMPRESSION:   1.  There is a small irregular rim-enhancing cyst in the right  ovary(probably a corpus luteal cyst) with intermediate density  mass/hematoma surrounding the left ovary, and moderate amount of  intermediate density free fluid in the pelvis and perihepatic region.  Findings likely represent a ruptured ovarian cyst with hemoperitoneum.  Tubal/ovarian mass cannot be ruled out on CT scan. Recommend follow up  ultrasound to confirm resolution and if further evaluation is  considered necessary, MRI pelvis may be done.  2. 1.2 cm rim-enhancing cyst along the sigmoid colon/ovary, could be  an inflamed diverticulum or cyst from the left ovary. Ectopic  pregnancy is unlikely since beta hcg is negative.  3. The appendix is normal.    PRAVEEN DUNN MD         SYSTEM ID:  [id number]   Comprehensive metabolic panel     Status: Abnormal   Result Value Ref Range    Sodium 139 135 - 145 mmol/L    Potassium 3.5 3.4 - 5.3 mmol/L    Carbon Dioxide (CO2) 24 22 - 29 mmol/L    Anion Gap 10 7 - 15 mmol/L    Urea Nitrogen 10.9 6.0 - 20.0 mg/dL    Creatinine 0.77 0.51 - 0.95 mg/dL    GFR Estimate >90 >60 mL/min/1.73m2    Calcium 9.0 8.6 - 10.0 mg/dL    Chloride 105 98 - 107 mmol/L    Glucose 114 (H) 70 - 99 mg/dL    Alkaline Phosphatase 45 40 - 150 U/L    AST 19 0 - 45 U/L    ALT 12 0 - 50 U/L    Protein Total 6.6 6.4 - 8.3 g/dL     Albumin 4.0 3.5 - 5.2 g/dL    Bilirubin Total 0.4 <=1.2 mg/dL   Lipase     Status: Normal   Result Value Ref Range    Lipase 17 13 - 60 U/L   UA with Microscopic reflex to Culture     Status: Abnormal    Specimen: Urine, Midstream   Result Value Ref Range    Color Urine Light Yellow Colorless, Straw, Light Yellow, Yellow    Appearance Urine Clear Clear    Glucose Urine Negative Negative mg/dL    Bilirubin Urine Negative Negative    Ketones Urine Negative Negative mg/dL    Specific Gravity Urine 1.025 1.003 - 1.035    Blood Urine Moderate (A) Negative    pH Urine 6.0 5.0 - 7.0    Protein Albumin Urine Negative Negative mg/dL    Urobilinogen Urine 2.0 Normal, 2.0 mg/dL    Nitrite Urine Negative Negative    Leukocyte Esterase Urine Negative Negative    Mucus Urine Present (A) None Seen /LPF    RBC Urine 15 (H) <=2 /HPF    WBC Urine 1 <=5 /HPF    Squamous Epithelials Urine <1 <=1 /HPF    Narrative    Urine Culture not indicated   [place] Draw     Status: None    Narrative    The following orders were created for panel order [place] Draw.  Procedure                               Abnormality         Status                     ---------                               -----------         ------                     Extra Blue Top Tube[[id number]]                              Final result               Extra Red Top Tube[[id number]]                               Final result                 Please view results for these tests on the individual orders.   CBC with platelets and differential     Status: Abnormal   Result Value Ref Range    WBC Count 19.0 (H) 4.0 - 11.0 10e3/uL    RBC Count 3.40 (L) 3.80 - 5.20 10e6/uL    Hemoglobin 10.7 (L) 11.7 - 15.7 g/dL    Hematocrit 32.5 (L) 35.0 - 47.0 %    MCV 96 78 - 100 fL    MCH 31.5 26.5 - 33.0 pg    MCHC 32.9 31.5 - 36.5 g/dL    RDW 14.3 10.0 - 15.0 %    Platelet Count 202 150 - 450 10e3/uL    % Neutrophils 83 %    % Lymphocytes 11 %    % Monocytes 5 %    % Eosinophils 0 %    %  Basophils 0 %    % Immature Granulocytes 1 %    NRBCs per 100 WBC 0 <1 /100    Absolute Neutrophils 15.8 (H) 1.6 - 8.3 10e3/uL    Absolute Lymphocytes 2.1 0.8 - 5.3 10e3/uL    Absolute Monocytes 1.0 0.0 - 1.3 10e3/uL    Absolute Eosinophils 0.0 0.0 - 0.7 10e3/uL    Absolute Basophils 0.0 0.0 - 0.2 10e3/uL    Absolute Immature Granulocytes 0.2 <=0.4 10e3/uL    Absolute NRBCs 0.0 10e3/uL   Extra Blue Top Tube     Status: None   Result Value Ref Range    Hold Specimen JIC    Extra Red Top Tube     Status: None   Result Value Ref Range    Hold Specimen JIC    HCG qualitative Blood     Status: Normal   Result Value Ref Range    hCG Serum Qualitative Negative Negative                                                                    Result Value Ref Range    INR 1.14 0.85 - 1.15   Partial thromboplastin time     Status: Normal   Result Value Ref Range    aPTT 26 22 - 38 Seconds   Fibrinogen activity     Status: Normal   Result Value Ref Range    Fibrinogen Activity 238 170 - 490 mg/dL   Adult Type and Screen     Status: None   Result Value Ref Range    ABO/RH(D) O POS     Antibody Screen Negative Negative    SPECIMEN EXPIRATION DATE [id number]    Wet prep     Status: Abnormal    Specimen: Vagina; Swab   Result Value Ref Range    Trichomonas Absent Absent    Yeast Absent Absent    Clue Cells Absent Absent    WBCs/high power field 1+ (A) None   Chlamydia trachomatis PCR     Status: Normal    Specimen: Vagina; Swab   Result Value Ref Range    Chlamydia trachomatis Negative Negative   Neisseria gonorrhoea PCR     Status: Normal    Specimen: Vagina; Swab   Result Value Ref Range    Neisseria gonorrhoeae Negative Negative   CBC with platelets differential     Status: Abnormal    Narrative    The following orders were created for panel order CBC with platelets differential.  Procedure                               Abnormality         Status                     ---------                               -----------          ------                     CBC with platelets and d...[[id number]]  Abnormal            Final result                 Please view results for these tests on the individual orders.   ABO/Rh type and screen     Status: None    Narrative    The following orders were created for panel order ABO/Rh type and screen.  Procedure                               Abnormality         Status                     ---------                               -----------         ------                     Adult Type and Screen[[id number]]                            Final result                 Please view results for these tests on the individual orders.     This note was created at least in part by the use of dragon voice dictation system. Inadvertent typographical errors may still exist.  Chepe Ndiaye MD.  Patient evaluated in the emergency department during the COVID-19 pandemic period. Careful attention to patients safety was addressed throughout the evaluation. Evaluation and treatment management was initiated with disposition made efficiently and appropriate as possible to minimize any risk of potential exposure to patient during this evaluation.                   Chepe Ndiaye MD  05/29/24 0402

## 2024-05-29 NOTE — LETTER
May 30, 2024      Chhaya Mock  [address] [place] [address]        To Whom It May Concern:    Chhaya Mock was admitted in the hospital 5/29/24 and will not return to work until June 12th.       Sincerely,        Dina Freeman MD

## 2024-05-29 NOTE — MEDICATION SCRIBE - ADMISSION MEDICATION HISTORY
Medication Scribe Admission Medication History    Admission medication history is complete. The information provided in this note is only as accurate as the sources available at the time of the update.    Information Source(s): Patient and CareEverywhere/SureScripts via in-person    Pertinent Information: Patient stated she's not on any OTC or prescription meds currently, she has a few prescriptions sent recently but has not picked up from pharmacy yet.    Changes made to PTA medication list:  Added: None  Deleted: acetaminophen 325 mg/10.15 ml (not picked up from pharmacy yet), albuterol inhaler (doesn't have), amoxicillin clavulanate 875-125 mg (not picked up from pharmacy yet), ondansetron 4 mg (not taking).  Changed: None    Allergies reviewed with patient and updates made in EHR: yes    Medication History Completed By: Marylin Camacho 5/29/2024 1:37 PM    No outpatient medications have been marked as taking for the 5/29/24 encounter (Hospital Encounter).

## 2024-05-30 VITALS
DIASTOLIC BLOOD PRESSURE: 74 MMHG | HEIGHT: 63 IN | BODY MASS INDEX: 24.45 KG/M2 | RESPIRATION RATE: 16 BRPM | OXYGEN SATURATION: 96 % | HEART RATE: 65 BPM | TEMPERATURE: 98.5 F | WEIGHT: 138 LBS | SYSTOLIC BLOOD PRESSURE: 120 MMHG

## 2024-05-30 LAB
APTT PPP: 25 SECONDS (ref 22–38)
ERYTHROCYTE [DISTWIDTH] IN BLOOD BY AUTOMATED COUNT: 14.2 % (ref 10–15)
FIBRINOGEN PPP-MCNC: 228 MG/DL (ref 170–490)
GLUCOSE BLDC GLUCOMTR-MCNC: 118 MG/DL (ref 70–99)
HCT VFR BLD AUTO: 29.4 % (ref 35–47)
HGB BLD-MCNC: 9.5 G/DL (ref 11.7–15.7)
HOLD SPECIMEN: NORMAL
INR PPP: 1.16 (ref 0.85–1.15)
MCH RBC QN AUTO: 30.6 PG (ref 26.5–33)
MCHC RBC AUTO-ENTMCNC: 32.3 G/DL (ref 31.5–36.5)
MCV RBC AUTO: 95 FL (ref 78–100)
PLATELET # BLD AUTO: 177 10E3/UL (ref 150–450)
RBC # BLD AUTO: 3.1 10E6/UL (ref 3.8–5.2)
WBC # BLD AUTO: 9.5 10E3/UL (ref 4–11)

## 2024-05-30 PROCEDURE — 99222 1ST HOSP IP/OBS MODERATE 55: CPT | Mod: 57 | Performed by: OBSTETRICS & GYNECOLOGY

## 2024-05-30 PROCEDURE — 250N000013 HC RX MED GY IP 250 OP 250 PS 637

## 2024-05-30 PROCEDURE — 96361 HYDRATE IV INFUSION ADD-ON: CPT

## 2024-05-30 PROCEDURE — 85610 PROTHROMBIN TIME: CPT

## 2024-05-30 PROCEDURE — 250N000011 HC RX IP 250 OP 636

## 2024-05-30 PROCEDURE — G0378 HOSPITAL OBSERVATION PER HR: HCPCS

## 2024-05-30 PROCEDURE — 85027 COMPLETE CBC AUTOMATED: CPT

## 2024-05-30 PROCEDURE — 58662 LAPAROSCOPY EXCISE LESIONS: CPT | Mod: GC | Performed by: OBSTETRICS & GYNECOLOGY

## 2024-05-30 PROCEDURE — 85730 THROMBOPLASTIN TIME PARTIAL: CPT

## 2024-05-30 PROCEDURE — 96376 TX/PRO/DX INJ SAME DRUG ADON: CPT

## 2024-05-30 PROCEDURE — 82962 GLUCOSE BLOOD TEST: CPT

## 2024-05-30 PROCEDURE — 250N000011 HC RX IP 250 OP 636: Performed by: STUDENT IN AN ORGANIZED HEALTH CARE EDUCATION/TRAINING PROGRAM

## 2024-05-30 PROCEDURE — 85384 FIBRINOGEN ACTIVITY: CPT

## 2024-05-30 PROCEDURE — 36415 COLL VENOUS BLD VENIPUNCTURE: CPT

## 2024-05-30 PROCEDURE — 58350 REOPEN FALLOPIAN TUBE: CPT | Mod: 59 | Performed by: OBSTETRICS & GYNECOLOGY

## 2024-05-30 RX ORDER — HYDROXYZINE HYDROCHLORIDE 50 MG/1
50 TABLET, FILM COATED ORAL ONCE
Qty: 1 TABLET | Refills: 0 | Status: COMPLETED | OUTPATIENT
Start: 2024-05-30 | End: 2024-05-30

## 2024-05-30 RX ORDER — OXYCODONE HYDROCHLORIDE 5 MG/1
5-10 TABLET ORAL EVERY 4 HOURS PRN
Qty: 20 TABLET | Refills: 0 | Status: SHIPPED | OUTPATIENT
Start: 2024-05-30 | End: 2024-08-09

## 2024-05-30 RX ORDER — HYDROMORPHONE HCL IN WATER/PF 6 MG/30 ML
0.2 PATIENT CONTROLLED ANALGESIA SYRINGE INTRAVENOUS
Status: DISCONTINUED | OUTPATIENT
Start: 2024-05-30 | End: 2024-05-30

## 2024-05-30 RX ADMIN — SENNOSIDES AND DOCUSATE SODIUM 1 TABLET: 50; 8.6 TABLET ORAL at 09:04

## 2024-05-30 RX ADMIN — Medication 1 LOZENGE: at 16:18

## 2024-05-30 RX ADMIN — HYDROXYZINE HYDROCHLORIDE 50 MG: 50 TABLET, FILM COATED ORAL at 01:41

## 2024-05-30 RX ADMIN — SODIUM CHLORIDE, SODIUM LACTATE, POTASSIUM CHLORIDE, CALCIUM CHLORIDE AND DEXTROSE MONOHYDRATE: 5; 600; 310; 30; 20 INJECTION, SOLUTION INTRAVENOUS at 00:09

## 2024-05-30 RX ADMIN — KETOROLAC TROMETHAMINE 15 MG: 15 INJECTION, SOLUTION INTRAMUSCULAR; INTRAVENOUS at 00:57

## 2024-05-30 RX ADMIN — IBUPROFEN 800 MG: 800 TABLET, FILM COATED ORAL at 16:05

## 2024-05-30 RX ADMIN — ACETAMINOPHEN 975 MG: 325 TABLET, FILM COATED ORAL at 10:58

## 2024-05-30 RX ADMIN — SODIUM CHLORIDE, SODIUM LACTATE, POTASSIUM CHLORIDE, CALCIUM CHLORIDE AND DEXTROSE MONOHYDRATE: 5; 600; 310; 30; 20 INJECTION, SOLUTION INTRAVENOUS at 09:13

## 2024-05-30 RX ADMIN — HYDROMORPHONE HYDROCHLORIDE 0.2 MG: 0.2 INJECTION, SOLUTION INTRAMUSCULAR; INTRAVENOUS; SUBCUTANEOUS at 09:04

## 2024-05-30 RX ADMIN — Medication 1 LOZENGE: at 02:03

## 2024-05-30 RX ADMIN — AMOXICILLIN AND CLAVULANATE POTASSIUM 1 TABLET: 875; 125 TABLET, FILM COATED ORAL at 09:04

## 2024-05-30 RX ADMIN — OXYCODONE HYDROCHLORIDE 10 MG: 10 TABLET ORAL at 13:46

## 2024-05-30 RX ADMIN — KETOROLAC TROMETHAMINE 15 MG: 15 INJECTION, SOLUTION INTRAMUSCULAR; INTRAVENOUS at 10:59

## 2024-05-30 RX ADMIN — ACETAMINOPHEN 975 MG: 325 TABLET, FILM COATED ORAL at 16:05

## 2024-05-30 RX ADMIN — HYDROMORPHONE HYDROCHLORIDE 0.2 MG: 0.2 INJECTION, SOLUTION INTRAMUSCULAR; INTRAVENOUS; SUBCUTANEOUS at 03:16

## 2024-05-30 RX ADMIN — KETOROLAC TROMETHAMINE 15 MG: 15 INJECTION, SOLUTION INTRAMUSCULAR; INTRAVENOUS at 04:06

## 2024-05-30 RX ADMIN — ACETAMINOPHEN 975 MG: 325 TABLET, FILM COATED ORAL at 03:17

## 2024-05-30 ASSESSMENT — ACTIVITIES OF DAILY LIVING (ADL)
ADLS_ACUITY_SCORE: 36

## 2024-05-30 NOTE — ANESTHESIA PROCEDURE NOTES
Airway       Patient location during procedure: OR       Procedure Start/Stop Times: 5/29/2024 7:17 PM  Staff -        CRNA: Yoanna Garay APRN CRNA       Performed By: CRNA  Consent for Airway        Urgency: elective  Indications and Patient Condition       Indications for airway management: glen-procedural       Induction type:RSI       Mask difficulty assessment: 1 - vent by mask    Final Airway Details       Final airway type: endotracheal airway       Successful airway: ETT - single  Endotracheal Airway Details        ETT size (mm): 7.0       Cuffed: yes       Successful intubation technique: direct laryngoscopy       DL Blade Type: Swan 2       Grade View of Cords: 1       Adjucts: stylet       Position: Right       Measured from: lips       Secured at (cm): 23       Bite block used: None    Post intubation assessment        Placement verified by: capnometry, equal breath sounds and chest rise        Number of attempts at approach: 1       Secured with: pink tape       Ease of procedure: easy       Dentition: Intact and Unchanged    Medication(s) Administered   Medication Administration Time: 5/29/2024 7:17 PM

## 2024-05-30 NOTE — PLAN OF CARE
Goal Outcome Evaluation:      Plan of Care Reviewed With: patient          Outcome Evaluation: Pt returned from PACU shortly after 2200, c/o pain, writer brought in meds and asked if she would like water vs juice or ginger ale to take, became upset that PO oxycodone was ordered and not dilaudid, started swearing and asking why the F I would give her that, that only dilaudid helps and refused meds and beverage. Informed her that I would relay her request for dilaudid to provider. Pt on clear liquid diet to start but writer had ordered her a tray of reg dinner in case she can advance diet overnight, is in patient refrigerator, report given to NOC nurse. Not able to complete admission questions with patient due to behaviors and time frames that patient has been up on unit. VSS, has voided x1 for 600 mls. Ambulated from bed to bathroom and back with SBA. Pt abrupt and rude with NST as well. Recommend establishing boundaries and expectations with patient.

## 2024-05-30 NOTE — ANESTHESIA CARE TRANSFER NOTE
Patient: Chhaya Mock    Procedure: Procedure(s):  Diagnostic laparoscopy, left ovarian cystectomy and Peritoneal Biopsy       Diagnosis: Ruptured cyst of left ovary [N83.202]  Hemoperitoneum [K66.1]  Diagnosis Additional Information: No value filed.    Anesthesia Type:   General     Note:    Oropharynx: oropharynx clear of all foreign objects and spontaneously breathing  Level of Consciousness: drowsy  Oxygen Supplementation: face mask  Level of Supplemental Oxygen (L/min / FiO2): 8  Independent Airway: airway patency satisfactory and stable  Dentition: dentition unchanged  Vital Signs Stable: post-procedure vital signs reviewed and stable  Report to RN Given: handoff report given  Patient transferred to: PACU    Handoff Report: Identifed the Patient, Identified the Reponsible Provider, Reviewed the pertinent medical history, Discussed the surgical course, Reviewed Intra-OP anesthesia mangement and issues during anesthesia, Set expectations for post-procedure period and Allowed opportunity for questions and acknowledgement of understanding    Vitals:  Vitals Value Taken Time   BP     Temp     Pulse 69 05/29/24 2115   Resp 17 05/29/24 2115   SpO2 100 % 05/29/24 2115   Vitals shown include unfiled device data.    Electronically Signed By: MARIOLA Rai CRNA  May 29, 2024  9:16 PM

## 2024-05-30 NOTE — PLAN OF CARE
Goal Outcome Evaluation:                 Outcome Evaluation: [id number]- Pt transferred from ED to 6 [facility] surg during shift change, was upset about waiting for pain meds explained waiting for providers to order and then get verified, VSS. Pt IV fluids changed, given tylenol while awaits ibuprofen to be changed to IV toradol. Pt irritable about NPO status, we did order her food in the event her diet progresses after surgery per pt and provider request. Pt transferred to PACU just after 1800 for exploratory laparotomy.

## 2024-05-30 NOTE — OP NOTE
GYNECOLOGIC OPERATIVE NOTE     PATIENT: Chhaya Mock  MRN: [id number]  DATE OF SURGERY: May 30, 2024     PREOPERATIVE DIAGNOSES:    1. Hemoperitoneum  2. Suspected Ruptured Hemorrhagic Cyst     POSTOPERATIVE DIAGNOSES:    1. Ruptured Hemorrhagic Cyst     PROCEDURES:    1. Diagnostic laparoscopy  2. Laparoscopic left ovarian cystectomy     SURGEON: Mel Gaspar MD      ASSISTANTS:    Charlie Mcginnis MD - PGY3        ANESTHESIA: General endotracheal.     ESTIMATED BLOOD LOSS: 150 ml      IV FLUIDS: 1000 ml     TOTAL URINE OUTPUT: 75 ml clear urine      FINDINGS:   EUA revealed small uterus. No adnexal masses noted. External genitalia, vaginal vault and cervix appeared normal. 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 Chronic inflammatory changes to the peritoneum. Normal appearing uterus. Right Ovary with likely corpus luteum cyst.. Both fallopian tubes showed clubbing of the fimbria and inflammatory changes, right tube was adherent to the ovary but was patent on chromopertubation. Left ovary notable for ruptured hemorrhagic cyst, cystectomy performed. Surgiflow applied to cystectomy bed. Left fallopian tube mobile but significant loss of fimbria and only a small trickle of fluid noted with chromopertubation. Evacuation of hemoperitoneum. Appendix visualized. Hemostatic surgical sites at end of case.     SPECIMENS:    ID Type Source Tests Collected by Time Destination   1 : Specimen:Left Ovarian Cyst Wall Tissue Ovary, Left SURGICAL PATHOLOGY EXAM Mel Gaspar MD 5/29/2024  8:36 PM    2 : Specimen: Peritoneal biopsy Posterior Cul de Sac Tissue Peritoneum SURGICAL PATHOLOGY EXAM Mel Gaspar MD 5/29/2024  8:36 PM      COMPLICATIONS: None apparent.     CONDITION: Stable to PACU.      INDICATIONS: Chhaya Mock is a 33  year old year old with diagnosis of a suspected ovarian cyst and hemoperitoneum. Given concern for decreasing hemoglobin surgery was recommended  with a diagnostic laparoscopy, cystectomy and hemoperitoneum removal. The risks of the procedure (bleeding, infection, damage to surround tissues - fallopian tube, ovary, uterus, nerves, vasculature, and possibility for oophorectomy) and alternatives were discussed. The patient's questions were answered and consent was signed.     OPERATIVE PROCEDURE IN DETAIL:   The patient was taken to the operating room where adequate general anesthesia was administered. The patient was then placed in the dorsal lithotomy position with feet in yellow-fin stirrups. An exam under anesthesia was performed, with the findings noted above. A surgical timeout was completed. The patient was prepped and draped in the usual sterile fashion. Huber catheter was then placed under sterile technique. An [place] manipulator was placed.      Attention was turned to the abdomen.   A small 5mm vertical incision was made at the level of the umbilicus. The umbilicus was then elevated and a 5mm Visiport was used to enter the abdominal cavity under direct visualization. The peritoneum was not entered after two attempts and the decision was made to switch to the Veress needle for entry. A Veress needle was inserted to obtained entry into the abdomen.  Intraabdominal placement was verified with low opening pressure and adequate insufflation.  Once a pneumoperitoneum of 15 mmHg was reached the Veress needle was removed and was replaced with a 5 mm trocar.  A 5 mm laparoscope was introduced and diagnostic laparoscopy preformed with the above mentioned findings.  The patient was placed in steep Trendelenburg position and two additional ports were placed under direct visualization in the left and right lower quadrants of 5 mm and 5 mm respectively.     Attention was turned to the pelvis, with initial findings as  above. The ovary and cyst were then elevated and blunt dissection was used to dissect through the ovarian cortex overlying the cyst. The ovarian cortex was carefully dissected away from the cyst wall with electrocautery and blunt dissection. The cyst was then peeled away from the normal ovarian tissue. The cyst peeled away with relative ease. The cyst was removed easily. The pelvis was then irrigated. Surgiflo was applied to the ovary where the cyst was removed. Good hemostasis of the cystectomy site was noted at the end of the case. The peritoneum was noted to have inflammatory changes but no clear endometriosis. A peritoneal; biopsy was obtained in the posterior cul-de-sac with laparoscopic scissors and the biopsy site was hemostatic.     Methylene blue was administered via the uterine manipulator to assess for fallopian tube patency given the significant inflammatory changes noted with the fallopian tubes. Dye was noted to emitting from both fallopian tubes however the left fallopian tube appeared to have some strictures limiting flow when compared to the right. The abdomen was copiously irrigated.    The lower quadrant ports were removed under direct visualization, with good hemostasis noted at port sites. The umbilical port and laparoscope were then removed. All three skin incisions were reapproximated with 4-0 Vicryl and sterile dressings Sponge, lap and needle counts were reported as correct x2 and instrument count was correct x1.      Dr. Gaspar and was present and scrubbed throughout the entire procedure.    Charlie Mcginnis MD, MPH  Ob/Gyn Resident, PGY-3  05/30/24 1:48 AM    OB/GYN Procedure Attestation     I was scrubbed and present for the entire procedure. I have reviewed the resident note and agree with their documentation.     Mel Gaspar MD

## 2024-05-30 NOTE — PROGRESS NOTES
Gynecologic Oncology Postoperative Check Note  5/30/2024    S: Patient reports she is doing well postoperatively. Pain is moderately well controlled with current pain regimen. Notes that she has pain with coughing, sneezing. Voiding spontaneouly. Ambulating without difficulty. Tolerating regular diet without nausea or vomiting. Denies chest pain, shortness of breath, dizziness, or other concerns at this time.     O:  Vitals:    05/29/24 2145 05/29/24 2200 05/29/24 2250 05/30/24 0822   BP: 123/83  110/66 120/74   BP Location:    Left arm   Pulse: 69  60 65   Resp: 13  16 16   Temp: 97.7  F (36.5  C)  97.5  F (36.4  C) 98.5  F (36.9  C)   TempSrc: Axillary  Axillary Oral   SpO2: 100% 100% 100% 96%   Weight:       Height:           Gen: NAD  Cardio: RRR, no murmurs  Resp: CTAB, no wheezing or crackles  Abdomen: soft, appropriately tender, incision c/d/i  Extremities: Non-tender, no LE edema    A: 33 year old POD#1 s/p diagnostic laparoscopy, left ovarian cystectomy. Doing well in the immediate postoperative period. VSS.    # Post-operative state  - Dz: Left Hemorrhagic Cyst  FEN: Regular diet  Pain: Yuniel Tylenol, ibuprofen. PRN oxy. Discontinue dilaudid this morning  Heme: Hgb 9.6> > 9.5  CV: NI  Pulm: NI  GI: Yuniel bowel reg. PRN antiemetics, simethincone.  : Voiding spontaneously  ID: Afebrile. S/p pre-op abx.   Endo: NI  Psych/Neuro/MSK: NI  PPX: SCDs, IS     Dispo: Discharge to home on POD#1    Gigi Guzman MD  OBGYN PGY-4  May 30, 2024 10:21 AM    Attestation:   This patient was seen and evaluated by me, separately from the house staff team. I have reviewed the note/plan above and agree.     Doing well this afternoon and wants to go home. Throat is most sore and had prior abscess still resolving so that's largest issue. Discussed pain control at home and she will need to get to [employer] to get her car. Plans to see Dr dos santos for postop visit. Discussed showering and no bath for 2 weeks. All questions  answered and work note done for 2 weeks. Oxycodone #20 tablets done (5 mg) has taken 10 mg here.     Dina Freeman MD

## 2024-05-30 NOTE — OR NURSING
PACU to Inpatient Nursing Handoff    Patient Chhaya Mock is a 33 year old female who speaks English.   Procedure Procedure(s):  Diagnostic laparoscopy,Evacuation of Hemoperitoneum, and  left ovarian cystectomy   Surgeon(s) Primary: Mel Gaspar MD  Resident - Assisting: Charlie Mcginnis MD     Allergies   Allergen Reactions    No Known Allergies        Isolation  [unfilled]     Past Medical History   has a past medical history of Carbuncle and furuncle, Hidradenitis suppurativa, Tobacco use disorder, and Vaginal yeast infection (06/02/2017).    Anesthesia General   Dermatome Level     Preop Meds acetaminophen (Tylenol) - time given: 1649  ketorolac (Toradol) - time given: 1809  Bicitra - time given: 1830   Nerve block Not applicable   Intraop Meds dexamethasone (Decadron)  fentanyl (Sublimaze): 100 mcg total  ondansetron (Zofran): last given at 2057   Local Meds Yes   Antibiotics Not applicable     Pain Patient Currently in Pain: yes   PACU meds  oxycodone (Roxicodone): 5 mg (total dose) last given at 2141    PCA / epidural No   Capnography     Telemetry     Inpatient Telemetry Monitor Ordered? No        Labs Glucose Lab Results   Component Value Date     05/29/2024    GLC 85 11/27/2019       Hgb Lab Results   Component Value Date    HGB 9.6 05/29/2024    HGB 11.3 11/28/2019       INR Lab Results   Component Value Date    INR 1.14 05/29/2024      PACU Imaging Not applicable     Wound/Incision Incision/Surgical Site 05/29/24 Anterior Umbilicus (Active)   Number of days: 0       Incision/Surgical Site 05/29/24 Left;Lower;Quadrant Abdomen (Active)   Number of days: 0       Incision/Surgical Site 05/29/24 Right;Quadrant;Lower Abdomen (Active)   Number of days: 0      CMS        Equipment Not applicable   Other LDA ETT Cuffed 7 mm (Active)   Number of days: 0        IV Access Peripheral IV 05/29/24 Anterior;Right Lower forearm (Active)   Site Assessment WDL 05/29/24 1835   Line Status  Infusing 05/29/24 1835   Dressing Transparent 05/29/24 1835   Dressing Status clean;dry;intact 05/29/24 1835   Number of days: 0       Peripheral IV 05/29/24 Left Hand (Active)   Number of days: 0      Blood Products Not applicable EBL 15 mL   Intake/Output    Drains / Huber Urethral Catheter 05/29/24 Straight-tip;Latex 16 fr (Active)   Number of days: 0      Time of void PreOp Time of Void Prior to Procedure: 1800 (05/29/24 1821)    PostOp      Diapered? No   Bladder Scan     PO    crackers, water, and popsicle     Vitals    B/P: 104/64  T: 98.4  F (36.9  C)    Temp src: Oral  P:  Pulse: 71 (05/29/24 1549)          R: 18  O2:  SpO2: 96 %    O2 Device: None (Room air) (05/29/24 1549)              Family/support present None present   Patient belongings     Patient transported on cart   DC meds/scripts (obs/outpt) Not applicable   Inpatient Pain Meds Released? Yes       Special needs/considerations None   Tasks needing completion None       Claritza Jackson, RN  [place]

## 2024-05-30 NOTE — PROGRESS NOTES
6MS DISCHARGE    D: Patient discharged to home at 1740. Patient took the [employer] shuttle to the [facility] to  her car.    I: Discharge prescriptions given to patient. All discharge medications and instructions reviewed with patient. Patient instructed to seek care if experiencing worsening symptoms listed in the AVS. Other phone numbers to call with questions or concerns after discharge reviewed. PIV removed. Education completed.    A: Patient verbalized understanding of discharge medications and instructions. Prescribed home medications given to patient.  Belongings returned to patient.    P: Patient to follow-up on surgeon within 2 weeks. .

## 2024-05-30 NOTE — PROGRESS NOTES
Pt AO x4, on RA, and sating adequately. Pain rated 8/10 and managed with heat  packs, and meds. IL PIV infusing dextrose 5% in LR at 125 ml/hr.  Patient may discharge home today.

## 2024-05-30 NOTE — PROGRESS NOTES
2300 - 0700    VS:  Temp: 97.5  F (36.4  C) Temp src: Axillary BP: 110/66 Pulse: 60   Resp: 16 SpO2: 100 % O2 Device: None (Room air)    O2: stable on RA.   Denies chest pain and SOB.    Output: Voids spontaneously without difficulty to bathroom    Activity: Independent   Skin: Visible skin intact   Pain: Pain managed with PRN Ketorolac and dilaudid; Scheduled pain medications   CMS: Intact,   AOx4.  pt able to make needs known.    Dressing: C/D/I   Diet: Regular diet.   Denies nausea/vomiting.    LDA:  R PIV infusing D5LR at 125 mL/hr.    Equipment: IV pole, personal belongings,    Plan: Continue with plan of care.   Call light within reach,    Additional Info: > Patient was anxious - PRN and schedule atarax given  > 6 am blood sugar not done. Pt. Refuse, provider notified. BG to be taken with labs per provider.

## 2024-05-30 NOTE — BRIEF OP NOTE
[facility]    Brief Operative Note    Pre-operative diagnosis: Ruptured cyst of left ovary [N83.202]  Hemoperitoneum [K66.1]  Post-operative diagnosis Same as pre-operative diagnosis    Procedure: Diagnostic laparoscopy, left ovarian cystectomy and Peritoneal Biopsy, Left - Abdomen    Surgeon: Surgeons and Role:     * Mel Gaspar MD - Primary     * Charlie Mcginnis MD - Resident - Assisting  Anesthesia: General   Estimated Blood Loss: 15 mL from 5/29/2024  7:06 PM to 5/29/2024  9:05 PM      Drains: None  Specimens:   ID Type Source Tests Collected by Time Destination   1 : Specimen:Left Ovarian Cyst Wall Tissue Ovary, Left SURGICAL PATHOLOGY EXAM Mel Gaspar MD 5/29/2024  8:36 PM    2 : Specimen: Peritoneal biopsy Posterior Cul de Sac Tissue Peritoneum SURGICAL PATHOLOGY EXAM Mel Gaspar MD 5/29/2024  8:36 PM      Findings: EUA revealed small uterus. No adnexal masses noted. External genitalia, vaginal vault and cervix appeared normal. 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 Chronic inflammatory changes to the peritoneum. Normal appearing uterus. Right Ovary with likely corpus luteum cyst. And patent right fallopian tube on chromopertubation. Left ovary notable for ruptured hemorrhagic cyst, cystectomy performed. Surgiflow applied to cystectomy bed. Left fallopian tube appears to be partially occluded on chrompertubation. Evacuation of hemoperitoneum. Appendix visualized. Hemostatic surgical sites at end of case.    Complications: None.  Implants: * No implants in log *      Charlie Mcginnis MD, MPH  Ob/Gyn Resident, PGY-3  05/29/24 9:40 PM

## 2024-05-30 NOTE — ANESTHESIA POSTPROCEDURE EVALUATION
Patient: Chhaya Mock    Procedure: Procedure(s):  Diagnostic laparoscopy, left ovarian cystectomy and Peritoneal Biopsy       Anesthesia Type:  General    Note:  Disposition: Inpatient   Postop Pain Control: Uneventful            Sign Out: Well controlled pain   PONV: No   Neuro/Psych: Uneventful            Sign Out: Acceptable/Baseline neuro status   Airway/Respiratory: Uneventful            Sign Out: Acceptable/Baseline resp. status   CV/Hemodynamics: Uneventful            Sign Out: Acceptable CV status; No obvious hypovolemia; No obvious fluid overload   Other NRE: NONE   DID A NON-ROUTINE EVENT OCCUR? No           Last vitals:  Vitals Value Taken Time   /96 05/29/24 2115   Temp 36.4  C (97.5  F) 05/29/24 2109   Pulse 62 05/29/24 2139   Resp 18 05/29/24 2139   SpO2 100 % 05/29/24 2139   Vitals shown include unfiled device data.    Electronically Signed By: Shwetha Bains MD  May 29, 2024  9:41 PM

## 2024-05-31 ENCOUNTER — TELEPHONE (OUTPATIENT)
Dept: OBGYN | Facility: CLINIC | Age: 34
End: 2024-05-31
Payer: COMMERCIAL

## 2024-05-31 NOTE — TELEPHONE ENCOUNTER
----- Message from Mel Gaspar MD sent at 5/30/2024  8:24 AM CDT -----  Regarding: post-op visit  Hi,     Can someone please call Rennychelseadao and help her schedule a post-op visit with me in the office in 2-4 weeks.     Thank you,   Mel Gaspar MD

## 2024-06-01 LAB
BACTERIA BLD CULT: NO GROWTH
BACTERIA BLD CULT: NO GROWTH

## 2024-06-04 LAB
PATH REPORT.COMMENTS IMP SPEC: NORMAL
PATH REPORT.COMMENTS IMP SPEC: NORMAL
PATH REPORT.FINAL DX SPEC: NORMAL
PATH REPORT.GROSS SPEC: NORMAL
PATH REPORT.MICROSCOPIC SPEC OTHER STN: NORMAL
PATH REPORT.RELEVANT HX SPEC: NORMAL
PHOTO IMAGE: NORMAL

## 2024-06-08 ENCOUNTER — HEALTH MAINTENANCE LETTER (OUTPATIENT)
Age: 34
End: 2024-06-08

## 2024-06-12 ENCOUNTER — OFFICE VISIT (OUTPATIENT)
Dept: OBGYN | Facility: CLINIC | Age: 34
End: 2024-06-12
Payer: COMMERCIAL

## 2024-06-12 VITALS
DIASTOLIC BLOOD PRESSURE: 87 MMHG | RESPIRATION RATE: 16 BRPM | HEART RATE: 111 BPM | TEMPERATURE: 98.4 F | SYSTOLIC BLOOD PRESSURE: 111 MMHG | WEIGHT: 133 LBS | BODY MASS INDEX: 23.56 KG/M2

## 2024-06-12 DIAGNOSIS — Z98.890 HISTORY OF OVARIAN CYSTECTOMY: Primary | ICD-10-CM

## 2024-06-12 DIAGNOSIS — Z87.42 HISTORY OF OVARIAN CYSTECTOMY: Primary | ICD-10-CM

## 2024-06-12 PROCEDURE — 99024 POSTOP FOLLOW-UP VISIT: CPT | Performed by: OBSTETRICS & GYNECOLOGY

## 2024-06-12 NOTE — PATIENT INSTRUCTIONS
The medication to stop the cysts that isn't birth control is called Aygestin (norethindrone) and has similar side effects to the progesterone only birth control which is a lower dose of the same medication.     Any birth control that stops ovulation would also would also stop the cysts from forming   [email or web address].org

## 2024-06-13 ASSESSMENT — ANXIETY QUESTIONNAIRES
6. BECOMING EASILY ANNOYED OR IRRITABLE: NOT AT ALL
7. FEELING AFRAID AS IF SOMETHING AWFUL MIGHT HAPPEN: NOT AT ALL
3. WORRYING TOO MUCH ABOUT DIFFERENT THINGS: NOT AT ALL
IF YOU CHECKED OFF ANY PROBLEMS ON THIS QUESTIONNAIRE, HOW DIFFICULT HAVE THESE PROBLEMS MADE IT FOR YOU TO DO YOUR WORK, TAKE CARE OF THINGS AT HOME, OR GET ALONG WITH OTHER PEOPLE: NOT DIFFICULT AT ALL
5. BEING SO RESTLESS THAT IT IS HARD TO SIT STILL: NOT AT ALL
2. NOT BEING ABLE TO STOP OR CONTROL WORRYING: NOT AT ALL
GAD7 TOTAL SCORE: 0
GAD7 TOTAL SCORE: 0
1. FEELING NERVOUS, ANXIOUS, OR ON EDGE: NOT AT ALL

## 2024-06-13 ASSESSMENT — PATIENT HEALTH QUESTIONNAIRE - PHQ9
5. POOR APPETITE OR OVEREATING: NOT AT ALL
SUM OF ALL RESPONSES TO PHQ QUESTIONS 1-9: 2

## 2024-06-18 NOTE — PROGRESS NOTES
GYN Post-op Progress Note    CC: Chhaya Mock is a 33 year old  who is s/p pelvic laparoscopy with left ovarian cystectomy for hemorrhagic cyst and presents for a post-op visit         HPI: Since her procedure Chhaya Mock reports that she has been feeling well. She is ambulating and voiding without difficulty. She denies any dysuria or urinary frequency and is able to fully empty her bladder. She denies nausea or vomiting and is tolerating a general diet. Incisions healing, no concerns. We did discuss the surgical findings consistent with prior pelvic infection and clubbing/adhesions of both fallopian tubes although they were both technically patent with instillation of dye at the time of laparoscopy. We discussed that this is likely the underlying cause of her difficulty conceiving and that I would recommend she follow up with [facility] if she is interested in pursuing IVF or fertility treatment. We also discussed strategies to prevent recurrence of hemorrhagic cysts but that this would involve ovarian suppression and prevent ovulation/pregnancy.          Physical exam:    Vitals:    24 1508   BP: 111/87   BP Location: Right arm   Patient Position: Sitting   Cuff Size: Adult Regular   Pulse: 111   Resp: 16   Temp: 98.4  F (36.9  C)   Weight: 60.3 kg (133 lb)       Gen: no acute distress  Abd: soft, non-tender, incisions well healed   Pelvic: deferred   Ext: non-tender, no edema        A) - Ovary, Left, Specimen:Left Ovarian Cyst Wall                                                    B) - Peritoneum, Specimen: Peritoneal biopsy Posterior Cul de Sac                          Final Diagnosis   A.  LEFT OVARY, CYSTECTOMY:  - Corpus luteum cyst  - Negative for malignancy     B.  PERITONEUM, CUL-DE-SAC BIOPSY:  - Unremarkable peritoneum  - Negative for atypia and malignancy            Assessment and Plan    Chhaya Mock is a 33 year old  who is s/p pelvic  laparoscopy for hemorrhagic ovarian cyst and presents for a post-op visit, progressing well  -OK to resume normal activities   -Reviewed medications to suppress ovulation and prevent formation of hemorrhagic cysts in the future but Chhaya is uncertain at this time if she would want to pursue medication and will reach out in the future if she would like to start either OCPs or Norethindrone.     Dispo: RTC as needed   Mel Gaspar MD

## 2024-07-16 PROBLEM — R87.810 CERVICAL HIGH RISK HPV (HUMAN PAPILLOMAVIRUS) TEST POSITIVE: Status: ACTIVE | Noted: 2023-08-02

## 2024-08-09 ENCOUNTER — APPOINTMENT (OUTPATIENT)
Dept: CT IMAGING | Facility: CLINIC | Age: 34
End: 2024-08-09
Attending: STUDENT IN AN ORGANIZED HEALTH CARE EDUCATION/TRAINING PROGRAM
Payer: COMMERCIAL

## 2024-08-09 ENCOUNTER — HOSPITAL ENCOUNTER (OUTPATIENT)
Facility: CLINIC | Age: 34
Setting detail: OBSERVATION
Discharge: HOME | End: 2024-08-10
Attending: STUDENT IN AN ORGANIZED HEALTH CARE EDUCATION/TRAINING PROGRAM | Admitting: STUDENT IN AN ORGANIZED HEALTH CARE EDUCATION/TRAINING PROGRAM
Payer: COMMERCIAL

## 2024-08-09 ENCOUNTER — OFFICE VISIT (OUTPATIENT)
Dept: OBGYN | Facility: CLINIC | Age: 34
End: 2024-08-09
Payer: COMMERCIAL

## 2024-08-09 VITALS
HEART RATE: 106 BPM | HEIGHT: 64 IN | TEMPERATURE: 98 F | BODY MASS INDEX: 23.08 KG/M2 | RESPIRATION RATE: 16 BRPM | WEIGHT: 135.2 LBS | DIASTOLIC BLOOD PRESSURE: 70 MMHG | SYSTOLIC BLOOD PRESSURE: 118 MMHG

## 2024-08-09 DIAGNOSIS — R22.0 LOCALIZED SWELLING, MASS, AND LUMP OF HEAD: ICD-10-CM

## 2024-08-09 DIAGNOSIS — R13.10 DYSPHAGIA, UNSPECIFIED TYPE: ICD-10-CM

## 2024-08-09 DIAGNOSIS — J36 PERITONSILLAR ABSCESS: ICD-10-CM

## 2024-08-09 DIAGNOSIS — L02.11 ABSCESS, NECK: ICD-10-CM

## 2024-08-09 DIAGNOSIS — B96.89 BACTERIAL VAGINOSIS: Primary | ICD-10-CM

## 2024-08-09 DIAGNOSIS — N76.0 BACTERIAL VAGINOSIS: Primary | ICD-10-CM

## 2024-08-09 DIAGNOSIS — Z01.419 ENCOUNTER FOR ANNUAL ROUTINE GYNECOLOGICAL EXAMINATION: Primary | ICD-10-CM

## 2024-08-09 DIAGNOSIS — Z12.4 CERVICAL CANCER SCREENING: ICD-10-CM

## 2024-08-09 DIAGNOSIS — M54.2 CERVICALGIA: ICD-10-CM

## 2024-08-09 DIAGNOSIS — Z11.3 SCREEN FOR STD (SEXUALLY TRANSMITTED DISEASE): ICD-10-CM

## 2024-08-09 LAB
ANION GAP SERPL CALCULATED.3IONS-SCNC: 12 MMOL/L (ref 7–15)
BASOPHILS # BLD AUTO: 0 10E3/UL (ref 0–0.2)
BASOPHILS NFR BLD AUTO: 0 %
BUN SERPL-MCNC: 8.8 MG/DL (ref 6–20)
CALCIUM SERPL-MCNC: 9 MG/DL (ref 8.8–10.4)
CHLORIDE SERPL-SCNC: 104 MMOL/L (ref 98–107)
CLUE CELLS: PRESENT
CREAT SERPL-MCNC: 0.74 MG/DL (ref 0.51–0.95)
CRP SERPL-MCNC: <3 MG/L
EGFRCR SERPLBLD CKD-EPI 2021: >90 ML/MIN/1.73M2
EOSINOPHIL # BLD AUTO: 0.1 10E3/UL (ref 0–0.7)
EOSINOPHIL NFR BLD AUTO: 1 %
ERYTHROCYTE [DISTWIDTH] IN BLOOD BY AUTOMATED COUNT: 13.7 % (ref 10–15)
ERYTHROCYTE [SEDIMENTATION RATE] IN BLOOD BY WESTERGREN METHOD: 8 MM/HR (ref 0–20)
GLUCOSE SERPL-MCNC: 83 MG/DL (ref 70–99)
GROUP A STREP BY PCR: NOT DETECTED
HCG SERPL QL: NEGATIVE
HCO3 SERPL-SCNC: 24 MMOL/L (ref 22–29)
HCT VFR BLD AUTO: 42.3 % (ref 35–47)
HGB BLD-MCNC: 13.4 G/DL (ref 11.7–15.7)
IMM GRANULOCYTES # BLD: 0 10E3/UL
IMM GRANULOCYTES NFR BLD: 0 %
INR PPP: 0.92 (ref 0.85–1.15)
LYMPHOCYTES # BLD AUTO: 3.7 10E3/UL (ref 0.8–5.3)
LYMPHOCYTES NFR BLD AUTO: 39 %
MCH RBC QN AUTO: 30.1 PG (ref 26.5–33)
MCHC RBC AUTO-ENTMCNC: 31.7 G/DL (ref 31.5–36.5)
MCV RBC AUTO: 95 FL (ref 78–100)
MONOCYTES # BLD AUTO: 0.4 10E3/UL (ref 0–1.3)
MONOCYTES NFR BLD AUTO: 4 %
NEUTROPHILS # BLD AUTO: 5.2 10E3/UL (ref 1.6–8.3)
NEUTROPHILS NFR BLD AUTO: 56 %
NRBC # BLD AUTO: 0 10E3/UL
NRBC BLD AUTO-RTO: 0 /100
PLATELET # BLD AUTO: 269 10E3/UL (ref 150–450)
POTASSIUM SERPL-SCNC: 3.7 MMOL/L (ref 3.4–5.3)
RBC # BLD AUTO: 4.45 10E6/UL (ref 3.8–5.2)
SODIUM SERPL-SCNC: 140 MMOL/L (ref 135–145)
TRICHOMONAS, WET PREP: ABNORMAL
WBC # BLD AUTO: 9.5 10E3/UL (ref 4–11)
WBC'S/HIGH POWER FIELD, WET PREP: ABNORMAL
YEAST, WET PREP: ABNORMAL

## 2024-08-09 PROCEDURE — 87210 SMEAR WET MOUNT SALINE/INK: CPT | Performed by: OBSTETRICS & GYNECOLOGY

## 2024-08-09 PROCEDURE — 96376 TX/PRO/DX INJ SAME DRUG ADON: CPT | Mod: 59

## 2024-08-09 PROCEDURE — 70492 CT SFT TSUE NCK W/O & W/DYE: CPT

## 2024-08-09 PROCEDURE — 85610 PROTHROMBIN TIME: CPT | Performed by: STUDENT IN AN ORGANIZED HEALTH CARE EDUCATION/TRAINING PROGRAM

## 2024-08-09 PROCEDURE — 258N000003 HC RX IP 258 OP 636: Performed by: STUDENT IN AN ORGANIZED HEALTH CARE EDUCATION/TRAINING PROGRAM

## 2024-08-09 PROCEDURE — 87624 HPV HI-RISK TYP POOLED RSLT: CPT | Performed by: OBSTETRICS & GYNECOLOGY

## 2024-08-09 PROCEDURE — 85652 RBC SED RATE AUTOMATED: CPT | Performed by: STUDENT IN AN ORGANIZED HEALTH CARE EDUCATION/TRAINING PROGRAM

## 2024-08-09 PROCEDURE — 250N000011 HC RX IP 250 OP 636: Performed by: STUDENT IN AN ORGANIZED HEALTH CARE EDUCATION/TRAINING PROGRAM

## 2024-08-09 PROCEDURE — 99213 OFFICE O/P EST LOW 20 MIN: CPT | Mod: 25 | Performed by: OBSTETRICS & GYNECOLOGY

## 2024-08-09 PROCEDURE — 36415 COLL VENOUS BLD VENIPUNCTURE: CPT | Performed by: STUDENT IN AN ORGANIZED HEALTH CARE EDUCATION/TRAINING PROGRAM

## 2024-08-09 PROCEDURE — 84703 CHORIONIC GONADOTROPIN ASSAY: CPT | Performed by: STUDENT IN AN ORGANIZED HEALTH CARE EDUCATION/TRAINING PROGRAM

## 2024-08-09 PROCEDURE — 31575 DIAGNOSTIC LARYNGOSCOPY: CPT

## 2024-08-09 PROCEDURE — 85004 AUTOMATED DIFF WBC COUNT: CPT | Performed by: STUDENT IN AN ORGANIZED HEALTH CARE EDUCATION/TRAINING PROGRAM

## 2024-08-09 PROCEDURE — 99285 EMERGENCY DEPT VISIT HI MDM: CPT | Performed by: STUDENT IN AN ORGANIZED HEALTH CARE EDUCATION/TRAINING PROGRAM

## 2024-08-09 PROCEDURE — 80048 BASIC METABOLIC PNL TOTAL CA: CPT | Performed by: STUDENT IN AN ORGANIZED HEALTH CARE EDUCATION/TRAINING PROGRAM

## 2024-08-09 PROCEDURE — G0145 SCR C/V CYTO,THINLAYER,RESCR: HCPCS | Performed by: OBSTETRICS & GYNECOLOGY

## 2024-08-09 PROCEDURE — 86140 C-REACTIVE PROTEIN: CPT | Performed by: STUDENT IN AN ORGANIZED HEALTH CARE EDUCATION/TRAINING PROGRAM

## 2024-08-09 PROCEDURE — 250N000013 HC RX MED GY IP 250 OP 250 PS 637

## 2024-08-09 PROCEDURE — 87591 N.GONORRHOEAE DNA AMP PROB: CPT | Performed by: OBSTETRICS & GYNECOLOGY

## 2024-08-09 PROCEDURE — 99395 PREV VISIT EST AGE 18-39: CPT | Mod: 24 | Performed by: OBSTETRICS & GYNECOLOGY

## 2024-08-09 PROCEDURE — 96375 TX/PRO/DX INJ NEW DRUG ADDON: CPT | Mod: 59 | Performed by: STUDENT IN AN ORGANIZED HEALTH CARE EDUCATION/TRAINING PROGRAM

## 2024-08-09 PROCEDURE — 99285 EMERGENCY DEPT VISIT HI MDM: CPT | Mod: 25 | Performed by: STUDENT IN AN ORGANIZED HEALTH CARE EDUCATION/TRAINING PROGRAM

## 2024-08-09 PROCEDURE — 99223 1ST HOSP IP/OBS HIGH 75: CPT | Mod: FS | Performed by: STUDENT IN AN ORGANIZED HEALTH CARE EDUCATION/TRAINING PROGRAM

## 2024-08-09 PROCEDURE — 96365 THER/PROPH/DIAG IV INF INIT: CPT | Performed by: STUDENT IN AN ORGANIZED HEALTH CARE EDUCATION/TRAINING PROGRAM

## 2024-08-09 PROCEDURE — 99459 PELVIC EXAMINATION: CPT | Performed by: OBSTETRICS & GYNECOLOGY

## 2024-08-09 PROCEDURE — 87491 CHLMYD TRACH DNA AMP PROBE: CPT | Performed by: OBSTETRICS & GYNECOLOGY

## 2024-08-09 PROCEDURE — 96374 THER/PROPH/DIAG INJ IV PUSH: CPT | Mod: 59 | Performed by: STUDENT IN AN ORGANIZED HEALTH CARE EDUCATION/TRAINING PROGRAM

## 2024-08-09 PROCEDURE — G0378 HOSPITAL OBSERVATION PER HR: HCPCS

## 2024-08-09 PROCEDURE — 87651 STREP A DNA AMP PROBE: CPT

## 2024-08-09 PROCEDURE — 70492 CT SFT TSUE NCK W/O & W/DYE: CPT | Mod: 26 | Performed by: RADIOLOGY

## 2024-08-09 PROCEDURE — 99207 PR APP CREDIT; MD BILLING SHARED VISIT: CPT

## 2024-08-09 RX ORDER — AMOXICILLIN 250 MG
1 CAPSULE ORAL 2 TIMES DAILY PRN
Status: DISCONTINUED | OUTPATIENT
Start: 2024-08-09 | End: 2024-08-10 | Stop reason: HOSPADM

## 2024-08-09 RX ORDER — ACETAMINOPHEN 325 MG/1
650 TABLET ORAL EVERY 4 HOURS PRN
Status: DISCONTINUED | OUTPATIENT
Start: 2024-08-09 | End: 2024-08-10 | Stop reason: HOSPADM

## 2024-08-09 RX ORDER — CALCIUM CARBONATE 500 MG/1
1000 TABLET, CHEWABLE ORAL 4 TIMES DAILY PRN
Status: DISCONTINUED | OUTPATIENT
Start: 2024-08-09 | End: 2024-08-10 | Stop reason: HOSPADM

## 2024-08-09 RX ORDER — METRONIDAZOLE 500 MG/1
500 TABLET ORAL 2 TIMES DAILY
Status: DISCONTINUED | OUTPATIENT
Start: 2024-08-09 | End: 2024-08-10 | Stop reason: HOSPADM

## 2024-08-09 RX ORDER — ONDANSETRON 2 MG/ML
4 INJECTION INTRAMUSCULAR; INTRAVENOUS ONCE
Status: DISCONTINUED | OUTPATIENT
Start: 2024-08-09 | End: 2024-08-10 | Stop reason: HOSPADM

## 2024-08-09 RX ORDER — IOPAMIDOL 755 MG/ML
90 INJECTION, SOLUTION INTRAVASCULAR ONCE
Status: COMPLETED | OUTPATIENT
Start: 2024-08-09 | End: 2024-08-09

## 2024-08-09 RX ORDER — HYDROMORPHONE HYDROCHLORIDE 1 MG/ML
0.5 INJECTION, SOLUTION INTRAMUSCULAR; INTRAVENOUS; SUBCUTANEOUS
Status: DISCONTINUED | OUTPATIENT
Start: 2024-08-09 | End: 2024-08-10 | Stop reason: HOSPADM

## 2024-08-09 RX ORDER — LIDOCAINE 40 MG/G
CREAM TOPICAL
Status: DISCONTINUED | OUTPATIENT
Start: 2024-08-09 | End: 2024-08-10 | Stop reason: HOSPADM

## 2024-08-09 RX ORDER — MORPHINE SULFATE 4 MG/ML
4 INJECTION, SOLUTION INTRAMUSCULAR; INTRAVENOUS ONCE
Status: COMPLETED | OUTPATIENT
Start: 2024-08-09 | End: 2024-08-09

## 2024-08-09 RX ORDER — ACETAMINOPHEN 650 MG/1
650 SUPPOSITORY RECTAL EVERY 4 HOURS PRN
Status: DISCONTINUED | OUTPATIENT
Start: 2024-08-09 | End: 2024-08-10 | Stop reason: HOSPADM

## 2024-08-09 RX ORDER — AMOXICILLIN 250 MG
2 CAPSULE ORAL 2 TIMES DAILY PRN
Status: DISCONTINUED | OUTPATIENT
Start: 2024-08-09 | End: 2024-08-10 | Stop reason: HOSPADM

## 2024-08-09 RX ORDER — OXYCODONE HYDROCHLORIDE 5 MG/1
5 TABLET ORAL EVERY 4 HOURS PRN
Status: DISCONTINUED | OUTPATIENT
Start: 2024-08-09 | End: 2024-08-10 | Stop reason: HOSPADM

## 2024-08-09 RX ORDER — AMPICILLIN AND SULBACTAM 2; 1 G/1; G/1
3 INJECTION, POWDER, FOR SOLUTION INTRAMUSCULAR; INTRAVENOUS EVERY 6 HOURS
Status: DISCONTINUED | OUTPATIENT
Start: 2024-08-10 | End: 2024-08-09

## 2024-08-09 RX ORDER — AMPICILLIN AND SULBACTAM 2; 1 G/1; G/1
3 INJECTION, POWDER, FOR SOLUTION INTRAMUSCULAR; INTRAVENOUS ONCE
Status: COMPLETED | OUTPATIENT
Start: 2024-08-09 | End: 2024-08-09

## 2024-08-09 RX ORDER — DEXAMETHASONE SODIUM PHOSPHATE 10 MG/ML
8 INJECTION, SOLUTION INTRAMUSCULAR; INTRAVENOUS EVERY 8 HOURS
Status: DISCONTINUED | OUTPATIENT
Start: 2024-08-09 | End: 2024-08-10 | Stop reason: HOSPADM

## 2024-08-09 RX ORDER — ONDANSETRON 2 MG/ML
4 INJECTION INTRAMUSCULAR; INTRAVENOUS ONCE
Status: COMPLETED | OUTPATIENT
Start: 2024-08-09 | End: 2024-08-09

## 2024-08-09 RX ORDER — AMPICILLIN AND SULBACTAM 2; 1 G/1; G/1
3 INJECTION, POWDER, FOR SOLUTION INTRAMUSCULAR; INTRAVENOUS EVERY 6 HOURS
Status: DISCONTINUED | OUTPATIENT
Start: 2024-08-10 | End: 2024-08-10 | Stop reason: HOSPADM

## 2024-08-09 RX ORDER — MORPHINE SULFATE 2 MG/ML
2 INJECTION, SOLUTION INTRAMUSCULAR; INTRAVENOUS ONCE
Status: COMPLETED | OUTPATIENT
Start: 2024-08-09 | End: 2024-08-09

## 2024-08-09 RX ADMIN — DEXAMETHASONE SODIUM PHOSPHATE 8 MG: 10 INJECTION, SOLUTION INTRAMUSCULAR; INTRAVENOUS at 20:45

## 2024-08-09 RX ADMIN — ONDANSETRON 4 MG: 2 INJECTION INTRAMUSCULAR; INTRAVENOUS at 18:48

## 2024-08-09 RX ADMIN — OXYCODONE HYDROCHLORIDE 5 MG: 5 TABLET ORAL at 23:29

## 2024-08-09 RX ADMIN — MORPHINE SULFATE 2 MG: 2 INJECTION, SOLUTION INTRAMUSCULAR; INTRAVENOUS at 21:20

## 2024-08-09 RX ADMIN — MORPHINE SULFATE 4 MG: 4 INJECTION INTRAVENOUS at 18:43

## 2024-08-09 RX ADMIN — SODIUM CHLORIDE 1000 ML: 9 INJECTION, SOLUTION INTRAVENOUS at 20:44

## 2024-08-09 RX ADMIN — IOPAMIDOL 90 ML: 755 INJECTION, SOLUTION INTRAVENOUS at 20:12

## 2024-08-09 RX ADMIN — METRONIDAZOLE 500 MG: 500 TABLET ORAL at 22:22

## 2024-08-09 RX ADMIN — AMPICILLIN SODIUM AND SULBACTAM SODIUM 3 G: 2; 1 INJECTION, POWDER, FOR SOLUTION INTRAMUSCULAR; INTRAVENOUS at 20:44

## 2024-08-09 ASSESSMENT — ACTIVITIES OF DAILY LIVING (ADL)
ADLS_ACUITY_SCORE: 36

## 2024-08-09 NOTE — CONSULTS
Otolaryngology Consult Note  August 9, 2024  CC: odynophagia, dysphagia    HPI: Chhaya Mock is a 33 year old female with a past medical history of hidradinitis suppurativa and frequent soft tissue abscesses. Pt also with history of supraglottitis with small vallecular abscess subsequently admitted to [facility] for steroids and antibiotics in May 2024. She presented to the ED today for throat pain and dysphagia.    Two days ago patient noticed right neck pain, present when she swallows or speaks but not at rest. She has had difficulty swallowing as well and has been eating and drinking less because of this. She also feels that her voice is weaker; she says she typically sings but recently has not been able to in the same way. She feels her breathing is different as well. She reports recent trauma to nasal bridge when she accidentally hit herself with a freezer door at work.     She denies recent illness though does note she was around nephew who was recently sick.  She endorses smoking marijuana daily and hookah occasionally. No history of surgeries to head or neck.     Back in May when treated for supraglottitis/left vallecular abscess she had presented similarly, though says this time the pain is worse. In May she left before medically ready for discharge and so received less than 24 hours IV abx and steroids; she went home on augmentin at that time and reports that had helped quite a bit. Unfortunately she missed clinic follow-up as scheduled due to a death in the family.     In the ED today she was vitally stable. Workup completed included CBC, CMP, ESR, CRP, strep test, and CT neck with/without contrast.     Past Medical History:   Diagnosis Date    Carbuncle and furuncle     Hidradenitis suppurativa     Tobacco use disorder     Smokes 1/2 ppd. Started smoking at 14 years    Vaginal yeast infection 06/02/2017       Past Surgical History:   Procedure Laterality Date    LAPAROSCOPY DIAGNOSTIC (GYN)  Left 5/29/2024    Procedure: Diagnostic laparoscopy, left ovarian cystectomy and Peritoneal Biopsy;  Surgeon: Mel Gaspar MD;  Location: [facility] ORAL SURGERY PROCEDURE  10/23/2008    [place] Teeth Extraction       No current outpatient medications on file.          Allergies   Allergen Reactions    No Known Allergies        Social History     Socioeconomic History    Marital status: Single     Spouse name: Not on file    Number of children: Not on file    Years of education: Not on file    Highest education level: Not on file   Occupational History     Employer: [employer].     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     Comment: [employer]   Tobacco Use    Smoking status: Some Days     Types: Hookah     Passive exposure: Current    Smokeless tobacco: Never   Vaping Use    Vaping status: Never Used   Substance and Sexual Activity    Alcohol use: No     Comment: occassionally    Drug use: Yes     Types: Marijuana     Comment: occassionally    Sexual activity: Yes     Partners: Male   Other Topics Concern    Not on file   Social History Narrative    Not on file     Social Determinants of Health     Financial Resource Strain: Not on file   Food Insecurity: Not on file   Transportation Needs: Not on file   Physical Activity: Not on file   Stress: Not on file   Social Connections: Not on file   Interpersonal Safety: Not on file   Housing Stability: Not on file       Family History   Problem Relation Age of Onset    Hypertension Maternal Grandmother     Diabetes Maternal Uncle     Hypertension Maternal Uncle     Alcohol/Drug Maternal Aunt     Alcohol/Drug Maternal Uncle     Anesthesia Reaction Other        PHYSICAL EXAM:  /62   Pulse 51   Temp 98.5  F (36.9  C) (Oral)   Resp 16   LMP 07/25/2024 (Approximate)   SpO2 100%   General: laying in bed, no acute distress  HEAD: normocephalic, atraumatic  Face: symmetrical,  no swelling, edema, or erythema.  Eyes: EOMI grossly intact, clear  sclera  Ears: external ear normal  Nose: minimal anterior drainage  Mouth: moist, no ulcers, no jaw or tooth tenderness, tongue midline and symmetric  Oropharynx: tonsils within normal limits, uvula midline, no oropharyngeal erythema  Neck: no LAD, trachea midline, slight right neck pain with palpation when talking or swallowing  Neuro: cranial nerves 2-12 grossly intact  Respiratory: breathing non-labored on RA, no stridor  Skin: no rashes or skin lesions of the face/neck  Psych: pleasant affect    FIBEROPTIC ENDOSCOPY:  Due to throat pain and dysphagia in setting of prior vallecula abscess, fiberoptic laryngoscopy was indicated. After obtaining verbal consent, the nose was topically decongested and anesthetized. The fiberoptic laryngoscope was passed under endoscopic vision through the right nasal passage. The turbinates were normal. The inferior and middle meati were clear bilaterally without purulence, masses, or polyps. The nasopharynx was clear. The eustachian tubes were clear. The soft palate appeared normal with good mobility. Mild asymmetry with effacement of right lingual epiglottis and subtle fullness of right vallecula; epiglottis sharp and airway patent. The larynx was clear with mobile cords. The arytenoids were clear, and there was no pooling in the hypopharynx.    ROUTINE IP LABS (Last four results)  BMP  Recent Labs   Lab 08/09/24  1844      POTASSIUM 3.7   CHLORIDE 104   FRANCY 9.0   CO2 24   BUN 8.8   CR 0.74   GLC 83     CBC  Recent Labs   Lab 08/09/24  1844   WBC 9.5   RBC 4.45   HGB 13.4   HCT 42.3   MCV 95   MCH 30.1   MCHC 31.7   RDW 13.7        INR  Recent Labs   Lab 08/09/24  1844   INR 0.92       Imaging  EXAM: CT SOFT TISSUE NECK W/O and W CONTRAST  DATE: 8/9/2024    MUCOSAL SPACES/SOFT TISSUES: Interval resolution of the previously seen enhancing collection in the left vallecula and thickening of the epiglottis. Normal mucosal spaces of the upper aerodigestive tract. No mucosal  mass or inflammation identified.  Normal vocal cords and infraglottic trachea. Normal parapharyngeal space and subcutaneous soft tissues. Normal oral cavity,  spaces, and floor of mouth structures.  LYMPH NODES: No pathologic lymph nodes by size or morphology criteria.  SALIVARY GLANDS: Normal parotid and submandibular glands.  THYROID: Normal.  VESSELS: Vascular structures of the neck are patent.  VISUALIZED INTRACRANIAL/ORBITS/SINUSES: No abnormality of the visualized intracranial compartment or orbits. Visualized paranasal sinuses and mastoid air cells are clear.  OTHER: Nonspecific sclerosis of the inferior aspect of the C3 and C5 vertebral bodies, similar to the prior study. The included lung apices are clear.  Impression:  1.  When compared to 5/27/2024, there has been interval resolution of the previously seen enhancing collection in the left vallecula and previously thickened epiglottis.  2.  No new infectious/inflammatory change. No evidence of a soft tissue abscess.    Assessment and Plan  Chhaya Mock is a 33 year old female with a PMH of hidradinitis suppurativa as well as a history of supraglottitis with small left vallecular abscess (May 2024) who presented to ED today for odynophagia and dysphagia with right neck pain. Infectious workup largely negative. Exact etiology of repeat episodes unclear and requires repeat assessment outpatient once asymptomatic due to possibility that there is an underlying physical cause for repeat acute inflammation and irritation (difficult to assess for this while acutely inflamed). Recommend admission to medicine for IV steroids and antibiotics to ensure clinical improvement.    - No acute surgical intervention indicated.  - NPO at midnight in case need for surgical intervention becomes evident  - Airway is widely patent  - Recommend admission to medicine for IV antibiotics, IV steroids   - Airway steroid dosing: IV decadron 8mg q8h x24h   -  Recommend Unasyn  - ENT will continue to follow  - Important for patient to have follow up with ENT outpatient on discharge for repeat scope exam once at baseline. Will coordinate on discharge  - Please contact Otolaryngology on call with any questions or concerns    Patient and plan will be discussed with staff Dr. Darrin MD  Otolaryngology-Head & Neck Surgery PGY-1  To contact ENT please dial [phone] and enter job code [phone].

## 2024-08-09 NOTE — ED PROVIDER NOTES
ED Provider Note  [facility]      History     Chief Complaint   Patient presents with    Oral Swelling     The history is provided by the patient and medical records.     Chhaya Mock is a 33 year old female with a history of peritonsillar abscess who presents for oral swelling and difficulty swallowing.  Patient reports a 2-day history of throat swelling and pain with swallowing.  She likens the pain to chewing on glass.  Patient is able to swallow her secretions but states she prefers to spit up her saliva.  She states she has not eaten today due to her symptoms.  Patient states her symptoms have been worsening since their onset. She feels these symptoms are similar to her previous presentation in May. No recent fevers.  Patient denies taking medications for her pain.  She denies use of daily medications.      Per chart review, patient was evaluated on 5/27/2024 in the ED due to numbness and painful swallowing.  CT scan showed pharyngitis but a localized fluid collection consistent with an abscess.  ENT was consulted who agreed with plan for IV antibiotics and steroids.    Past Medical History  Past Medical History:   Diagnosis Date    Carbuncle and furuncle     Hidradenitis suppurativa     Tobacco use disorder     Smokes 1/2 ppd. Started smoking at 14 years    Vaginal yeast infection 06/02/2017     Past Surgical History:   Procedure Laterality Date    LAPAROSCOPY DIAGNOSTIC (GYN) Left 5/29/2024    Procedure: Diagnostic laparoscopy, left ovarian cystectomy and Peritoneal Biopsy;  Surgeon: Mel Gaspar MD;  Location:  OR    [facility] ORAL SURGERY PROCEDURE  10/23/2008    [place] Teeth Extraction     No current outpatient medications on file.    Allergies   Allergen Reactions    No Known Allergies      Family History  Family History   Problem Relation Age of Onset    Hypertension Maternal Grandmother     Diabetes Maternal Uncle     Hypertension Maternal Uncle      Alcohol/Drug Maternal Aunt     Alcohol/Drug Maternal Uncle     Anesthesia Reaction Other      Social History   Social History     Tobacco Use    Smoking status: Some Days     Types: Hookah     Passive exposure: Current    Smokeless tobacco: Never   Vaping Use    Vaping status: Never Used   Substance Use Topics    Alcohol use: No     Comment: occassionally    Drug use: Yes     Types: Marijuana     Comment: occassionally      Past medical history, past surgical history, medications, allergies, family history, and social history were reviewed with the patient. No additional pertinent items.   A medically appropriate review of systems was performed with pertinent positives and negatives noted in the HPI, and all other systems negative.    Physical Exam   BP: 112/80  Pulse: 80  Temp: 97.5  F (36.4  C)  Resp: 15  SpO2: 100 %  Physical Exam  Constitutional:       General: She is not in acute distress.     Appearance: Normal appearance. She is not diaphoretic.   HENT:      Head: Atraumatic.      Mouth/Throat:      Mouth: Mucous membranes are moist.      Comments: No objective swelling to throat or neck, no uvular deviation, no peritonsillar swelling no trismus, no muffled voice  Eyes:      General: No scleral icterus.     Conjunctiva/sclera: Conjunctivae normal.   Cardiovascular:      Rate and Rhythm: Normal rate.      Heart sounds: Normal heart sounds.   Pulmonary:      Effort: No respiratory distress.      Breath sounds: Normal breath sounds.   Abdominal:      General: Abdomen is flat.   Musculoskeletal:      Cervical back: Neck supple.   Skin:     General: Skin is warm.      Findings: No rash.   Neurological:      Mental Status: She is alert.           ED Course, Procedures, & Data      Procedures                No results found for any visits on 08/09/24.  Medications - No data to display  Labs Ordered and Resulted from Time of ED Arrival to Time of ED Departure - No data to display  No orders to display           Critical care was not performed.     Medical Decision Making  The patient's presentation was of high complexity (an acute health issue posing potential threat to life or bodily function).    The patient's evaluation involved:  review of external note(s) from 3+ sources (see separate area of note for details)  review of 3+ test result(s) ordered prior to this encounter (see separate area of note for details)  ordering and/or review of 3+ test(s) in this encounter (see separate area of note for details)  discussion of management or test interpretation with another health professional (ENT)    The patient's management necessitated high risk (a decision regarding hospitalization).    Assessment & Plan    Patient's workup is reviewed and interpreted by me shows negative pregnancy test  When patient arrived patient ENT who came and did a scope of the patient's  ENT recommends admission, Unasyn, and Decadron 8 mg every 8 x 24 hours  Signed patient out to Dr. Armenta pending CT neck, labs    I have reviewed the nursing notes. I have reviewed the findings, diagnosis, plan and need for follow up with the patient.    New Prescriptions    No medications on file       Final diagnoses:   None     IDennis, am serving as a trained medical scribe to document services personally performed by Luis Griffin MD, based on the provider's statements to me.     ILuis MD, was physically present and have reviewed and verified the accuracy of this note documented by Dennis Treviño.    Luis Griffin MD  [facility] EMERGENCY DEPARTMENT  8/9/2024           Luis Griffin MD  08/09/24 1933

## 2024-08-09 NOTE — ED TRIAGE NOTES
Pt ambulatory to triage with c/o throat swelling. Pt states she has a HX of cyst on her vocal cords. Pt has been unable to tolerate her secretions for the past few days, and when able to swallow feels like glass in her throat. Pt was at her primary this afternoon & told her of these symptoms, and referred to ED for further evaluation.      Triage Assessment (Adult)       Row Name 08/09/24 1786          Triage Assessment    Airway WDL WDL        Respiratory WDL    Respiratory WDL X;rhythm/pattern     Rhythm/Pattern, Respiratory shortness of breath        Skin Circulation/Temperature WDL    Skin Circulation/Temperature WDL WDL        Cardiac WDL    Cardiac WDL WDL        Peripheral/Neurovascular WDL    Peripheral Neurovascular WDL WDL        Cognitive/Neuro/Behavioral WDL    Cognitive/Neuro/Behavioral WDL WDL

## 2024-08-09 NOTE — PROGRESS NOTES
"Chhaya is a 33 year old  female who presents for annual exam.     Menses are regular q 26 days and normal lasting  3-4  days.  Menses flow: normal.  Patient's last menstrual period was 2024 (approximate).. Using none for contraception.  She is not currently considering pregnancy.  Besides routine health maintenance,  she would like to discuss discharge in throat. She had a history of a peritonsillar abscess in the past 1-2 months and over the past few days she has notices a sore throat, inflammation and pain with swallowing similar to how the abscess started before. She was referred to ENT for follow up after treatment but wasn't able to make that appointment. She denies difficulty breathing or talking at this time.   GYNECOLOGIC HISTORY:  Menarche: 12  Age at first intercourse: 16   Number of lifetime partners: less than 10  Chhaya is sexually active with male partner(s) and is not currently in monogamous relationship with.    History sexually transmitted infections:Chlamydia, Gonorrhea, and Trichomonas  STI testing offered?  Accepted  VICENTE exposure: Unknown  History of abnormal Pap smear: No - age 30- 64 PAP with HPV every 5 years recommended  Family history of breast CA: Yes (Please explain): maternal aunts, grandma  Family history of uterine/ovarian CA: No    Family history of colon CA: No    HEALTH MAINTENANCE:  Cholesterol: (No results found for: \"CHOL\" History of abnormal lipids: No  Mammo: yes . History of abnormal Mammo: No.  Regular Self Breast Exams: No  Calcium/Vitamin D intake: source:  none Adequate? No  TSH: (  TSH   Date Value Ref Range Status   2020 0.86 0.40 - 4.00 mU/L Final    )  Pap; (  Lab Results   Component Value Date    PAP NIL 2017    )    HISTORY:  OB History    Para Term  AB Living   0 0 0 0 0 0   SAB IAB Ectopic Multiple Live Births   0 0 0 0 0     Past Medical History:   Diagnosis Date    Carbuncle and furuncle     Hidradenitis suppurativa     " Tobacco use disorder     Smokes 1/2 ppd. Started smoking at 14 years    Vaginal yeast infection 06/02/2017     Past Surgical History:   Procedure Laterality Date    LAPAROSCOPY DIAGNOSTIC (GYN) Left 5/29/2024    Procedure: Diagnostic laparoscopy, left ovarian cystectomy and Peritoneal Biopsy;  Surgeon: Mel Gaspar MD;  [facility]  10/23/2008    [place] Teeth Extraction     Family History   Problem Relation Age of Onset    Hypertension Maternal Grandmother     Diabetes Maternal Uncle     Hypertension Maternal Uncle     Alcohol/Drug Maternal Aunt     Alcohol/Drug Maternal Uncle     Anesthesia Reaction Other      Social History     Socioeconomic History    Marital status: Single   Occupational History     Employer: [employer].     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     Allergies   Allergen Reactions    No Known Allergies        Past medical, surgical, social and family history were reviewed and updated in EPIC.    ROS:   C:     NEGATIVE for fever, chills, change in weight  I:       NEGATIVE for worrisome rashes, moles or lesions  E:     NEGATIVE for vision changes or irritation  E/M: NEGATIVE for ear, mouth and throat problems  R:     NEGATIVE for significant cough or SOB  CV:   NEGATIVE for chest pain, palpitations or peripheral edema  GI:     NEGATIVE for nausea, abdominal pain, heartburn, or change in bowel habits  :   NEGATIVE for frequency, dysuria, hematuria, vaginal discharge, or irregular bleeding  M:     NEGATIVE for significant  "arthralgias or myalgia  N:      NEGATIVE for weakness, dizziness or paresthesias  E:      NEGATIVE for temperature intolerance, skin/hair changes  P:      NEGATIVE for changes in mood or affect.    EXAM:  /70 (BP Location: Left arm, Patient Position: Sitting, Cuff Size: Adult Regular)   Pulse 106   Temp 98  F (36.7  C)   Resp 16   Ht 1.613 m (5' 3.5\")   Wt 61.3 kg (135 lb 3.2 oz)   LMP 2024 (Approximate)   BMI 23.57 kg/m     BMI: Body mass index is 23.57 kg/m .  Constitutional: healthy, alert and no distress  Head: Normocephalic. No masses, lesions, tenderness or abnormalities  Neck: Neck supple. Trachea midline. No adenopathy. Thyroid symmetric, normal size.   Cardiovascular: RRR.   Respiratory: Negative.   Breast: Breasts reveal mild symmetric fibrocystic densities, but there are no dominant, discrete, fixed or suspicious masses found.  Gastrointestinal: Abdomen soft, non-tender, non-distended. No masses, organomegaly.  :  Vulva:  No external lesions, normal female hair distribution, no inguinal adenopathy.    Urethra:  Midline, non-tender, well supported, no discharge  Vagina:  Moist, pink, no abnormal discharge, no lesions  Uterus:  Normal size , non-tender, freely mobile  Ovaries:  No masses appreciated, non-tender, mobile  Rectal Exam: deferred  Musculoskeletal: extremities normal  Skin: no suspicious lesions or rashes  Psychiatric: Affect appropriate, cooperative,mentation appears normal.     COUNSELING:   Reviewed preventive health counseling, as reflected in patient instructions       Safe sex practices/STD prevention       HIV screeninx in teen years, 1x in adult years, and at intervals if high risk   reports that she has been smoking hookah. She has been exposed to tobacco smoke. She has never used smokeless tobacco.    Body mass index is 23.57 kg/m .    FRAX Risk Assessment    ASSESSMENT:  33 year old female with satisfactory annual exam     (Z01.419) Encounter for annual routine " gynecological examination  (primary encounter diagnosis)  -Normal clinical breast and pelvic exam   -HPV vaccines completed   -Patient having difficulty affording health care, she has insurance through her part time job but it is high deductible with lots of difficulty getting things covered, would like to have a care coordinator help her explore other options. Referral placed.     (Z11.3) Screen for STD (sexually transmitted disease)  Plan: HIV Antigen Antibody Combo Cascade, Treponema         Abs w Reflex to RPR and Titer, Hepatitis C         antibody, Chlamydia trachomatis/Neisseria         gonorrhoeae by PCR, Wet prep - Clinic Collect            (Z12.4) Cervical cancer screening  Plan: Gynecologic Cytology (Pap) and HPV -         Recommended Age 30-65 Years            (J36) Peritonsillar abscess  -Given patient's concern that her peritonsillar abscess has returned and she is not yet established with ENT, I recommend that she go to the [facility] for further evaluation.     Dispo: RTC as needed   Mel Gaspar MD

## 2024-08-09 NOTE — LETTER
[facility] [address] OBSERVATION  [address] [address]  Phone: [phone]  Fax: [phone]    August 10, 2024        Chhaya Mock  [address]  [place] [address]          To whom it may concern:    RE: Chhaya Mock    Patient was seen and treated today at the [facility] and missed work.    Please contact me for questions or concerns.      Sincerely,      Villa Lyons*

## 2024-08-10 VITALS
OXYGEN SATURATION: 97 % | HEART RATE: 58 BPM | TEMPERATURE: 98.5 F | RESPIRATION RATE: 20 BRPM | DIASTOLIC BLOOD PRESSURE: 70 MMHG | SYSTOLIC BLOOD PRESSURE: 104 MMHG

## 2024-08-10 LAB
ANION GAP SERPL CALCULATED.3IONS-SCNC: 8 MMOL/L (ref 7–15)
BUN SERPL-MCNC: 8.4 MG/DL (ref 6–20)
C TRACH DNA SPEC QL PROBE+SIG AMP: NEGATIVE
CALCIUM SERPL-MCNC: 8.5 MG/DL (ref 8.8–10.4)
CHLORIDE SERPL-SCNC: 106 MMOL/L (ref 98–107)
CREAT SERPL-MCNC: 0.76 MG/DL (ref 0.51–0.95)
EGFRCR SERPLBLD CKD-EPI 2021: >90 ML/MIN/1.73M2
ERYTHROCYTE [DISTWIDTH] IN BLOOD BY AUTOMATED COUNT: 13.7 % (ref 10–15)
GLUCOSE SERPL-MCNC: 152 MG/DL (ref 70–99)
HCO3 SERPL-SCNC: 21 MMOL/L (ref 22–29)
HCT VFR BLD AUTO: 37.1 % (ref 35–47)
HGB BLD-MCNC: 11.7 G/DL (ref 11.7–15.7)
MCH RBC QN AUTO: 30.2 PG (ref 26.5–33)
MCHC RBC AUTO-ENTMCNC: 31.5 G/DL (ref 31.5–36.5)
MCV RBC AUTO: 96 FL (ref 78–100)
N GONORRHOEA DNA SPEC QL NAA+PROBE: NEGATIVE
PLATELET # BLD AUTO: 228 10E3/UL (ref 150–450)
POTASSIUM SERPL-SCNC: 4.7 MMOL/L (ref 3.4–5.3)
RBC # BLD AUTO: 3.87 10E6/UL (ref 3.8–5.2)
SARS-COV-2 RNA RESP QL NAA+PROBE: NEGATIVE
SODIUM SERPL-SCNC: 135 MMOL/L (ref 135–145)
WBC # BLD AUTO: 5.8 10E3/UL (ref 4–11)

## 2024-08-10 PROCEDURE — 250N000011 HC RX IP 250 OP 636

## 2024-08-10 PROCEDURE — 96376 TX/PRO/DX INJ SAME DRUG ADON: CPT

## 2024-08-10 PROCEDURE — 250N000013 HC RX MED GY IP 250 OP 250 PS 637

## 2024-08-10 PROCEDURE — 99239 HOSP IP/OBS DSCHRG MGMT >30: CPT | Mod: FS

## 2024-08-10 PROCEDURE — 96375 TX/PRO/DX INJ NEW DRUG ADDON: CPT

## 2024-08-10 PROCEDURE — G0378 HOSPITAL OBSERVATION PER HR: HCPCS

## 2024-08-10 PROCEDURE — 99207 PR APP CREDIT; MD BILLING SHARED VISIT: CPT | Performed by: ORTHOPAEDIC SURGERY

## 2024-08-10 PROCEDURE — 36415 COLL VENOUS BLD VENIPUNCTURE: CPT

## 2024-08-10 PROCEDURE — 85027 COMPLETE CBC AUTOMATED: CPT

## 2024-08-10 PROCEDURE — 999N000248 HC STATISTIC IV INSERT WITH US BY RN

## 2024-08-10 PROCEDURE — 87635 SARS-COV-2 COVID-19 AMP PRB: CPT

## 2024-08-10 PROCEDURE — 80048 BASIC METABOLIC PNL TOTAL CA: CPT

## 2024-08-10 PROCEDURE — 250N000011 HC RX IP 250 OP 636: Performed by: STUDENT IN AN ORGANIZED HEALTH CARE EDUCATION/TRAINING PROGRAM

## 2024-08-10 RX ORDER — METRONIDAZOLE 500 MG/1
500 TABLET ORAL 2 TIMES DAILY
Qty: 14 TABLET | Refills: 0 | Status: SHIPPED | OUTPATIENT
Start: 2024-08-10 | End: 2024-08-17

## 2024-08-10 RX ADMIN — HYDROMORPHONE HYDROCHLORIDE 0.5 MG: 1 INJECTION, SOLUTION INTRAMUSCULAR; INTRAVENOUS; SUBCUTANEOUS at 03:50

## 2024-08-10 RX ADMIN — OXYCODONE HYDROCHLORIDE 5 MG: 5 TABLET ORAL at 05:04

## 2024-08-10 RX ADMIN — DEXAMETHASONE SODIUM PHOSPHATE 8 MG: 10 INJECTION, SOLUTION INTRAMUSCULAR; INTRAVENOUS at 04:00

## 2024-08-10 RX ADMIN — HYDROMORPHONE HYDROCHLORIDE 0.5 MG: 1 INJECTION, SOLUTION INTRAMUSCULAR; INTRAVENOUS; SUBCUTANEOUS at 08:09

## 2024-08-10 RX ADMIN — METRONIDAZOLE 500 MG: 500 TABLET ORAL at 07:54

## 2024-08-10 RX ADMIN — AMPICILLIN SODIUM AND SULBACTAM SODIUM 3 G: 2; 1 INJECTION, POWDER, FOR SOLUTION INTRAMUSCULAR; INTRAVENOUS at 04:14

## 2024-08-10 ASSESSMENT — ACTIVITIES OF DAILY LIVING (ADL)
ADLS_ACUITY_SCORE: 36
ADLS_ACUITY_SCORE: 18
ADLS_ACUITY_SCORE: 18
ADLS_ACUITY_SCORE: 36
ADLS_ACUITY_SCORE: 18
ADLS_ACUITY_SCORE: 19
ADLS_ACUITY_SCORE: 18
ADLS_ACUITY_SCORE: 36
ADLS_ACUITY_SCORE: 18
ADLS_ACUITY_SCORE: 36
ADLS_ACUITY_SCORE: 19
ADLS_ACUITY_SCORE: 36

## 2024-08-10 NOTE — PROGRESS NOTES
Patient is discharged from the hospital to home, pt drove herself here and will drive back to her home. Pt is A&Ox4, able to swallow pills and drinking fluids without any issues. Pt is very eager to go home. PIV removed, discharge instructions reviewed and given to pt.

## 2024-08-10 NOTE — PROGRESS NOTES
Observation Goals:     -diagnostic tests and consults completed and resulted- Not met, surgical intervention pending  -vital signs normal or at patient baseline- Met  -tolerating oral intake to maintain hydration- Met; PO soup, applesauce, meds tolerated well  -adequate pain control on oral analgesics- Met, pain controlled well with prn Oxy  -tolerating oral antibiotics or has plans for home infusion setup- Met, PO meds tolerated well  -returns to baseline functional status- Met  -safe disposition plan has been identified- Met    /70 (BP Location: Right arm)   Pulse 58   Temp 98.5  F (36.9  C) (Oral)   Resp 20   LMP 07/25/2024 (Approximate)   SpO2 97%     Nurse to notify provider when observation goals have been met and patient is ready for discharge.

## 2024-08-10 NOTE — PROGRESS NOTES
Otolaryngology Progress Note  August 10, 2024    SUBJECTIVE: No acute events overnight. VSS, satting well on room air, started on unasyn and flagyl and received 2 doses of decadron. Inflammatory markers wnl, CT neck grossly normal. The patient reports that her throat pain has improved and denies any difficulty with breathing. She has not eaten anything yet, but has had sips of juice and swallowed her flagyl with no issues. The patient would like to rule out COVID as a potential cause for her symptoms. Otherwise, she has no concerns and is eager to go home. She confirms that she will follow up with ENT in clinic when she is not acutely ill for further evaluation.     OBJECTIVE:   /70 (BP Location: Right arm)   Pulse 58   Temp 98.5  F (36.9  C) (Oral)   Resp 20   LMP 07/25/2024 (Approximate)   SpO2 97%    General: Alert and oriented x 3, No acute distress   HEENT: Oral mucosa moist and clear, No oral lesions noted. Oropharynx clear.  Neck soft and flat, no palpable LAD, trachea midline.    Pulmonary: Breathing non-labored, no stridor, no accessory muscle use.    No intake or output data in the 24 hours ending 08/10/24 0837    LABS:  ROUTINE IP LABS (Last four results)  BMP  Recent Labs   Lab 08/10/24  0644 08/09/24  1844    140   POTASSIUM 4.7 3.7   CHLORIDE 106 104   FRANCY 8.5* 9.0   CO2 21* 24   BUN 8.4 8.8   CR 0.76 0.74   * 83     CBC  Recent Labs   Lab 08/10/24  0644 08/09/24  1844   WBC 5.8 9.5   RBC 3.87 4.45   HGB 11.7 13.4   HCT 37.1 42.3   MCV 96 95   MCH 30.2 30.1   MCHC 31.5 31.7   RDW 13.7 13.7    269     INR  Recent Labs   Lab 08/09/24  1844   INR 0.92       ASSESSMENT & PLAN: Chhaya Mock is a 33 year old female with a past medical history of hidradinitis suppurativa as well as a history of supraglottitis with small vallecular abscess (May 2024) who presented to ED 8/9/24 for odynophagia and dysphagia. Infectious workup negative. Neck CT with possible minimal  early phlegmonous change of right vallecula but overall improved compared to CT neck from May. Given recurrent nature of her symptoms and presentation, consider possible underlying anatomic abnormality as nidus for infection. Patient's exam stable this morning, she remains afebrile and inflammatory markers remain wnl.     - No acute surgical intervention needed   - Okay to discharge home from ENT perspective   - Recommend 7 day course of Augmentin on discharge   - Okay to restart diet  - Will coordinate outpatient follow up when symptomatically improved in order to better assess for any underlying etiology     Patricia Saavedra, MS4    Marianna Siegel MD  ENT, PGY3

## 2024-08-10 NOTE — H&P
[facility]    History and Physical - Hospitalist Service, GOLD TEAM        Date of Admission:  8/9/2024    Assessment & Plan      Chhaya Mock is a 33 year old female admitted on 8/9/2024. She has history significant for hidradinitis suppurativa, hx of supraglottitis with small abscess requiring admission for steroids and IV ABX in 5/2024 who presents with throat pain and will be admitted for workup and need for IV abx and steroids.    Dysphagia  Patient presents with 2 day history of neck and throat pain, similar to 5/2024 when she was admitted for supraglottitis with abscess. She has difficulty swallowing which has lead to decreased PO intake. She also feels like her voice is changed. Breathing feels different, though airway is widely patient and she is satting well on room air.  She is afebrile.  Group A strep negative  -- ENT consulted by the ED, appreciate recommendations  :: CT soft tissue neck with contrast will be obtained tonight and will guide possible need for intervention  :: Decadron 8mg IV q8 x24 hours  :: Unasyn 3g IV q6   :: NPO [place] in case of surgical intervention needed tomorrow  :: Pt will need outpatient ENT follow up for repeat scope for baseline  -- Pulse oximetry  -- Tylenol, oxycodone, Cepacol spray for pain control    Bacterial Vaginosis  Was seen for routine OBGYN appointment earlier in the day with wet prep positive for clue cells. Per chart review she has had BV almost yearly for the last several years. Patient currently reports some itching and burning of the vulva.  -- Metronidazole 500mg BID x7 days      Diet:  Regular, NPO midnight  DVT Prophylaxis: Pneumatic Compression Devices  Huber Catheter: Not present  Lines: None     Cardiac Monitoring: None  Code Status:  Full code    Clinically Significant Risk Factors Present on Admission                                       Disposition Plan     Medically Ready for Discharge:  Pending ENT workup         The patient's care was discussed with the Attending Physician, Dr. Armenta, Patient, and ENT Consultant(s) via this note    Mary Moses PA-C  Hospitalist Service, [facility]  Securely message with [facility] [employer] (more info)  Text page via [facility] Paging/Directory   See signed in provider for up to date coverage information    ______________________________________________________________________    Chief Complaint   dysphagia    History is obtained from the patient    History of Present Illness   Chhaya Mock is a 33 year old female admitted on 8/9/2024. She has history significant for hidradinitis suppurativa, hx of supraglottitis with small abscess requiring admission for steroids and IV ABX in 5/2024 who presents with throat pain and will be admitted for workup and need for IV abx.    She reports over the last two days having increasing pain in her throat, which has led to decreased oral intake.  She tried to eat food earlier today and she was not able to swallow easily.  Feels that her voice is different from her baseline.  This feels similar but worse than when she was admitted in May for supraglottitis with abscess.  She denies fevers, chills, dyspnea/shortness of breath, chest pain.       Past Medical History    Past Medical History:   Diagnosis Date    Carbuncle and furuncle     Hidradenitis suppurativa     Tobacco use disorder     Smokes 1/2 ppd. Started smoking at 14 years    Vaginal yeast infection 06/02/2017       Past Surgical History   Past Surgical History:   Procedure Laterality Date    LAPAROSCOPY DIAGNOSTIC (GYN) Left 5/29/2024    Procedure: Diagnostic laparoscopy, left ovarian cystectomy and Peritoneal Biopsy;  Surgeon: Mel Gaspar MD;  Location:  OR    [facility] ORAL SURGERY PROCEDURE  10/23/2008    [place] Teeth Extraction       Prior to Admission Medications   None        Review of Systems    The 5  point Review of Systems is negative other than noted in the HPI or here.      Physical Exam   Vital Signs: Temp: 97.5  F (36.4  C) Temp src: Oral BP: 118/86 Pulse: 80   Resp: 15 SpO2: 100 % O2 Device: None (Room air)    Weight: 0 lbs 0 oz    Physical Exam  Vitals reviewed.   Constitutional:       General: She is not in acute distress.     Appearance: She is not diaphoretic.   HENT:      Mouth/Throat:      Mouth: Mucous membranes are moist.      Pharynx: Oropharynx is clear. No posterior oropharyngeal erythema.   Cardiovascular:      Rate and Rhythm: Normal rate and regular rhythm.   Pulmonary:      Effort: Pulmonary effort is normal.      Breath sounds: No stridor. No wheezing, rhonchi or rales.      Comments: Satting well RA  Skin:     General: Skin is warm and dry.   Neurological:      Mental Status: She is alert and oriented to person, place, and time.           Medical Decision Making       60 MINUTES SPENT BY ME on the date of service doing chart review, history, exam, documentation & further activities per the note.      Data     I have personally reviewed the following data over the past 24 hrs:    N/A  \   N/A   / N/A     140 104 8.8 /  83   3.7 24 0.74 \     Procal: N/A CRP: <3.00 Lactic Acid: N/A       INR:  0.92 PTT:  N/A   D-dimer:  N/A Fibrinogen:  N/A       Imaging results reviewed over the past 24 hrs:   No results found for this or any previous visit (from the past 24 hour(s)).

## 2024-08-10 NOTE — DISCHARGE SUMMARY
[facility]  Hospitalist Discharge Summary      Date of Admission:  8/9/2024  Date of Discharge:  8/10/2024  Discharging Provider: Emely Krishnan NP  Discharge Service: Hospitalist Service    Discharge Diagnoses   Dysphagia   Bacterial vaginosis     Clinically Significant Risk Factors          Follow-ups Needed After Discharge   Follow-up Appointments     Follow Up ([facility])      Follow up with the outpatient ENT clinic. Their schedulers have been   notified and will assist with scheduling.     Appointments on [place] and/or [facility] (with [facility] or [facility]   provider or service). Call [phone] if you haven't heard regarding   these appointments within 7 days of discharge.            Unresulted Labs Ordered in the Past 30 Days of this Admission       Date and Time Order Name Status Description    8/9/2024  3:38 PM GYNECOLOGIC CYTOLOGY AND HPV In process     8/9/2024  3:38 PM CHLAMYDIA TRACHOMATIS/NEISSERIA GONORRHOEAE BY PCR In process         These results will be followed up by PCP    Discharge Disposition   Discharged to home  Condition at discharge: Stable    Hospital Course   Chhaya Mock is a 33 year old female admitted on 8/9/2024. She has history significant for hidradinitis suppurativa, hx of supraglottitis with small abscess requiring admission for steroids and IV ABX in 5/2024 who presents with throat pain and will be admitted for workup and need for IV abx and steroids.      Dysphagia   Patient presents with 2 day history of neck and throat pain, similar to 5/2024 when she was admitted for supraglottitis with abscess. She has difficulty swallowing which has lead to decreased PO intake. She also feels like her voice is changed. Breathing feels different, though airway is widely patient and she is satting well on room air.  She is afebrile.  Group A strep negative. CT neck showing interval resolution of previously seen enhancing  collection in left vallecula and previously thickened epiglottis; no new infectious/inflammatory change; no evidence of soft tissue abscess. ENT consulted who did not think acute surgical intervention was required at this time. Will discharge with 7 days of Augmentin and follow-up in clinic.      Bacterial vaginosis   Was seen for routine OBGYN appointment earlier in the day with wet prep positive for clue cells. Per chart review she has had BV almost yearly for the last several years. Patient currently reports some itching and burning of the vulva. Discharged with metronidazole 500 mg BID x7 days      Discharge Planning:  ENT: follow-up OP (their schedulers to arrange)     Consultations This Hospital Stay   ENT IP CONSULT  NURSING TO CONSULT FOR VASCULAR ACCESS CARE IP CONSULT  NURSING TO CONSULT FOR VASCULAR ACCESS CARE IP CONSULT    Code Status   Full Code    Time Spent on this Encounter   IEmely NP, personally saw the patient today and spent greater than 30 minutes discharging this patient.       Emely Krishnan NP  [facility] [address] OBSERVATION  [address] [address]  Phone: [phone]  Fax: [phone]  ______________________________________________________________________    Physical Exam   Vital Signs: Temp: 98.5  F (36.9  C) Temp src: Oral BP: 104/70 Pulse: 58   Resp: 20 SpO2: 97 % O2 Device: None (Room air)    Weight: 0 lbs 0 oz    GENERAL: Alert and awake. Answering questions appropriately. Oriented x 3. NAD. Pleasant and conversational   HEENT: Anicteric sclera. EOMI. Mucous membranes moist   RESPIRATORY: Effort normal on RA. Clear to auscultation bilaterally, no rales, rhonchi or wheezes  EXTREMITIES: No peripheral edema. No calf asymmetry, erythema, or tenderness.   NEUROLOGICAL: No focal deficits. Moving all extremities symmetrically.   SKIN: Intact. Warm and dry. No jaundice. No rashes on exposed skin   PSYCH: Affect appropriate         Primary Care Physician   [facility]    Discharge Orders      Reason for your hospital stay    You were admitted to the [facility] after presenting to the ED with 2 days of neck and throat pain. Repeat CT scan of your neck showed no new infectious/inflammatory changes. The ENT was consulted to recommended discharge with 7 days of antibiotics and to follow-up in their clinic in ~ 1 week for repeat imaging.     New medications:  - Augmentin, twice daily for 7 days.  - Metronidazole, twice daily for 7 days     Follow-Ups:  - the ENT schedulers will assist in arranging follow-up in their outpatient clinic in ~ 1 week.     Activity    Your activity upon discharge: activity as tolerated     Follow Up ([facility])    Follow up with the outpatient ENT clinic. Their schedulers have been notified and will assist with scheduling.     Appointments on [place] and/or [facility] (with [facility] or [facility] provider or service). Call [phone] if you haven't heard regarding these appointments within 7 days of discharge.     Diet    Follow this diet upon discharge: Orders Placed This Encounter      NPO for Medical/Clinical Reasons Except for: No Exceptions       Significant Results and Procedures   Most Recent 3 CBC's:  Recent Labs   Lab Test 08/10/24  0644 08/09/24  1844 05/30/24  0836   WBC 5.8 9.5 9.5   HGB 11.7 13.4 9.5*   MCV 96 95 95    269 177     Most Recent 3 BMP's:  Recent Labs   Lab Test 08/10/24  0644 08/09/24  1844 05/30/24  0835 05/29/24  0558    140  --  139   POTASSIUM 4.7 3.7  --  3.5   CHLORIDE 106 104  --  105   CO2 21* 24  --  24   BUN 8.4 8.8  --  10.9   CR 0.76 0.74  --  0.77   ANIONGAP 8 12  --  10   FRANCY 8.5* 9.0  --  9.0   * 83 118* 114*   ,   Results for orders placed or performed during the hospital encounter of 08/09/24   CT Soft Tissue Neck w/o & w Contrast    Narrative    EXAM: CT SOFT TISSUE NECK W/O and W CONTRAST  LOCATION: [facility]  DATE: 8/9/2024    INDICATION: with and without contrast, valecular abscess, neck pain  COMPARISON: CT neck 5/27/2024  CONTRAST: 90cc of isovue 370  TECHNIQUE: Routine CT Soft Tissue Neck without and with IV contrast. Multiplanar reformats. Dose reduction techniques were used.    FINDINGS:     MUCOSAL SPACES/SOFT TISSUES: Interval resolution of the previously seen enhancing collection in the left vallecula and thickening of the epiglottis. Normal mucosal spaces of the upper aerodigestive tract. No mucosal mass or inflammation identified.   Normal vocal cords and infraglottic trachea. Normal parapharyngeal space and subcutaneous soft tissues. Normal oral cavity,  spaces, and floor of mouth structures.    LYMPH NODES: No pathologic lymph nodes by size or morphology criteria.     SALIVARY GLANDS: Normal parotid and submandibular glands.    THYROID: Normal.     VESSELS: Vascular structures of the neck are patent.    VISUALIZED INTRACRANIAL/ORBITS/SINUSES: No abnormality of the visualized intracranial compartment or orbits. Visualized paranasal sinuses and mastoid air cells are clear.    OTHER: Nonspecific sclerosis of the inferior aspect of the C3 and C5 vertebral bodies, similar to the prior study. The included lung apices are clear.      Impression    IMPRESSION:   1.  When compared to 5/27/2024, there has been interval resolution of the previously seen enhancing collection in the left vallecula and previously thickened epiglottis.     2.  No new infectious/inflammatory change. No evidence of a soft tissue abscess.       Discharge Medications   Current Discharge Medication List        START taking these medications    Details   amoxicillin-clavulanate (AUGMENTIN) 875-125 MG tablet Take 1 tablet by mouth 2 times daily for 7 days  Qty: 14 tablet, Refills: 0    Associated Diagnoses: Abscess, neck      metroNIDAZOLE (FLAGYL) 500 MG tablet Take 1 tablet (500  mg) by mouth 2 times daily for 7 days  Qty: 14 tablet, Refills: 0    Associated Diagnoses: Bacterial vaginosis           Allergies   Allergies   Allergen Reactions    No Known Allergies       caffeine/never used

## 2024-08-13 ENCOUNTER — PATIENT OUTREACH (OUTPATIENT)
Dept: CARE COORDINATION | Facility: CLINIC | Age: 34
End: 2024-08-13
Payer: COMMERCIAL

## 2024-08-13 ENCOUNTER — TELEPHONE (OUTPATIENT)
Dept: OTOLARYNGOLOGY | Facility: CLINIC | Age: 34
End: 2024-08-13
Payer: COMMERCIAL

## 2024-08-13 LAB
HPV HR 12 DNA CVX QL NAA+PROBE: POSITIVE
HPV16 DNA CVX QL NAA+PROBE: NEGATIVE
HPV18 DNA CVX QL NAA+PROBE: NEGATIVE
HUMAN PAPILLOMA VIRUS FINAL DIAGNOSIS: ABNORMAL

## 2024-08-13 NOTE — PROGRESS NOTES
Clinic Care Coordination Contact  Community Health Worker Initial Outreach    Chart Review: Specialty referral from Dr. Gaspar on 8/12/24:  Financial support - insurance  Additional information - Patient having difficulty affording medical care through her private insurance but thinks she would qualify for state insurance but needs some guidance    Patient accepts CC: Unsure at the present time    Spoke to pt this morning:  Pt does not see Km Yo MD, at the [facility]. Pt states she really doesn't have a PCP. Pt would be interested in finding a new PCP.  Pt states she is not sure that she has MA at the current time and wonders if she should apply for MA. Pt's employer doesn't offer insurance.  CHW [place] and [place] not show as having active [place]. Had MARY ANN Langston, also [place] to confirm that [place] does not have [place].       CHW Plan: CHW will confer with Joie, CHW supervisor, regarding this insurance situation. CHW will reach out to pt again following discussion with Joie.    Shae Bennett  Community Health Worker  [facility]  [phone]

## 2024-08-13 NOTE — LETTER
[facility]  [address], [address]  [facility] [address]    August 15, 2024    Chhaya Mock  [address]  [place] [address]      Basil Shaver,    Thank you for taking the time to speak with me today. Below I have included the resources which we discussed.     [employer] [place] insurance cards - Medicaid (Medical Assistance) members - Call [phone].      Here are the [facility] Clinics which accept your [employer] [place] insurance:      The former [facility] which are now [facility] which accept [employer] [place]:  [facility] - [phone] - [address], [place] [address]  [facility]     To find a clinic and primary care provider at a clinic who are accepting new patients:  [email or web address]  You would search under Family Medicine, then filter by location.  Do call the clinic to confirm that they accept [employer] [place] before making an appointment with a provider!    If you have any further questions, do not hesitate to contact me.    Shae Bennett  Community Health Worker  [facility]  [phone]

## 2024-08-14 ENCOUNTER — PATIENT OUTREACH (OUTPATIENT)
Dept: OBGYN | Facility: CLINIC | Age: 34
End: 2024-08-14
Payer: COMMERCIAL

## 2024-08-14 ENCOUNTER — MYC MEDICAL ADVICE (OUTPATIENT)
Dept: OBGYN | Facility: CLINIC | Age: 34
End: 2024-08-14
Payer: COMMERCIAL

## 2024-08-14 LAB
BKR LAB AP GYN ADEQUACY: NORMAL
BKR LAB AP GYN INTERPRETATION: NORMAL
BKR LAB AP LMP: NORMAL
BKR LAB AP PREVIOUS ABNORMAL: NORMAL
PATH REPORT.COMMENTS IMP SPEC: NORMAL
PATH REPORT.COMMENTS IMP SPEC: NORMAL
PATH REPORT.RELEVANT HX SPEC: NORMAL

## 2024-08-14 NOTE — TELEPHONE ENCOUNTER
I called the pt and informed her of the results and recommendations. See the pap and HPV result for further documentation.

## 2024-08-14 NOTE — PROGRESS NOTES
Clinic Care Coordination Contact  [facility]/Voicemail    Clinical Data: Care Coordinator Outreach    Outreach Documentation Number of Outreach Attempt   8/14/2024   2:02 PM 1       Left message on patient's voicemail with call back information and requested return call.    Plan:  Care Coordinator will try to reach patient again in 1-2 business days.    Shae Bennett  Community Health Worker  [facility]  [phone]

## 2024-08-15 NOTE — PROGRESS NOTES
Clinic Care Coordination Contact  Community Health Worker Initial Outreach    Chart Review: Specialty referral from Dr. Gaspar on 8/12/24:  Financial support - insurance  Additional information - Patient having difficulty affording medical care through her private insurance but thinks she would qualify for state insurance but needs some guidance     Patient accepts CC: No, pt is not currently established with a PCP with [facility]. Patient will be sent Care Coordination introduction letter for future reference.     Spoke with pt this afternoon:  Pt is aware she has active MA since 1/1/2020. Gave pt her MAMN ID: [id number]      [employer] [place] insurance cards - pt does not have. Pt will call [employer] to request insurance cards: Medicaid (Medical Assistance) members - [phone].   Establishing with a PCP at [facility] who accepts HP MA - CHW explained that only certain clinics in the [facility] system accept HP MA such as the [facility], [facility], and [facility] as well as [facility] in [place]. CHW will send a list of the Clinics which accept HP in the [facility] and the [facility] website to select clinics/providers within the clinics. Pt is aware that once she is established with a PCP within the [facility] system, she can access care coordination through that clinic should she need further CC assistance. Pt verbalizes understanding.    Resources sent to pt via [employer] today:    [employer] [place] insurance cards - Medicaid (Medical Assistance) members - [phone].      Here are the [facility] Clinics which accept your [employer] [place] insurance:      The former [facility] which are now [facility] which accept [employer] [place]:  [facility] - [phone] - [address], [place] [address]  [facility]  Tonny Doty  [facility]     To find a  clinic and primary care provider at a clinic who are accepting new patients:  [email or web address]  You would search under Family Medicine, then filter by location.  Do call the clinic to confirm that they accept [employer] MA before making an appointment with a provider!      Shae Bennett  Community Health Worker  [facility]  [phone]

## 2024-08-15 NOTE — PROGRESS NOTES
Clinic Care Coordination Contact  [facility]/Voicemail    Chart Review: Specialty referral from Dr. Gaspar on 8/12/24:  Financial support - insurance  Additional information - Patient having difficulty affording medical care through her private insurance but thinks she would qualify for state insurance but needs some guidance       Clinical Data: Care Coordinator Outreach    Outreach Documentation Number of Outreach Attempt   8/14/2024   2:02 PM 1   8/15/2024   1:25 PM 1     Pt called CHW on 8/14/24, leaving a VM request for a return phone call.     Left message on patient's voicemail with call back information and requested return call.    Plan: Care Coordinator will try to reach patient again in 1-2 business days.    Shae Bennett  Community Health Worker  [facility]  [phone]

## 2024-08-21 NOTE — TELEPHONE ENCOUNTER
FUTURE VISIT INFORMATION      FUTURE VISIT INFORMATION:  Date: 9/19/24  Time: 8:40 AM  [place] [facility]  REFERRAL INFORMATION:  Referring provider:    Referring providers clinic:    Reason for visit/diagnosis:  history of recurrent odynophagia in the setting of supraglottitis and vallecular abscess/phlegmon. Concern for underlying anatomic abnormality (cyst, etc) given recurrence     RECORDS REQUESTED FROM      Clinic name Comments Records Status Imaging Status   [facility] ER 8/9/24 ER note/ referral  8/9/24 ENT consult- Pinky Moore MD  Epic    [facility] 8/9/24 CT neck  5/27/24 CT neck Epic PACS           
all other ROS negative except as per HPI

## 2024-09-19 ENCOUNTER — PRE VISIT (OUTPATIENT)
Dept: OTOLARYNGOLOGY | Facility: CLINIC | Age: 34
End: 2024-09-19

## 2024-09-26 ENCOUNTER — HOSPITAL ENCOUNTER (OUTPATIENT)
Facility: CLINIC | Age: 34
Setting detail: OBSERVATION
Discharge: HOME | End: 2024-09-27
Attending: EMERGENCY MEDICINE | Admitting: EMERGENCY MEDICINE
Payer: COMMERCIAL

## 2024-09-26 ENCOUNTER — APPOINTMENT (OUTPATIENT)
Dept: ULTRASOUND IMAGING | Facility: CLINIC | Age: 34
End: 2024-09-26
Attending: EMERGENCY MEDICINE
Payer: COMMERCIAL

## 2024-09-26 DIAGNOSIS — N83.209 HEMORRHAGIC OVARIAN CYST: ICD-10-CM

## 2024-09-26 DIAGNOSIS — F10.90 ALCOHOL USE DISORDER: ICD-10-CM

## 2024-09-26 DIAGNOSIS — R45.851 SUICIDAL IDEATION: ICD-10-CM

## 2024-09-26 DIAGNOSIS — F41.9 ANXIETY: ICD-10-CM

## 2024-09-26 DIAGNOSIS — N83.202 CYST OF LEFT OVARY: Primary | ICD-10-CM

## 2024-09-26 LAB
ALBUMIN SERPL BCG-MCNC: 4.3 G/DL (ref 3.5–5.2)
ALP SERPL-CCNC: 44 U/L (ref 40–150)
ALT SERPL W P-5'-P-CCNC: 10 U/L (ref 0–50)
AMPHETAMINES UR QL SCN: ABNORMAL
ANION GAP SERPL CALCULATED.3IONS-SCNC: 11 MMOL/L (ref 7–15)
AST SERPL W P-5'-P-CCNC: 18 U/L (ref 0–45)
BARBITURATES UR QL SCN: ABNORMAL
BASOPHILS # BLD AUTO: 0 10E3/UL (ref 0–0.2)
BASOPHILS NFR BLD AUTO: 0 %
BENZODIAZ UR QL SCN: ABNORMAL
BILIRUB SERPL-MCNC: 0.5 MG/DL
BUN SERPL-MCNC: 5.5 MG/DL (ref 6–20)
BZE UR QL SCN: ABNORMAL
CALCIUM SERPL-MCNC: 9 MG/DL (ref 8.8–10.4)
CANNABINOIDS UR QL SCN: ABNORMAL
CHLORIDE SERPL-SCNC: 105 MMOL/L (ref 98–107)
CREAT SERPL-MCNC: 0.85 MG/DL (ref 0.51–0.95)
EGFRCR SERPLBLD CKD-EPI 2021: >90 ML/MIN/1.73M2
EOSINOPHIL # BLD AUTO: 0.1 10E3/UL (ref 0–0.7)
EOSINOPHIL NFR BLD AUTO: 1 %
ERYTHROCYTE [DISTWIDTH] IN BLOOD BY AUTOMATED COUNT: 14.9 % (ref 10–15)
ETHANOL SERPL-MCNC: <0.01 G/DL
FENTANYL UR QL: ABNORMAL
GLUCOSE SERPL-MCNC: 100 MG/DL (ref 70–99)
HCG INTACT+B SERPL-ACNC: <1 MIU/ML
HCG UR QL: NEGATIVE
HCO3 SERPL-SCNC: 24 MMOL/L (ref 22–29)
HCT VFR BLD AUTO: 37.4 % (ref 35–47)
HGB BLD-MCNC: 12.4 G/DL (ref 11.7–15.7)
IMM GRANULOCYTES # BLD: 0 10E3/UL
IMM GRANULOCYTES NFR BLD: 0 %
LIPASE SERPL-CCNC: 19 U/L (ref 13–60)
LYMPHOCYTES # BLD AUTO: 4.1 10E3/UL (ref 0.8–5.3)
LYMPHOCYTES NFR BLD AUTO: 41 %
MCH RBC QN AUTO: 30.8 PG (ref 26.5–33)
MCHC RBC AUTO-ENTMCNC: 33.2 G/DL (ref 31.5–36.5)
MCV RBC AUTO: 93 FL (ref 78–100)
MONOCYTES # BLD AUTO: 0.6 10E3/UL (ref 0–1.3)
MONOCYTES NFR BLD AUTO: 6 %
NEUTROPHILS # BLD AUTO: 5.1 10E3/UL (ref 1.6–8.3)
NEUTROPHILS NFR BLD AUTO: 51 %
NRBC # BLD AUTO: 0 10E3/UL
NRBC BLD AUTO-RTO: 0 /100
OPIATES UR QL SCN: ABNORMAL
PCP QUAL URINE (ROCHE): ABNORMAL
PLATELET # BLD AUTO: 264 10E3/UL (ref 150–450)
POTASSIUM SERPL-SCNC: 4.1 MMOL/L (ref 3.4–5.3)
PROT SERPL-MCNC: 7.3 G/DL (ref 6.4–8.3)
RBC # BLD AUTO: 4.03 10E6/UL (ref 3.8–5.2)
SODIUM SERPL-SCNC: 140 MMOL/L (ref 135–145)
WBC # BLD AUTO: 10 10E3/UL (ref 4–11)

## 2024-09-26 PROCEDURE — 96361 HYDRATE IV INFUSION ADD-ON: CPT | Performed by: EMERGENCY MEDICINE

## 2024-09-26 PROCEDURE — 83690 ASSAY OF LIPASE: CPT | Performed by: EMERGENCY MEDICINE

## 2024-09-26 PROCEDURE — 84702 CHORIONIC GONADOTROPIN TEST: CPT | Performed by: EMERGENCY MEDICINE

## 2024-09-26 PROCEDURE — 96375 TX/PRO/DX INJ NEW DRUG ADDON: CPT | Performed by: EMERGENCY MEDICINE

## 2024-09-26 PROCEDURE — 96374 THER/PROPH/DIAG INJ IV PUSH: CPT | Performed by: EMERGENCY MEDICINE

## 2024-09-26 PROCEDURE — 80307 DRUG TEST PRSMV CHEM ANLYZR: CPT | Performed by: EMERGENCY MEDICINE

## 2024-09-26 PROCEDURE — 82077 ASSAY SPEC XCP UR&BREATH IA: CPT | Performed by: EMERGENCY MEDICINE

## 2024-09-26 PROCEDURE — 93976 VASCULAR STUDY: CPT

## 2024-09-26 PROCEDURE — 99285 EMERGENCY DEPT VISIT HI MDM: CPT | Performed by: EMERGENCY MEDICINE

## 2024-09-26 PROCEDURE — 81025 URINE PREGNANCY TEST: CPT | Performed by: EMERGENCY MEDICINE

## 2024-09-26 PROCEDURE — 36415 COLL VENOUS BLD VENIPUNCTURE: CPT | Performed by: EMERGENCY MEDICINE

## 2024-09-26 PROCEDURE — 250N000011 HC RX IP 250 OP 636: Performed by: EMERGENCY MEDICINE

## 2024-09-26 PROCEDURE — 96361 HYDRATE IV INFUSION ADD-ON: CPT

## 2024-09-26 PROCEDURE — 80053 COMPREHEN METABOLIC PANEL: CPT | Performed by: EMERGENCY MEDICINE

## 2024-09-26 PROCEDURE — 85025 COMPLETE CBC W/AUTO DIFF WBC: CPT | Performed by: EMERGENCY MEDICINE

## 2024-09-26 PROCEDURE — 99285 EMERGENCY DEPT VISIT HI MDM: CPT | Mod: 25 | Performed by: EMERGENCY MEDICINE

## 2024-09-26 PROCEDURE — 258N000003 HC RX IP 258 OP 636: Performed by: EMERGENCY MEDICINE

## 2024-09-26 RX ORDER — LORAZEPAM 1 MG/1
1-4 TABLET ORAL EVERY 30 MIN PRN
Status: DISCONTINUED | OUTPATIENT
Start: 2024-09-26 | End: 2024-09-27 | Stop reason: HOSPADM

## 2024-09-26 RX ORDER — ONDANSETRON 2 MG/ML
4 INJECTION INTRAMUSCULAR; INTRAVENOUS ONCE
Status: COMPLETED | OUTPATIENT
Start: 2024-09-26 | End: 2024-09-26

## 2024-09-26 RX ORDER — HYDROMORPHONE HYDROCHLORIDE 1 MG/ML
0.5 INJECTION, SOLUTION INTRAMUSCULAR; INTRAVENOUS; SUBCUTANEOUS ONCE
Status: COMPLETED | OUTPATIENT
Start: 2024-09-26 | End: 2024-09-26

## 2024-09-26 RX ORDER — TRAZODONE HYDROCHLORIDE 50 MG/1
100 TABLET, FILM COATED ORAL ONCE
Status: COMPLETED | OUTPATIENT
Start: 2024-09-26 | End: 2024-09-27

## 2024-09-26 RX ADMIN — SODIUM CHLORIDE 1000 ML: 9 INJECTION, SOLUTION INTRAVENOUS at 21:39

## 2024-09-26 RX ADMIN — HYDROMORPHONE HYDROCHLORIDE 0.5 MG: 1 INJECTION, SOLUTION INTRAMUSCULAR; INTRAVENOUS; SUBCUTANEOUS at 21:45

## 2024-09-26 RX ADMIN — ONDANSETRON 4 MG: 2 INJECTION INTRAMUSCULAR; INTRAVENOUS at 21:40

## 2024-09-26 ASSESSMENT — COLUMBIA-SUICIDE SEVERITY RATING SCALE - C-SSRS
2. HAVE YOU ACTUALLY HAD ANY THOUGHTS OF KILLING YOURSELF IN THE PAST MONTH?: YES
3. HAVE YOU BEEN THINKING ABOUT HOW YOU MIGHT KILL YOURSELF?: NO
1. IN THE PAST MONTH, HAVE YOU WISHED YOU WERE DEAD OR WISHED YOU COULD GO TO SLEEP AND NOT WAKE UP?: YES
5. HAVE YOU STARTED TO WORK OUT OR WORKED OUT THE DETAILS OF HOW TO KILL YOURSELF? DO YOU INTEND TO CARRY OUT THIS PLAN?: NO
4. HAVE YOU HAD THESE THOUGHTS AND HAD SOME INTENTION OF ACTING ON THEM?: NO
6. HAVE YOU EVER DONE ANYTHING, STARTED TO DO ANYTHING, OR PREPARED TO DO ANYTHING TO END YOUR LIFE?: NO

## 2024-09-26 ASSESSMENT — ACTIVITIES OF DAILY LIVING (ADL)
ADLS_ACUITY_SCORE: 35

## 2024-09-27 VITALS
SYSTOLIC BLOOD PRESSURE: 103 MMHG | TEMPERATURE: 98.3 F | HEART RATE: 59 BPM | OXYGEN SATURATION: 100 % | WEIGHT: 136 LBS | RESPIRATION RATE: 18 BRPM | DIASTOLIC BLOOD PRESSURE: 66 MMHG | HEIGHT: 63 IN | BODY MASS INDEX: 24.1 KG/M2

## 2024-09-27 PROBLEM — R45.851 DEPRESSION WITH SUICIDAL IDEATION: Status: ACTIVE | Noted: 2024-09-27

## 2024-09-27 PROBLEM — F12.99 CANNABIS-RELATED DISORDER (H): Status: ACTIVE | Noted: 2024-09-27

## 2024-09-27 PROBLEM — Z59.6 LOW INCOME: Status: RESOLVED | Noted: 2024-09-27 | Resolved: 2024-09-27

## 2024-09-27 PROBLEM — Z59.9 HOUSING OR ECONOMIC CIRCUMSTANCE: Status: ACTIVE | Noted: 2024-09-27

## 2024-09-27 PROBLEM — F10.99 ALCOHOL-RELATED DISORDER (H): Status: ACTIVE | Noted: 2024-09-27

## 2024-09-27 PROBLEM — Z59.6 LOW INCOME: Status: ACTIVE | Noted: 2024-09-27

## 2024-09-27 PROBLEM — G47.00 INSOMNIA: Status: ACTIVE | Noted: 2024-09-27

## 2024-09-27 PROBLEM — F32.A DEPRESSION WITH SUICIDAL IDEATION: Status: ACTIVE | Noted: 2024-09-27

## 2024-09-27 PROCEDURE — 250N000013 HC RX MED GY IP 250 OP 250 PS 637: Performed by: EMERGENCY MEDICINE

## 2024-09-27 PROCEDURE — 96376 TX/PRO/DX INJ SAME DRUG ADON: CPT

## 2024-09-27 PROCEDURE — 96361 HYDRATE IV INFUSION ADD-ON: CPT

## 2024-09-27 PROCEDURE — 99222 1ST HOSP IP/OBS MODERATE 55: CPT | Performed by: PSYCHIATRY & NEUROLOGY

## 2024-09-27 PROCEDURE — 250N000011 HC RX IP 250 OP 636: Performed by: EMERGENCY MEDICINE

## 2024-09-27 PROCEDURE — G0378 HOSPITAL OBSERVATION PER HR: HCPCS

## 2024-09-27 RX ORDER — IBUPROFEN 600 MG/1
600 TABLET, FILM COATED ORAL ONCE
Status: COMPLETED | OUTPATIENT
Start: 2024-09-27 | End: 2024-09-27

## 2024-09-27 RX ORDER — HYDROXYZINE HYDROCHLORIDE 25 MG/1
25-50 TABLET, FILM COATED ORAL EVERY 8 HOURS PRN
Qty: 40 TABLET | Refills: 0 | Status: SHIPPED | OUTPATIENT
Start: 2024-09-27 | End: 2024-10-07

## 2024-09-27 RX ORDER — HYDROCODONE BITARTRATE AND ACETAMINOPHEN 5; 325 MG/1; MG/1
1 TABLET ORAL ONCE
Status: COMPLETED | OUTPATIENT
Start: 2024-09-27 | End: 2024-09-27

## 2024-09-27 RX ORDER — ESCITALOPRAM OXALATE 10 MG/1
10 TABLET ORAL DAILY
Qty: 30 TABLET | Refills: 0 | Status: SHIPPED | OUTPATIENT
Start: 2024-09-27 | End: 2024-10-27

## 2024-09-27 RX ADMIN — IBUPROFEN 600 MG: 600 TABLET, FILM COATED ORAL at 05:57

## 2024-09-27 RX ADMIN — HYDROMORPHONE HYDROCHLORIDE 1 MG: 1 INJECTION, SOLUTION INTRAMUSCULAR; INTRAVENOUS; SUBCUTANEOUS at 01:44

## 2024-09-27 RX ADMIN — TRAZODONE HYDROCHLORIDE 100 MG: 50 TABLET ORAL at 00:05

## 2024-09-27 ASSESSMENT — ACTIVITIES OF DAILY LIVING (ADL)
ADLS_ACUITY_SCORE: 35

## 2024-09-27 ASSESSMENT — COLUMBIA-SUICIDE SEVERITY RATING SCALE - C-SSRS
2. HAVE YOU ACTUALLY HAD ANY THOUGHTS OF KILLING YOURSELF?: NO
1. SINCE LAST CONTACT, HAVE YOU WISHED YOU WERE DEAD OR WISHED YOU COULD GO TO SLEEP AND NOT WAKE UP?: YES
SUICIDE, SINCE LAST CONTACT: NO
REASONS FOR IDEATION SINCE LAST CONTACT: MOSTLY TO END OR STOP THE PAIN (YOU COULDN'T GO ON LIVING WITH THE PAIN OR HOW YOU WERE FEELING)
ATTEMPT SINCE LAST CONTACT: NO
TOTAL  NUMBER OF INTERRUPTED ATTEMPTS SINCE LAST CONTACT: NO
6. HAVE YOU EVER DONE ANYTHING, STARTED TO DO ANYTHING, OR PREPARED TO DO ANYTHING TO END YOUR LIFE?: NO
TOTAL  NUMBER OF ABORTED OR SELF INTERRUPTED ATTEMPTS SINCE LAST CONTACT: NO

## 2024-09-27 NOTE — CONSULTS
"Chhaya Mock MRN# [id number]   Age: 33 year old YOB: 1990   Date of Admission to ED: 2024    In person visit Details:     Patient was assessed and interviewed face-to-face in person with this writer   Assessment methods included conducting a formal interview with patient, review of medical records, collaboration with medical staff, and obtaining relevant collateral information from family and community providers when available. Myrna Sesay MD          History of Present Illness:     Patient presented to the ED on 2024 for abdominal pain and suicidal thoughts. Patient has been experiencing SI for \"a couple of weeks\". Passive without plan or intent. Reports decreased appetite, trouble sleeping, feeling hopeless and depressed, anxiety, isolating. Symptoms ongoing for months, worse since  when her uncle . She has been using THC and ETOH daily for several months. Drinking 1/2 pint daily. Denies history of withdrawal symptoms, has been on MSSA here, no medication required. UDS + THC. Denies history of manic episodes. No prior psychiatric history. Saw a therapist as a child, that was court ordered.  She is open to both medications and therapy.    Reviewed DEC assessment and ED notes.           Psychiatric and AODA History:     Patient reports no prior diagnoses, no hospitalizations, no suicide attempts, no self harm behavior, no medications. Tried melatonin for sleep but that gave her bad dreams.    Drinking and using THC daily as above. Denies other RICARDO history.          Past Medical and Surgical History:     PAST MEDICAL HISTORY:   Past Medical History:   Diagnosis Date    Carbuncle and furuncle     Hidradenitis suppurativa     Tobacco use disorder     Smokes 1/2 ppd. Started smoking at 14 years    Vaginal yeast infection 2017       PAST SURGICAL HISTORY:   Past Surgical History:   Procedure Laterality Date    LAPAROSCOPY DIAGNOSTIC (GYN) Left 2024 " "   Procedure: Diagnostic laparoscopy, left ovarian cystectomy and Peritoneal Biopsy;  Surgeon: Mel Gaspar MD;  Location: [facility] ORAL SURGERY PROCEDURE  10/23/2008    [place]Lain Teeth Extraction                Pertinent Social and Family History:   Works part time at [employer] as a , not getting enough hours to meet her financial needs. Lives with partner, no kids.    No known family history of mental illness or RICARDO             Allergies and Medications:     Allergies   Allergen Reactions    No Known Allergies        Medications:     Current Facility-Administered Medications   Medication Dose Route Frequency Provider Last Rate Last Admin    LORazepam (ATIVAN) tablet 1-4 mg  1-4 mg Oral Q30 Min Gregg Funes MD         No current outpatient medications on file.           Mental Status Exam:   Appearance:  awake, alert and adequately groomed  Attitude:  cooperative  Eye Contact:  fair  Mood:  depressed  Affect:  mood congruent and tearful at times  Speech:  clear, coherent  Psychomotor Behavior:  no evidence of tardive dyskinesia, dystonia, or tics  Thought Process:  logical, linear, and goal oriented  Associations:  no loose associations  Thought Content:  passive suicidal ideation present, no auditory hallucinations present, and no visual hallucinations present  Insight:  good  Judgment:  intact  Oriented to:  time, person, and place  Attention Span and Concentration:  intact  Recent and Remote Memory:  intact         Physical Exam:     /80   Pulse 72   Temp 98.5  F (36.9  C) (Oral)   Resp 17   Ht 1.6 m (5' 3\")   Wt 61.7 kg (136 lb)   LMP 09/24/2024 (Approximate)   SpO2 100%   BMI 24.09 kg/m    Weight is 136 lbs 0 oz 5' 3\" Body mass index is 24.09 kg/m .    Laboratory/Imaging:    Recent Results (from the past 72 hour(s))   HCG qualitative urine    Collection Time: 09/26/24  9:41 PM   Result Value Ref Range    hCG Urine Qualitative Negative Negative   Comprehensive " metabolic panel    Collection Time: 09/26/24  9:41 PM   Result Value Ref Range    Sodium 140 135 - 145 mmol/L    Potassium 4.1 3.4 - 5.3 mmol/L    Carbon Dioxide (CO2) 24 22 - 29 mmol/L    Anion Gap 11 7 - 15 mmol/L    Urea Nitrogen 5.5 (L) 6.0 - 20.0 mg/dL    Creatinine 0.85 0.51 - 0.95 mg/dL    GFR Estimate >90 >60 mL/min/1.73m2    Calcium 9.0 8.8 - 10.4 mg/dL    Chloride 105 98 - 107 mmol/L    Glucose 100 (H) 70 - 99 mg/dL    Alkaline Phosphatase 44 40 - 150 U/L    AST 18 0 - 45 U/L    ALT 10 0 - 50 U/L    Protein Total 7.3 6.4 - 8.3 g/dL    Albumin 4.3 3.5 - 5.2 g/dL    Bilirubin Total 0.5 <=1.2 mg/dL   Lipase    Collection Time: 09/26/24  9:41 PM   Result Value Ref Range    Lipase 19 13 - 60 U/L   HCG quantitative pregnancy (blood)    Collection Time: 09/26/24  9:41 PM   Result Value Ref Range    hCG Quantitative <1 <5 mIU/mL   Urine Drug Screen Panel    Collection Time: 09/26/24  9:41 PM   Result Value Ref Range    Amphetamines Urine Screen Negative Screen Negative    Barbituates Urine Screen Negative Screen Negative    Benzodiazepine Urine Screen Negative Screen Negative    Cannabinoids Urine Screen Positive (A) Screen Negative    Cocaine Urine Screen Negative Screen Negative    Fentanyl Qual Urine Screen Negative Screen Negative    Opiates Urine Screen Negative Screen Negative    PCP Urine Screen Negative Screen Negative   CBC with platelets and differential    Collection Time: 09/26/24  9:41 PM   Result Value Ref Range    WBC Count 10.0 4.0 - 11.0 10e3/uL    RBC Count 4.03 3.80 - 5.20 10e6/uL    Hemoglobin 12.4 11.7 - 15.7 g/dL    Hematocrit 37.4 35.0 - 47.0 %    MCV 93 78 - 100 fL    MCH 30.8 26.5 - 33.0 pg    MCHC 33.2 31.5 - 36.5 g/dL    RDW 14.9 10.0 - 15.0 %    Platelet Count 264 150 - 450 10e3/uL    % Neutrophils 51 %    % Lymphocytes 41 %    % Monocytes 6 %    % Eosinophils 1 %    % Basophils 0 %    % Immature Granulocytes 0 %    NRBCs per 100 WBC 0 <1 /100    Absolute Neutrophils 5.1 1.6 - 8.3  10e3/uL    Absolute Lymphocytes 4.1 0.8 - 5.3 10e3/uL    Absolute Monocytes 0.6 0.0 - 1.3 10e3/uL    Absolute Eosinophils 0.1 0.0 - 0.7 10e3/uL    Absolute Basophils 0.0 0.0 - 0.2 10e3/uL    Absolute Immature Granulocytes 0.0 <=0.4 10e3/uL    Absolute NRBCs 0.0 10e3/uL   Alcohol level blood    Collection Time: 09/26/24  9:41 PM   Result Value Ref Range    Alcohol ethyl <0.01 <=0.01 g/dL       ROS: 10 point ROS neg other than the symptoms noted above in the HPI.            Impression:   This patient is a 33 year old year old female with no psychiatric history who presented to the ED on 9/26/2024  for suicidal thoughts. SI are passive without plan or intent, present for several weeks. She describes symptoms consistent with MDD. Discussed treatment options, she is open to both therapy and medications. She works tomorrow and says she really needs to go as it is her only shift this week. She would likely benefit from some RICARDO support in the community. Discussed escitalopram with her including risks/benefits/side effects. Discussed that it can take weeks to work. Discussed that anxiety can get temporarily worse on starting the medication, discussed prn hydroxyzine for that. She is amenable to trying both medications.       Risk factors include primarily substance use. She is future oriented, engaging in treatment, has a partner, has a job. Risk is low overall.            Diagnoses:   Principal Diagnosis: MDD moderate    Secondary psychiatric diagnoses of concern this admission:  Alcohol abuse, THC abuse    Current medical diagnosis being treated:   Abdominal pain         Recommendations:        Recommend discharge home with outpatient follow up including therapy and med management  Recommend escitalopram 10 mg daily and hydroxyzine 25-50 mg Q 6 hours prn anxiety on discharge    .   Continue to consult psychiatry as needed.    Please call [facility]/DEC at [phone] if you have follow-up questions or wish to place another  consult.         Attestation     I have provided critical care services at the bedside in the [facility] adult emergency department evaluating the patient, reviewing notes and laboratory values and directing care. Discussed patient's care with LMHP and ED MD Dr Luque. Myrna Sesay MD September 27, 2024.    Disclaimer: This note consists of symbols derived from keyboarding, dictation, and/or voice recognition software. As a result, there may be errors in the script that have gone undetected.  Please consider this when interpreting information found in the chart.

## 2024-09-27 NOTE — ED PROVIDER NOTES
The patient was seen by psychiatry consult service and they are recommending lexapro 10 mg daily and atarax 25-50 mg prn for anxiety.  Scripts were sent to her pharmacy.  A alec assessment was done and recommendations made.  Patient wishes to leave due to having work tomorrow.  Extended care made recommendations.  Please see their note.  She was discharged per her request.      Esperanza Luque MD  09/27/24 3700

## 2024-09-27 NOTE — DISCHARGE INSTRUCTIONS
Psychiatry recommends starting lexapro 10 mg daily.  You can also take atarax 25-50 mg every 8 hours if needed for anxiety.  Prescriptions were sent to your pharmacy.     Scheduled Appointment:    Date: Monday, 9/30/2024  Time: 9:00 am - 10:00 am  Provider: Libby Wick MA  LMFT  Location: [facility], [address] [place] [address], [place] [address]  Phone: [phone]  Type: Therapy - Initial (In-Person)    Scheduling Instructions  Please include clients email. WEBSITE: [email or web address]      Date: Friday, 10/11/2024  Time: 10:00 am - 11:00 am  Provider: Shanthi KIMBROUGH  CNP,RN  Location: [place] [address]  Phone: [phone]  Type: Telepsychiatry        It is your responsibility to contact your insurance company directly to verify coverage, eligibility, payment, and benefit information for any appointments or referrals listed above.    [facility] maintains an extensive network of licensed behavioral health providers to connect patients with the services they need. We do not charge providers a fee to participate in our referral network. We match patients with providers based on a patient's specific treatment needs, insurance coverage, and location. Our first effort will be to refer you to a provider within your care system and will utilize providers outside your care system as needed.     CHEMICAL HEALTH RESOURCES:  The first step to accessing chemical health treatment is to complete a Chemical Health Assessment.  Chemical Health Assessments are beneficial as the  can help you find a treatment program that is a good fit for your goals and needs. The  will make the referrals for you and help follow up until you get into a program.  To schedule an Assessment with [facility], call Behavioral Access at [phone].    To find recovery support meetings near you or online:  AA:  https://[place].org/meetings/ or call [phone]  NA: https://[place].org/find-a-meeting/ or call 1-888.307.4370  SMART Recovery: https://meetings.smartrecovery.org/meetings/  [facility] Recovery: https://refugerecoverymeetings.org/meetings  [facility] Recovery: https://www.[facility]YAP/what-we-offer/find-a-cr-meeting  [facility] Connection: https://[place]WinLocal.org/ or call [phone] (All Recovery Meetings & Telephone Recovery Support)  [facility]: https://recoverydharma.org/meetings/    [facility] program offers access to chemical health services (individual counseling, group counseling) and peer recovery support services virtually.  For more information and to schedule, call: [phone].    Ways to help cope with sobriety:   Take prescribed medicines as scheduled   Keep follow-up appointments   Talk to others about your concerns   Get regular exercise   Practice deep breathing skills   Eat a healthy diet   Use community resources, including hotline numbers, [facility] crisis and support meetings   Stay sober and avoid places/people/things associated with substance use   Maintain a daily schedule/routine   Get at least 7-8 hours of sleep per night   Create a list 10--20 healthy activities that you can do that are enjoyable and do not involve substance use   Create daily goals (approx. 1-4 goals) per day and work to achieve them throughout the day    [place] [facility] Connection ([facility]): [facility] connects people seeking recovery to resources that help foster and sustain long-term recovery. Whether you are seeking resources for treatment, transportation, housing, job training, education, health care or other pathways to recovery, [facility] is a great place to start. Phone: [phone]. www.[place]404 Found! (Great listing of all types of recovery and non-recovery related resources).

## 2024-09-27 NOTE — ED PROVIDER NOTES
[facility] EMERGENCY DEPARTMENT ([facility])    9/26/24      ED PROVIDER NOTE    History     Chief Complaint   Patient presents with    Abdominal Pain     Patient presents due to abdominal pain present for the last 2 days. Patient is concerned about ovarian cyst.     Suicidal     Patient reports suicidal thoughts, denies plan, however wants to check in so patient does not harm herself.      MARLON Mock is a 33 year old female with history of hidradinitis suppurativa, hemorrhagic cyst of left ovary s/p cystectomy (5/29/24), who presents to the ED with complaints of abdominal pain and SI. Patient reports that her last menstrual period was 2 weeks ago, she follows with an trisha and notes that she ovulated about 2 days ago, is now presenting with left lower quadrant abdominal pain.  She states that she has had ovarian cyst in the past that have felt similar in terms of the pain response.  In addition to that, the patient reports that she is having worsening depression and suicidal ideation.  She does not feel safe at home currently.  She does not have a specific plan in place but she would like help with management.  Denies any additional medical or psychiatric bleeds at this time.  Denies homicidal ideation, hallucinations or concern for psychosis.  Denies chest pain, shortness of breath, fevers, chills, or other additional concerns.      Past Medical History  Past Medical History:   Diagnosis Date    Carbuncle and furuncle     Hidradenitis suppurativa     Tobacco use disorder     Smokes 1/2 ppd. Started smoking at 14 years    Vaginal yeast infection 06/02/2017     Past Surgical History:   Procedure Laterality Date    LAPAROSCOPY DIAGNOSTIC (GYN) Left 5/29/2024    Procedure: Diagnostic laparoscopy, left ovarian cystectomy and Peritoneal Biopsy;  Surgeon: Mel Gaspar MD;  Location: [facility] ORAL SURGERY PROCEDURE  10/23/2008    [place] Teeth Extraction     No current outpatient  "medications on file.    Allergies   Allergen Reactions    No Known Allergies      Family History  Family History   Problem Relation Age of Onset    Hypertension Maternal Grandmother     Diabetes Maternal Uncle     Hypertension Maternal Uncle     Alcohol/Drug Maternal Aunt     Alcohol/Drug Maternal Uncle     Anesthesia Reaction Other      Social History   Social History     Tobacco Use    Smoking status: Some Days     Types: Hookah     Passive exposure: Current    Smokeless tobacco: Never   Vaping Use    Vaping status: Never Used   Substance Use Topics    Alcohol use: No     Comment: occassionally    Drug use: Yes     Types: Marijuana     Comment: occassionally      Past medical history, past surgical history, medications, allergies, family history, and social history were reviewed with the patient. No additional pertinent items.     A complete review of systems was performed with pertinent positives and negatives noted in the HPI, and all other systems negative.    Physical Exam   BP: 126/80  Pulse: 72  Temp: 98.5  F (36.9  C)  Resp: 16  Height: 160 cm (5' 3\")  Weight: 61.7 kg (136 lb)  SpO2: 100 %  Physical Exam  Vitals and nursing note reviewed.   Constitutional:       General: She is not in acute distress.     Appearance: Normal appearance. She is not diaphoretic.   HENT:      Head: Atraumatic.      Mouth/Throat:      Mouth: Mucous membranes are moist.   Eyes:      General: No scleral icterus.     Conjunctiva/sclera: Conjunctivae normal.   Cardiovascular:      Rate and Rhythm: Normal rate.      Heart sounds: Normal heart sounds.   Pulmonary:      Effort: No respiratory distress.      Breath sounds: Normal breath sounds.   Abdominal:      General: Abdomen is flat.      Palpations: Abdomen is soft.      Tenderness: There is abdominal tenderness in the left lower quadrant. There is no right CVA tenderness, left CVA tenderness, guarding or rebound.   Musculoskeletal:      Cervical back: Neck supple.   Skin:     " General: Skin is warm.      Findings: No rash.   Neurological:      Mental Status: She is alert.         ED Course, Procedures, & Data      Procedures       A consult was attained from the DEC service. The case was discussed with DEC  from that service. The consulting service's recommendations were provided promptly.          Results for orders placed or performed during the hospital encounter of 09/26/24   US Pelvis Cmplt w Transvag & Doppler LmtPel Duplex Limited     Status: None    Narrative    EXAM: US PELVIS COMPLETE W TRANSVAGINAL AND DOPPLER LIMITED  LOCATION: [facility]  DATE: 9/26/2024    INDICATION: LLQ abd pain, history of ovarian cyst, eval for torsion cyst  COMPARISON: CT dated 5/29/2024  TECHNIQUE: Transabdominal scans were performed. Endovaginal ultrasound was performed to better visualize the adnexa. Color flow with spectral Doppler and waveform analysis performed.    FINDINGS:    UTERUS: 8.3 x 2.9 x 3.8 cm. Normal in size and position with no masses.    ENDOMETRIUM: 5.4 mm. Normal smooth endometrium.    RIGHT OVARY: 3.5 x 1.9 x 2.4 cm. Normal with arterial and venous duplex flow identified.    LEFT OVARY: 5.4 x 2.9 x 4.0 cm. Hemorrhagic left ovarian cyst measures 4.6 x 2.2 x 3.3 cm. Arterial and venous duplex flow identified.    No significant free fluid.      Impression    IMPRESSION:    1.  4.6 cm left ovarian hemorrhagic cyst.  2.  No sonographic evidence of torsion.         HCG qualitative urine     Status: Normal   Result Value Ref Range    hCG Urine Qualitative Negative Negative   Comprehensive metabolic panel     Status: Abnormal   Result Value Ref Range    Sodium 140 135 - 145 mmol/L    Potassium 4.1 3.4 - 5.3 mmol/L    Carbon Dioxide (CO2) 24 22 - 29 mmol/L    Anion Gap 11 7 - 15 mmol/L    Urea Nitrogen 5.5 (L) 6.0 - 20.0 mg/dL    Creatinine 0.85 0.51 - 0.95 mg/dL    GFR Estimate >90 >60 mL/min/1.73m2    Calcium 9.0 8.8 - 10.4 mg/dL     Chloride 105 98 - 107 mmol/L    Glucose 100 (H) 70 - 99 mg/dL    Alkaline Phosphatase 44 40 - 150 U/L    AST 18 0 - 45 U/L    ALT 10 0 - 50 U/L    Protein Total 7.3 6.4 - 8.3 g/dL    Albumin 4.3 3.5 - 5.2 g/dL    Bilirubin Total 0.5 <=1.2 mg/dL   Lipase     Status: Normal   Result Value Ref Range    Lipase 19 13 - 60 U/L   HCG quantitative pregnancy (blood)     Status: Normal   Result Value Ref Range    hCG Quantitative <1 <5 mIU/mL   Urine Drug Screen Panel     Status: Abnormal   Result Value Ref Range    Amphetamines Urine Screen Negative Screen Negative    Barbituates Urine Screen Negative Screen Negative    Benzodiazepine Urine Screen Negative Screen Negative    Cannabinoids Urine Screen Positive (A) Screen Negative    Cocaine Urine Screen Negative Screen Negative    Fentanyl Qual Urine Screen Negative Screen Negative    Opiates Urine Screen Negative Screen Negative    PCP Urine Screen Negative Screen Negative   CBC with platelets and differential     Status: None   Result Value Ref Range    WBC Count 10.0 4.0 - 11.0 10e3/uL    RBC Count 4.03 3.80 - 5.20 10e6/uL    Hemoglobin 12.4 11.7 - 15.7 g/dL    Hematocrit 37.4 35.0 - 47.0 %    MCV 93 78 - 100 fL    MCH 30.8 26.5 - 33.0 pg    MCHC 33.2 31.5 - 36.5 g/dL    RDW 14.9 10.0 - 15.0 %    Platelet Count 264 150 - 450 10e3/uL    % Neutrophils 51 %    % Lymphocytes 41 %    % Monocytes 6 %    % Eosinophils 1 %    % Basophils 0 %    % Immature Granulocytes 0 %    NRBCs per 100 WBC 0 <1 /100    Absolute Neutrophils 5.1 1.6 - 8.3 10e3/uL    Absolute Lymphocytes 4.1 0.8 - 5.3 10e3/uL    Absolute Monocytes 0.6 0.0 - 1.3 10e3/uL    Absolute Eosinophils 0.1 0.0 - 0.7 10e3/uL    Absolute Basophils 0.0 0.0 - 0.2 10e3/uL    Absolute Immature Granulocytes 0.0 <=0.4 10e3/uL    Absolute NRBCs 0.0 10e3/uL   Urine Drug Screen     Status: Abnormal    Narrative    The following orders were created for panel order Urine Drug Screen.  Procedure                               Abnormality          Status                     ---------                               -----------         ------                     Urine Drug Screen Panel[[id number]]      Abnormal            Final result                 Please view results for these tests on the individual orders.   CBC with Platelets & Differential     Status: None    Narrative    The following orders were created for panel order CBC with Platelets & Differential.  Procedure                               Abnormality         Status                     ---------                               -----------         ------                     CBC with platelets and d...[[id number]]                      Final result                 Please view results for these tests on the individual orders.     Medications   LORazepam (ATIVAN) tablet 1-4 mg (has no administration in time range)   HYDROmorphone (PF) (DILAUDID) injection 0.5 mg (0.5 mg Intravenous $Given 9/26/24 2145)   ondansetron (ZOFRAN) injection 4 mg (4 mg Intravenous $Given 9/26/24 2140)   sodium chloride 0.9% BOLUS 1,000 mL ( Intravenous Rate/Dose Verify 9/26/24 2310)     Labs Ordered and Resulted from Time of ED Arrival to Time of ED Departure   COMPREHENSIVE METABOLIC PANEL - Abnormal       Result Value    Sodium 140      Potassium 4.1      Carbon Dioxide (CO2) 24      Anion Gap 11      Urea Nitrogen 5.5 (*)     Creatinine 0.85      GFR Estimate >90      Calcium 9.0      Chloride 105      Glucose 100 (*)     Alkaline Phosphatase 44      AST 18      ALT 10      Protein Total 7.3      Albumin 4.3      Bilirubin Total 0.5     URINE DRUG SCREEN PANEL - Abnormal    Amphetamines Urine Screen Negative      Barbituates Urine Screen Negative      Benzodiazepine Urine Screen Negative      Cannabinoids Urine Screen Positive (*)     Cocaine Urine Screen Negative      Fentanyl Qual Urine Screen Negative      Opiates Urine Screen Negative      PCP Urine Screen Negative     HCG QUALITATIVE URINE - Normal    hCG Urine  Qualitative Negative     LIPASE - Normal    Lipase 19     HCG QUANTITATIVE PREGNANCY - Normal    hCG Quantitative <1     CBC WITH PLATELETS AND DIFFERENTIAL    WBC Count 10.0      RBC Count 4.03      Hemoglobin 12.4      Hematocrit 37.4      MCV 93      MCH 30.8      MCHC 33.2      RDW 14.9      Platelet Count 264      % Neutrophils 51      % Lymphocytes 41      % Monocytes 6      % Eosinophils 1      % Basophils 0      % Immature Granulocytes 0      NRBCs per 100 WBC 0      Absolute Neutrophils 5.1      Absolute Lymphocytes 4.1      Absolute Monocytes 0.6      Absolute Eosinophils 0.1      Absolute Basophils 0.0      Absolute Immature Granulocytes 0.0      Absolute NRBCs 0.0     ETHYL ALCOHOL LEVEL     US Pelvis Cmplt w Transvag & Doppler LmtPel Duplex Limited   Final Result   IMPRESSION:     1.  4.6 cm left ovarian hemorrhagic cyst.   2.  No sonographic evidence of torsion.                      Critical care was not performed.     Medical Decision Making  The patient's presentation was of high complexity (an acute health issue posing potential threat to life or bodily function).    The patient's evaluation involved:  review of external note(s) from 1 sources (see separate area of note for details)  review of 3+ test result(s) ordered prior to this encounter (see separate area of note for details)  ordering and/or review of 3+ test(s) in this encounter (see separate area of note for details)  discussion of management or test interpretation with another health professional (see separate area of note for details)    The patient's management necessitated high risk (a decision regarding hospitalization).    Assessment & Plan    Chhaya Mock is a 33 year old female with history of hidradinitis suppurativa, hemorrhagic cyst of left ovary s/p cystectomy (5/29/24), who presents to the ED with complaints of abdominal pain and SI. Patient is nontoxic-appearing on exam, his vital signs within normal limits.   She is tearful, endorses ongoing suicidal ideation, denies any concern for hallucinations or psychosis and does not have homicidal ideations.  With regard to her abdominal pain, she does have tenderness in the left lower quadrant.  Her timing and prior history suggest that this may be indicative of an ovarian cyst.  She was worked up with labs and ultrasound transvaginal study that did show a 4.6 centimeter hemorrhagic cyst on the left, no concern for torsion at this time.  Patient's pain improved after medication and clinically not concern for torsion, however given the enlarged cyst she may be at increased risk and will need to be reassessed if she has recurrent significant pain.  In the meantime, DEC  evaluated the patient and recommended observation overnight and reassessment in the morning to discuss further.  Patient placed on MSSA protocol in case of alcohol withdrawal given her history of drinking the last few days, and placed observation overnight.    I have reviewed the nursing notes. I have reviewed the findings, diagnosis, plan and need for follow up with the patient.    New Prescriptions    No medications on file       Final diagnoses:   Suicidal ideation   Alcohol use disorder   Hemorrhagic ovarian cyst       Gregg Rajput MD  [facility] EMERGENCY DEPARTMENT  9/26/2024     Gregg Rajput MD  09/26/24 9009

## 2024-09-27 NOTE — CONSULTS
"Diagnostic Evaluation Consultation  Crisis Assessment    Patient Name: Chhaya Mock  Age:  33 year old  Legal Sex: female  Gender Identity: female  Pronouns:   Race: Black or   Ethnicity: Not  or   Language: English      Patient was assessed: In person   Crisis Assessment Start Date: 24  Crisis Assessment Start Time:   Crisis Assessment Stop Time:   Patient location: [facility] EMERGENCY DEPARTMENT                             UR ULTRASOUND    Referral Data and Chief Complaint  Chhaya Mock presents to the ED by  self. Patient is presenting to the ED for the following concerns: Suicidal ideation, Worsening psychosocial stress, Health stressors, Depression, Anxiety, Substance use.   Factors that make the mental health crisis life threatening or complex are:  Pt presented to the ER with abdominal pain and worsening mental health with suicidal ideation (no plan/intent). Pt reported uncontrollable worry, insomnia, decreased appetite, daily alcohol and cannabis use (for months), an inability to obtain enough work causing financial and housing instability, and social withdrawal. Pt stated \"I don't feel worthy, inadequate in life, along. I don't want to hurt myself but I don't want to live anymore because I feel like a burden to others. I can't sleep; I can't eat. I shut down and isolate.\" Although pt has been struggling with these life stressors for months, she stated things took a turn for the worse when her uncle passed away at the beginning of September and she was not allowed to attend the  and properly grieve. She expressed regrets/guilt around lack of contact/connection with her uncle before he passed. Pt stated \"I just want to go to sleep and not wake up or to feel better. Life is unbearable and death sometimes seems like the best option.\" Pt reported taking off the strings of clothing before arrival because she wasn't " "sure what she would do if mental health worsened, and not because she had a plan/intent to use them. Pt would prefer to discharge before returning to work on Saturday to earn income to maintain her housing.      Informed Consent and Assessment Methods  Explained the crisis assessment process, including applicable information disclosures and limits to confidentiality, assessed understanding of the process, and obtained consent to proceed with the assessment.  Assessment methods included conducting a formal interview with patient, review of medical records, collaboration with medical staff, and obtaining relevant collateral information from family and community providers when available.  : done     Patient response to interventions: eager to participate, acceptance expressed, verbalizes understanding  Coping skills were attempted to reduce the crisis:  Help-seeking     History of the Crisis   Pt denied awareness of a family history of mental health dx and denied any previously of her own. She endorsed visiting the hospital in c. 2001 and 2010, provided referrals, but didn't follow through because she wanted to avoid being labeled \"crazy.\" Pt endorsed smoking cannabis daily without the ability to quantify how much, reporting noticeable wx sx when she stops smoking. Pt stated she's been drinking c. 1/2 pint of alcohol daily for months and not sure if she would experience wx from that. Pt endorsed past tremors when abstaining from substance use, assuming they were associated with cannabis use, not alcohol. Pt stated it's been difficult to make the choice to ask for help, as her partner, who is supportive in many ways, doesn't appear to her to recognize the severity of her sx and often tries to reassure her that everything is OK. \"Sometimes he can talk me into going for a walk, pull me out of it, and make me feel OK. But he doesn't think anything is wrong because he sees me as functional; I have no choice to go to work " "and put on a smile.\"    Brief Psychosocial History  Family:  Domestic Partnership, Children no  Support System:  Partner  Employment Status:  employed part-time  Source of Income:  salary/wages  Financial Environmental Concerns:  unable to afford rent/mortgage  Current Hobbies:  other (see comments) (Pt did not identify any interests.)  Barriers in Personal Life:  mental health concerns    Significant Clinical History  Current Anxiety Symptoms:  excessive worry, racing thoughts, anxious  Current Depression/Trauma:  withdrawl/isolation, crying or feels like crying, thoughts of death/suicide, sadness, excessive guilt, hopelessness, helplessness, impaired decision making, low self esteem, sense of doom  Current Somatic Symptoms:  racing thoughts, excessive worry, anxious  Current Psychosis/Thought Disturbance:     Current Eating Symptoms:  loss of appetite  Chemical Use History:  Alcohol: Daily  Last Use:: 09/26/24  Benzodiazepines: None  Opiates: None  Cocaine: None  Marijuana: Daily  Last Use:: 09/26/24  Other Use: None  Withdrawal Symptoms: Tremors, Other (comments) (Pt endorsed past tremors pt assumed to be associated with cannabis wx, not alcohol.)   Past diagnosis:  Other (Review of chart suggests past dx given in ER of depression and anxiety, although pt appeared skeptical of their validity at the time of visits.)  Family history:  No known history of mental health or chemical health concerns  Past treatment:  Primary Care, Other (Infrequent contact with county  (not since March 2024).)  Details of most recent treatment:  N/A  Other relevant history:          Collateral Information  Is there collateral information: No (Call to partner Pj Mantilla ([phone]) couldn't go through as dialed; possibly out of service or temporarily shut off. Pt stated he planned to go out instead of staying home alone since she went to the hospital.)      Risk Assessment  [place] Suicide Severity Rating Scale Full " Clinical Version:  Suicidal Ideation  Q1 Wish to be Dead (Lifetime): Yes  Q2 Non-Specific Active Suicidal Thoughts (Lifetime): Yes  3. Active Suicidal Ideation with any Methods (Not Plan) Without Intent to Act (Lifetime): No  Q4 Active Suicidal Ideation with Some Intent to Act, Without Specific Plan (Lifetime): No  Q5 Active Suicidal Ideation with Specific Plan and Intent (Lifetime): No  Q6 Suicide Behavior (Lifetime): no     Suicidal Behavior (Lifetime)  Actual Attempt (Lifetime): No  Has subject engaged in non-suicidal self-injurious behavior? (Lifetime): No  Interrupted Attempts (Lifetime): No  Aborted or Self-Interrupted Attempt (Lifetime): No  Preparatory Acts or Behavior (Lifetime): No    [place] Suicide Severity Rating Scale Recent:   Suicidal Ideation (Recent)  Q1 Wished to be Dead (Past Month): yes  Q2 Suicidal Thoughts (Past Month): yes  Q3 Suicidal Thought Method: no  Q4 Suicidal Intent without Specific Plan: no  Q5 Suicide Intent with Specific Plan: no  Level of Risk per Screen: low risk  Intensity of Ideation (Recent)  Description of Most Severe Ideation (Past 1 Month): Wish to be dead and to end life w/out plan/intent. Pt stated she removed strongs from clothing in the event she becomes overwhelmed and might make irrational decisions, but not that she had a plan to use them for harm as aborted an attempt.  Frequency (Past 1 Month): Many times each day  Duration (Past 1 Month): More than 8 hours/persistent or continuous  Controllability (Past 1 Month): Unable to control thoughts  Deterrents (Past 1 Month): Deterrents definitely stopped you from attempting suicide  Reasons for Ideation (Past 1 Month): Completely to end or stop the pain (You couldn't go on living with the pain or how you were feeling)  Suicidal Behavior (Recent)  Actual Attempt (Past 3 Months): No  Has subject engaged in non-suicidal self-injurious behavior? (Past 3 Months): No  Interrupted Attempts (Past 3 Months): No  Aborted or  Self-Interrupted Attempt (Past 3 Months): No  Preparatory Acts or Behavior (Past 3 Months): No    Environmental or Psychosocial Events: suicide bereavement, work or task failure, helplessness/hopelessness, unemployment/underemployment, excessive debt, poor finances, unstable housing, homelessness, social isolation, other (see comment) (Health concerns/pain)  Protective Factors: Protective Factors: intact marriage or domestic partnership, responsibilities and duties to others, including pets and children, lives in a responsibly safe and stable environment, sense of importance of health and wellness, able to access care without barriers, help seeking, optimistic outlook - identification of future goals, reality testing ability    Does the patient have thoughts of harming others? Feels Like Hurting Others: no  Previous Attempt to Hurt Others: no    Is the patient engaging in sexually inappropriate behavior?           Mental Status Exam   Affect: Other (Anxious, teary, near panic)  Appearance: Disheveled  Attention Span/Concentration: Attentive  Eye Contact: Engaged    Fund of Knowledge: Appropriate   Language /Speech Content: Fluent  Language /Speech Volume: Normal  Language /Speech Rate/Productions: Normal  Recent Memory: Intact  Remote Memory: Intact  Mood: Depressed, Sad, Anxious  Orientation to Person: Yes   Orientation to Place: Yes  Orientation to Time of Day: Yes  Orientation to Date: Yes     Situation (Do they understand why they are here?): Yes  Psychomotor Behavior: Agitated  Thought Content: Suicidal  Thought Form: Obsessive/Perseverative      Medication  Psychotropic medications:   Medication Orders - Psychiatric (From admission, onward)      Start     Dose/Rate Route Frequency Ordered Stop    09/26/24 2330  LORazepam (ATIVAN) tablet 1-4 mg         1-4 mg Oral EVERY 30 MIN PRN 09/26/24 2330            Current Facility-Administered Medications   Medication Dose Route Frequency Provider Last Rate Last Admin     LORazepam (ATIVAN) tablet 1-4 mg  1-4 mg Oral Q30 Min CATHYN Gregg Rajput MD         No current outpatient medications on file.          Current Care Team  Patient Care Team:  Clinic - [facility] as PCP - Mel Delong MD as MD (OB/Gyn)  Mel Gaspar MD as Assigned OBGYN Provider    Diagnosis  Patient Active Problem List   Diagnosis Code    Hidradenitis suppurativa L73.2    Abscess L02.91    Hypocalcemia E83.51    Cigarette smoker motivated to quit F17.210    Cervical high risk HPV (human papillomavirus) test positive R87.810    Status post skin graft Z94.5    Supraglottitis without airway obstruction J04.30    Hemoperitoneum K66.1    Lower abdominal pain R10.30    Hemorrhagic cyst of left ovary N83.202    Ruptured cyst of left ovary N83.202    Abscess, neck L02.11    Suicidal ideation R45.851    Depression with suicidal ideation F32.A, R45.851    Insomnia G47.00    Unspecified alcohol-related disorder F10.99    Unspecified cannabis-related disorder F12.99    Other economic and housing problem Z59.9       Primary Problem This Admission  F32.A, R45.851 Depression with suicidal ideation  R10.30 Lower abdominal pain  Z59.9 Other economic and housing problem  G47.00 Insomnia  F12.99 Unspecified cannabis-related disorder  F10.99 Unspecified alcohol-related disorder    Clinical Summary and Substantiation of Recommendations   Life stressors (e.g., financial and housing instability) have contributed to depressive symptoms, which appear to have been exacerbated by reported daily substance use and recent loss of an uncle. Pt reported having difficulty speaking to others and reaching out for support about her concerns/struggles, trying to be strong and put on a good face, hoping that eventually things will be better. Pt reports worsening depressive symptoms for several weeks, appearing to meet criteria for a severe major depressive episode with suicidal ideation, while  denying any plan/intent. It is recommended by this provider that pt is monitored under observation to see if rest and medications will improve sx. If pt's sx improve, pt may be discharged with a referral for therapy and any services recommended from a  consult, as pt's hope is to return to work by Saturday to earn income to maintain her housing. If pt's sx do not improve or worsen, reassessment can determine if additional observation or a higher level of care is appropriate. Pt will need to be medically cleared before discharge, as pt also presented with abdominal pain for which she's undergone additional testing.    Patient coping skills attempted to reduce the crisis:  Help-seeking    Disposition  Recommended disposition: Individual Therapy, Observation, Substance Abuse Disorder Treatment, Medication Management, Other. please comment (Social work consult)        Reviewed case and recommendations with attending provider. Attending Name: Dr. Rajput       Attending concurs with disposition: yes       Patient and/or validated legal guardian concurs with disposition:   yes       Final disposition:  observation    Legal status on admission: Voluntary/Patient has signed consent for treatment    Assessment Details   Total duration spent with the patient: 53 min     CPT code(s) utilized: 93252 - Psychotherapy for Crisis - 60 (30-74*) min    Catarino Romero Psychotherapist  [facility] - Triage & Transition Services  Callback: [phone]

## 2024-09-27 NOTE — ED TRIAGE NOTES
Abdominal Pain Patient presents due to abdominal pain present for the last 2 days. Patient is concerned about ovarian cyst.        Suicidal Patient reports suicidal thoughts, denies plan, however wants to check in so patient does not harm herself.          Triage Assessment (Adult)       Row Name 09/26/24 2006          Triage Assessment    Airway WDL WDL        Respiratory WDL    Respiratory WDL WDL        Skin Circulation/Temperature WDL    Skin Circulation/Temperature WDL WDL        Cardiac WDL    Cardiac WDL WDL        Peripheral/Neurovascular WDL    Peripheral Neurovascular WDL WDL        Cognitive/Neuro/Behavioral WDL    Cognitive/Neuro/Behavioral WDL WDL

## 2024-09-27 NOTE — PROGRESS NOTES
"Triage and Transition Services Extended Care Reassessment     Patient: Chhaya goes by \"Chhaya,\" uses she/her pronouns  Date of Service: 2024  Site of Service: [facility] EMERGENCY DEPARTMENT                             UR ULTRASOUND  Patient was seen yes  Mode of Assessment: Virtual: iPad     Reason for Reassessment:  observation status    History of Patient's Original Emergency Room Encounter: Per initial assessment:  Pt presented to the ER with abdominal pain and worsening mental health with suicidal ideation (no plan/intent). Pt reported uncontrollable worry, insomnia, decreased appetite, daily alcohol and cannabis use (for months), an inability to obtain enough work causing financial and housing instability, and social withdrawal. Pt stated \"I don't feel worthy, inadequate in life, along. I don't want to hurt myself but I don't want to live anymore because I feel like a burden to others. I can't sleep; I can't eat. I shut down and isolate.\" Although pt has been struggling with these life stressors for months, she stated things took a turn for the worse when her uncle passed away at the beginning of September and she was not allowed to attend the  and properly grieve. She expressed regrets/guilt around lack of contact/connection with her uncle before he passed. Pt stated \"I just want to go to sleep and not wake up or to feel better. Life is unbearable and death sometimes seems like the best option.\" Pt reported taking off the strings of clothing before arrival because she wasn't sure what she would do if mental health worsened, and not because she had a plan/intent to use them. Pt would prefer to discharge before returning to work on Saturday to earn income to maintain her housing.   Pt denied awareness of a family history of mental health dx and denied any previously of her own. She endorsed visiting the hospital in c.  and , provided referrals, but didn't follow " "through because she wanted to avoid being labeled \"crazy.\" Pt endorsed smoking cannabis daily without the ability to quantify how much, reporting noticeable wx sx when she stops smoking. Pt stated she's been drinking c. 1/2 pint of alcohol daily for months and not sure if she would experience wx from that. Pt endorsed past tremors when abstaining from substance use, assuming they were associated with cannabis use, not alcohol. Pt stated it's been difficult to make the choice to ask for help, as her partner, who is supportive in many ways, doesn't appear to her to recognize the severity of her sx and often tries to reassure her that everything is OK. \"Sometimes he can talk me into going for a walk, pull me out of it, and make me feel OK. But he doesn't think anything is wrong because he sees me as functional; I have no choice to go to work and put on a smile.\"    Current Patient Presentation: Met with patient over an Ipad.  Patient states she is tired, sad, and lonely.  Patient is tearful at times throughout assessment.    She identifies suicidal thoughts has lessened she initial presentation.  She denies suicide plan, intent, or hx of attempts.  She denies SIB, HI, or hallucinations.  She reports turning to alcohol and drugs to cope and identifies feeling she has a problem with her use.  Patient verbalized wanting to stop drinking and using.  Offered have her seen for a RICARDO eval.  Pt in agreement, order entered.  Patient is agreeable to medication management and therapy referrals  Pt completed a safety plan.    Presentation Summary: Pt reports SI thoughts have lessened.  She denies a plan, intent or hx of attempts.  She denies SIB, HI or hallucations.  Pt agreeable to RICARDO assessment.  Notes from RICARDO  indicates she declined RICARDO services.  Referrals made for medication management and therapy.  Safety plan completed. Pt will discharge.    Changes Observed Since Initial Assessment: decrease in presenting " symptoms    Therapeutic Interventions Provided: Engaged in guided discovery, explored patient's perspectives and helped expand them through socratic dialogue., Explored strategies for self-soothing., Explored motivation for behavioral change.    Current Symptoms:   thoughts of death/suicide anxious   loss of appetite    Mental Status Exam   Affect:  (anxious, tearful)  Appearance:  (under blankets)  Attention Span/Concentration: Attentive  Eye Contact: Variable    Fund of Knowledge: Appropriate   Language /Speech Content: Fluent  Language /Speech Volume: Normal  Language /Speech Rate/Productions: Normal  Recent Memory: Intact  Remote Memory: Intact  Mood: Depressed, Sad, Anxious  Orientation to Person: Yes   Orientation to Place: Yes  Orientation to Time of Day: Yes  Orientation to Date: Yes     Situation (Do they understand why they are here?): Yes  Psychomotor Behavior: Normal  Thought Content: Suicidal (passive, less than initial presentation)  Thought Form: Intact    Treatment Objective(s) Addressed: rapport building, processing feelings, safety planning, assessing safety, identifying treatment goals    Patient Response to Interventions: verbalizes understanding, acceptance expressed    Progress Towards Goals:  Patient Reports Symptoms Are: improving  Patient Progress Toward Goals: is making progress  Next Step to Work Toward Discharge: follow up on referrals    Case Management: Summary of Interaction: Ordered RICARDO assessment.  RICARDO  notes pt declined RICARDO services.  Collaborated with consulting psych provider, Dr Sesay.  Patient scheduled for appointments for medication management and therapy    C-SSRS Since Last Contact:   1. Wish to be Dead (Since Last Contact): Yes  2. Non-Specific Active Suicidal Thoughts (Since Last Contact): No  Most Severe Ideation Rating (Since Last Contact): 3  Frequency (Since Last Contact): Daily or almost daily (passive no plan, intent)  Duration (Since Last Contact): 1-4  hours/a lot of time  Controllability (Since Last Contact): Can control thoughts with a lot of difficulty  Deterrents (Since Last Contact): Deterrents probably stopped you  Reasons for Ideation (Since Last Contact): Mostly to end or stop the pain (You couldn't go on living with the pain or how you were feeling)  Actual Attempt (Since Last Contact): No  Has subject engaged in non-suicidal self-injurious behavior? (Since Last Contact): No  Interrupted Attempts (Since Last Contact): No  Aborted or Self-Interrupted Attempt (Since Last Contact): No  Preparatory Acts or Behavior (Since Last Contact): No  Suicide (Since Last Contact): No     Calculated C-SSRS Risk Score (Since Last Contact): Low Risk    Plan: Final Disposition / Recommended Care Path: discharge    Plan for Care reviewed with assigned Medical Provider: yes  Plan for Care Team Review: RN, provider, other (see comment) (consulting psych provider, Dr Sesay)      Clinical Substantiation: Patient reports SI has lessened and she feels comfortable discharging home.  She denies plan, intent, or hx of suicide attempts.  Patient reschedule for therapy and medication management.  Patient agreed to RICARDO assessment then declined RICARDO services and resources were given.  Pt completed a safety plan.  Pt will discharge.    Legal Status: Legal Status at Admission: Voluntary/Patient has signed consent for treatment    Session Status: Time session started: 0855  Time session ended: 0915  Session Duration (minutes): 20 minutes  Session Number: 1  Anticipated number of sessions or this episode of care: 1    Session Start Time: 0855  Session Stop Time: 0915  CPT codes: 50590 - Psychotherapy (with patient) - 30 (16-37*) min  Time Spent: 20 minutes      CPT code(s) utilized: 01292 - Psychotherapy (with patient) - 30 (16-37*) min    Diagnosis:   Patient Active Problem List   Diagnosis Code    Hidradenitis suppurativa L73.2    Abscess L02.91    Hypocalcemia E83.51    Cigarette smoker  motivated to quit F17.210    Cervical high risk HPV (human papillomavirus) test positive R87.810    Status post skin graft Z94.5    Supraglottitis without airway obstruction J04.30    Hemoperitoneum K66.1    Lower abdominal pain R10.30    Hemorrhagic cyst of left ovary N83.202    Ruptured cyst of left ovary N83.202    Abscess, neck L02.11    Suicidal ideation R45.851    Depression with suicidal ideation F32.A, R45.851    Insomnia G47.00    Unspecified alcohol-related disorder F10.99    Unspecified cannabis-related disorder F12.99    Other economic and housing problem Z59.9       Primary Problem This Admission: Active Hospital Problems    Depression with suicidal ideation      Insomnia      Unspecified alcohol-related disorder      Unspecified cannabis-related disorder      Other economic and housing problem      Lower abdominal pain        CHRIS Johansen   Licensed Mental Health Professional (LMHP), [facility]  [phone]

## 2024-09-27 NOTE — PLAN OF CARE
Chhaya Mock  September 27, 2024  Plan of Care Hand-off Note     Patient Care Path: observation    Plan for Care:   Life stressors (e.g., financial and housing instability) have contributed to depressive symptoms, which appear to have been exacerbated by reported daily substance use and recent loss of an uncle. Pt reported having difficulty speaking to others and reaching out for support about her concerns/struggles, trying to be strong and put on a good face, hoping that eventually things will be better. Pt did not appear intoxicated and testing did not suggest high levels of blood alcohol to indicate a need for detox, although pt reported concern about wx sx she believes to be associated with cannabis use (including tremors). Pt reports worsening depressive symptoms for several weeks, appearing to meet criteria for a severe major depressive episode with suicidal ideation, while denying any plan/intent. It is recommended by this provider that pt is monitored under observation to see if rest and medications will improve sx. If pt's sx improve, pt may be discharged with a referral for therapy and any services recommended from a  consult, as pt's hope is to return to work by Saturday to earn income to maintain her housing. If pt's sx do not improve or worsen, reassessment can determine if additional observation or a higher level of care is appropriate. Pt will need to be medically cleared before discharge, as pt also presented with abdominal pain for which she's undergone additional testing.    Identified Goals and Safety Issues: Observation with goal of rest with medications. Extended care will reassess to determine if discharge is appropriate with referrals provided (pt's goal is to return to work by Saturday; a lot of anxiety is related to earning income to maintain housing that is at risk), or if sx do not improve or worsen, to recommend additional observation or a higher level of  care.    Overview:  Pj Mantilla (partner): [phone] (number might not be accurate, out of service, or temporarily turned off)      Legal Status: Legal Status at Admission: Voluntary/Patient has signed consent for treatment    Psychiatry Consult: not ordered     Updated attending physician and RN regarding plan of care.      Catarino Romero, Psychotherapist Trainee

## 2024-09-27 NOTE — CONSULTS
Met with pt for CD Consult and introduced self and role in pt's care.  Inquired about pt's interest in RICARDO Assessment for referrals to Tx or any additional community resources.  Pt declined to complete RICARDO Assessment for referrals to Tx at this time, but was open to receiving community CD resources, sending to pt via secure e-mail to [email or web address] and adding to pt's AVS.    Relayed to pt that if they change their mind while admitted and would like to complete a RICARDO Assessment for referrals to treatment or need any additional CD Resources they may alert unit staff who will assist in having CD Consult re-ordered.  If pt has active insurance they may also schedule an assessment on an outpatient basis by calling [facility] at [phone].    NANCY Neff, [facility]  Substance Use Disorder Evaluation Counselor  Email: [email or web address]

## 2024-09-29 ENCOUNTER — APPOINTMENT (OUTPATIENT)
Dept: ULTRASOUND IMAGING | Facility: CLINIC | Age: 34
End: 2024-09-29
Attending: EMERGENCY MEDICINE
Payer: COMMERCIAL

## 2024-09-29 ENCOUNTER — TELEPHONE (OUTPATIENT)
Dept: BEHAVIORAL HEALTH | Facility: CLINIC | Age: 34
End: 2024-09-29
Payer: COMMERCIAL

## 2024-09-29 ENCOUNTER — NURSE TRIAGE (OUTPATIENT)
Dept: NURSING | Facility: CLINIC | Age: 34
End: 2024-09-29
Payer: COMMERCIAL

## 2024-09-29 ENCOUNTER — HOSPITAL ENCOUNTER (EMERGENCY)
Facility: CLINIC | Age: 34
Discharge: HOME | End: 2024-09-30
Attending: EMERGENCY MEDICINE | Admitting: EMERGENCY MEDICINE
Payer: COMMERCIAL

## 2024-09-29 DIAGNOSIS — N83.202 HEMORRHAGIC CYST OF LEFT OVARY: ICD-10-CM

## 2024-09-29 LAB
ALBUMIN SERPL BCG-MCNC: 3.9 G/DL (ref 3.5–5.2)
ALBUMIN UR-MCNC: 30 MG/DL
ALP SERPL-CCNC: 41 U/L (ref 40–150)
ALT SERPL W P-5'-P-CCNC: 9 U/L (ref 0–50)
ANION GAP SERPL CALCULATED.3IONS-SCNC: 7 MMOL/L (ref 7–15)
APPEARANCE UR: CLEAR
AST SERPL W P-5'-P-CCNC: 14 U/L (ref 0–45)
BACTERIA #/AREA URNS HPF: ABNORMAL /HPF
BASOPHILS # BLD AUTO: 0 10E3/UL (ref 0–0.2)
BASOPHILS NFR BLD AUTO: 0 %
BILIRUB SERPL-MCNC: 0.3 MG/DL
BILIRUB UR QL STRIP: NEGATIVE
BUN SERPL-MCNC: 10.7 MG/DL (ref 6–20)
CALCIUM SERPL-MCNC: 8.8 MG/DL (ref 8.8–10.4)
CHLORIDE SERPL-SCNC: 105 MMOL/L (ref 98–107)
COLOR UR AUTO: YELLOW
CREAT SERPL-MCNC: 0.83 MG/DL (ref 0.51–0.95)
EGFRCR SERPLBLD CKD-EPI 2021: >90 ML/MIN/1.73M2
EOSINOPHIL # BLD AUTO: 0.1 10E3/UL (ref 0–0.7)
EOSINOPHIL NFR BLD AUTO: 1 %
ERYTHROCYTE [DISTWIDTH] IN BLOOD BY AUTOMATED COUNT: 14.7 % (ref 10–15)
GLUCOSE SERPL-MCNC: 106 MG/DL (ref 70–99)
GLUCOSE UR STRIP-MCNC: NEGATIVE MG/DL
HCG INTACT+B SERPL-ACNC: <1 MIU/ML
HCO3 SERPL-SCNC: 25 MMOL/L (ref 22–29)
HCT VFR BLD AUTO: 32.2 % (ref 35–47)
HGB BLD-MCNC: 10.6 G/DL (ref 11.7–15.7)
HGB UR QL STRIP: ABNORMAL
IMM GRANULOCYTES # BLD: 0 10E3/UL
IMM GRANULOCYTES NFR BLD: 0 %
KETONES UR STRIP-MCNC: NEGATIVE MG/DL
LEUKOCYTE ESTERASE UR QL STRIP: NEGATIVE
LIPASE SERPL-CCNC: 21 U/L (ref 13–60)
LYMPHOCYTES # BLD AUTO: 3.4 10E3/UL (ref 0.8–5.3)
LYMPHOCYTES NFR BLD AUTO: 40 %
MCH RBC QN AUTO: 30.2 PG (ref 26.5–33)
MCHC RBC AUTO-ENTMCNC: 32.9 G/DL (ref 31.5–36.5)
MCV RBC AUTO: 92 FL (ref 78–100)
MONOCYTES # BLD AUTO: 0.6 10E3/UL (ref 0–1.3)
MONOCYTES NFR BLD AUTO: 7 %
MUCOUS THREADS #/AREA URNS LPF: PRESENT /LPF
NEUTROPHILS # BLD AUTO: 4.4 10E3/UL (ref 1.6–8.3)
NEUTROPHILS NFR BLD AUTO: 52 %
NITRATE UR QL: NEGATIVE
NRBC # BLD AUTO: 0 10E3/UL
NRBC BLD AUTO-RTO: 0 /100
PH UR STRIP: 8.5 [PH] (ref 5–7)
PLATELET # BLD AUTO: 223 10E3/UL (ref 150–450)
POTASSIUM SERPL-SCNC: 3.4 MMOL/L (ref 3.4–5.3)
PROT SERPL-MCNC: 6.5 G/DL (ref 6.4–8.3)
RBC # BLD AUTO: 3.51 10E6/UL (ref 3.8–5.2)
RBC URINE: >182 /HPF
SODIUM SERPL-SCNC: 137 MMOL/L (ref 135–145)
SP GR UR STRIP: 1.03 (ref 1–1.03)
SQUAMOUS EPITHELIAL: 4 /HPF
UROBILINOGEN UR STRIP-MCNC: 3 MG/DL
WBC # BLD AUTO: 8.5 10E3/UL (ref 4–11)
WBC URINE: 1 /HPF

## 2024-09-29 PROCEDURE — 99285 EMERGENCY DEPT VISIT HI MDM: CPT | Performed by: EMERGENCY MEDICINE

## 2024-09-29 PROCEDURE — 99284 EMERGENCY DEPT VISIT MOD MDM: CPT | Mod: 25 | Performed by: EMERGENCY MEDICINE

## 2024-09-29 PROCEDURE — 84702 CHORIONIC GONADOTROPIN TEST: CPT | Performed by: EMERGENCY MEDICINE

## 2024-09-29 PROCEDURE — 250N000011 HC RX IP 250 OP 636: Performed by: EMERGENCY MEDICINE

## 2024-09-29 PROCEDURE — 96374 THER/PROPH/DIAG INJ IV PUSH: CPT | Performed by: EMERGENCY MEDICINE

## 2024-09-29 PROCEDURE — 80053 COMPREHEN METABOLIC PANEL: CPT | Performed by: EMERGENCY MEDICINE

## 2024-09-29 PROCEDURE — 96361 HYDRATE IV INFUSION ADD-ON: CPT | Performed by: EMERGENCY MEDICINE

## 2024-09-29 PROCEDURE — 96376 TX/PRO/DX INJ SAME DRUG ADON: CPT | Performed by: EMERGENCY MEDICINE

## 2024-09-29 PROCEDURE — 36415 COLL VENOUS BLD VENIPUNCTURE: CPT | Performed by: EMERGENCY MEDICINE

## 2024-09-29 PROCEDURE — 81001 URINALYSIS AUTO W/SCOPE: CPT | Performed by: EMERGENCY MEDICINE

## 2024-09-29 PROCEDURE — 258N000003 HC RX IP 258 OP 636: Performed by: EMERGENCY MEDICINE

## 2024-09-29 PROCEDURE — 85025 COMPLETE CBC W/AUTO DIFF WBC: CPT | Performed by: EMERGENCY MEDICINE

## 2024-09-29 PROCEDURE — 93976 VASCULAR STUDY: CPT

## 2024-09-29 PROCEDURE — 83690 ASSAY OF LIPASE: CPT | Performed by: EMERGENCY MEDICINE

## 2024-09-29 RX ORDER — HYDROMORPHONE HYDROCHLORIDE 1 MG/ML
0.5 INJECTION, SOLUTION INTRAMUSCULAR; INTRAVENOUS; SUBCUTANEOUS ONCE
Status: COMPLETED | OUTPATIENT
Start: 2024-09-29 | End: 2024-09-29

## 2024-09-29 RX ADMIN — HYDROMORPHONE HYDROCHLORIDE 0.5 MG: 1 INJECTION, SOLUTION INTRAMUSCULAR; INTRAVENOUS; SUBCUTANEOUS at 23:47

## 2024-09-29 RX ADMIN — HYDROMORPHONE HYDROCHLORIDE 1 MG: 1 INJECTION, SOLUTION INTRAMUSCULAR; INTRAVENOUS; SUBCUTANEOUS at 22:17

## 2024-09-29 RX ADMIN — SODIUM CHLORIDE 1000 ML: 9 INJECTION, SOLUTION INTRAVENOUS at 23:25

## 2024-09-29 ASSESSMENT — ACTIVITIES OF DAILY LIVING (ADL)
ADLS_ACUITY_SCORE: 35
ADLS_ACUITY_SCORE: 35

## 2024-09-29 ASSESSMENT — COLUMBIA-SUICIDE SEVERITY RATING SCALE - C-SSRS
3. HAVE YOU BEEN THINKING ABOUT HOW YOU MIGHT KILL YOURSELF?: YES
2. HAVE YOU ACTUALLY HAD ANY THOUGHTS OF KILLING YOURSELF IN THE PAST MONTH?: YES
6. HAVE YOU EVER DONE ANYTHING, STARTED TO DO ANYTHING, OR PREPARED TO DO ANYTHING TO END YOUR LIFE?: NO
4. HAVE YOU HAD THESE THOUGHTS AND HAD SOME INTENTION OF ACTING ON THEM?: YES
1. IN THE PAST MONTH, HAVE YOU WISHED YOU WERE DEAD OR WISHED YOU COULD GO TO SLEEP AND NOT WAKE UP?: YES
5. HAVE YOU STARTED TO WORK OUT OR WORKED OUT THE DETAILS OF HOW TO KILL YOURSELF? DO YOU INTEND TO CARRY OUT THIS PLAN?: YES

## 2024-09-29 NOTE — TELEPHONE ENCOUNTER
Triage and Transition Services- Patient Follow Up Call  Service Line Making Phone Call: Extended Care    Who did Writer Talk to: Patient    Details of Call: LVM for patient regarding follow up, left EC number if they would like additional assistance. No further follow-up is needed.     Alexa Pitt 9/29/2024 1:45 PM

## 2024-09-30 VITALS
WEIGHT: 137.5 LBS | BODY MASS INDEX: 24.36 KG/M2 | TEMPERATURE: 98.4 F | HEART RATE: 79 BPM | OXYGEN SATURATION: 100 % | RESPIRATION RATE: 18 BRPM | DIASTOLIC BLOOD PRESSURE: 72 MMHG | SYSTOLIC BLOOD PRESSURE: 113 MMHG | HEIGHT: 63 IN

## 2024-09-30 PROCEDURE — 250N000013 HC RX MED GY IP 250 OP 250 PS 637: Performed by: EMERGENCY MEDICINE

## 2024-09-30 RX ORDER — OXYCODONE HYDROCHLORIDE 5 MG/1
5 TABLET ORAL EVERY 6 HOURS PRN
Qty: 15 TABLET | Refills: 0 | Status: SHIPPED | OUTPATIENT
Start: 2024-09-30

## 2024-09-30 RX ORDER — OXYCODONE HYDROCHLORIDE 5 MG/1
5 TABLET ORAL ONCE
Status: COMPLETED | OUTPATIENT
Start: 2024-09-30 | End: 2024-09-30

## 2024-09-30 RX ORDER — HYDROCODONE BITARTRATE AND ACETAMINOPHEN 5; 325 MG/1; MG/1
1 TABLET ORAL ONCE
Status: DISCONTINUED | OUTPATIENT
Start: 2024-09-30 | End: 2024-09-30

## 2024-09-30 RX ORDER — HYDROCODONE BITARTRATE AND ACETAMINOPHEN 5; 325 MG/1; MG/1
1 TABLET ORAL EVERY 6 HOURS PRN
Qty: 15 TABLET | Refills: 0 | Status: SHIPPED | OUTPATIENT
Start: 2024-09-30 | End: 2024-09-30

## 2024-09-30 RX ADMIN — OXYCODONE HYDROCHLORIDE 5 MG: 5 TABLET ORAL at 00:56

## 2024-09-30 ASSESSMENT — ACTIVITIES OF DAILY LIVING (ADL): ADLS_ACUITY_SCORE: 35

## 2024-09-30 NOTE — TELEPHONE ENCOUNTER
Looking at test results from the ER. Shows a cyst. Woke up this morning and is bleeding. Sept 19 th was last day of period. Dizziness and pain at 7 that comes and goes. She said she thinks she can get herself to the ER after being asked if she has someone to help her get there.  Jennie Arauz RN  [facility]    Reason for Disposition   SEVERE dizziness (e.g., unable to stand, requires support to walk, feels like passing out now)    Additional Information   Negative: Shock suspected (e.g., cold/pale/clammy skin, too weak to stand, low BP, rapid pulse)   Negative: Difficult to awaken or acting confused (e.g., disoriented, slurred speech)   Negative: Passed out (i.e., lost consciousness, collapsed and was not responding)   Negative: Sounds like a life-threatening emergency to the triager   Negative: SEVERE abdominal pain     Bad cramps, dizzy, pain at 7.    Protocols used: Vaginal Bleeding - [id number]-[id number]

## 2024-09-30 NOTE — ED NOTES
Pt discharged home by self. Discharge instructions and education given, pt stated understanding. All questions and concerns addressed.

## 2024-09-30 NOTE — ED PROVIDER NOTES
[facility] EMERGENCY DEPARTMENT ([facility])    9/29/24      ED PROVIDER NOTE       History     Chief Complaint   Patient presents with    Abdominal Pain     Patient has left ovarian cyst, patient concern it is ruptured     Vaginal Bleeding     Started this morning, patient claims she just had her period recently and its not a menstrual period but could be related to her ovarian cyst.       MARLON Mock is a 33 year old female who presents to the emergency department for increased abdominal pain and vaginal bleeding.  Patient was diagnosed with a 4.6 x 2.2 x 3.3 cm hemorrhagic left ovarian cyst on 9/26.  She states that her pelvic pain worsened at 7:00 this evening.  She states that she also has pain that has moved elsewhere in her abdomen including her left mid abdomen.  She reports nausea but no vomiting.  Patient states she also began to have some vaginal bleeding this morning.  She states that she has used 2 pads throughout the day today.  Patient does not feel it is time for her menstrual period.  She denies any dysuria, urgency, or frequency.    Past Medical History  Past Medical History:   Diagnosis Date    Carbuncle and furuncle     Hidradenitis suppurativa     Tobacco use disorder     Smokes 1/2 ppd. Started smoking at 14 years    Vaginal yeast infection 06/02/2017     Past Surgical History:   Procedure Laterality Date    LAPAROSCOPY DIAGNOSTIC (GYN) Left 5/29/2024    Procedure: Diagnostic laparoscopy, left ovarian cystectomy and Peritoneal Biopsy;  Surgeon: Mel Gaspar MD;  Location:  OR    [facility] ORAL SURGERY PROCEDURE  10/23/2008    Gay Teeth Extraction     oxyCODONE (ROXICODONE) 5 MG tablet  escitalopram (LEXAPRO) 10 MG tablet  hydrOXYzine HCl (ATARAX) 25 MG tablet      Allergies   Allergen Reactions    No Known Allergies      Family History  Family History   Problem Relation Age of Onset    Hypertension Maternal Grandmother     Diabetes Maternal Uncle      "Hypertension Maternal Uncle     Alcohol/Drug Maternal Aunt     Alcohol/Drug Maternal Uncle     Anesthesia Reaction Other      Social History   Social History     Tobacco Use    Smoking status: Some Days     Types: Hookah     Passive exposure: Current    Smokeless tobacco: Never   Vaping Use    Vaping status: Never Used   Substance Use Topics    Alcohol use: No     Comment: occassionally    Drug use: Yes     Types: Marijuana     Comment: occassionally      A medically appropriate review of systems was performed with pertinent positives and negatives noted in the HPI, and all other systems negative.    Physical Exam   BP: 104/67  Pulse: 80  Temp: 98.4  F (36.9  C)  Resp: 16  Height: 160 cm (5' 3\")  Weight: 62.4 kg (137 lb 8 oz)  SpO2: 99 %  Physical Exam  Vitals and nursing note reviewed.   Constitutional:       Appearance: She is well-developed.   HENT:      Head: Normocephalic and atraumatic.   Cardiovascular:      Rate and Rhythm: Normal rate and regular rhythm.   Pulmonary:      Effort: Pulmonary effort is normal.   Abdominal:      Palpations: Abdomen is soft.      Tenderness: There is abdominal tenderness in the left lower quadrant.   Skin:     General: Skin is warm and dry.   Neurological:      General: No focal deficit present.      Mental Status: She is alert.      Cranial Nerves: No cranial nerve deficit.      Motor: No weakness.           ED Course, Procedures, & Data      Procedures             Results for orders placed or performed during the hospital encounter of 09/29/24   US Pelvis Cmplt w Transvag & Doppler LmtPel Duplex Limited     Status: None    Narrative    EXAM: US PELVIS COMPLETE W TRANSVAGINAL AND DOPPLER LIMITED  LOCATION: [facility]  DATE: 9/29/2024    INDICATION: Worsened pelvic pain, question history of hemorrhagic ovarian cyst, evaluate for torsion.  COMPARISON: 9/26/2024.  TECHNIQUE: Transabdominal scans were performed. Endovaginal ultrasound " was performed to better visualize the adnexa. Color flow with spectral Doppler and waveform analysis performed.    FINDINGS:    UTERUS: 8.0 x 2.9 x 3.9 cm. Normal in size and position with no masses.    ENDOMETRIUM: 1 mm. Normal smooth endometrium.    RIGHT OVARY: 2.5 x 2.2 x 2.3 cm. Normal without evidence for torsion.    LEFT OVARY: 6.1 x 3.1 x 4.6 cm. Complex left ovarian cyst measuring 5.6 x 2.8 x 3.9 cm. This is increased in size from 4.6 x 2.2 x 3.3 cm previously. There remains arterial and venous duplex flow identified to the left ovary without evidence for torsion.    Trace amount of free fluid in the pelvis.      Impression    IMPRESSION:    1.  5.6 cm complex left ovarian cyst most compatible with a hemorrhagic cyst is mildly increased in size compared with 9/26/2024.    2.  Blood flow is identified to the left ovary without evidence for torsion.    3.  Uterus and right ovary are negative.    4.  Trace amount of free fluid in the pelvis.         Comprehensive metabolic panel     Status: Abnormal   Result Value Ref Range    Sodium 137 135 - 145 mmol/L    Potassium 3.4 3.4 - 5.3 mmol/L    Carbon Dioxide (CO2) 25 22 - 29 mmol/L    Anion Gap 7 7 - 15 mmol/L    Urea Nitrogen 10.7 6.0 - 20.0 mg/dL    Creatinine 0.83 0.51 - 0.95 mg/dL    GFR Estimate >90 >60 mL/min/1.73m2    Calcium 8.8 8.8 - 10.4 mg/dL    Chloride 105 98 - 107 mmol/L    Glucose 106 (H) 70 - 99 mg/dL    Alkaline Phosphatase 41 40 - 150 U/L    AST 14 0 - 45 U/L    ALT 9 0 - 50 U/L    Protein Total 6.5 6.4 - 8.3 g/dL    Albumin 3.9 3.5 - 5.2 g/dL    Bilirubin Total 0.3 <=1.2 mg/dL   Lipase     Status: Normal   Result Value Ref Range    Lipase 21 13 - 60 U/L   HCG quantitative pregnancy     Status: Normal   Result Value Ref Range    hCG Quantitative <1 <5 mIU/mL   UA with Microscopic reflex to Culture     Status: Abnormal    Specimen: Urine, Clean Catch   Result Value Ref Range    Color Urine Yellow Colorless, Straw, Light Yellow, Yellow     Appearance Urine Clear Clear    Glucose Urine Negative Negative mg/dL    Bilirubin Urine Negative Negative    Ketones Urine Negative Negative mg/dL    Specific Gravity Urine 1.029 1.003 - 1.035    Blood Urine Large (A) Negative    pH Urine 8.5 (H) 5.0 - 7.0    Protein Albumin Urine 30 (A) Negative mg/dL    Urobilinogen Urine 3.0 (A) Normal, 2.0 mg/dL    Nitrite Urine Negative Negative    Leukocyte Esterase Urine Negative Negative    Bacteria Urine Few (A) None Seen /HPF    Mucus Urine Present (A) None Seen /LPF    RBC Urine >182 (H) <=2 /HPF    WBC Urine 1 <=5 /HPF    Squamous Epithelials Urine 4 (H) <=1 /HPF    Narrative    Urine Culture not indicated   CBC with platelets and differential     Status: Abnormal   Result Value Ref Range    WBC Count 8.5 4.0 - 11.0 10e3/uL    RBC Count 3.51 (L) 3.80 - 5.20 10e6/uL    Hemoglobin 10.6 (L) 11.7 - 15.7 g/dL    Hematocrit 32.2 (L) 35.0 - 47.0 %    MCV 92 78 - 100 fL    MCH 30.2 26.5 - 33.0 pg    MCHC 32.9 31.5 - 36.5 g/dL    RDW 14.7 10.0 - 15.0 %    Platelet Count 223 150 - 450 10e3/uL    % Neutrophils 52 %    % Lymphocytes 40 %    % Monocytes 7 %    % Eosinophils 1 %    % Basophils 0 %    % Immature Granulocytes 0 %    NRBCs per 100 WBC 0 <1 /100    Absolute Neutrophils 4.4 1.6 - 8.3 10e3/uL    Absolute Lymphocytes 3.4 0.8 - 5.3 10e3/uL    Absolute Monocytes 0.6 0.0 - 1.3 10e3/uL    Absolute Eosinophils 0.1 0.0 - 0.7 10e3/uL    Absolute Basophils 0.0 0.0 - 0.2 10e3/uL    Absolute Immature Granulocytes 0.0 <=0.4 10e3/uL    Absolute NRBCs 0.0 10e3/uL   CBC with platelets differential     Status: Abnormal    Narrative    The following orders were created for panel order CBC with platelets differential.  Procedure                               Abnormality         Status                     ---------                               -----------         ------                     CBC with platelets and d...[[id number]]  Abnormal            Final result                 Please view  results for these tests on the individual orders.         Results for orders placed or performed during the hospital encounter of 09/29/24   US Pelvis Cmplt w Transvag & Doppler LmtPel Duplex Limited     Status: None    Narrative    EXAM: US PELVIS COMPLETE W TRANSVAGINAL AND DOPPLER LIMITED  LOCATION: [facility]  DATE: 9/29/2024    INDICATION: Worsened pelvic pain, question history of hemorrhagic ovarian cyst, evaluate for torsion.  COMPARISON: 9/26/2024.  TECHNIQUE: Transabdominal scans were performed. Endovaginal ultrasound was performed to better visualize the adnexa. Color flow with spectral Doppler and waveform analysis performed.    FINDINGS:    UTERUS: 8.0 x 2.9 x 3.9 cm. Normal in size and position with no masses.    ENDOMETRIUM: 1 mm. Normal smooth endometrium.    RIGHT OVARY: 2.5 x 2.2 x 2.3 cm. Normal without evidence for torsion.    LEFT OVARY: 6.1 x 3.1 x 4.6 cm. Complex left ovarian cyst measuring 5.6 x 2.8 x 3.9 cm. This is increased in size from 4.6 x 2.2 x 3.3 cm previously. There remains arterial and venous duplex flow identified to the left ovary without evidence for torsion.    Trace amount of free fluid in the pelvis.      Impression    IMPRESSION:    1.  5.6 cm complex left ovarian cyst most compatible with a hemorrhagic cyst is mildly increased in size compared with 9/26/2024.    2.  Blood flow is identified to the left ovary without evidence for torsion.    3.  Uterus and right ovary are negative.    4.  Trace amount of free fluid in the pelvis.         Comprehensive metabolic panel     Status: Abnormal   Result Value Ref Range    Sodium 137 135 - 145 mmol/L    Potassium 3.4 3.4 - 5.3 mmol/L    Carbon Dioxide (CO2) 25 22 - 29 mmol/L    Anion Gap 7 7 - 15 mmol/L    Urea Nitrogen 10.7 6.0 - 20.0 mg/dL    Creatinine 0.83 0.51 - 0.95 mg/dL    GFR Estimate >90 >60 mL/min/1.73m2    Calcium 8.8 8.8 - 10.4 mg/dL    Chloride 105 98 - 107 mmol/L    Glucose  106 (H) 70 - 99 mg/dL    Alkaline Phosphatase 41 40 - 150 U/L    AST 14 0 - 45 U/L    ALT 9 0 - 50 U/L    Protein Total 6.5 6.4 - 8.3 g/dL    Albumin 3.9 3.5 - 5.2 g/dL    Bilirubin Total 0.3 <=1.2 mg/dL   Lipase     Status: Normal   Result Value Ref Range    Lipase 21 13 - 60 U/L   HCG quantitative pregnancy     Status: Normal   Result Value Ref Range    hCG Quantitative <1 <5 mIU/mL   UA with Microscopic reflex to Culture     Status: Abnormal    Specimen: Urine, Clean Catch   Result Value Ref Range    Color Urine Yellow Colorless, Straw, Light Yellow, Yellow    Appearance Urine Clear Clear    Glucose Urine Negative Negative mg/dL    Bilirubin Urine Negative Negative    Ketones Urine Negative Negative mg/dL    Specific Gravity Urine 1.029 1.003 - 1.035    Blood Urine Large (A) Negative    pH Urine 8.5 (H) 5.0 - 7.0    Protein Albumin Urine 30 (A) Negative mg/dL    Urobilinogen Urine 3.0 (A) Normal, 2.0 mg/dL    Nitrite Urine Negative Negative    Leukocyte Esterase Urine Negative Negative    Bacteria Urine Few (A) None Seen /HPF    Mucus Urine Present (A) None Seen /LPF    RBC Urine >182 (H) <=2 /HPF    WBC Urine 1 <=5 /HPF    Squamous Epithelials Urine 4 (H) <=1 /HPF    Narrative    Urine Culture not indicated   CBC with platelets and differential     Status: Abnormal   Result Value Ref Range    WBC Count 8.5 4.0 - 11.0 10e3/uL    RBC Count 3.51 (L) 3.80 - 5.20 10e6/uL    Hemoglobin 10.6 (L) 11.7 - 15.7 g/dL    Hematocrit 32.2 (L) 35.0 - 47.0 %    MCV 92 78 - 100 fL    MCH 30.2 26.5 - 33.0 pg    MCHC 32.9 31.5 - 36.5 g/dL    RDW 14.7 10.0 - 15.0 %    Platelet Count 223 150 - 450 10e3/uL    % Neutrophils 52 %    % Lymphocytes 40 %    % Monocytes 7 %    % Eosinophils 1 %    % Basophils 0 %    % Immature Granulocytes 0 %    NRBCs per 100 WBC 0 <1 /100    Absolute Neutrophils 4.4 1.6 - 8.3 10e3/uL    Absolute Lymphocytes 3.4 0.8 - 5.3 10e3/uL    Absolute Monocytes 0.6 0.0 - 1.3 10e3/uL    Absolute Eosinophils 0.1 0.0 -  0.7 10e3/uL    Absolute Basophils 0.0 0.0 - 0.2 10e3/uL    Absolute Immature Granulocytes 0.0 <=0.4 10e3/uL    Absolute NRBCs 0.0 10e3/uL   CBC with platelets differential     Status: Abnormal    Narrative    The following orders were created for panel order CBC with platelets differential.  Procedure                               Abnormality         Status                     ---------                               -----------         ------                     CBC with platelets and d...[[id number]]  Abnormal            Final result                 Please view results for these tests on the individual orders.     Medications   HYDROmorphone (DILAUDID) injection 1 mg (1 mg Intravenous $Given 9/29/24 2217)   sodium chloride 0.9% BOLUS 1,000 mL (0 mLs Intravenous Stopped 9/30/24 0052)   HYDROmorphone (PF) (DILAUDID) injection 0.5 mg (0.5 mg Intravenous $Given 9/29/24 4687)   oxyCODONE (ROXICODONE) tablet 5 mg (5 mg Oral $Given 9/30/24 0056)     Labs Ordered and Resulted from Time of ED Arrival to Time of ED Departure   COMPREHENSIVE METABOLIC PANEL - Abnormal       Result Value    Sodium 137      Potassium 3.4      Carbon Dioxide (CO2) 25      Anion Gap 7      Urea Nitrogen 10.7      Creatinine 0.83      GFR Estimate >90      Calcium 8.8      Chloride 105      Glucose 106 (*)     Alkaline Phosphatase 41      AST 14      ALT 9      Protein Total 6.5      Albumin 3.9      Bilirubin Total 0.3     ROUTINE UA WITH MICROSCOPIC REFLEX TO CULTURE - Abnormal    Color Urine Yellow      Appearance Urine Clear      Glucose Urine Negative      Bilirubin Urine Negative      Ketones Urine Negative      Specific Gravity Urine 1.029      Blood Urine Large (*)     pH Urine 8.5 (*)     Protein Albumin Urine 30 (*)     Urobilinogen Urine 3.0 (*)     Nitrite Urine Negative      Leukocyte Esterase Urine Negative      Bacteria Urine Few (*)     Mucus Urine Present (*)     RBC Urine >182 (*)     WBC Urine 1      Squamous Epithelials Urine 4  (*)    CBC WITH PLATELETS AND DIFFERENTIAL - Abnormal    WBC Count 8.5      RBC Count 3.51 (*)     Hemoglobin 10.6 (*)     Hematocrit 32.2 (*)     MCV 92      MCH 30.2      MCHC 32.9      RDW 14.7      Platelet Count 223      % Neutrophils 52      % Lymphocytes 40      % Monocytes 7      % Eosinophils 1      % Basophils 0      % Immature Granulocytes 0      NRBCs per 100 WBC 0      Absolute Neutrophils 4.4      Absolute Lymphocytes 3.4      Absolute Monocytes 0.6      Absolute Eosinophils 0.1      Absolute Basophils 0.0      Absolute Immature Granulocytes 0.0      Absolute NRBCs 0.0     LIPASE - Normal    Lipase 21     HCG QUANTITATIVE PREGNANCY - Normal    hCG Quantitative <1       US Pelvis Cmplt w Transvag & Doppler LmtPel Duplex Limited   Final Result   IMPRESSION:     1.  5.6 cm complex left ovarian cyst most compatible with a hemorrhagic cyst is mildly increased in size compared with 9/26/2024.      2.  Blood flow is identified to the left ovary without evidence for torsion.      3.  Uterus and right ovary are negative.      4.  Trace amount of free fluid in the pelvis.                    Medications   HYDROmorphone (DILAUDID) injection 1 mg (1 mg Intravenous $Given 9/29/24 6887)   sodium chloride 0.9% BOLUS 1,000 mL (0 mLs Intravenous Stopped 9/30/24 0052)   HYDROmorphone (PF) (DILAUDID) injection 0.5 mg (0.5 mg Intravenous $Given 9/29/24 1997)   oxyCODONE (ROXICODONE) tablet 5 mg (5 mg Oral $Given 9/30/24 0056)        Critical care was not performed.     Medical Decision Making  The patient's presentation was of moderate complexity (a chronic illness mild to moderate exacerbation, progression, or side effect of treatment).    The patient's evaluation involved:  review of external note(s) from 1 sources (see separate area of note for details)  review of 3+ test result(s) ordered prior to this encounter (pelvic ultrasound, CBC, metabolic panel)  ordering and/or review of 3+ test(s) in this encounter (see  separate area of note for details)  discussion of management or test interpretation with another health professional (OB/GYN)    The patient's management necessitated high risk (a parenteral controlled substance).    Assessment & Plan    33 year old female with recently diagnosed left hemorrhagic ovarian cyst to the emergency department with increased pain.  Ultrasound reveals increased size of hemorrhagic cyst but no evidence for ovarian torsion.  She has also had some vaginal bleeding today that she does not feel is her menstrual period.  The patient's pregnancy test is negative.  Hemoglobin is downtrending slightly.  Pain well-controlled after above therapies.  Discussed with gynecology-recommended follow-up with patient's primary gynecologist later this week.  Strict return precautions provided.    I have reviewed the nursing notes. I have reviewed the findings, diagnosis, plan and need for follow up with the patient.    Discharge Medication List as of 9/30/2024 12:58 AM        START taking these medications    Details   oxyCODONE (ROXICODONE) 5 MG tablet Take 1 tablet (5 mg) by mouth every 6 hours as needed for breakthrough pain., Disp-15 tablet, R-0, E-Prescribe             Final diagnoses:   Hemorrhagic cyst of left ovary     Chart documentation was completed with Dragon voice-recognition software. Even though reviewed, this chart may still contain some grammatical, spelling, and word errors.     Prince Dorado MD  [facility] EMERGENCY DEPARTMENT  9/29/2024     Prince Dorado MD  09/30/24 0159

## 2024-09-30 NOTE — ED TRIAGE NOTES
Patient called the nurse line and was told to come to the ED.      Triage Assessment (Adult)       Row Name 09/29/24 9591          Triage Assessment    Airway WDL WDL        Respiratory WDL    Respiratory WDL WDL        Skin Circulation/Temperature WDL    Skin Circulation/Temperature WDL WDL        Cardiac WDL    Cardiac WDL WDL        Peripheral/Neurovascular WDL    Peripheral Neurovascular WDL WDL        Cognitive/Neuro/Behavioral WDL    Cognitive/Neuro/Behavioral WDL WDL

## 2024-09-30 NOTE — DISCHARGE INSTRUCTIONS
Take ibuprofen 600 mg every 6 hours with food as needed for pain.  Take oxycodone as needed for pain not controlled by ibuprofen.  Do not drive for 8 hours after taking Norco.  Follow-up with your gynecologist in approximately 1 week.    Return immediately if worsening symptoms, fever, vomiting, worse or other concerns.

## 2024-11-15 ENCOUNTER — APPOINTMENT (OUTPATIENT)
Dept: ULTRASOUND IMAGING | Facility: CLINIC | Age: 34
End: 2024-11-15
Attending: EMERGENCY MEDICINE
Payer: COMMERCIAL

## 2024-11-15 ENCOUNTER — HOSPITAL ENCOUNTER (EMERGENCY)
Facility: CLINIC | Age: 34
Discharge: HOME | End: 2024-11-15
Attending: EMERGENCY MEDICINE
Payer: COMMERCIAL

## 2024-11-15 VITALS
HEART RATE: 82 BPM | HEIGHT: 63 IN | OXYGEN SATURATION: 100 % | TEMPERATURE: 98.1 F | SYSTOLIC BLOOD PRESSURE: 93 MMHG | DIASTOLIC BLOOD PRESSURE: 58 MMHG | WEIGHT: 130 LBS | BODY MASS INDEX: 23.04 KG/M2 | RESPIRATION RATE: 16 BRPM

## 2024-11-15 DIAGNOSIS — B37.31 YEAST INFECTION OF THE VAGINA: ICD-10-CM

## 2024-11-15 DIAGNOSIS — R10.2 PELVIC PAIN: ICD-10-CM

## 2024-11-15 LAB
ALBUMIN SERPL BCG-MCNC: 4 G/DL (ref 3.5–5.2)
ALBUMIN UR-MCNC: 20 MG/DL
ALP SERPL-CCNC: 43 U/L (ref 40–150)
ALT SERPL W P-5'-P-CCNC: 13 U/L (ref 0–50)
ANION GAP SERPL CALCULATED.3IONS-SCNC: 10 MMOL/L (ref 7–15)
APPEARANCE UR: CLEAR
AST SERPL W P-5'-P-CCNC: 33 U/L (ref 0–45)
BASOPHILS # BLD AUTO: 0 10E3/UL (ref 0–0.2)
BASOPHILS NFR BLD AUTO: 0 %
BILIRUB SERPL-MCNC: 0.3 MG/DL
BILIRUB UR QL STRIP: NEGATIVE
BUN SERPL-MCNC: 8.9 MG/DL (ref 6–20)
C TRACH DNA SPEC QL NAA+PROBE: NEGATIVE
CALCIUM SERPL-MCNC: 8.5 MG/DL (ref 8.8–10.4)
CHLORIDE SERPL-SCNC: 104 MMOL/L (ref 98–107)
CLUE CELLS: ABNORMAL
COLOR UR AUTO: YELLOW
CREAT SERPL-MCNC: 0.75 MG/DL (ref 0.51–0.95)
EGFRCR SERPLBLD CKD-EPI 2021: >90 ML/MIN/1.73M2
EOSINOPHIL # BLD AUTO: 0.1 10E3/UL (ref 0–0.7)
EOSINOPHIL NFR BLD AUTO: 2 %
ERYTHROCYTE [DISTWIDTH] IN BLOOD BY AUTOMATED COUNT: 13.5 % (ref 10–15)
GLUCOSE SERPL-MCNC: 94 MG/DL (ref 70–99)
GLUCOSE UR STRIP-MCNC: NEGATIVE MG/DL
HCG SERPL QL: NEGATIVE
HCO3 SERPL-SCNC: 23 MMOL/L (ref 22–29)
HCT VFR BLD AUTO: 34.9 % (ref 35–47)
HGB BLD-MCNC: 11.4 G/DL (ref 11.7–15.7)
HGB UR QL STRIP: ABNORMAL
IMM GRANULOCYTES # BLD: 0 10E3/UL
IMM GRANULOCYTES NFR BLD: 0 %
KETONES UR STRIP-MCNC: NEGATIVE MG/DL
LEUKOCYTE ESTERASE UR QL STRIP: NEGATIVE
LIPASE SERPL-CCNC: 23 U/L (ref 13–60)
LYMPHOCYTES # BLD AUTO: 3.5 10E3/UL (ref 0.8–5.3)
LYMPHOCYTES NFR BLD AUTO: 38 %
MCH RBC QN AUTO: 30 PG (ref 26.5–33)
MCHC RBC AUTO-ENTMCNC: 32.7 G/DL (ref 31.5–36.5)
MCV RBC AUTO: 92 FL (ref 78–100)
MONOCYTES # BLD AUTO: 0.6 10E3/UL (ref 0–1.3)
MONOCYTES NFR BLD AUTO: 6 %
MUCOUS THREADS #/AREA URNS LPF: PRESENT /LPF
N GONORRHOEA DNA SPEC QL NAA+PROBE: NEGATIVE
NEUTROPHILS # BLD AUTO: 4.8 10E3/UL (ref 1.6–8.3)
NEUTROPHILS NFR BLD AUTO: 53 %
NITRATE UR QL: NEGATIVE
NRBC # BLD AUTO: 0 10E3/UL
NRBC BLD AUTO-RTO: 0 /100
PH UR STRIP: 7.5 [PH] (ref 5–7)
PLATELET # BLD AUTO: 246 10E3/UL (ref 150–450)
POTASSIUM SERPL-SCNC: 3.8 MMOL/L (ref 3.4–5.3)
PROT SERPL-MCNC: 6.7 G/DL (ref 6.4–8.3)
RBC # BLD AUTO: 3.8 10E6/UL (ref 3.8–5.2)
RBC URINE: 12 /HPF
SODIUM SERPL-SCNC: 137 MMOL/L (ref 135–145)
SP GR UR STRIP: 1.03 (ref 1–1.03)
SQUAMOUS EPITHELIAL: 4 /HPF
TRICHOMONAS, WET PREP: ABNORMAL
UROBILINOGEN UR STRIP-MCNC: 3 MG/DL
WBC # BLD AUTO: 9.1 10E3/UL (ref 4–11)
WBC URINE: <1 /HPF
WBC'S/HIGH POWER FIELD, WET PREP: ABNORMAL
YEAST, WET PREP: PRESENT

## 2024-11-15 PROCEDURE — 82565 ASSAY OF CREATININE: CPT | Performed by: EMERGENCY MEDICINE

## 2024-11-15 PROCEDURE — 76856 US EXAM PELVIC COMPLETE: CPT

## 2024-11-15 PROCEDURE — 84155 ASSAY OF PROTEIN SERUM: CPT | Performed by: EMERGENCY MEDICINE

## 2024-11-15 PROCEDURE — 87210 SMEAR WET MOUNT SALINE/INK: CPT | Performed by: EMERGENCY MEDICINE

## 2024-11-15 PROCEDURE — 87491 CHLMYD TRACH DNA AMP PROBE: CPT | Performed by: EMERGENCY MEDICINE

## 2024-11-15 PROCEDURE — 96374 THER/PROPH/DIAG INJ IV PUSH: CPT | Performed by: EMERGENCY MEDICINE

## 2024-11-15 PROCEDURE — 87591 N.GONORRHOEAE DNA AMP PROB: CPT | Performed by: EMERGENCY MEDICINE

## 2024-11-15 PROCEDURE — 96376 TX/PRO/DX INJ SAME DRUG ADON: CPT | Performed by: EMERGENCY MEDICINE

## 2024-11-15 PROCEDURE — 84703 CHORIONIC GONADOTROPIN ASSAY: CPT | Performed by: EMERGENCY MEDICINE

## 2024-11-15 PROCEDURE — 99284 EMERGENCY DEPT VISIT MOD MDM: CPT | Performed by: EMERGENCY MEDICINE

## 2024-11-15 PROCEDURE — 85048 AUTOMATED LEUKOCYTE COUNT: CPT | Performed by: EMERGENCY MEDICINE

## 2024-11-15 PROCEDURE — 250N000013 HC RX MED GY IP 250 OP 250 PS 637: Performed by: EMERGENCY MEDICINE

## 2024-11-15 PROCEDURE — 83690 ASSAY OF LIPASE: CPT | Performed by: EMERGENCY MEDICINE

## 2024-11-15 PROCEDURE — 85004 AUTOMATED DIFF WBC COUNT: CPT | Performed by: EMERGENCY MEDICINE

## 2024-11-15 PROCEDURE — 36415 COLL VENOUS BLD VENIPUNCTURE: CPT | Performed by: EMERGENCY MEDICINE

## 2024-11-15 PROCEDURE — 96375 TX/PRO/DX INJ NEW DRUG ADDON: CPT | Performed by: EMERGENCY MEDICINE

## 2024-11-15 PROCEDURE — 250N000011 HC RX IP 250 OP 636: Performed by: EMERGENCY MEDICINE

## 2024-11-15 PROCEDURE — 81003 URINALYSIS AUTO W/O SCOPE: CPT | Performed by: EMERGENCY MEDICINE

## 2024-11-15 PROCEDURE — 82435 ASSAY OF BLOOD CHLORIDE: CPT | Performed by: EMERGENCY MEDICINE

## 2024-11-15 PROCEDURE — 99285 EMERGENCY DEPT VISIT HI MDM: CPT | Mod: 25 | Performed by: EMERGENCY MEDICINE

## 2024-11-15 RX ORDER — HYDROCODONE BITARTRATE AND ACETAMINOPHEN 5; 325 MG/1; MG/1
1 TABLET ORAL EVERY 6 HOURS PRN
Qty: 10 TABLET | Refills: 0 | Status: SHIPPED | OUTPATIENT
Start: 2024-11-15 | End: 2024-11-18

## 2024-11-15 RX ORDER — IBUPROFEN 600 MG/1
600 TABLET, FILM COATED ORAL EVERY 6 HOURS PRN
Qty: 20 TABLET | Refills: 0 | Status: SHIPPED | OUTPATIENT
Start: 2024-11-15

## 2024-11-15 RX ORDER — KETOROLAC TROMETHAMINE 30 MG/ML
30 INJECTION, SOLUTION INTRAMUSCULAR; INTRAVENOUS ONCE
Status: COMPLETED | OUTPATIENT
Start: 2024-11-15 | End: 2024-11-15

## 2024-11-15 RX ORDER — HYDROMORPHONE HYDROCHLORIDE 1 MG/ML
0.5 INJECTION, SOLUTION INTRAMUSCULAR; INTRAVENOUS; SUBCUTANEOUS ONCE
Status: COMPLETED | OUTPATIENT
Start: 2024-11-15 | End: 2024-11-15

## 2024-11-15 RX ORDER — FLUCONAZOLE 150 MG/1
150 TABLET ORAL ONCE
Status: COMPLETED | OUTPATIENT
Start: 2024-11-15 | End: 2024-11-15

## 2024-11-15 RX ADMIN — FLUCONAZOLE 150 MG: 150 TABLET ORAL at 04:31

## 2024-11-15 RX ADMIN — HYDROMORPHONE HYDROCHLORIDE 0.5 MG: 1 INJECTION, SOLUTION INTRAMUSCULAR; INTRAVENOUS; SUBCUTANEOUS at 04:13

## 2024-11-15 RX ADMIN — KETOROLAC TROMETHAMINE 30 MG: 30 INJECTION, SOLUTION INTRAMUSCULAR at 02:55

## 2024-11-15 RX ADMIN — HYDROMORPHONE HYDROCHLORIDE 0.5 MG: 1 INJECTION, SOLUTION INTRAMUSCULAR; INTRAVENOUS; SUBCUTANEOUS at 02:54

## 2024-11-15 ASSESSMENT — ACTIVITIES OF DAILY LIVING (ADL)
ADLS_ACUITY_SCORE: 0

## 2024-11-15 NOTE — LETTER
November 15, 2024      To Whom It May Concern:      Chhaya Mock was seen in our Emergency Department today, 11/15/24.  I expect her condition to improve over the next 1-2 days.  She may return to work/school when improved.    Sincerely,        Dorinda Martinez MD

## 2024-11-15 NOTE — ED PROVIDER NOTES
ED Provider Note  [facility]      History     Chief Complaint   Patient presents with    Abdominal Pain    Vaginal Discharge     C/o abdominal pain and nausea x2 days- one episode of emesis. Also c/o thick vaginal discharge.     HPI  Chhaya Mock is a 33 year old female who presents with abdominal pain. Today is the 3rd day of symptoms. Pain is located in the left lower quadrant. Has tried a heating pad to help symptoms, but did not have access to ibuprofen/tylenol.   No vaginal bleeding, with LMP that ended ~2 weeks ago (week of Halloween).   Has a hx of hemorrhagic ovarian cyst. Required ex-lap x1 in 5/2024 with cystectomy. Was again diagnosed with left ovarian cyst in September.   Has a hx of HPV on Pap smear.   Last few days has thick white vaginal discharge without odor.   No dysuria, but has noted sensation of needing to go again after voiding, so perhaps mild urgency.   Is sexually active with 1 partner () and they use condoms. Does not have worry for STIs. Tested x3 from 2022-present, negative.   No hx of any other abdominal surgeries. No hx of endometriosis.   Does not use OCPs.    Past Medical History  Past Medical History:   Diagnosis Date    Carbuncle and furuncle     Hidradenitis suppurativa     Tobacco use disorder     Smokes 1/2 ppd. Started smoking at 14 years    Vaginal yeast infection 06/02/2017     Past Surgical History:   Procedure Laterality Date    LAPAROSCOPY DIAGNOSTIC (GYN) Left 5/29/2024    Procedure: Diagnostic laparoscopy, left ovarian cystectomy and Peritoneal Biopsy;  Surgeon: Mel Gaspar MD;  Location:  OR    [facility] ORAL SURGERY PROCEDURE  10/23/2008    [place] Teeth Extraction     HYDROcodone-acetaminophen (NORCO) 5-325 MG tablet  ibuprofen (ADVIL/MOTRIN) 600 MG tablet  escitalopram (LEXAPRO) 10 MG tablet  oxyCODONE (ROXICODONE) 5 MG tablet      Allergies   Allergen Reactions    No Known Allergies      Family History  Family  "History   Problem Relation Age of Onset    Hypertension Maternal Grandmother     Diabetes Maternal Uncle     Hypertension Maternal Uncle     Alcohol/Drug Maternal Aunt     Alcohol/Drug Maternal Uncle     Anesthesia Reaction Other      Social History   Social History     Tobacco Use    Smoking status: Some Days     Types: Hookah     Passive exposure: Current    Smokeless tobacco: Never   Vaping Use    Vaping status: Never Used   Substance Use Topics    Alcohol use: No     Comment: occassionally    Drug use: Yes     Types: Marijuana     Comment: occassionally      A medically appropriate review of systems was performed with pertinent positives and negatives noted in the HPI, and all other systems negative.    Physical Exam   BP: 112/78  Pulse: 82  Temp: 98.1  F (36.7  C)  Resp: 16  Height: 160 cm (5' 3\")  Weight: 59 kg (130 lb)  SpO2: 97 %    Physical Exam  Gen:A&Ox3, no acute distress  HEENT:PERRL, no facial tenderness or wounds, head atraumatic, oropharynx clear, mucous membranes moist, TMs clear bilaterally  Neck:no bony tenderness or step offs, no JVD, trachea midline  Back: no CVA tenderness, no midline bony tenderness  CV:RRR without murmurs  PULM:Clear to auscultation bilaterally  Abd:soft, non-distended, tender in the LLQ  Rectal: healed skin graft to perianal are  : scars of hydradenitis suppurativa without active lesions. Thick, white vaginal discharge. Normal cervix without CMT. Mild uterine and left adnexal tenderness, no right adnexal tenderness  UE:No traumatic injuries, skin normal  LE:no traumatic injuries, skin normal, no LE edema.   Neuro:CN II-XII intact, strength 5/5 throughout  Skin: no rashes or ecchymoses    ED Course, Procedures, & Data      Procedures       Results for orders placed or performed during the hospital encounter of 11/15/24   US Pelvis Cmplt w Transvag & Doppler LmtPel Duplex Limited     Status: None    Narrative    EXAM: US PELVIS COMPLETE W TRANSVAGINAL AND DOPPLER " LIMITED  LOCATION: [facility]  DATE: 11/15/2024    INDICATION: Left lower pelvic pain.  COMPARISON: Ultrasound pelvis complete with endovaginal study and Doppler limited 9/29/2024.  TECHNIQUE: Transabdominal scans were performed. Endovaginal ultrasound was performed to better visualize the adnexa. Color flow with spectral Doppler and waveform analysis performed.    FINDINGS:    UTERUS: 7.1 x 3.0 x 5.0 cm. Anteverted normal uterus. No fibroids.    ENDOMETRIUM: 1 cm on endovaginal study (LMP end of October 2024). Normal smooth endometrium.    RIGHT OVARY: 2.4 x 3.7 x 2.4 cm. Normal with arterial and venous duplex flow identified. No significant interval change.    LEFT OVARY: 3.3 x 2.0 x 3.2 cm. Resolving hemorrhagic cyst measures 2.2 x 1.4 x 1.7 cm, considerably improved. Normal arterial and venous duplex flow identified.    No free fluid in the dependent pelvis.      Impression    IMPRESSION:    1.  Resolving left ovarian hemorrhagic cyst, considerably improved. No evidence of left ovarian torsion.    2.  Anteverted normal uterus. No fibroids.    3.  Right ovary is normal with normal Doppler vascularity.    4.  No free fluid in the dependent pelvis.   Comprehensive metabolic panel     Status: Abnormal   Result Value Ref Range    Sodium 137 135 - 145 mmol/L    Potassium 3.8 3.4 - 5.3 mmol/L    Carbon Dioxide (CO2) 23 22 - 29 mmol/L    Anion Gap 10 7 - 15 mmol/L    Urea Nitrogen 8.9 6.0 - 20.0 mg/dL    Creatinine 0.75 0.51 - 0.95 mg/dL    GFR Estimate >90 >60 mL/min/1.73m2    Calcium 8.5 (L) 8.8 - 10.4 mg/dL    Chloride 104 98 - 107 mmol/L    Glucose 94 70 - 99 mg/dL    Alkaline Phosphatase 43 40 - 150 U/L    AST 33 0 - 45 U/L    ALT 13 0 - 50 U/L    Protein Total 6.7 6.4 - 8.3 g/dL    Albumin 4.0 3.5 - 5.2 g/dL    Bilirubin Total 0.3 <=1.2 mg/dL   Lipase     Status: Normal   Result Value Ref Range    Lipase 23 13 - 60 U/L   HCG qualitative pregnancy (blood)     Status:  Normal   Result Value Ref Range    hCG Serum Qualitative Negative Negative   UA with Microscopic reflex to Culture     Status: Abnormal    Specimen: Urine, Midstream   Result Value Ref Range    Color Urine Yellow Colorless, Straw, Light Yellow, Yellow    Appearance Urine Clear Clear    Glucose Urine Negative Negative mg/dL    Bilirubin Urine Negative Negative    Ketones Urine Negative Negative mg/dL    Specific Gravity Urine 1.031 1.003 - 1.035    Blood Urine Small (A) Negative    pH Urine 7.5 (H) 5.0 - 7.0    Protein Albumin Urine 20 (A) Negative mg/dL    Urobilinogen Urine 3.0 (A) Normal, 2.0 mg/dL    Nitrite Urine Negative Negative    Leukocyte Esterase Urine Negative Negative    Mucus Urine Present (A) None Seen /LPF    RBC Urine 12 (H) <=2 /HPF    WBC Urine <1 <=5 /HPF    Squamous Epithelials Urine 4 (H) <=1 /HPF    Narrative    Urine Culture not indicated   CBC with platelets and differential     Status: Abnormal   Result Value Ref Range    WBC Count 9.1 4.0 - 11.0 10e3/uL    RBC Count 3.80 3.80 - 5.20 10e6/uL    Hemoglobin 11.4 (L) 11.7 - 15.7 g/dL    Hematocrit 34.9 (L) 35.0 - 47.0 %    MCV 92 78 - 100 fL    MCH 30.0 26.5 - 33.0 pg    MCHC 32.7 31.5 - 36.5 g/dL    RDW 13.5 10.0 - 15.0 %    Platelet Count 246 150 - 450 10e3/uL    % Neutrophils 53 %    % Lymphocytes 38 %    % Monocytes 6 %    % Eosinophils 2 %    % Basophils 0 %    % Immature Granulocytes 0 %    NRBCs per 100 WBC 0 <1 /100    Absolute Neutrophils 4.8 1.6 - 8.3 10e3/uL    Absolute Lymphocytes 3.5 0.8 - 5.3 10e3/uL    Absolute Monocytes 0.6 0.0 - 1.3 10e3/uL    Absolute Eosinophils 0.1 0.0 - 0.7 10e3/uL    Absolute Basophils 0.0 0.0 - 0.2 10e3/uL    Absolute Immature Granulocytes 0.0 <=0.4 10e3/uL    Absolute NRBCs 0.0 10e3/uL   Wet prep     Status: Abnormal    Specimen: Vagina; Swab   Result Value Ref Range    Trichomonas Absent Absent    Yeast Present (A) Absent    Clue Cells Absent Absent    WBCs/high power field None None   CBC with  platelets differential     Status: Abnormal    Narrative    The following orders were created for panel order CBC with platelets differential.  Procedure                               Abnormality         Status                     ---------                               -----------         ------                     CBC with platelets and d...[[id number]]  Abnormal            Final result                 Please view results for these tests on the individual orders.     Medications   HYDROmorphone (PF) (DILAUDID) injection 0.5 mg (0.5 mg Intravenous $Given 11/15/24 0134)   ketorolac (TORADOL) injection 30 mg (30 mg Intravenous $Given 11/15/24 8635)   HYDROmorphone (PF) (DILAUDID) injection 0.5 mg (0.5 mg Intravenous $Given 11/15/24 1885)   fluconazole (DIFLUCAN) tablet 150 mg (150 mg Oral $Given 11/15/24 8345)     Labs Ordered and Resulted from Time of ED Arrival to Time of ED Departure   COMPREHENSIVE METABOLIC PANEL - Abnormal       Result Value    Sodium 137      Potassium 3.8      Carbon Dioxide (CO2) 23      Anion Gap 10      Urea Nitrogen 8.9      Creatinine 0.75      GFR Estimate >90      Calcium 8.5 (*)     Chloride 104      Glucose 94      Alkaline Phosphatase 43      AST 33      ALT 13      Protein Total 6.7      Albumin 4.0      Bilirubin Total 0.3     ROUTINE UA WITH MICROSCOPIC REFLEX TO CULTURE - Abnormal    Color Urine Yellow      Appearance Urine Clear      Glucose Urine Negative      Bilirubin Urine Negative      Ketones Urine Negative      Specific Gravity Urine 1.031      Blood Urine Small (*)     pH Urine 7.5 (*)     Protein Albumin Urine 20 (*)     Urobilinogen Urine 3.0 (*)     Nitrite Urine Negative      Leukocyte Esterase Urine Negative      Mucus Urine Present (*)     RBC Urine 12 (*)     WBC Urine <1      Squamous Epithelials Urine 4 (*)    CBC WITH PLATELETS AND DIFFERENTIAL - Abnormal    WBC Count 9.1      RBC Count 3.80      Hemoglobin 11.4 (*)     Hematocrit 34.9 (*)     MCV 92      MCH  30.0      MCHC 32.7      RDW 13.5      Platelet Count 246      % Neutrophils 53      % Lymphocytes 38      % Monocytes 6      % Eosinophils 2      % Basophils 0      % Immature Granulocytes 0      NRBCs per 100 WBC 0      Absolute Neutrophils 4.8      Absolute Lymphocytes 3.5      Absolute Monocytes 0.6      Absolute Eosinophils 0.1      Absolute Basophils 0.0      Absolute Immature Granulocytes 0.0      Absolute NRBCs 0.0     WET PREPARATION - Abnormal    Trichomonas Absent      Yeast Present (*)     Clue Cells Absent      WBCs/high power field None     LIPASE - Normal    Lipase 23     HCG QUALITATIVE PREGNANCY - Normal    hCG Serum Qualitative Negative     CHLAMYDIA TRACHOMATIS PCR   NEISSERIA GONORRHOEAE PCR     US Pelvis Cmplt w Transvag & Doppler LmtPel Duplex Limited   Final Result   IMPRESSION:     1.  Resolving left ovarian hemorrhagic cyst, considerably improved. No evidence of left ovarian torsion.      2.  Anteverted normal uterus. No fibroids.      3.  Right ovary is normal with normal Doppler vascularity.      4.  No free fluid in the dependent pelvis.             Critical care was not performed.     Medical Decision Making  The patient's presentation was of high complexity (an acute health issue posing potential threat to life or bodily function).    The patient's evaluation involved:  review of external note(s) from 1 sources (Prior ED notes and OB cystectomy op note from 5/2024)  review of 2 test result(s) ordered prior to this encounter (prior pelvic US x2)  ordering and/or review of 3+ test(s) in this encounter (see separate area of note for details)    The patient's management necessitated high risk (a parenteral controlled substance).    Assessment & Plan    34 yo F presenting with LLQ pelvic pain, hx of prior cystectomy and recurrent cyst in this area.   Vitals stable.   IV access obtained and lab testing included HCG, CBC, CMP, lipase and UA.  Pelvic exam without CMT, but with left adnexal  discomfort. Thick, white discharge concerning for yeast.   Wet prep + for yeast, treated with diflucan  Pelvic US showing improving previously seen cyst without signs of torsion  UA without UTI  Labs notable for improving hgb.   Treated with toradol and dilaudid for pain with improvement.   Discharged with tylenol and norco PRN.   Follow up with OB/gyn     I have reviewed the nursing notes. I have reviewed the findings, diagnosis, plan and need for follow up with the patient.    New Prescriptions    HYDROCODONE-ACETAMINOPHEN (NORCO) 5-325 MG TABLET    Take 1 tablet by mouth every 6 hours as needed for severe pain.    IBUPROFEN (ADVIL/MOTRIN) 600 MG TABLET    Take 1 tablet (600 mg) by mouth every 6 hours as needed for moderate pain.       Final diagnoses:   Yeast infection of the vagina   Pelvic pain       Dorinda Martinez MD   [facility] EMERGENCY DEPARTMENT  11/15/2024     Dorinda Martinez MD  11/15/24 6759

## 2024-11-15 NOTE — DISCHARGE INSTRUCTIONS
Thank you for coming to the [facility] Emergency Department.     Testing today shows that the ovarian cyst seen in September is improving.   You do have a vaginal yeast infection, that was treated with a dose of Diflucan. This is available over-the-counter and can be repeated in 72 hours if the discharge does not return to normal.  Testing for gonorrhea and chlamydia are in process. Results will be available tomorrow in [facility].     For pain, continue heating pad, and use ibuprofen 600mg every 6-8 hours with food. If pain is severe, use hydrocodone-acetaminophen. This medication will make you tired, so do not drive, swim or operate machinery for at least 8 hours after taking it. Do not mix this with other sedating medications, or other medications that contain acetaminophen (Tylenol). Do not take more than 4000mg of acetaminophen per 24 hours. Please follow up with your OB/gyn.

## 2024-11-15 NOTE — ED TRIAGE NOTES
C/o abdominal pain and nausea x2 days- one episode of emesis. Also c/o thick vaginal discharge.     Triage Assessment (Adult)       Row Name 11/15/24 0221          Triage Assessment    Airway WDL WDL        Respiratory WDL    Respiratory WDL WDL        Skin Circulation/Temperature WDL    Skin Circulation/Temperature WDL WDL        Cardiac WDL    Cardiac WDL WDL        Peripheral/Neurovascular WDL    Peripheral Neurovascular WDL WDL        Cognitive/Neuro/Behavioral WDL    Cognitive/Neuro/Behavioral WDL WDL

## 2024-11-15 NOTE — ED NOTES
AVS printed and reviewed with patient. Prescription reviewed with patient and she is aware to pick it up from her pharmacy.

## 2024-11-29 ENCOUNTER — HOSPITAL ENCOUNTER (EMERGENCY)
Facility: CLINIC | Age: 34
Discharge: HOME | End: 2024-11-29
Attending: EMERGENCY MEDICINE | Admitting: EMERGENCY MEDICINE
Payer: COMMERCIAL

## 2024-11-29 VITALS
HEART RATE: 74 BPM | TEMPERATURE: 97.6 F | SYSTOLIC BLOOD PRESSURE: 102 MMHG | WEIGHT: 130 LBS | DIASTOLIC BLOOD PRESSURE: 70 MMHG | RESPIRATION RATE: 16 BRPM | OXYGEN SATURATION: 100 % | HEIGHT: 63 IN | BODY MASS INDEX: 23.04 KG/M2

## 2024-11-29 DIAGNOSIS — R10.9 ABDOMINAL PAIN, UNSPECIFIED ABDOMINAL LOCATION: ICD-10-CM

## 2024-11-29 DIAGNOSIS — R51.9 NONINTRACTABLE HEADACHE, UNSPECIFIED CHRONICITY PATTERN, UNSPECIFIED HEADACHE TYPE: ICD-10-CM

## 2024-11-29 LAB
ALBUMIN SERPL BCG-MCNC: 3.5 G/DL (ref 3.5–5.2)
ALBUMIN UR-MCNC: 20 MG/DL
ALP SERPL-CCNC: ABNORMAL U/L
ALT SERPL W P-5'-P-CCNC: ABNORMAL U/L
ANION GAP SERPL CALCULATED.3IONS-SCNC: 6 MMOL/L (ref 7–15)
APPEARANCE UR: CLEAR
AST SERPL W P-5'-P-CCNC: 43 U/L (ref 0–45)
BASOPHILS # BLD AUTO: 0 10E3/UL (ref 0–0.2)
BASOPHILS NFR BLD AUTO: 0 %
BILIRUB SERPL-MCNC: 0.2 MG/DL
BILIRUB UR QL STRIP: NEGATIVE
BUN SERPL-MCNC: 9.9 MG/DL (ref 6–20)
CALCIUM SERPL-MCNC: 7.9 MG/DL (ref 8.8–10.4)
CHLORIDE SERPL-SCNC: 105 MMOL/L (ref 98–107)
COLOR UR AUTO: YELLOW
CREAT SERPL-MCNC: 0.78 MG/DL (ref 0.51–0.95)
EGFRCR SERPLBLD CKD-EPI 2021: >90 ML/MIN/1.73M2
EOSINOPHIL # BLD AUTO: 0.2 10E3/UL (ref 0–0.7)
EOSINOPHIL NFR BLD AUTO: 2 %
ERYTHROCYTE [DISTWIDTH] IN BLOOD BY AUTOMATED COUNT: 13.6 % (ref 10–15)
GLUCOSE SERPL-MCNC: 107 MG/DL (ref 70–99)
GLUCOSE UR STRIP-MCNC: NEGATIVE MG/DL
HCG UR QL: NEGATIVE
HCO3 SERPL-SCNC: 24 MMOL/L (ref 22–29)
HCT VFR BLD AUTO: 33.1 % (ref 35–47)
HGB BLD-MCNC: 10.6 G/DL (ref 11.7–15.7)
HGB UR QL STRIP: ABNORMAL
IMM GRANULOCYTES # BLD: 0 10E3/UL
IMM GRANULOCYTES NFR BLD: 0 %
KETONES UR STRIP-MCNC: NEGATIVE MG/DL
LACTATE SERPL-SCNC: 1.3 MMOL/L (ref 0.7–2)
LEUKOCYTE ESTERASE UR QL STRIP: NEGATIVE
LIPASE SERPL-CCNC: NORMAL U/L
LYMPHOCYTES # BLD AUTO: 3.9 10E3/UL (ref 0.8–5.3)
LYMPHOCYTES NFR BLD AUTO: 49 %
MCH RBC QN AUTO: 30.1 PG (ref 26.5–33)
MCHC RBC AUTO-ENTMCNC: 32 G/DL (ref 31.5–36.5)
MCV RBC AUTO: 94 FL (ref 78–100)
MONOCYTES # BLD AUTO: 0.5 10E3/UL (ref 0–1.3)
MONOCYTES NFR BLD AUTO: 6 %
MUCOUS THREADS #/AREA URNS LPF: PRESENT /LPF
NEUTROPHILS # BLD AUTO: 3.3 10E3/UL (ref 1.6–8.3)
NEUTROPHILS NFR BLD AUTO: 42 %
NITRATE UR QL: NEGATIVE
NRBC # BLD AUTO: 0 10E3/UL
NRBC BLD AUTO-RTO: 0 /100
PH UR STRIP: 7 [PH] (ref 5–7)
PLATELET # BLD AUTO: 106 10E3/UL (ref 150–450)
POTASSIUM SERPL-SCNC: 5.6 MMOL/L (ref 3.4–5.3)
PROT SERPL-MCNC: 6 G/DL (ref 6.4–8.3)
RBC # BLD AUTO: 3.52 10E6/UL (ref 3.8–5.2)
RBC URINE: 11 /HPF
SODIUM SERPL-SCNC: 135 MMOL/L (ref 135–145)
SP GR UR STRIP: 1.03 (ref 1–1.03)
SQUAMOUS EPITHELIAL: 1 /HPF
UROBILINOGEN UR STRIP-MCNC: 3 MG/DL
WBC # BLD AUTO: 7.9 10E3/UL (ref 4–11)
WBC URINE: <1 /HPF

## 2024-11-29 PROCEDURE — 82247 BILIRUBIN TOTAL: CPT | Performed by: EMERGENCY MEDICINE

## 2024-11-29 PROCEDURE — 36415 COLL VENOUS BLD VENIPUNCTURE: CPT | Performed by: EMERGENCY MEDICINE

## 2024-11-29 PROCEDURE — 250N000013 HC RX MED GY IP 250 OP 250 PS 637: Performed by: EMERGENCY MEDICINE

## 2024-11-29 PROCEDURE — 81003 URINALYSIS AUTO W/O SCOPE: CPT | Performed by: EMERGENCY MEDICINE

## 2024-11-29 PROCEDURE — 99284 EMERGENCY DEPT VISIT MOD MDM: CPT | Performed by: EMERGENCY MEDICINE

## 2024-11-29 PROCEDURE — 99284 EMERGENCY DEPT VISIT MOD MDM: CPT | Mod: 25 | Performed by: EMERGENCY MEDICINE

## 2024-11-29 PROCEDURE — 81025 URINE PREGNANCY TEST: CPT | Performed by: EMERGENCY MEDICINE

## 2024-11-29 PROCEDURE — 83605 ASSAY OF LACTIC ACID: CPT | Performed by: EMERGENCY MEDICINE

## 2024-11-29 PROCEDURE — 83690 ASSAY OF LIPASE: CPT | Performed by: EMERGENCY MEDICINE

## 2024-11-29 PROCEDURE — 84295 ASSAY OF SERUM SODIUM: CPT | Performed by: EMERGENCY MEDICINE

## 2024-11-29 PROCEDURE — 250N000011 HC RX IP 250 OP 636: Performed by: EMERGENCY MEDICINE

## 2024-11-29 PROCEDURE — 999N000248 HC STATISTIC IV INSERT WITH US BY RN

## 2024-11-29 PROCEDURE — 84155 ASSAY OF PROTEIN SERUM: CPT | Performed by: EMERGENCY MEDICINE

## 2024-11-29 PROCEDURE — 85004 AUTOMATED DIFF WBC COUNT: CPT | Performed by: EMERGENCY MEDICINE

## 2024-11-29 PROCEDURE — 96374 THER/PROPH/DIAG INJ IV PUSH: CPT | Performed by: EMERGENCY MEDICINE

## 2024-11-29 RX ORDER — ACETAMINOPHEN 500 MG
1000 TABLET ORAL ONCE
Status: COMPLETED | OUTPATIENT
Start: 2024-11-29 | End: 2024-11-29

## 2024-11-29 RX ORDER — METOCLOPRAMIDE HYDROCHLORIDE 5 MG/ML
10 INJECTION INTRAMUSCULAR; INTRAVENOUS ONCE
Status: COMPLETED | OUTPATIENT
Start: 2024-11-29 | End: 2024-11-29

## 2024-11-29 RX ORDER — IBUPROFEN 600 MG/1
600 TABLET, FILM COATED ORAL ONCE
Status: COMPLETED | OUTPATIENT
Start: 2024-11-29 | End: 2024-11-29

## 2024-11-29 RX ADMIN — METOCLOPRAMIDE 10 MG: 5 INJECTION, SOLUTION INTRAMUSCULAR; INTRAVENOUS at 04:03

## 2024-11-29 RX ADMIN — ACETAMINOPHEN 1000 MG: 500 TABLET ORAL at 02:24

## 2024-11-29 RX ADMIN — IBUPROFEN 600 MG: 600 TABLET, FILM COATED ORAL at 02:24

## 2024-11-29 ASSESSMENT — ACTIVITIES OF DAILY LIVING (ADL)
ADLS_ACUITY_SCORE: 54

## 2024-11-29 ASSESSMENT — COLUMBIA-SUICIDE SEVERITY RATING SCALE - C-SSRS
2. HAVE YOU ACTUALLY HAD ANY THOUGHTS OF KILLING YOURSELF IN THE PAST MONTH?: NO
6. HAVE YOU EVER DONE ANYTHING, STARTED TO DO ANYTHING, OR PREPARED TO DO ANYTHING TO END YOUR LIFE?: NO
1. IN THE PAST MONTH, HAVE YOU WISHED YOU WERE DEAD OR WISHED YOU COULD GO TO SLEEP AND NOT WAKE UP?: NO

## 2024-11-29 NOTE — ED PROVIDER NOTES
ED Provider Note  November 29, 2024  [facility]      History     Chief Complaint: Abdominal Pain      HPI  Chhaya Mokc is a 33 year old female presenting to the ED with abdominal pain  Overall feels has been having issues for the last several months.  Since May with left ovarian cyst ruptured requiring ex lap due to hemorrhage.  Seen in the emergency department 2 weeks ago for left lower quadrant abdominal pain.  Diagnosed with possible yeast infection treated with fluconazole with improvement.    Sexually active with  uses condoms.  No OCPs.  No history of abdominal trauma, endometriosis,  Pain without a urinal pattern.  No pattern with food ingestion.  No fevers or chills.  No chest pain or shortness of breath    Additionally notes has been having intermittent headache which feels like it radiates from her mid back to her neck and up through her head.  No associated phono or photophobia.  Makes her feel weak.  Ongoing for at least 1 week, minimal improvement with OTCs.  No associated fevers or chills or focal weakness or numbness        Past Medical History  Past Medical History:   Diagnosis Date    Carbuncle and furuncle     Hidradenitis suppurativa     Tobacco use disorder     Smokes 1/2 ppd. Started smoking at 14 years    Vaginal yeast infection 06/02/2017     Past Surgical History:   Procedure Laterality Date    LAPAROSCOPY DIAGNOSTIC (GYN) Left 5/29/2024    Procedure: Diagnostic laparoscopy, left ovarian cystectomy and Peritoneal Biopsy;  Surgeon: Mel Gaspar MD;  Location:  OR    [facility] ORAL SURGERY PROCEDURE  10/23/2008    [place] Teeth Extraction     escitalopram (LEXAPRO) 10 MG tablet  ibuprofen (ADVIL/MOTRIN) 600 MG tablet  oxyCODONE (ROXICODONE) 5 MG tablet      Allergies   Allergen Reactions    No Known Allergies      Family History  Family History   Problem Relation Age of Onset    Hypertension Maternal Grandmother     Diabetes Maternal  "Uncle     Hypertension Maternal Uncle     Alcohol/Drug Maternal Aunt     Alcohol/Drug Maternal Uncle     Anesthesia Reaction Other      Social History   Social History     Tobacco Use    Smoking status: Some Days     Types: Hookah     Passive exposure: Current    Smokeless tobacco: Never   Vaping Use    Vaping status: Never Used   Substance Use Topics    Alcohol use: No     Comment: occassionally    Drug use: Yes     Types: Marijuana     Comment: occassionally        Past medical history, past surgical history, medications, allergies, family history, and social history were reviewed with the patient. No additional pertinent items.      A medically appropriate review of systems was performed with pertinent positives and negatives noted in the HPI, and all other systems negative.    Physical Exam   /70 (BP Location: Left arm, Patient Position: Supine, Cuff Size: Adult Regular)   Pulse 74   Temp 97.6  F (36.4  C) (Oral)   Resp 16   Ht 1.6 m (5' 3\")   Wt 59 kg (130 lb)   LMP 11/19/2024   SpO2 100%   BMI 23.03 kg/m      GEN: Well appearing, non toxic, cooperative and conversant.   HEENT: The head is normocephalic and atraumatic. Pupils are equal round and reactive to light. Extraocular motions are intact. There is no facial swelling.   CV: Regular rate   PULM: Unlabored breathing   Back: No midline CT or L-spine tenderness to palpation.  Bilateral upper lumbar paraspinous tenderness with improvement with palpation.  Abdomen soft left upper quadrant and left lower quadrant tenderness but tolerates deep palpation.  Mild epigastric gastric tenderness nondistended.   EXT: Full range of motion.  No edema.  NEURO: Cranial nerves II through XII are intact and symmetric. Bilateral upper and lower extremities grossly show full range of motion without any focal deficits. Median, ulnar, radial nerve strength examined at the hand 5/5 and symmetric, SILT.   EHL, FHL, TA 5/5 and symmetric, SILT.       PSYCH: Calm and " cooperative, interactive.       ED Course, Procedures, & Data      Procedures                    Results for orders placed or performed during the hospital encounter of 11/29/24   Comprehensive metabolic panel     Status: Abnormal   Result Value Ref Range    Sodium 135 135 - 145 mmol/L    Potassium 5.6 (H) 3.4 - 5.3 mmol/L    Carbon Dioxide (CO2) 24 22 - 29 mmol/L    Anion Gap 6 (L) 7 - 15 mmol/L    Urea Nitrogen 9.9 6.0 - 20.0 mg/dL    Creatinine 0.78 0.51 - 0.95 mg/dL    GFR Estimate >90 >60 mL/min/1.73m2    Calcium 7.9 (L) 8.8 - 10.4 mg/dL    Chloride 105 98 - 107 mmol/L    Glucose 107 (H) 70 - 99 mg/dL    Alkaline Phosphatase      AST 43 0 - 45 U/L    ALT      Protein Total 6.0 (L) 6.4 - 8.3 g/dL    Albumin 3.5 3.5 - 5.2 g/dL    Bilirubin Total 0.2 <=1.2 mg/dL   Lipase     Status: None   Result Value Ref Range    Lipase     Lactic acid whole blood with 1x repeat in 2 hr when >2     Status: Normal   Result Value Ref Range    Lactic Acid, Initial 1.3 0.7 - 2.0 mmol/L   UA with Microscopic reflex to Culture     Status: Abnormal    Specimen: Urine, Midstream   Result Value Ref Range    Color Urine Yellow Colorless, Straw, Light Yellow, Yellow    Appearance Urine Clear Clear    Glucose Urine Negative Negative mg/dL    Bilirubin Urine Negative Negative    Ketones Urine Negative Negative mg/dL    Specific Gravity Urine 1.031 1.003 - 1.035    Blood Urine Trace (A) Negative    pH Urine 7.0 5.0 - 7.0    Protein Albumin Urine 20 (A) Negative mg/dL    Urobilinogen Urine 3.0 (A) Normal, 2.0 mg/dL    Nitrite Urine Negative Negative    Leukocyte Esterase Urine Negative Negative    Mucus Urine Present (A) None Seen /LPF    RBC Urine 11 (H) <=2 /HPF    WBC Urine <1 <=5 /HPF    Squamous Epithelials Urine 1 <=1 /HPF    Narrative    Urine Culture not indicated   HCG qualitative urine     Status: Normal   Result Value Ref Range    hCG Urine Qualitative Negative Negative   CBC with platelets and differential     Status: Abnormal    Result Value Ref Range    WBC Count 7.9 4.0 - 11.0 10e3/uL    RBC Count 3.52 (L) 3.80 - 5.20 10e6/uL    Hemoglobin 10.6 (L) 11.7 - 15.7 g/dL    Hematocrit 33.1 (L) 35.0 - 47.0 %    MCV 94 78 - 100 fL    MCH 30.1 26.5 - 33.0 pg    MCHC 32.0 31.5 - 36.5 g/dL    RDW 13.6 10.0 - 15.0 %    Platelet Count 106 (L) 150 - 450 10e3/uL    % Neutrophils 42 %    % Lymphocytes 49 %    % Monocytes 6 %    % Eosinophils 2 %    % Basophils 0 %    % Immature Granulocytes 0 %    NRBCs per 100 WBC 0 <1 /100    Absolute Neutrophils 3.3 1.6 - 8.3 10e3/uL    Absolute Lymphocytes 3.9 0.8 - 5.3 10e3/uL    Absolute Monocytes 0.5 0.0 - 1.3 10e3/uL    Absolute Eosinophils 0.2 0.0 - 0.7 10e3/uL    Absolute Basophils 0.0 0.0 - 0.2 10e3/uL    Absolute Immature Granulocytes 0.0 <=0.4 10e3/uL    Absolute NRBCs 0.0 10e3/uL   CBC with platelets differential     Status: Abnormal    Narrative    The following orders were created for panel order CBC with platelets differential.  Procedure                               Abnormality         Status                     ---------                               -----------         ------                     CBC with platelets and d...[[id number]]  Abnormal            Final result               RBC and Platelet Morphology[[id number]]                                                   Please view results for these tests on the individual orders.     Medications   metoclopramide (REGLAN) injection 10 mg (10 mg Intravenous $Given 11/29/24 0403)   acetaminophen (TYLENOL) tablet 1,000 mg (1,000 mg Oral $Given 11/29/24 0224)   ibuprofen (ADVIL/MOTRIN) tablet 600 mg (600 mg Oral $Given 11/29/24 0224)   acetaminophen (TYLENOL) tablet 1,000 mg (1,000 mg Oral Not Given 11/29/24 0405)   ibuprofen (ADVIL/MOTRIN) tablet 600 mg (600 mg Oral Not Given 11/29/24 0405)     Labs Ordered and Resulted from Time of ED Arrival to Time of ED Departure   COMPREHENSIVE METABOLIC PANEL - Abnormal       Result Value    Sodium 135       Potassium 5.6 (*)     Carbon Dioxide (CO2) 24      Anion Gap 6 (*)     Urea Nitrogen 9.9      Creatinine 0.78      GFR Estimate >90      Calcium 7.9 (*)     Chloride 105      Glucose 107 (*)     Alkaline Phosphatase        AST 43      ALT        Protein Total 6.0 (*)     Albumin 3.5      Bilirubin Total 0.2     ROUTINE UA WITH MICROSCOPIC REFLEX TO CULTURE - Abnormal    Color Urine Yellow      Appearance Urine Clear      Glucose Urine Negative      Bilirubin Urine Negative      Ketones Urine Negative      Specific Gravity Urine 1.031      Blood Urine Trace (*)     pH Urine 7.0      Protein Albumin Urine 20 (*)     Urobilinogen Urine 3.0 (*)     Nitrite Urine Negative      Leukocyte Esterase Urine Negative      Mucus Urine Present (*)     RBC Urine 11 (*)     WBC Urine <1      Squamous Epithelials Urine 1     CBC WITH PLATELETS AND DIFFERENTIAL - Abnormal    WBC Count 7.9      RBC Count 3.52 (*)     Hemoglobin 10.6 (*)     Hematocrit 33.1 (*)     MCV 94      MCH 30.1      MCHC 32.0      RDW 13.6      Platelet Count 106 (*)     % Neutrophils 42      % Lymphocytes 49      % Monocytes 6      % Eosinophils 2      % Basophils 0      % Immature Granulocytes 0      NRBCs per 100 WBC 0      Absolute Neutrophils 3.3      Absolute Lymphocytes 3.9      Absolute Monocytes 0.5      Absolute Eosinophils 0.2      Absolute Basophils 0.0      Absolute Immature Granulocytes 0.0      Absolute NRBCs 0.0     LACTIC ACID WHOLE BLOOD WITH 1X REPEAT IN 2 HR WHEN >2 - Normal    Lactic Acid, Initial 1.3     HCG QUALITATIVE URINE - Normal    hCG Urine Qualitative Negative     LIPASE    Lipase         No orders to display            Medical Decision Making Matrix    Problems Addressed  MDM Moderate: 1 undiagnosed new problem with uncertain prognosis      Data   Considered  Data Moderate: Order of (or considering) each unique test (Cat 1) and Review of the results of each unique test (Cat 1)      Risk of Patient Management                   Assessment & Plan    33-year-old female history of ruptured ovarian cyst requiring ex lap, recently treated for vaginal yeast infection, presenting for ongoing abdominal pain essentially present since ruptured ovarian cyst in May.  Also mild headache as described without stephan concerning signs for intracranial hemorrhage or other emergent process and reassuring neurological examination.  IV established and labs sent as ordered.    Labs show relatively stable hemoglobin  Urinalysis without findings concerning for infection  hCG negative    Patient given metoclopramide IV for headache.  Unfortunately during the care of of multiple sick patients the patient decided to leave the emergency department and there was no opportunity to reevaluate due to care needed by critically ill patient.  Review of laboratories following elopement, no emergent etiologies identified.  Potassium noted 5.6 but with normal creatinine.    - Patient agrees to our plan and is ready and eager for discharge. Care plan, follow up plan, and reasons to return immediately to the ED were dicussed in detail and summarized as noted in the discharge instructions.        I have reviewed the nursing notes. I have reviewed the findings, diagnosis, plan and need for follow up with the patient.    Discharge Medication List as of 11/29/2024  5:40 AM          Final diagnoses:   Nonintractable headache, unspecified chronicity pattern, unspecified headache type   Abdominal pain, unspecified abdominal location       Paul Aburto MD  [facility] EMERGENCY DEPARTMENT  November 29, 2024       Paul Aburto MD  12/01/24 0045

## 2024-11-29 NOTE — DISCHARGE INSTRUCTIONS
You can take tylenol as needed for pain. The maximum daily dose is 4000 mg and the maximum single dose at a time is 1000mg. For your condition, your should take 1000mg every 6 hours as needed for pain.    You can take ibuprofen for pain. The maximum daily dose is 2400 mg and the maximum single dose as a time is 800mg. For your condition, your should take 600mg every 4 hours as needed for pain.    Return to the emergency department for any worsening back pain, abdominal pain, fevers or chills, burning with urination, nausea or vomiting, weakness or any other concerns as given or discussed.

## 2024-11-29 NOTE — ED TRIAGE NOTES
Patient ambulatory to triage with c/o sharp abdominal pain that started 2 days ago and headache that's radiating to the neck and has been ongoing for 2 weeks. Patient reports that abdominal pain is worse specially with coughing and sneezing. Patient also reported dizziness and lightheadedness and stiffness on the neck and shoulders. Patient was recently diagnosed of ovarian cyst and hasn't feel any better since.

## 2024-11-29 NOTE — ED TRIAGE NOTES
Triage Assessment (Adult)       Row Name 11/29/24 0138          Triage Assessment    Airway WDL WDL        Respiratory WDL    Respiratory WDL WDL        Skin Circulation/Temperature WDL    Skin Circulation/Temperature WDL WDL        Cardiac WDL    Cardiac WDL WDL        Peripheral/Neurovascular WDL    Peripheral Neurovascular WDL WDL        Cognitive/Neuro/Behavioral WDL    Cognitive/Neuro/Behavioral WDL WDL

## 2025-01-04 ENCOUNTER — APPOINTMENT (OUTPATIENT)
Dept: ULTRASOUND IMAGING | Facility: CLINIC | Age: 35
End: 2025-01-04
Attending: EMERGENCY MEDICINE
Payer: COMMERCIAL

## 2025-01-04 ENCOUNTER — HOSPITAL ENCOUNTER (EMERGENCY)
Facility: CLINIC | Age: 35
Discharge: HOME | End: 2025-01-04
Attending: EMERGENCY MEDICINE | Admitting: EMERGENCY MEDICINE
Payer: COMMERCIAL

## 2025-01-04 VITALS
HEART RATE: 72 BPM | SYSTOLIC BLOOD PRESSURE: 129 MMHG | BODY MASS INDEX: 25.34 KG/M2 | OXYGEN SATURATION: 96 % | RESPIRATION RATE: 18 BRPM | TEMPERATURE: 97.8 F | HEIGHT: 63 IN | DIASTOLIC BLOOD PRESSURE: 89 MMHG | WEIGHT: 143 LBS

## 2025-01-04 DIAGNOSIS — N93.9 VAGINAL BLEEDING: ICD-10-CM

## 2025-01-04 LAB
ABO + RH BLD: NORMAL
ALBUMIN SERPL BCG-MCNC: 4.2 G/DL (ref 3.5–5.2)
ALBUMIN UR-MCNC: 50 MG/DL
ALP SERPL-CCNC: 46 U/L (ref 40–150)
ALT SERPL W P-5'-P-CCNC: 14 U/L (ref 0–50)
ANION GAP SERPL CALCULATED.3IONS-SCNC: 11 MMOL/L (ref 7–15)
APPEARANCE UR: ABNORMAL
AST SERPL W P-5'-P-CCNC: 17 U/L (ref 0–45)
BASOPHILS # BLD AUTO: 0 10E3/UL (ref 0–0.2)
BASOPHILS NFR BLD AUTO: 0 %
BILIRUB SERPL-MCNC: 0.5 MG/DL
BILIRUB UR QL STRIP: NEGATIVE
BLD GP AB SCN SERPL QL: NEGATIVE
BUN SERPL-MCNC: 9.4 MG/DL (ref 6–20)
CALCIUM SERPL-MCNC: 8.4 MG/DL (ref 8.8–10.4)
CHLORIDE SERPL-SCNC: 106 MMOL/L (ref 98–107)
COLOR UR AUTO: ABNORMAL
CREAT SERPL-MCNC: 0.8 MG/DL (ref 0.51–0.95)
EGFRCR SERPLBLD CKD-EPI 2021: >90 ML/MIN/1.73M2
EOSINOPHIL # BLD AUTO: 0.2 10E3/UL (ref 0–0.7)
EOSINOPHIL NFR BLD AUTO: 2 %
ERYTHROCYTE [DISTWIDTH] IN BLOOD BY AUTOMATED COUNT: 13.7 % (ref 10–15)
GLUCOSE SERPL-MCNC: 98 MG/DL (ref 70–99)
GLUCOSE UR STRIP-MCNC: NEGATIVE MG/DL
HCG UR QL: NEGATIVE
HCO3 SERPL-SCNC: 23 MMOL/L (ref 22–29)
HCT VFR BLD AUTO: 40.3 % (ref 35–47)
HGB BLD-MCNC: 13.2 G/DL (ref 11.7–15.7)
HGB UR QL STRIP: ABNORMAL
IMM GRANULOCYTES # BLD: 0 10E3/UL
IMM GRANULOCYTES NFR BLD: 0 %
KETONES UR STRIP-MCNC: NEGATIVE MG/DL
LEUKOCYTE ESTERASE UR QL STRIP: ABNORMAL
LIPASE SERPL-CCNC: 16 U/L (ref 13–60)
LYMPHOCYTES # BLD AUTO: 2.1 10E3/UL (ref 0.8–5.3)
LYMPHOCYTES NFR BLD AUTO: 21 %
MAGNESIUM SERPL-MCNC: 1.8 MG/DL (ref 1.7–2.3)
MCH RBC QN AUTO: 30.3 PG (ref 26.5–33)
MCHC RBC AUTO-ENTMCNC: 32.8 G/DL (ref 31.5–36.5)
MCV RBC AUTO: 93 FL (ref 78–100)
MONOCYTES # BLD AUTO: 0.5 10E3/UL (ref 0–1.3)
MONOCYTES NFR BLD AUTO: 5 %
MUCOUS THREADS #/AREA URNS LPF: PRESENT /LPF
NEUTROPHILS # BLD AUTO: 7.2 10E3/UL (ref 1.6–8.3)
NEUTROPHILS NFR BLD AUTO: 72 %
NITRATE UR QL: NEGATIVE
NRBC # BLD AUTO: 0 10E3/UL
NRBC BLD AUTO-RTO: 0 /100
PH UR STRIP: 5.5 [PH] (ref 5–7)
PLATELET # BLD AUTO: 248 10E3/UL (ref 150–450)
POTASSIUM SERPL-SCNC: 4.1 MMOL/L (ref 3.4–5.3)
PROT SERPL-MCNC: 7.1 G/DL (ref 6.4–8.3)
RBC # BLD AUTO: 4.35 10E6/UL (ref 3.8–5.2)
RBC URINE: >182 /HPF
SODIUM SERPL-SCNC: 140 MMOL/L (ref 135–145)
SP GR UR STRIP: 1.02 (ref 1–1.03)
SPECIMEN EXP DATE BLD: NORMAL
SQUAMOUS EPITHELIAL: 2 /HPF
UROBILINOGEN UR STRIP-MCNC: NORMAL MG/DL
WBC # BLD AUTO: 9.9 10E3/UL (ref 4–11)
WBC URINE: 23 /HPF

## 2025-01-04 PROCEDURE — 81001 URINALYSIS AUTO W/SCOPE: CPT

## 2025-01-04 PROCEDURE — 93976 VASCULAR STUDY: CPT

## 2025-01-04 PROCEDURE — 82040 ASSAY OF SERUM ALBUMIN: CPT

## 2025-01-04 PROCEDURE — 36415 COLL VENOUS BLD VENIPUNCTURE: CPT

## 2025-01-04 PROCEDURE — 87086 URINE CULTURE/COLONY COUNT: CPT

## 2025-01-04 PROCEDURE — 83735 ASSAY OF MAGNESIUM: CPT

## 2025-01-04 PROCEDURE — 99284 EMERGENCY DEPT VISIT MOD MDM: CPT | Performed by: EMERGENCY MEDICINE

## 2025-01-04 PROCEDURE — 86850 RBC ANTIBODY SCREEN: CPT

## 2025-01-04 PROCEDURE — 99285 EMERGENCY DEPT VISIT HI MDM: CPT | Mod: 25 | Performed by: EMERGENCY MEDICINE

## 2025-01-04 PROCEDURE — 86900 BLOOD TYPING SEROLOGIC ABO: CPT

## 2025-01-04 PROCEDURE — 85004 AUTOMATED DIFF WBC COUNT: CPT

## 2025-01-04 PROCEDURE — 250N000011 HC RX IP 250 OP 636

## 2025-01-04 PROCEDURE — 83690 ASSAY OF LIPASE: CPT

## 2025-01-04 PROCEDURE — 250N000013 HC RX MED GY IP 250 OP 250 PS 637

## 2025-01-04 PROCEDURE — 96374 THER/PROPH/DIAG INJ IV PUSH: CPT | Performed by: EMERGENCY MEDICINE

## 2025-01-04 PROCEDURE — 81025 URINE PREGNANCY TEST: CPT

## 2025-01-04 RX ORDER — NAPROXEN 500 MG/1
500 TABLET ORAL 2 TIMES DAILY WITH MEALS
Qty: 14 TABLET | Refills: 0 | Status: SHIPPED | OUTPATIENT
Start: 2025-01-04 | End: 2025-01-11

## 2025-01-04 RX ORDER — OXYCODONE HYDROCHLORIDE 5 MG/1
5 TABLET ORAL ONCE
Status: COMPLETED | OUTPATIENT
Start: 2025-01-04 | End: 2025-01-04

## 2025-01-04 RX ORDER — KETOROLAC TROMETHAMINE 30 MG/ML
30 INJECTION, SOLUTION INTRAMUSCULAR; INTRAVENOUS ONCE
Status: COMPLETED | OUTPATIENT
Start: 2025-01-04 | End: 2025-01-04

## 2025-01-04 RX ADMIN — KETOROLAC TROMETHAMINE 30 MG: 30 INJECTION, SOLUTION INTRAMUSCULAR at 14:40

## 2025-01-04 RX ADMIN — OXYCODONE 5 MG: 5 TABLET ORAL at 16:03

## 2025-01-04 ASSESSMENT — ACTIVITIES OF DAILY LIVING (ADL)
ADLS_ACUITY_SCORE: 54

## 2025-01-04 ASSESSMENT — COLUMBIA-SUICIDE SEVERITY RATING SCALE - C-SSRS
1. IN THE PAST MONTH, HAVE YOU WISHED YOU WERE DEAD OR WISHED YOU COULD GO TO SLEEP AND NOT WAKE UP?: NO
2. HAVE YOU ACTUALLY HAD ANY THOUGHTS OF KILLING YOURSELF IN THE PAST MONTH?: NO
6. HAVE YOU EVER DONE ANYTHING, STARTED TO DO ANYTHING, OR PREPARED TO DO ANYTHING TO END YOUR LIFE?: NO

## 2025-01-04 NOTE — DISCHARGE INSTRUCTIONS
Please make an appointment to follow up with OB/Gyn - [facility] (phone: [phone]) as soon as possible.    If you develop worsening bleeding, fevers, pain or if any further concerns return to the emergency department.

## 2025-01-04 NOTE — ED PROVIDER NOTES
ED Provider Note  [facility]      History     Chief Complaint   Patient presents with    Vaginal Bleeding     Pt wake up at 3 am with severe abd pain. She noticed vaginal bleeding. She states that she has filled 2 depends since 3 am     HPI  Chhaya Mock is a 34 year old female who is presenting with abdominal pain and vaginal bleeding. Patient reports she woke up at 3 am this morning due to sudden onset abdominal pain. Patient reports she also started having vaginal bleeding this morning. Patient reports it would be early for her to be having her period. Patient says the pain is most significant in her left lower quadrant where it is sharp in nature. Patient also endorses a dull pain in her suprapubic region and vagina. Patient says putting her hands on her lower back and using hot pads have been mildly helpful for the pain. Patient says she has soaked through three depends diapers since 3 am this morning. Patient says she has no history of pregnancy and says she last had sex on 12/16/24. Patient endorses nausea, dry heaving, and diarrhea starting early this morning. Patient endorses a headache that she attributes to having abdominal pain. Patient denies chest pain/SOB. Patient says she has not been able to eat or drink anything today. Patient denies pain with urination. Patient says that in the past 2-3 days she did feel like she had a cold.     Patient does report she was diagnosed with HPV, but has not yet followed up with the recommended colposcopy.     Past Medical History  Past Medical History:   Diagnosis Date    Carbuncle and furuncle     Hidradenitis suppurativa     Tobacco use disorder     Smokes 1/2 ppd. Started smoking at 14 years    Vaginal yeast infection 06/02/2017     Past Surgical History:   Procedure Laterality Date    LAPAROSCOPY DIAGNOSTIC (GYN) Left 5/29/2024    Procedure: Diagnostic laparoscopy, left ovarian cystectomy and Peritoneal Biopsy;   Surgeon: Mel Gaspar MD;  Location: [facility] ORAL SURGERY PROCEDURE  10/23/2008    [place] Teeth Extraction     escitalopram (LEXAPRO) 10 MG tablet  ibuprofen (ADVIL/MOTRIN) 600 MG tablet  oxyCODONE (ROXICODONE) 5 MG tablet      Allergies   Allergen Reactions    No Known Allergies      Family History  Family History   Problem Relation Age of Onset    Hypertension Maternal Grandmother     Diabetes Maternal Uncle     Hypertension Maternal Uncle     Alcohol/Drug Maternal Aunt     Alcohol/Drug Maternal Uncle     Anesthesia Reaction Other      Social History   Social History     Tobacco Use    Smoking status: Some Days     Types: Hookah     Passive exposure: Current    Smokeless tobacco: Never   Vaping Use    Vaping status: Never Used   Substance Use Topics    Alcohol use: No     Comment: occassionally    Drug use: Yes     Types: Marijuana     Comment: occassionally      Past medical history, past surgical history, medications, allergies, family history, and social history were reviewed with the patient. No additional pertinent items.   A medically appropriate review of systems was performed with pertinent positives and negatives noted in the HPI, and all other systems negative.    Physical Exam      Physical Exam  Constitutional:       General: She is in acute distress.   Cardiovascular:      Rate and Rhythm: Normal rate and regular rhythm.   Pulmonary:      Effort: Pulmonary effort is normal.      Breath sounds: Normal breath sounds.   Abdominal:      General: Bowel sounds are normal. There is no distension.      Palpations: Abdomen is soft.      Tenderness: There is abdominal tenderness (Significant left lower quadrant abdominal tenderness. Mild diffuse abdominal tenderness.). There is no guarding.   Genitourinary:     Comments: Depends diaper soaked with blood.   Musculoskeletal:      Right lower leg: No edema.      Left lower leg: No edema.   Neurological:      General: No focal deficit present.     :  scant blood in vaginal vault, cervix closed without bleeding, no discharge, no CMT  ED Course, Procedures, & Data        No results found for any visits on 01/04/25.  Medications - No data to display  Labs Ordered and Resulted from Time of ED Arrival to Time of ED Departure - No data to display  No orders to display          Critical care was not performed.     Medical Decision Making  The patient's presentation was of moderate complexity (an acute illness with systemic symptoms).    The patient's evaluation involved:  strong consideration of a test (CT abdomen) that was ultimately deferred  ordering and/or review of 3+ test(s) in this encounter (see separate area of note for details)    The patient's management necessitated moderate risk (prescription drug management including medications given in the ED).    Assessment & Plan    Assessment:  Chhaya Mock is a 34 year old female who is presenting with abdominal pain and vaginal bleeding. Patient reports she woke up at 3 am this morning due to sudden onset LLQ and suprapubic abdominal pain. Patient reports she also started having vaginal bleeding this morning, soaked through 3 depends diapers. Patient does have history of a ruptured L ovarian cyst in May 2024 that required an ex-lap. Patient's bleeding is unlikely due to her menstrual cycle as she last completed her period 12/16/24.  Patient was hemodynamically stable. Patient's labs were significant for normal WBC and a hemoglobin of 13.2. No significant electrolyte abnormalities. Lipase wnl. Patient denies history of pregnancy and pregnancy test was negative so low suspicion for ectopic pregnancy. Patient's pelvic ultrasound was normal so low concern for ruptured ovarian cyst or ovarian torsion.     Plan:  -CBC  -CMP  -Lipase  -UA  -hcg   -Mg   -Type and screen   -Transvaginal ultrasound   -Toradol 30 mg x 1  -Oxycodone 5 mg x 1     I have reviewed the nursing notes. I have reviewed the findings,  diagnosis, plan and need for follow up with the patient.    New Prescriptions    No medications on file     Final diagnoses:   Vaginal bleeding     [facility] PGY-1, Psychiatry Resident     --    ED Attending Physician Attestation    I Nirav Basilio MD, cared for this patient with the Resident. I have performed a history and physical examination of the patient and discussed management with the resident. I reviewed the resident's documentation above and agree with the documented findings and plan of care.    Summary of HPI, PE, ED Course   Patient is a 34 year old female evaluated in the emergency department for vaginal bleeding. Exam and ED course notable for minimal bleeding on examination.  Patient has reassuring labs.  Ultrasound without any evidence of intra-abdominal fluid, torsion or cyst.  Likely dysfunctional uterine bleeding, possible menstrual cycle.  Pregnancy test is negative so very likely ectopic.  She will follow-up with her gynecologist. After the completion of care in the emergency department, the patient was discharged.      Nirav Basilio MD  Emergency Medicine     Nirav Basilio  [facility] EMERGENCY DEPARTMENT  1/4/2025     Nirav Basilio MD  01/04/25 1700

## 2025-01-06 LAB — BACTERIA UR CULT: NO GROWTH

## 2025-01-20 PROBLEM — R87.810 CERVICAL HIGH RISK HPV (HUMAN PAPILLOMAVIRUS) TEST POSITIVE: Status: ACTIVE | Noted: 2023-08-02

## 2025-03-03 ENCOUNTER — OFFICE VISIT (OUTPATIENT)
Dept: OBGYN | Facility: CLINIC | Age: 35
End: 2025-03-03
Payer: COMMERCIAL

## 2025-03-03 VITALS
TEMPERATURE: 98.1 F | DIASTOLIC BLOOD PRESSURE: 86 MMHG | RESPIRATION RATE: 16 BRPM | BODY MASS INDEX: 25.51 KG/M2 | SYSTOLIC BLOOD PRESSURE: 120 MMHG | WEIGHT: 144 LBS | HEART RATE: 97 BPM | OXYGEN SATURATION: 99 %

## 2025-03-03 DIAGNOSIS — R87.810 CERVICAL HIGH RISK HPV (HUMAN PAPILLOMAVIRUS) TEST POSITIVE: Primary | ICD-10-CM

## 2025-03-03 DIAGNOSIS — N32.81 OAB (OVERACTIVE BLADDER): ICD-10-CM

## 2025-03-03 DIAGNOSIS — Z12.4 CERVICAL CANCER SCREENING: ICD-10-CM

## 2025-03-03 LAB
ALBUMIN UR-MCNC: NEGATIVE MG/DL
APPEARANCE UR: CLEAR
BACTERIA #/AREA URNS HPF: ABNORMAL /HPF
BILIRUB UR QL STRIP: NEGATIVE
COLOR UR AUTO: YELLOW
GLUCOSE UR STRIP-MCNC: NEGATIVE MG/DL
HCG UR QL: NEGATIVE
HGB UR QL STRIP: ABNORMAL
KETONES UR STRIP-MCNC: NEGATIVE MG/DL
LEUKOCYTE ESTERASE UR QL STRIP: NEGATIVE
NITRATE UR QL: NEGATIVE
PH UR STRIP: 5.5 [PH] (ref 5–7)
RBC #/AREA URNS AUTO: ABNORMAL /HPF
SP GR UR STRIP: >=1.03 (ref 1–1.03)
SQUAMOUS #/AREA URNS AUTO: ABNORMAL /LPF
UROBILINOGEN UR STRIP-ACNC: 0.2 E.U./DL
WBC #/AREA URNS AUTO: ABNORMAL /HPF

## 2025-03-03 PROCEDURE — 81001 URINALYSIS AUTO W/SCOPE: CPT | Performed by: OBSTETRICS & GYNECOLOGY

## 2025-03-03 PROCEDURE — 81025 URINE PREGNANCY TEST: CPT | Performed by: OBSTETRICS & GYNECOLOGY

## 2025-03-03 PROCEDURE — 3079F DIAST BP 80-89 MM HG: CPT | Performed by: OBSTETRICS & GYNECOLOGY

## 2025-03-03 PROCEDURE — 57456 ENDOCERV CURETTAGE W/SCOPE: CPT | Performed by: OBSTETRICS & GYNECOLOGY

## 2025-03-03 PROCEDURE — 3074F SYST BP LT 130 MM HG: CPT | Performed by: OBSTETRICS & GYNECOLOGY

## 2025-03-03 PROCEDURE — 88305 TISSUE EXAM BY PATHOLOGIST: CPT | Performed by: PATHOLOGY

## 2025-03-03 NOTE — PATIENT INSTRUCTIONS

## 2025-03-04 NOTE — PROGRESS NOTES
GYN Procedure Note   Office Colposcopy     CC: Evaluation of a NIL pap smear with +HR HPV     HPI:  Patient's pap smear history is as follows:  2017 NIL pap  8/2/23 NIL pap, +HR HPV, not 16/18. Plan 1 yr co-test  08/09/24 NIL Pap, +HR HPV (not 16 or 18) Plan [place] due bef 11/09/24 08/14/24 Pt was advised.  10/3/24 Reminder MyChart  10/28/24 [place] not done. Tracking updated for 6 mo colp/pap due 2/9/25.   3/3/25 [place] scheduled    Urine HCG: Neg     PROCEDURE NOTE:    We discussed that based on her pap testing we recommend further evaluation. She understands our recommendation to proceed with colposcopy. We discussed that colposcopy allows further evaluation of the cervix using magnification. I counseled the patient that biopsies may also be done at the time of the colposcopy. I discussed the procedure of colposcopy and counseled her that the colposcope magnifies the appearance of the cervix. Acetic acid or vinegar is placed on the cervix and vagina to stain the cells and to allow the clinician to better see where the abnormal cells are located and the size of any abnormal areas. The size, type and location of abnormal cells help to determine which area or areas may need to be biopsied. This information will further determine how severe the abnormality is and also help to determine what treatment, if any, is needed. When monitored and treated early, pre-cancerous areas usually do not develop into cervical cancer.       Written consent was obtained after ASCCP management guidelines were discussed and all patient's questions were answered. Final verification with two patient identifiers was performed.       Colposcopic exam of the cervix was performed before and after application of 3% acetic acid.  Colposcopy was not adequate as the SCJ was not visualized.    Acetowhite lesions: none  Punctations: none  Mosaicism: none  Atypical vessels: none    no biopsy(ies) taken. ECC done with Kevorkian curette and endocervical  brush.       Assessment/Plan  -Evaluation of a NIL with +HR HPV pap smear   -Pap smear and ECC obtained, will contact patient with results and follow up plan   -Recommend pelvic rest for 72 hours, discussed bleeding/infection    Dispo: pending results of biopsy     Mel Gaspar MD

## 2025-03-10 ENCOUNTER — TELEPHONE (OUTPATIENT)
Dept: OBGYN | Facility: CLINIC | Age: 35
End: 2025-03-10
Payer: COMMERCIAL

## 2025-03-10 DIAGNOSIS — R31.9 HEMATURIA, UNSPECIFIED TYPE: ICD-10-CM

## 2025-03-10 DIAGNOSIS — N32.81 OAB (OVERACTIVE BLADDER): Primary | ICD-10-CM

## 2025-03-10 NOTE — TELEPHONE ENCOUNTER
"Pt returned call to clinic  Gave Fax number to send urology referral  [facility]  Fax: [phone]     Per Dr. Gaspar-\"During Chhaya's colposcopy, we sent a UA due to some over active bladder symptoms. The UA was negative for infection but did show blood in her urine (which she has had before and has been worked up). During her colposcopy we discussed a referral to urology to discuss her OAB symptoms as well as the hematuria and make sure she doesn't need further evaluation. She recently had her insurance change and would like to get a referral for a provider that is covered by her insurance.     Can an RN reach out to her and see which providers are covered (she may have to call her insurance or go their website) and then fax over the referral once we have that info.     Put in standard urology referral since can't find [facility] in [place]- and will fax to [facility] above she said to send to the number above and they \"will get it where it needs to go\" it looks like her FP is there?  Pended referral to make sure correct  Routing to provider to advise.  Sameera Jarvis, RN on 3/10/2025 at 4:16 PM    "

## 2025-03-11 ENCOUNTER — PATIENT OUTREACH (OUTPATIENT)
Dept: OBGYN | Facility: CLINIC | Age: 35
End: 2025-03-11
Payer: COMMERCIAL

## 2025-03-11 LAB
HPV HR 12 DNA CVX QL NAA+PROBE: POSITIVE
HPV16 DNA CVX QL NAA+PROBE: NEGATIVE
HPV18 DNA CVX QL NAA+PROBE: NEGATIVE
HUMAN PAPILLOMA VIRUS FINAL DIAGNOSIS: ABNORMAL

## (undated) DEVICE — PAD PERI INDIV WRAP 11" 2022A

## (undated) DEVICE — NDL INSUFFLATION 13GA 150MM C2202

## (undated) DEVICE — LINEN GOWN X4 5410

## (undated) DEVICE — LINEN TOWEL PACK X5 5464

## (undated) DEVICE — SU DERMABOND ADVANCED .7ML DNX12

## (undated) DEVICE — PREP DYNA-HEX 4% CHG SCRUB 4OZ BOTTLE MDS098710

## (undated) DEVICE — JELLY LUBRICATING SURGILUBE 2OZ TUBE 0281-0205-02

## (undated) DEVICE — TRAY PREP DRY SKIN SCRUB 067

## (undated) DEVICE — SU VICRYL 4-0 PS-2 18" UND J496H

## (undated) DEVICE — SOL WATER IRRIG 1000ML BOTTLE 2F7114

## (undated) DEVICE — SUCTION MANIFOLD NEPTUNE 2 SYS 4 PORT 0702-020-000

## (undated) DEVICE — DRSG STERI STRIP 1/2X4" R1547

## (undated) DEVICE — CATH TRAY FOLEY SURESTEP 16FR WDRAIN BAG STLK LATEX A300316A

## (undated) DEVICE — SOL NACL 0.9% INJ 1000ML BAG 2B1324X

## (undated) DEVICE — PANTIES MESH LG/XLG 2PK 706M2

## (undated) DEVICE — Device

## (undated) DEVICE — RX SURGIFLO HEMOSTATIC MATRIX W/THROMBIN 8ML 2994

## (undated) DEVICE — DRSG PRIMAPORE 02X3" 7133

## (undated) DEVICE — TUBING SMOKE EVAC PNEUMOCLEAR HEATED 0620050350

## (undated) DEVICE — ESU HOLDER LAP INST DISP PURPLE LONG 330MM H-PRO-330

## (undated) DEVICE — SOL NACL 0.9% IRRIG 1000ML BOTTLE 2F7124

## (undated) DEVICE — GLOVE BIOGEL PI MICRO INDICATOR UNDERGLOVE SZ 7.0 48970

## (undated) DEVICE — STRAP KNEE/BODY 31143004

## (undated) DEVICE — ENDO DISSECTOR BLUNT 05MM  BTD05

## (undated) DEVICE — PAD CHUX UNDERPAD 30X36" P3036C

## (undated) DEVICE — ENDO TROCAR FIRST ENTRY KII FIOS ADV FIX 11X100MM CFF33

## (undated) DEVICE — ENDO SCOPE WARMER LF TM500

## (undated) DEVICE — GLOVE BIOGEL PI ULTRATOUCH G SZ 6.5 42165

## (undated) DEVICE — GOWN XLG DISP 9545

## (undated) DEVICE — SUCTION IRR STRYKERFLOW II W/TIP 250-070-520

## (undated) DEVICE — ENDO TROCAR FIRST ENTRY KII FIOS ADV FIX 05X100MM CFF03

## (undated) DEVICE — ENDO TROCAR SLEEVE KII ADV FIXATION 05X100MM CFS02

## (undated) RX ORDER — CITRIC ACID/SODIUM CITRATE 334-500MG
SOLUTION, ORAL ORAL
Status: DISPENSED
Start: 2024-05-29

## (undated) RX ORDER — ONDANSETRON 2 MG/ML
INJECTION INTRAMUSCULAR; INTRAVENOUS
Status: DISPENSED
Start: 2024-05-29

## (undated) RX ORDER — FENTANYL CITRATE 50 UG/ML
INJECTION, SOLUTION INTRAMUSCULAR; INTRAVENOUS
Status: DISPENSED
Start: 2024-05-29

## (undated) RX ORDER — FENTANYL CITRATE-0.9 % NACL/PF 10 MCG/ML
PLASTIC BAG, INJECTION (ML) INTRAVENOUS
Status: DISPENSED
Start: 2024-05-29

## (undated) RX ORDER — MEPERIDINE HYDROCHLORIDE 25 MG/ML
INJECTION INTRAMUSCULAR; INTRAVENOUS; SUBCUTANEOUS
Status: DISPENSED
Start: 2024-05-29

## (undated) RX ORDER — OXYCODONE HYDROCHLORIDE 5 MG/1
TABLET ORAL
Status: DISPENSED
Start: 2024-05-29

## (undated) RX ORDER — DEXAMETHASONE SODIUM PHOSPHATE 4 MG/ML
INJECTION, SOLUTION INTRA-ARTICULAR; INTRALESIONAL; INTRAMUSCULAR; INTRAVENOUS; SOFT TISSUE
Status: DISPENSED
Start: 2024-05-29

## (undated) RX ORDER — PROPOFOL 10 MG/ML
INJECTION, EMULSION INTRAVENOUS
Status: DISPENSED
Start: 2024-05-29